# Patient Record
Sex: FEMALE | Race: WHITE | NOT HISPANIC OR LATINO | Employment: UNEMPLOYED | URBAN - NONMETROPOLITAN AREA
[De-identification: names, ages, dates, MRNs, and addresses within clinical notes are randomized per-mention and may not be internally consistent; named-entity substitution may affect disease eponyms.]

---

## 2017-12-19 ENCOUNTER — OFFICE VISIT (OUTPATIENT)
Dept: RETAIL CLINIC | Facility: CLINIC | Age: 37
End: 2017-12-19

## 2017-12-19 DIAGNOSIS — Z53.21 PATIENT LEFT WITHOUT BEING SEEN: Primary | ICD-10-CM

## 2017-12-19 PROCEDURE — 87081 CULTURE SCREEN ONLY: CPT | Performed by: NURSE PRACTITIONER

## 2017-12-19 NOTE — PROGRESS NOTES
Patient and son both presented to be seen.  A higher level of care was recommended for her son. She is taking him to urgent care and will be seen there too. Visit here cancelled.

## 2018-05-17 ENCOUNTER — OFFICE VISIT (OUTPATIENT)
Dept: RETAIL CLINIC | Facility: CLINIC | Age: 38
End: 2018-05-17

## 2018-05-17 VITALS
WEIGHT: 150 LBS | SYSTOLIC BLOOD PRESSURE: 140 MMHG | DIASTOLIC BLOOD PRESSURE: 80 MMHG | HEART RATE: 84 BPM | RESPIRATION RATE: 18 BRPM | OXYGEN SATURATION: 97 % | TEMPERATURE: 98.8 F

## 2018-05-17 DIAGNOSIS — L73.2 HIDRADENITIS: Primary | ICD-10-CM

## 2018-05-17 PROCEDURE — 99214 OFFICE O/P EST MOD 30 MIN: CPT | Performed by: NURSE PRACTITIONER

## 2018-05-17 RX ORDER — CEPHALEXIN 500 MG/1
500 CAPSULE ORAL 3 TIMES DAILY
Qty: 30 CAPSULE | Refills: 0 | Status: SHIPPED | OUTPATIENT
Start: 2018-05-17 | End: 2018-05-27

## 2018-05-17 NOTE — PATIENT INSTRUCTIONS
Hidradenitis Suppurativa  Hidradenitis suppurativa is a long-term (chronic) skin disease that starts with blocked sweat glands or hair follicles. Bacteria may grow in these blocked openings of your skin. Hidradenitis suppurativa is like a severe form of acne that develops in areas of your body where acne would be unusual. It is most likely to affect the areas of your body where skin rubs against skin and becomes moist. This includes your:  · Underarms.  · Groin.  · Genital areas.  · Buttocks.  · Upper thighs.  · Breasts.  Hidradenitis suppurativa may start out with small pimples. The pimples can develop into deep sores that break open (rupture) and drain pus. Over time your skin may thicken and become scarred. Hidradenitis suppurativa cannot be passed from person to person.  What are the causes?  The exact cause of hidradenitis suppurativa is not known. This condition may be due to:  · Male and female hormones. The condition is rare before and after puberty.  · An overactive body defense system (immune system). Your immune system may overreact to the blocked hair follicles or sweat glands and cause swelling and pus-filled sores.  What increases the risk?  You may have a higher risk of hidradenitis suppurativa if you:  · Are a woman.  · Are between ages 11 and 55.  · Have a family history of hidradenitis suppurativa.  · Have a personal history of acne.  · Are overweight.  · Smoke.  · Take the drug lithium.  What are the signs or symptoms?  The first signs of an outbreak are usually painful skin bumps that look like pimples. As the condition progresses:  · Skin bumps may get bigger and grow deeper into the skin.  · Bumps under the skin may rupture and drain smelly pus.  · Skin may become itchy and infected.  · Skin may thicken and scar.  · Drainage may continue through tunnels under the skin (fistulas).  · Walking and moving your arms can become painful.  How is this diagnosed?  Your health care provider may diagnose  hidradenitis suppurativa based on your medical history and your signs and symptoms. A physical exam will also be done. You may need to see a health care provider who specializes in skin diseases (dermatologist). You may also have tests done to confirm the diagnosis. These can include:  · Swabbing a sample of pus or drainage from your skin so it can be sent to the lab and tested for infection.  · Blood tests to check for infection.  How is this treated?  The same treatment will not work for everybody with hidradenitis suppurativa. Your treatment will depend on how severe your symptoms are. You may need to try several treatments to find what works best for you. Part of your treatment may include cleaning and bandaging (dressing) your wounds. You may also have to take medicines, such as the following:  · Antibiotics.  · Acne medicines.  · Medicines to block or suppress the immune system.  · A diabetes medicine (metformin) is sometimes used to treat this condition.  · For women, birth control pills can sometimes help relieve symptoms.  You may need surgery if you have a severe case of hidradenitis suppurativa that does not respond to medicine. Surgery may involve:  · Using a laser to clear the skin and remove hair follicles.  · Opening and draining deep sores.  · Removing the areas of skin that are diseased and scarred.  Follow these instructions at home:  · Learn as much as you can about your disease, and work closely with your health care providers.  · Take medicines only as directed by your health care provider.  · If you were prescribed an antibiotic medicine, finish it all even if you start to feel better.  · If you are overweight, losing weight may be very helpful. Try to reach and maintain a healthy weight.  · Do not use any tobacco products, including cigarettes, chewing tobacco, or electronic cigarettes. If you need help quitting, ask your health care provider.  · Do not shave the areas where you get  hidradenitis suppurativa.  · Do not wear deodorant.  · Wear loose-fitting clothes.  · Try not to overheat and get sweaty.  · Take a daily bleach bath as directed by your health care provider.  ¨ Fill your bathtub snf with water.  ¨ Pour in ½ cup of unscented household bleach.  ¨ Soak for 5-10 minutes.  · Cover sore areas with a warm, clean washcloth (compress) for 5-10 minutes.  Contact a health care provider if:  · You have a flare-up of hidradenitis suppurativa.  · You have chills or a fever.  · You are having trouble controlling your symptoms at home.  This information is not intended to replace advice given to you by your health care provider. Make sure you discuss any questions you have with your health care provider.  Document Released: 08/01/2005 Document Revised: 05/25/2017 Document Reviewed: 03/20/2015  [employer] Interactive Patient Education © 2017 [employer]

## 2018-05-17 NOTE — PROGRESS NOTES
Subjective   Rachana Smart is a 38 y.o. female.     Swollen glands under arms      Rash   The current episode started in the past 7 days. The problem has been gradually worsening since onset. The affected locations include the right axilla and left axilla. The rash is characterized by redness, swelling and pain. Associated symptoms include fatigue and a fever (last week; has resolved now). Pertinent negatives include no shortness of breath. Treatments tried: warm compresses. The treatment provided no relief.        The following portions of the patient's history were reviewed and updated as appropriate: allergies, current medications, past family history, past medical history, past social history, past surgical history and problem list.    Review of Systems   Constitutional: Positive for chills (last week), fatigue and fever (last week; has resolved now).   Respiratory: Negative for chest tightness and shortness of breath.    Cardiovascular: Negative for chest pain.   Gastrointestinal: Negative for nausea.   Musculoskeletal: Positive for myalgias. Negative for neck pain and neck stiffness.   Skin: Positive for rash.   Allergic/Immunologic: Positive for environmental allergies.   Hematological: Positive for adenopathy.       Objective      /80   Pulse 84   Temp 98.8 °F (37.1 °C) (Tympanic)   Resp 18   Wt 68 kg (150 lb)   LMP  (Within Weeks)   SpO2 97%   Breastfeeding? No     Physical Exam   Constitutional: She is oriented to person, place, and time. She appears well-developed and well-nourished. No distress.   HENT:   Head: Normocephalic and atraumatic.   Right Ear: External ear normal.   Left Ear: External ear normal.   Eyes: Conjunctivae and EOM are normal. Pupils are equal, round, and reactive to light.   Neck: Normal range of motion. Neck supple.   Cardiovascular: Normal rate and regular rhythm.    Pulmonary/Chest: Effort normal and breath sounds normal.   Musculoskeletal: Normal range of motion.    Neurological: She is alert and oriented to person, place, and time. No cranial nerve deficit.   Skin: Skin is warm and dry. Capillary refill takes less than 2 seconds. Rash noted.   pea- to marble-sized tender lumps under the skin of armpits bilateral; none are presently draining   Psychiatric: She has a normal mood and affect. Her behavior is normal. Judgment and thought content normal.   Nursing note and vitals reviewed.        Assessment/Plan   Rachana was seen today for rash.    Diagnoses and all orders for this visit:    Hidradenitis    Other orders  -     cephalexin (KEFLEX) 500 MG capsule; Take 1 capsule by mouth 3 (Three) Times a Day for 10 days.    Follow up with PCP if symptoms persist or worsen.    PRIYANK Merlos

## 2018-09-25 ENCOUNTER — OFFICE VISIT (OUTPATIENT)
Dept: RETAIL CLINIC | Facility: CLINIC | Age: 38
End: 2018-09-25

## 2018-09-25 VITALS
HEART RATE: 97 BPM | WEIGHT: 146 LBS | DIASTOLIC BLOOD PRESSURE: 84 MMHG | OXYGEN SATURATION: 98 % | TEMPERATURE: 98.4 F | RESPIRATION RATE: 18 BRPM | HEIGHT: 67 IN | BODY MASS INDEX: 22.91 KG/M2 | SYSTOLIC BLOOD PRESSURE: 118 MMHG

## 2018-09-25 DIAGNOSIS — L73.2 HIDRADENITIS: ICD-10-CM

## 2018-09-25 DIAGNOSIS — J02.9 ACUTE PHARYNGITIS, UNSPECIFIED ETIOLOGY: Primary | ICD-10-CM

## 2018-09-25 PROCEDURE — 99213 OFFICE O/P EST LOW 20 MIN: CPT | Performed by: NURSE PRACTITIONER

## 2018-09-25 RX ORDER — CEPHALEXIN 500 MG/1
500 CAPSULE ORAL 3 TIMES DAILY
Qty: 30 CAPSULE | Refills: 0 | Status: SHIPPED | OUTPATIENT
Start: 2018-09-25 | End: 2018-10-05

## 2018-09-25 NOTE — PROGRESS NOTES
Chief Complaint   Patient presents with   • Sore Throat   • Earache   • bumps under arms     Subjective   Rachana Smart is a 38 y.o. female who presents to the clinic today with complaints of sore throat, possible fever, mild ear pain. She also has tender bumps under her arms which she has had in the past. She reports they always seem to flare up when she feels bad.  Her son tested positive for strep today.   Sore Throat    This is a new problem. The current episode started today. The problem has been gradually worsening. Maximum temperature: not sure. Associated symptoms include ear pain. Pertinent negatives include no abdominal pain, congestion, coughing, diarrhea, headaches, shortness of breath, trouble swallowing or vomiting. She has had exposure to strep.   Earache    Associated symptoms include a rash (sore bumps under arms) and a sore throat. Pertinent negatives include no abdominal pain, coughing, diarrhea, headaches, rhinorrhea or vomiting.     Current Outpatient Prescriptions:   •  fluticasone (FLONASE) 50 MCG/ACT nasal spray, 2 sprays into each nostril Daily., Disp: 1 bottle, Rfl: 0  •  lisinopril (PRINIVIL,ZESTRIL) 10 MG tablet, Take 10 mg by mouth Daily., Disp: , Rfl:   •  LISINOPRIL PO, Take  by mouth., Disp: , Rfl:   •  loratadine (CLARITIN) 10 MG tablet, Take 1 tablet by mouth Daily., Disp: 30 tablet, Rfl: 0    Allergies:  Codeine    Past Medical History:   Diagnosis Date   • Allergic    • Anxiety    • Anxiety    • Asthma    • Gestational diabetes    • Heart murmur    • History of ear infections    • Hypertension    • Sinusitis      Past Surgical History:   Procedure Laterality Date   •  SECTION       Family History   Problem Relation Age of Onset   • Diabetes Mother    • Heart disease Father    • Diabetes Father    • Stroke Father      Social History   Substance Use Topics   • Smoking status: Current Every Day Smoker     Packs/day: 0.50     Years: 10.00   • Smokeless tobacco: Never Used   •  "Alcohol use Yes      Comment: 2/daily       Review of Systems  Review of Systems   Constitutional: Positive for chills and fever (possibly, not sure).   HENT: Positive for ear pain and sore throat. Negative for congestion, postnasal drip, rhinorrhea, sinus pain, sinus pressure and trouble swallowing.    Eyes: Negative.    Respiratory: Negative for cough and shortness of breath.    Gastrointestinal: Negative for abdominal pain, constipation, diarrhea, nausea and vomiting.   Musculoskeletal: Positive for arthralgias (generalized this morning).   Skin: Positive for rash (sore bumps under arms).   Neurological: Negative for headaches.       Objective   /84   Pulse 97   Temp 98.4 °F (36.9 °C) (Oral)   Resp 18   Ht 170.2 cm (67\")   Wt 66.2 kg (146 lb)   LMP 09/18/2018 (Exact Date)   SpO2 98%   BMI 22.87 kg/m²       Physical Exam   Constitutional: She is oriented to person, place, and time. She appears well-developed and well-nourished. She is cooperative. She appears ill (mildly). No distress.   HENT:   Head: Normocephalic and atraumatic.   Right Ear: Tympanic membrane, external ear and ear canal normal.   Left Ear: Tympanic membrane, external ear and ear canal normal.   Nose: Nose normal. No sinus tenderness.   Mouth/Throat: Uvula is midline and mucous membranes are normal. Posterior oropharyngeal erythema present. Tonsils are 2+ on the right. Tonsils are 2+ on the left. No tonsillar exudate.   Eyes: Pupils are equal, round, and reactive to light. Conjunctivae, EOM and lids are normal.   Neck: Trachea normal and normal range of motion. Neck supple.   Cardiovascular: Normal rate, regular rhythm, S1 normal, S2 normal and normal heart sounds.    Pulmonary/Chest: Effort normal and breath sounds normal. She has no wheezes. She has no rhonchi. She has no rales.   Lymphadenopathy:     She has no cervical adenopathy.   Neurological: She is alert and oriented to person, place, and time. Coordination and gait normal. "   Skin: Skin is warm, dry and intact.        Psychiatric: She has a normal mood and affect. Her speech is normal and behavior is normal.   Vitals reviewed.      Assessment/Plan     Rachana was seen today for sore throat, earache and bumps under arms.    Diagnoses and all orders for this visit:    Acute pharyngitis, unspecified etiology    Hidradenitis    Other orders  -     cephalexin (KEFLEX) 500 MG capsule; Take 1 capsule by mouth 3 (Three) Times a Day for 10 days.    Take Tylenol or Ibuprofen if needed for pain or fever.    An antibiotic has been prescribed for you that needs to be taken as directed for the full length of treatment. It is recommended that you take a probiotic when taking an antibiotic. Probiotics are found over the counter in pill form and in some brands of yogurt.    Change your razor blade.  Change your toothbrush after 48 hours.   Apply warm moist compresses under your arms for 20 minutes twice a day for the next few days.     If symptoms are not improving after 48 hours please see your primary care provider.

## 2018-09-25 NOTE — PATIENT INSTRUCTIONS
Pharyngitis  Pharyngitis is redness, pain, and swelling (inflammation) of the throat (pharynx). It is a very common cause of sore throat. Pharyngitis can be caused by a bacteria, but it is usually caused by a virus. Most cases of pharyngitis get better on their own without treatment.  What are the causes?  This condition may be caused by:  · Infection by viruses (viral). Viral pharyngitis spreads from person to person (is contagious) through coughing, sneezing, and sharing of personal items or utensils such as cups, forks, spoons, and toothbrushes.  · Infection by bacteria (bacterial). Bacterial pharyngitis may be spread by touching the nose or face after coming in contact with the bacteria, or through more intimate contact, such as kissing.  · Allergies. Allergies can cause buildup of mucus in the throat (post-nasal drip), leading to inflammation and irritation. Allergies can also cause blocked nasal passages, forcing breathing through the mouth, which dries and irritates the throat.    What increases the risk?  You are more likely to develop this condition if:  · You are 5-24 years old.  · You are exposed to crowded environments such as , school, or dormitory living.  · You live in a cold climate.  · You have a weakened disease-fighting (immune) system.    What are the signs or symptoms?  Symptoms of this condition vary by the cause (viral, bacterial, or allergies) and can include:  · Sore throat.  · Fatigue.  · Low-grade fever.  · Headache.  · Joint pain and muscle aches.  · Skin rashes.  · Swollen glands in the throat (lymph nodes).  · Plaque-like film on the throat or tonsils. This is often a symptom of bacterial pharyngitis.  · Vomiting.  · Stuffy nose (nasal congestion).  · Cough.  · Red, itchy eyes (conjunctivitis).  · Loss of appetite.    How is this diagnosed?  This condition is often diagnosed based on your medical history and a physical exam. Your health care provider will ask you questions about  your illness and your symptoms. A swab of your throat may be done to check for bacteria (rapid strep test). Other lab tests may also be done, depending on the suspected cause, but these are rare.  How is this treated?  This condition usually gets better in 3-4 days without medicine. Bacterial pharyngitis may be treated with antibiotic medicines.  Follow these instructions at home:  · Take over-the-counter and prescription medicines only as told by your health care provider.  ? If you were prescribed an antibiotic medicine, take it as told by your health care provider. Do not stop taking the antibiotic even if you start to feel better.  ? Do not give children aspirin because of the association with Reye syndrome.  · Drink enough water and fluids to keep your urine clear or pale yellow.  · Get a lot of rest.  · Gargle with a salt-water mixture 3-4 times a day or as needed. To make a salt-water mixture, completely dissolve ½-1 tsp of salt in 1 cup of warm water.  · If your health care provider approves, you may use throat lozenges or sprays to soothe your throat.  Contact a health care provider if:  · You have large, tender lumps in your neck.  · You have a rash.  · You cough up green, yellow-brown, or bloody spit.  Get help right away if:  · Your neck becomes stiff.  · You drool or are unable to swallow liquids.  · You cannot drink or take medicines without vomiting.  · You have severe pain that does not go away, even after you take medicine.  · You have trouble breathing, and it is not caused by a stuffy nose.  · You have new pain and swelling in your joints such as the knees, ankles, wrists, or elbows.  Summary  · Pharyngitis is redness, pain, and swelling (inflammation) of the throat (pharynx).  · While pharyngitis can be caused by a bacteria, the most common causes are viral.  · Most cases of pharyngitis get better on their own without treatment.  · Bacterial pharyngitis is treated with antibiotic medicines.  This  information is not intended to replace advice given to you by your health care provider. Make sure you discuss any questions you have with your health care provider.  Document Released: 12/18/2006 Document Revised: 01/23/2018 Document Reviewed: 01/23/2018  [employer] Interactive Patient Education © 2018 [employer]    Hidradenitis Suppurativa  Hidradenitis suppurativa is a long-term (chronic) skin disease that starts with blocked sweat glands or hair follicles. Bacteria may grow in these blocked openings of your skin. Hidradenitis suppurativa is like a severe form of acne that develops in areas of your body where acne would be unusual. It is most likely to affect the areas of your body where skin rubs against skin and becomes moist. This includes your:  · Underarms.  · Groin.  · Genital areas.  · Buttocks.  · Upper thighs.  · Breasts.    Hidradenitis suppurativa may start out with small pimples. The pimples can develop into deep sores that break open (rupture) and drain pus. Over time your skin may thicken and become scarred. Hidradenitis suppurativa cannot be passed from person to person.  What are the causes?  The exact cause of hidradenitis suppurativa is not known. This condition may be due to:  · Male and female hormones. The condition is rare before and after puberty.  · An overactive body defense system (immune system). Your immune system may overreact to the blocked hair follicles or sweat glands and cause swelling and pus-filled sores.    What increases the risk?  You may have a higher risk of hidradenitis suppurativa if you:  · Are a woman.  · Are between ages 11 and 55.  · Have a family history of hidradenitis suppurativa.  · Have a personal history of acne.  · Are overweight.  · Smoke.  · Take the drug lithium.    What are the signs or symptoms?  The first signs of an outbreak are usually painful skin bumps that look like pimples. As the condition progresses:  · Skin bumps may get bigger and grow deeper  into the skin.  · Bumps under the skin may rupture and drain smelly pus.  · Skin may become itchy and infected.  · Skin may thicken and scar.  · Drainage may continue through tunnels under the skin (fistulas).  · Walking and moving your arms can become painful.    How is this diagnosed?  Your health care provider may diagnose hidradenitis suppurativa based on your medical history and your signs and symptoms. A physical exam will also be done. You may need to see a health care provider who specializes in skin diseases (dermatologist). You may also have tests done to confirm the diagnosis. These can include:  · Swabbing a sample of pus or drainage from your skin so it can be sent to the lab and tested for infection.  · Blood tests to check for infection.    How is this treated?  The same treatment will not work for everybody with hidradenitis suppurativa. Your treatment will depend on how severe your symptoms are. You may need to try several treatments to find what works best for you. Part of your treatment may include cleaning and bandaging (dressing) your wounds. You may also have to take medicines, such as the following:  · Antibiotics.  · Acne medicines.  · Medicines to block or suppress the immune system.  · A diabetes medicine (metformin) is sometimes used to treat this condition.  · For women, birth control pills can sometimes help relieve symptoms.    You may need surgery if you have a severe case of hidradenitis suppurativa that does not respond to medicine. Surgery may involve:  · Using a laser to clear the skin and remove hair follicles.  · Opening and draining deep sores.  · Removing the areas of skin that are diseased and scarred.    Follow these instructions at home:  · Learn as much as you can about your disease, and work closely with your health care providers.  · Take medicines only as directed by your health care provider.  · If you were prescribed an antibiotic medicine, finish it all even if you  start to feel better.  · If you are overweight, losing weight may be very helpful. Try to reach and maintain a healthy weight.  · Do not use any tobacco products, including cigarettes, chewing tobacco, or electronic cigarettes. If you need help quitting, ask your health care provider.  · Do not shave the areas where you get hidradenitis suppurativa.  · Do not wear deodorant.  · Wear loose-fitting clothes.  · Try not to overheat and get sweaty.  · Take a daily bleach bath as directed by your health care provider.  ? Fill your bathtub skilled nursing with water.  ? Pour in ½ cup of unscented household bleach.  ? Soak for 5-10 minutes.  · Cover sore areas with a warm, clean washcloth (compress) for 5-10 minutes.  Contact a health care provider if:  · You have a flare-up of hidradenitis suppurativa.  · You have chills or a fever.  · You are having trouble controlling your symptoms at home.  This information is not intended to replace advice given to you by your health care provider. Make sure you discuss any questions you have with your health care provider.  Document Released: 08/01/2005 Document Revised: 05/25/2017 Document Reviewed: 03/20/2015  [employer] Patient Education © 2018 [employer]    Take Tylenol or Ibuprofen if needed for pain or fever.    An antibiotic has been prescribed for you that needs to be taken as directed for the full length of treatment. It is recommended that you take a probiotic when taking an antibiotic. Probiotics are found over the counter in pill form and in some brands of yogurt.      Change your razor blade.  Change your toothbrush after 48 hours.   Apply warm moist compresses under your arms for 20 minutes twice a day for the next few days.     If symptoms are not improving after 48 hours please see your primary care provider.

## 2018-12-18 ENCOUNTER — OFFICE VISIT (OUTPATIENT)
Dept: RETAIL CLINIC | Facility: CLINIC | Age: 38
End: 2018-12-18

## 2018-12-18 VITALS
BODY MASS INDEX: 23.18 KG/M2 | SYSTOLIC BLOOD PRESSURE: 140 MMHG | TEMPERATURE: 99.1 F | RESPIRATION RATE: 20 BRPM | DIASTOLIC BLOOD PRESSURE: 92 MMHG | HEART RATE: 80 BPM | OXYGEN SATURATION: 98 % | WEIGHT: 148 LBS

## 2018-12-18 DIAGNOSIS — J02.9 SORE THROAT: Primary | ICD-10-CM

## 2018-12-18 DIAGNOSIS — L73.2 HIDRADENITIS: ICD-10-CM

## 2018-12-18 DIAGNOSIS — J06.9 ACUTE URI: ICD-10-CM

## 2018-12-18 LAB
EXPIRATION DATE: NORMAL
EXPIRATION DATE: NORMAL
FLUAV AG NPH QL: NEGATIVE
FLUBV AG NPH QL: NEGATIVE
INTERNAL CONTROL: NORMAL
INTERNAL CONTROL: NORMAL
Lab: NORMAL
Lab: NORMAL
S PYO AG THROAT QL: NEGATIVE

## 2018-12-18 PROCEDURE — 87804 INFLUENZA ASSAY W/OPTIC: CPT | Performed by: NURSE PRACTITIONER

## 2018-12-18 PROCEDURE — 87880 STREP A ASSAY W/OPTIC: CPT | Performed by: NURSE PRACTITIONER

## 2018-12-18 PROCEDURE — 99213 OFFICE O/P EST LOW 20 MIN: CPT | Performed by: NURSE PRACTITIONER

## 2018-12-18 RX ORDER — CEPHALEXIN 500 MG/1
500 CAPSULE ORAL 3 TIMES DAILY
Qty: 30 CAPSULE | Refills: 0 | Status: SHIPPED | OUTPATIENT
Start: 2018-12-18 | End: 2018-12-28

## 2018-12-18 NOTE — PATIENT INSTRUCTIONS
"Upper Respiratory Infection, Adult  Most upper respiratory infections (URIs) are a viral infection of the air passages leading to the lungs. A URI affects the nose, throat, and upper air passages. The most common type of URI is nasopharyngitis and is typically referred to as \"the common cold.\"  URIs run their course and usually go away on their own. Most of the time, a URI does not require medical attention, but sometimes a bacterial infection in the upper airways can follow a viral infection. This is called a secondary infection. Sinus and middle ear infections are common types of secondary upper respiratory infections.  Bacterial pneumonia can also complicate a URI. A URI can worsen asthma and chronic obstructive pulmonary disease (COPD). Sometimes, these complications can require emergency medical care and may be life threatening.  What are the causes?  Almost all URIs are caused by viruses. A virus is a type of germ and can spread from one person to another.  What increases the risk?  You may be at risk for a URI if:  · You smoke.  · You have chronic heart or lung disease.  · You have a weakened defense (immune) system.  · You are very young or very old.  · You have nasal allergies or asthma.  · You work in crowded or poorly ventilated areas.  · You work in health care facilities or schools.    What are the signs or symptoms?  Symptoms typically develop 2-3 days after you come in contact with a cold virus. Most viral URIs last 7-10 days. However, viral URIs from the influenza virus (flu virus) can last 14-18 days and are typically more severe. Symptoms may include:  · Runny or stuffy (congested) nose.  · Sneezing.  · Cough.  · Sore throat.  · Headache.  · Fatigue.  · Fever.  · Loss of appetite.  · Pain in your forehead, behind your eyes, and over your cheekbones (sinus pain).  · Muscle aches.    How is this diagnosed?  Your health care provider may diagnose a URI by:  · Physical exam.  · Tests to check that your " symptoms are not due to another condition such as:  ? Strep throat.  ? Sinusitis.  ? Pneumonia.  ? Asthma.    How is this treated?  A URI goes away on its own with time. It cannot be cured with medicines, but medicines may be prescribed or recommended to relieve symptoms. Medicines may help:  · Reduce your fever.  · Reduce your cough.  · Relieve nasal congestion.    Follow these instructions at home:  · Take medicines only as directed by your health care provider.  · Gargle warm saltwater or take cough drops to comfort your throat as directed by your health care provider.  · Use a warm mist humidifier or inhale steam from a shower to increase air moisture. This may make it easier to breathe.  · Drink enough fluid to keep your urine clear or pale yellow.  · Eat soups and other clear broths and maintain good nutrition.  · Rest as needed.  · Return to work when your temperature has returned to normal or as your health care provider advises. You may need to stay home longer to avoid infecting others. You can also use a face mask and careful hand washing to prevent spread of the virus.  · Increase the usage of your inhaler if you have asthma.  · Do not use any tobacco products, including cigarettes, chewing tobacco, or electronic cigarettes. If you need help quitting, ask your health care provider.  How is this prevented?  The best way to protect yourself from getting a cold is to practice good hygiene.  · Avoid oral or hand contact with people with cold symptoms.  · Wash your hands often if contact occurs.    There is no clear evidence that vitamin C, vitamin E, echinacea, or exercise reduces the chance of developing a cold. However, it is always recommended to get plenty of rest, exercise, and practice good nutrition.  Contact a health care provider if:  · You are getting worse rather than better.  · Your symptoms are not controlled by medicine.  · You have chills.  · You have worsening shortness of breath.  · You have  brown or red mucus.  · You have yellow or brown nasal discharge.  · You have pain in your face, especially when you bend forward.  · You have a fever.  · You have swollen neck glands.  · You have pain while swallowing.  · You have white areas in the back of your throat.  Get help right away if:  · You have severe or persistent:  ? Headache.  ? Ear pain.  ? Sinus pain.  ? Chest pain.  · You have chronic lung disease and any of the following:  ? Wheezing.  ? Prolonged cough.  ? Coughing up blood.  ? A change in your usual mucus.  · You have a stiff neck.  · You have changes in your:  ? Vision.  ? Hearing.  ? Thinking.  ? Mood.  This information is not intended to replace advice given to you by your health care provider. Make sure you discuss any questions you have with your health care provider.  Document Released: 06/13/2002 Document Revised: 08/20/2017 Document Reviewed: 03/25/2015  [employer] Patient Education © 2018 [employer]    Continue Flonase.  Increase fluid intake.   Apply warm compress to right armpit for 20 minutes twice a day.   An antibiotic has been prescribed for you that needs to be taken as directed for the full length of treatment. It is recommended that you take a probiotic when taking an antibiotic. Probiotics are found over the counter in pill form and in some brands of yogurt.      If symptoms persist or worsen please see your primary care provider.

## 2018-12-19 NOTE — PROGRESS NOTES
Chief Complaint   Patient presents with   • Sore Throat   • Cough   • Fever     Subjective   Rachana Smart is a 38 y.o. female who presents to the clinic today with complaints of sore throat, fever, cough.  She also has some tender bumps right axilla.  She was treated for the same issue back in September. She states they completely went away after treatment.  She reports just about any time she is sick with strep they pop up.   Sore Throat    This is a new problem. Episode onset: 2-3 days ago. The problem has been waxing and waning. Maximum temperature: low grade. Associated symptoms include congestion and coughing. Pertinent negatives include no abdominal pain, diarrhea, ear pain, headaches, shortness of breath, trouble swallowing or vomiting. She has had exposure to strep (possibly). She has had no exposure to mono.   Cough   This is a new problem. Episode onset: 2-3 days ago. The cough is non-productive. Associated symptoms include a fever, postnasal drip, a rash (right axilla) and a sore throat. Pertinent negatives include no chills, ear pain, headaches, rhinorrhea, shortness of breath or wheezing.   Fever    Associated symptoms include congestion, coughing, a rash (right axilla) and a sore throat. Pertinent negatives include no abdominal pain, diarrhea, ear pain, headaches, nausea, vomiting or wheezing.       Current Outpatient Medications:   •  fluticasone (FLONASE) 50 MCG/ACT nasal spray, 2 sprays into each nostril Daily., Disp: 1 bottle, Rfl: 0    Allergies:  Codeine    Past Medical History:   Diagnosis Date   • Allergic    • Anxiety    • Anxiety    • Asthma    • Gestational diabetes    • Heart murmur    • History of ear infections    • Hypertension    • Sinusitis      Past Surgical History:   Procedure Laterality Date   •  SECTION       Family History   Problem Relation Age of Onset   • Diabetes Mother    • Heart disease Father    • Diabetes Father    • Stroke Father      Social History     Tobacco  Use   • Smoking status: Current Every Day Smoker     Packs/day: 0.50     Years: 10.00     Pack years: 5.00   • Smokeless tobacco: Never Used   Substance Use Topics   • Alcohol use: Yes     Comment: 2/daily   • Drug use: No       Review of Systems  Review of Systems   Constitutional: Positive for fever. Negative for chills.   HENT: Positive for congestion, postnasal drip and sore throat. Negative for ear pain, rhinorrhea, sinus pressure, sinus pain and trouble swallowing.    Respiratory: Positive for cough. Negative for shortness of breath and wheezing.    Gastrointestinal: Negative for abdominal pain, diarrhea, nausea and vomiting.   Skin: Positive for rash (right axilla).   Neurological: Negative for headaches.       Objective   /92   Pulse 80   Temp 99.1 °F (37.3 °C) (Oral)   Resp 20   Wt 67.1 kg (148 lb)   LMP 12/11/2018 (Within Days)   SpO2 98%   BMI 23.18 kg/m²       Physical Exam   Constitutional: She is oriented to person, place, and time. She appears well-developed and well-nourished. She is cooperative.   HENT:   Head: Normocephalic and atraumatic.   Right Ear: Tympanic membrane, external ear and ear canal normal.   Left Ear: Tympanic membrane, external ear and ear canal normal.   Nose: Nose normal. No sinus tenderness.   Mouth/Throat: Uvula is midline and mucous membranes are normal. Posterior oropharyngeal erythema present. Tonsils are 2+ on the right. Tonsils are 2+ on the left. No tonsillar exudate.   Eyes: Conjunctivae, EOM and lids are normal. Pupils are equal, round, and reactive to light.   Neck: Trachea normal and normal range of motion. Neck supple.   Cardiovascular: Normal rate, regular rhythm, S1 normal, S2 normal and normal heart sounds.   Pulmonary/Chest: Effort normal and breath sounds normal. She has no wheezes. She has no rhonchi. She has no rales.   Lymphadenopathy:     She has no cervical adenopathy.   Neurological: She is alert and oriented to person, place, and time.  Coordination and gait normal.   Skin: Skin is warm, dry and intact.        Psychiatric: She has a normal mood and affect. Her speech is normal and behavior is normal.   Vitals reviewed.      Assessment/Plan     Rachana was seen today for sore throat, cough and fever.    Diagnoses and all orders for this visit:    Sore throat  -     POC Rapid Strep A    Acute URI  -     POC Influenza A / B    Hidradenitis    Other orders  -     cephalexin (KEFLEX) 500 MG capsule; Take 1 capsule by mouth 3 (Three) Times a Day for 10 days.      Lab Results   Component Value Date    RAPFLUA negative 12/18/2018    RAPFLUB negative 12/18/2018     Lab Results   Component Value Date    RAPSCRN Negative 12/18/2018     Continue Flonase.  Increase fluid intake.   Apply warm compress to right armpit for 20 minutes twice a day.   An antibiotic has been prescribed for you that needs to be taken as directed for the full length of treatment. It is recommended that you take a probiotic when taking an antibiotic. Probiotics are found over the counter in pill form and in some brands of yogurt.      If symptoms persist or worsen please see your primary care provider.

## 2020-11-05 PROCEDURE — U0003 INFECTIOUS AGENT DETECTION BY NUCLEIC ACID (DNA OR RNA); SEVERE ACUTE RESPIRATORY SYNDROME CORONAVIRUS 2 (SARS-COV-2) (CORONAVIRUS DISEASE [COVID-19]), AMPLIFIED PROBE TECHNIQUE, MAKING USE OF HIGH THROUGHPUT TECHNOLOGIES AS DESCRIBED BY CMS-2020-01-R: HCPCS | Performed by: NURSE PRACTITIONER

## 2020-11-05 PROCEDURE — 87081 CULTURE SCREEN ONLY: CPT | Performed by: NURSE PRACTITIONER

## 2020-11-07 ENCOUNTER — TELEPHONE (OUTPATIENT)
Dept: URGENT CARE | Facility: CLINIC | Age: 40
End: 2020-11-07

## 2020-11-07 NOTE — TELEPHONE ENCOUNTER
Spoke to patient to discuss negative covid-19 test results. She agrees to follow-up with PCP if needed.

## 2021-01-20 PROCEDURE — U0004 COV-19 TEST NON-CDC HGH THRU: HCPCS | Performed by: NURSE PRACTITIONER

## 2021-02-08 ENCOUNTER — LAB (OUTPATIENT)
Dept: LAB | Facility: HOSPITAL | Age: 41
End: 2021-02-08

## 2021-02-08 ENCOUNTER — OFFICE VISIT (OUTPATIENT)
Dept: INTERNAL MEDICINE | Facility: CLINIC | Age: 41
End: 2021-02-08

## 2021-02-08 VITALS
WEIGHT: 139 LBS | TEMPERATURE: 98.7 F | DIASTOLIC BLOOD PRESSURE: 90 MMHG | HEART RATE: 73 BPM | RESPIRATION RATE: 15 BRPM | HEIGHT: 66 IN | OXYGEN SATURATION: 97 % | BODY MASS INDEX: 22.34 KG/M2 | SYSTOLIC BLOOD PRESSURE: 140 MMHG

## 2021-02-08 DIAGNOSIS — Z00.00 HEALTHCARE MAINTENANCE: ICD-10-CM

## 2021-02-08 DIAGNOSIS — Z11.59 ENCOUNTER FOR HEPATITIS C SCREENING TEST FOR LOW RISK PATIENT: ICD-10-CM

## 2021-02-08 DIAGNOSIS — Z11.59 NEED FOR HEPATITIS C SCREENING TEST: ICD-10-CM

## 2021-02-08 DIAGNOSIS — I10 ESSENTIAL HYPERTENSION: Primary | ICD-10-CM

## 2021-02-08 DIAGNOSIS — Z80.3 FAMILY HISTORY OF BREAST CANCER: ICD-10-CM

## 2021-02-08 DIAGNOSIS — I10 ESSENTIAL HYPERTENSION: ICD-10-CM

## 2021-02-08 DIAGNOSIS — Z12.31 ENCOUNTER FOR SCREENING MAMMOGRAM FOR MALIGNANT NEOPLASM OF BREAST: ICD-10-CM

## 2021-02-08 DIAGNOSIS — Z72.0 TOBACCO USE: ICD-10-CM

## 2021-02-08 DIAGNOSIS — F41.9 ANXIETY: ICD-10-CM

## 2021-02-08 DIAGNOSIS — D75.89 MACROCYTOSIS: ICD-10-CM

## 2021-02-08 LAB
ALBUMIN SERPL-MCNC: 4.2 G/DL (ref 3.5–5.2)
ALBUMIN/GLOB SERPL: 1.6 G/DL
ALP SERPL-CCNC: 72 U/L (ref 39–117)
ALT SERPL W P-5'-P-CCNC: 46 U/L (ref 1–33)
ANION GAP SERPL CALCULATED.3IONS-SCNC: 15.8 MMOL/L (ref 5–15)
AST SERPL-CCNC: 73 U/L (ref 1–32)
BASOPHILS # BLD AUTO: 0.07 10*3/MM3 (ref 0–0.2)
BASOPHILS NFR BLD AUTO: 1 % (ref 0–1.5)
BILIRUB SERPL-MCNC: 0.7 MG/DL (ref 0–1.2)
BUN SERPL-MCNC: 5 MG/DL (ref 6–20)
BUN/CREAT SERPL: 9.8 (ref 7–25)
CALCIUM SPEC-SCNC: 9.5 MG/DL (ref 8.6–10.5)
CHLORIDE SERPL-SCNC: 95 MMOL/L (ref 98–107)
CHOLEST SERPL-MCNC: 236 MG/DL (ref 0–200)
CO2 SERPL-SCNC: 25.2 MMOL/L (ref 22–29)
CREAT SERPL-MCNC: 0.51 MG/DL (ref 0.57–1)
DEPRECATED RDW RBC AUTO: 48 FL (ref 37–54)
EOSINOPHIL # BLD AUTO: 0.19 10*3/MM3 (ref 0–0.4)
EOSINOPHIL NFR BLD AUTO: 2.6 % (ref 0.3–6.2)
ERYTHROCYTE [DISTWIDTH] IN BLOOD BY AUTOMATED COUNT: 13.3 % (ref 12.3–15.4)
GFR SERPL CREATININE-BSD FRML MDRD: 133 ML/MIN/1.73
GLOBULIN UR ELPH-MCNC: 2.7 GM/DL
GLUCOSE SERPL-MCNC: 89 MG/DL (ref 65–99)
HBA1C MFR BLD: 5.06 % (ref 4.8–5.6)
HCT VFR BLD AUTO: 36 % (ref 34–46.6)
HCV AB SER DONR QL: NORMAL
HDLC SERPL-MCNC: 93 MG/DL (ref 40–60)
HGB BLD-MCNC: 12.6 G/DL (ref 12–15.9)
IMM GRANULOCYTES # BLD AUTO: 0.03 10*3/MM3 (ref 0–0.05)
IMM GRANULOCYTES NFR BLD AUTO: 0.4 % (ref 0–0.5)
LDLC SERPL CALC-MCNC: 123 MG/DL (ref 0–100)
LDLC/HDLC SERPL: 1.29 {RATIO}
LYMPHOCYTES # BLD AUTO: 1.86 10*3/MM3 (ref 0.7–3.1)
LYMPHOCYTES NFR BLD AUTO: 25.8 % (ref 19.6–45.3)
MCH RBC QN AUTO: 35.6 PG (ref 26.6–33)
MCHC RBC AUTO-ENTMCNC: 35 G/DL (ref 31.5–35.7)
MCV RBC AUTO: 101.7 FL (ref 79–97)
MONOCYTES # BLD AUTO: 0.54 10*3/MM3 (ref 0.1–0.9)
MONOCYTES NFR BLD AUTO: 7.5 % (ref 5–12)
NEUTROPHILS NFR BLD AUTO: 4.52 10*3/MM3 (ref 1.7–7)
NEUTROPHILS NFR BLD AUTO: 62.7 % (ref 42.7–76)
NRBC BLD AUTO-RTO: 0 /100 WBC (ref 0–0.2)
PLATELET # BLD AUTO: 267 10*3/MM3 (ref 140–450)
PMV BLD AUTO: 10.5 FL (ref 6–12)
POTASSIUM SERPL-SCNC: 3.5 MMOL/L (ref 3.5–5.2)
PROT SERPL-MCNC: 6.9 G/DL (ref 6–8.5)
RBC # BLD AUTO: 3.54 10*6/MM3 (ref 3.77–5.28)
SODIUM SERPL-SCNC: 136 MMOL/L (ref 136–145)
TRIGL SERPL-MCNC: 117 MG/DL (ref 0–150)
VLDLC SERPL-MCNC: 20 MG/DL (ref 5–40)
WBC # BLD AUTO: 7.21 10*3/MM3 (ref 3.4–10.8)

## 2021-02-08 PROCEDURE — 80061 LIPID PANEL: CPT

## 2021-02-08 PROCEDURE — 36415 COLL VENOUS BLD VENIPUNCTURE: CPT

## 2021-02-08 PROCEDURE — 85025 COMPLETE CBC W/AUTO DIFF WBC: CPT

## 2021-02-08 PROCEDURE — 86803 HEPATITIS C AB TEST: CPT

## 2021-02-08 PROCEDURE — 82607 VITAMIN B-12: CPT

## 2021-02-08 PROCEDURE — 83036 HEMOGLOBIN GLYCOSYLATED A1C: CPT

## 2021-02-08 PROCEDURE — 80053 COMPREHEN METABOLIC PANEL: CPT

## 2021-02-08 PROCEDURE — 99204 OFFICE O/P NEW MOD 45 MIN: CPT | Performed by: INTERNAL MEDICINE

## 2021-02-08 PROCEDURE — 82746 ASSAY OF FOLIC ACID SERUM: CPT

## 2021-02-08 NOTE — PATIENT INSTRUCTIONS
"Managing Your Hypertension  Hypertension is commonly called high blood pressure. This is when the force of your blood pressing against the walls of your arteries is too strong. Arteries are blood vessels that carry blood from your heart throughout your body. Hypertension forces the heart to work harder to pump blood, and may cause the arteries to become narrow or stiff. Having untreated or uncontrolled hypertension can cause heart attack, stroke, kidney disease, and other problems.  What are blood pressure readings?  A blood pressure reading consists of a higher number over a lower number. Ideally, your blood pressure should be below 120/80. The first (\"top\") number is called the systolic pressure. It is a measure of the pressure in your arteries as your heart beats. The second (\"bottom\") number is called the diastolic pressure. It is a measure of the pressure in your arteries as the heart relaxes.  What does my blood pressure reading mean?  Blood pressure is classified into four stages. Based on your blood pressure reading, your health care provider may use the following stages to determine what type of treatment you need, if any. Systolic pressure and diastolic pressure are measured in a unit called mm Hg.  Normal  · Systolic pressure: below 120.  · Diastolic pressure: below 80.  Elevated  · Systolic pressure: 120-129.  · Diastolic pressure: below 80.  Hypertension stage 1  · Systolic pressure: 130-139.  · Diastolic pressure: 80-89.  Hypertension stage 2  · Systolic pressure: 140 or above.  · Diastolic pressure: 90 or above.  What health risks are associated with hypertension?  Managing your hypertension is an important responsibility. Uncontrolled hypertension can lead to:  · A heart attack.  · A stroke.  · A weakened blood vessel (aneurysm).  · Heart failure.  · Kidney damage.  · Eye damage.  · Metabolic syndrome.  · Memory and concentration problems.  What changes can I make to manage my " hypertension?  Hypertension can be managed by making lifestyle changes and possibly by taking medicines. Your health care provider will help you make a plan to bring your blood pressure within a normal range.  Eating and drinking    · Eat a diet that is high in fiber and potassium, and low in salt (sodium), added sugar, and fat. An example eating plan is called the DASH (Dietary Approaches to Stop Hypertension) diet. To eat this way:  ? Eat plenty of fresh fruits and vegetables. Try to fill half of your plate at each meal with fruits and vegetables.  ? Eat whole grains, such as whole wheat pasta, brown rice, or whole grain bread. Fill about one quarter of your plate with whole grains.  ? Eat low-fat diary products.  ? Avoid fatty cuts of meat, processed or cured meats, and poultry with skin. Fill about one quarter of your plate with lean proteins such as fish, chicken without skin, beans, eggs, and tofu.  ? Avoid premade and processed foods. These tend to be higher in sodium, added sugar, and fat.  · Reduce your daily sodium intake. Most people with hypertension should eat less than 1,500 mg of sodium a day.  · Limit alcohol intake to no more than 1 drink a day for nonpregnant women and 2 drinks a day for men. One drink equals 12 oz of beer, 5 oz of wine, or 1½ oz of hard liquor.  Lifestyle  · Work with your health care provider to maintain a healthy body weight, or to lose weight. Ask what an ideal weight is for you.  · Get at least 30 minutes of exercise that causes your heart to beat faster (aerobic exercise) most days of the week. Activities may include walking, swimming, or biking.  · Include exercise to strengthen your muscles (resistance exercise), such as weight lifting, as part of your weekly exercise routine. Try to do these types of exercises for 30 minutes at least 3 days a week.  · Do not use any products that contain nicotine or tobacco, such as cigarettes and e-cigarettes. If you need help quitting,  ask your health care provider.  · Control any long-term (chronic) conditions you have, such as high cholesterol or diabetes.  Monitoring  · Monitor your blood pressure at home as told by your health care provider. Your personal target blood pressure may vary depending on your medical conditions, your age, and other factors.  · Have your blood pressure checked regularly, as often as told by your health care provider.  Working with your health care provider  · Review all the medicines you take with your health care provider because there may be side effects or interactions.  · Talk with your health care provider about your diet, exercise habits, and other lifestyle factors that may be contributing to hypertension.  · Visit your health care provider regularly. Your health care provider can help you create and adjust your plan for managing hypertension.  Will I need medicine to control my blood pressure?  Your health care provider may prescribe medicine if lifestyle changes are not enough to get your blood pressure under control, and if:  · Your systolic blood pressure is 130 or higher.  · Your diastolic blood pressure is 80 or higher.  Take medicines only as told by your health care provider. Follow the directions carefully. Blood pressure medicines must be taken as prescribed. The medicine does not work as well when you skip doses. Skipping doses also puts you at risk for problems.  Contact a health care provider if:  · You think you are having a reaction to medicines you have taken.  · You have repeated (recurrent) headaches.  · You feel dizzy.  · You have swelling in your ankles.  · You have trouble with your vision.  Get help right away if:  · You develop a severe headache or confusion.  · You have unusual weakness or numbness, or you feel faint.  · You have severe pain in your chest or abdomen.  · You vomit repeatedly.  · You have trouble breathing.  Summary  · Hypertension is when the force of blood pumping  "through your arteries is too strong. If this condition is not controlled, it may put you at risk for serious complications.  · Your personal target blood pressure may vary depending on your medical conditions, your age, and other factors. For most people, a normal blood pressure is less than 120/80.  · Hypertension is managed by lifestyle changes, medicines, or both. Lifestyle changes include weight loss, eating a healthy, low-sodium diet, exercising more, and limiting alcohol.  This information is not intended to replace advice given to you by your health care provider. Make sure you discuss any questions you have with your health care provider.  Document Revised: 04/10/2020 Document Reviewed: 11/15/2017  [employer] Patient Education © 2020 [employer]    Hypertension, Adult  High blood pressure (hypertension) is when the force of blood pumping through the arteries is too strong. The arteries are the blood vessels that carry blood from the heart throughout the body. Hypertension forces the heart to work harder to pump blood and may cause arteries to become narrow or stiff. Untreated or uncontrolled hypertension can cause a heart attack, heart failure, a stroke, kidney disease, and other problems.  A blood pressure reading consists of a higher number over a lower number. Ideally, your blood pressure should be below 120/80. The first (\"top\") number is called the systolic pressure. It is a measure of the pressure in your arteries as your heart beats. The second (\"bottom\") number is called the diastolic pressure. It is a measure of the pressure in your arteries as the heart relaxes.  What are the causes?  The exact cause of this condition is not known. There are some conditions that result in or are related to high blood pressure.  What increases the risk?  Some risk factors for high blood pressure are under your control. The following factors may make you more likely to develop this condition:  · Smoking.  · Having " type 2 diabetes mellitus, high cholesterol, or both.  · Not getting enough exercise or physical activity.  · Being overweight.  · Having too much fat, sugar, calories, or salt (sodium) in your diet.  · Drinking too much alcohol.  Some risk factors for high blood pressure may be difficult or impossible to change. Some of these factors include:  · Having chronic kidney disease.  · Having a family history of high blood pressure.  · Age. Risk increases with age.  · Race. You may be at higher risk if you are .  · Gender. Men are at higher risk than women before age 45. After age 65, women are at higher risk than men.  · Having obstructive sleep apnea.  · Stress.  What are the signs or symptoms?  High blood pressure may not cause symptoms. Very high blood pressure (hypertensive crisis) may cause:  · Headache.  · Anxiety.  · Shortness of breath.  · Nosebleed.  · Nausea and vomiting.  · Vision changes.  · Severe chest pain.  · Seizures.  How is this diagnosed?  This condition is diagnosed by measuring your blood pressure while you are seated, with your arm resting on a flat surface, your legs uncrossed, and your feet flat on the floor. The cuff of the blood pressure monitor will be placed directly against the skin of your upper arm at the level of your heart. It should be measured at least twice using the same arm. Certain conditions can cause a difference in blood pressure between your right and left arms.  Certain factors can cause blood pressure readings to be lower or higher than normal for a short period of time:  · When your blood pressure is higher when you are in a health care provider's office than when you are at home, this is called white coat hypertension. Most people with this condition do not need medicines.  · When your blood pressure is higher at home than when you are in a health care provider's office, this is called masked hypertension. Most people with this condition may need medicines to  control blood pressure.  If you have a high blood pressure reading during one visit or you have normal blood pressure with other risk factors, you may be asked to:  · Return on a different day to have your blood pressure checked again.  · Monitor your blood pressure at home for 1 week or longer.  If you are diagnosed with hypertension, you may have other blood or imaging tests to help your health care provider understand your overall risk for other conditions.  How is this treated?  This condition is treated by making healthy lifestyle changes, such as eating healthy foods, exercising more, and reducing your alcohol intake. Your health care provider may prescribe medicine if lifestyle changes are not enough to get your blood pressure under control, and if:  · Your systolic blood pressure is above 130.  · Your diastolic blood pressure is above 80.  Your personal target blood pressure may vary depending on your medical conditions, your age, and other factors.  Follow these instructions at home:  Eating and drinking    · Eat a diet that is high in fiber and potassium, and low in sodium, added sugar, and fat. An example eating plan is called the DASH (Dietary Approaches to Stop Hypertension) diet. To eat this way:  ? Eat plenty of fresh fruits and vegetables. Try to fill one half of your plate at each meal with fruits and vegetables.  ? Eat whole grains, such as whole-wheat pasta, brown rice, or whole-grain bread. Fill about one fourth of your plate with whole grains.  ? Eat or drink low-fat dairy products, such as skim milk or low-fat yogurt.  ? Avoid fatty cuts of meat, processed or cured meats, and poultry with skin. Fill about one fourth of your plate with lean proteins, such as fish, chicken without skin, beans, eggs, or tofu.  ? Avoid pre-made and processed foods. These tend to be higher in sodium, added sugar, and fat.  · Reduce your daily sodium intake. Most people with hypertension should eat less than 1,500 mg  of sodium a day.  · Do not drink alcohol if:  ? Your health care provider tells you not to drink.  ? You are pregnant, may be pregnant, or are planning to become pregnant.  · If you drink alcohol:  ? Limit how much you use to:  § 0-1 drink a day for women.  § 0-2 drinks a day for men.  ? Be aware of how much alcohol is in your drink. In the [place] one drink equals one 12 oz bottle of beer (355 mL), one 5 oz glass of wine (148 mL), or one 1½ oz glass of hard liquor (44 mL).  Lifestyle    · Work with your health care provider to maintain a healthy body weight or to lose weight. Ask what an ideal weight is for you.  · Get at least 30 minutes of exercise most days of the week. Activities may include walking, swimming, or biking.  · Include exercise to strengthen your muscles (resistance exercise), such as Pilates or lifting weights, as part of your weekly exercise routine. Try to do these types of exercises for 30 minutes at least 3 days a week.  · Do not use any products that contain nicotine or tobacco, such as cigarettes, e-cigarettes, and chewing tobacco. If you need help quitting, ask your health care provider.  · Monitor your blood pressure at home as told by your health care provider.  · Keep all follow-up visits as told by your health care provider. This is important.  Medicines  · Take over-the-counter and prescription medicines only as told by your health care provider. Follow directions carefully. Blood pressure medicines must be taken as prescribed.  · Do not skip doses of blood pressure medicine. Doing this puts you at risk for problems and can make the medicine less effective.  · Ask your health care provider about side effects or reactions to medicines that you should watch for.  Contact a health care provider if you:  · Think you are having a reaction to a medicine you are taking.  · Have headaches that keep coming back (recurring).  · Feel dizzy.  · Have swelling in your ankles.  · Have trouble with your  "vision.  Get help right away if you:  · Develop a severe headache or confusion.  · Have unusual weakness or numbness.  · Feel faint.  · Have severe pain in your chest or abdomen.  · Vomit repeatedly.  · Have trouble breathing.  Summary  · Hypertension is when the force of blood pumping through your arteries is too strong. If this condition is not controlled, it may put you at risk for serious complications.  · Your personal target blood pressure may vary depending on your medical conditions, your age, and other factors. For most people, a normal blood pressure is less than 120/80.  · Hypertension is treated with lifestyle changes, medicines, or a combination of both. Lifestyle changes include losing weight, eating a healthy, low-sodium diet, exercising more, and limiting alcohol.  This information is not intended to replace advice given to you by your health care provider. Make sure you discuss any questions you have with your health care provider.  Document Revised: 08/28/2019 Document Reviewed: 08/28/2019  [employer] Patient Education © 2020 [employer]    DASH Eating Plan  DASH stands for \"Dietary Approaches to Stop Hypertension.\" The DASH eating plan is a healthy eating plan that has been shown to reduce high blood pressure (hypertension). It may also reduce your risk for type 2 diabetes, heart disease, and stroke. The DASH eating plan may also help with weight loss.  What are tips for following this plan?    General guidelines  · Avoid eating more than 2,300 mg (milligrams) of salt (sodium) a day. If you have hypertension, you may need to reduce your sodium intake to 1,500 mg a day.  · Limit alcohol intake to no more than 1 drink a day for nonpregnant women and 2 drinks a day for men. One drink equals 12 oz of beer, 5 oz of wine, or 1½ oz of hard liquor.  · Work with your health care provider to maintain a healthy body weight or to lose weight. Ask what an ideal weight is for you.  · Get at least 30 minutes of " "exercise that causes your heart to beat faster (aerobic exercise) most days of the week. Activities may include walking, swimming, or biking.  · Work with your health care provider or diet and nutrition specialist (dietitian) to adjust your eating plan to your individual calorie needs.  Reading food labels    · Check food labels for the amount of sodium per serving. Choose foods with less than 5 percent of the Daily Value of sodium. Generally, foods with less than 300 mg of sodium per serving fit into this eating plan.  · To find whole grains, look for the word \"whole\" as the first word in the ingredient list.  Shopping  · Buy products labeled as \"low-sodium\" or \"no salt added.\"  · Buy fresh foods. Avoid canned foods and premade or frozen meals.  Cooking  · Avoid adding salt when cooking. Use salt-free seasonings or herbs instead of table salt or sea salt. Check with your health care provider or pharmacist before using salt substitutes.  · Do not gonzalez foods. Cook foods using healthy methods such as baking, boiling, grilling, and broiling instead.  · Cook with heart-healthy oils, such as olive, canola, soybean, or sunflower oil.  Meal planning  · Eat a balanced diet that includes:  ? 5 or more servings of fruits and vegetables each day. At each meal, try to fill half of your plate with fruits and vegetables.  ? Up to 6-8 servings of whole grains each day.  ? Less than 6 oz of lean meat, poultry, or fish each day. A 3-oz serving of meat is about the same size as a deck of cards. One egg equals 1 oz.  ? 2 servings of low-fat dairy each day.  ? A serving of nuts, seeds, or beans 5 times each week.  ? Heart-healthy fats. Healthy fats called Omega-3 fatty acids are found in foods such as flaxseeds and coldwater fish, like sardines, salmon, and mackerel.  · Limit how much you eat of the following:  ? Canned or prepackaged foods.  ? Food that is high in trans fat, such as fried foods.  ? Food that is high in saturated fat, " such as fatty meat.  ? Sweets, desserts, sugary drinks, and other foods with added sugar.  ? Full-fat dairy products.  · Do not salt foods before eating.  · Try to eat at least 2 vegetarian meals each week.  · Eat more home-cooked food and less restaurant, buffet, and fast food.  · When eating at a restaurant, ask that your food be prepared with less salt or no salt, if possible.  What foods are recommended?  The items listed may not be a complete list. Talk with your dietitian about what dietary choices are best for you.  Grains  Whole-grain or whole-wheat bread. Whole-grain or whole-wheat pasta. Brown rice. Oatmeal. Quinoa. Bulgur. Whole-grain and low-sodium cereals. Denisa bread. Low-fat, low-sodium crackers. Whole-wheat flour tortillas.  Vegetables  Fresh or frozen vegetables (raw, steamed, roasted, or grilled). Low-sodium or reduced-sodium tomato and vegetable juice. Low-sodium or reduced-sodium tomato sauce and tomato paste. Low-sodium or reduced-sodium canned vegetables.  Fruits  All fresh, dried, or frozen fruit. Canned fruit in natural juice (without added sugar).  Meat and other protein foods  Skinless chicken or turkey. Ground chicken or turkey. Pork with fat trimmed off. Fish and seafood. Egg whites. Dried beans, peas, or lentils. Unsalted nuts, nut butters, and seeds. Unsalted canned beans. Lean cuts of beef with fat trimmed off. Low-sodium, lean deli meat.  Dairy  Low-fat (1%) or fat-free (skim) milk. Fat-free, low-fat, or reduced-fat cheeses. Nonfat, low-sodium ricotta or cottage cheese. Low-fat or nonfat yogurt. Low-fat, low-sodium cheese.  Fats and oils  Soft margarine without trans fats. Vegetable oil. Low-fat, reduced-fat, or light mayonnaise and salad dressings (reduced-sodium). Canola, safflower, olive, soybean, and sunflower oils. Avocado.  Seasoning and other foods  Herbs. Spices. Seasoning mixes without salt. Unsalted popcorn and pretzels. Fat-free sweets.  What foods are not recommended?  The  items listed may not be a complete list. Talk with your dietitian about what dietary choices are best for you.  Grains  Baked goods made with fat, such as croissants, muffins, or some breads. Dry pasta or rice meal packs.  Vegetables  Creamed or fried vegetables. Vegetables in a cheese sauce. Regular canned vegetables (not low-sodium or reduced-sodium). Regular canned tomato sauce and paste (not low-sodium or reduced-sodium). Regular tomato and vegetable juice (not low-sodium or reduced-sodium). Pickles. Olives.  Fruits  Canned fruit in a light or heavy syrup. Fried fruit. Fruit in cream or butter sauce.  Meat and other protein foods  Fatty cuts of meat. Ribs. Fried meat. Moore. Sausage. Bologna and other processed lunch meats. Salami. Fatback. Hotdogs. Bratwurst. Salted nuts and seeds. Canned beans with added salt. Canned or smoked fish. Whole eggs or egg yolks. Chicken or turkey with skin.  Dairy  Whole or 2% milk, cream, and half-and-half. Whole or full-fat cream cheese. Whole-fat or sweetened yogurt. Full-fat cheese. Nondairy creamers. Whipped toppings. Processed cheese and cheese spreads.  Fats and oils  Butter. Stick margarine. Lard. Shortening. Ghee. Moore fat. Tropical oils, such as coconut, palm kernel, or palm oil.  Seasoning and other foods  Salted popcorn and pretzels. Onion salt, garlic salt, seasoned salt, table salt, and sea salt. [facility] [place] [facility] [place] sauce. Tartar sauce. Barbecue sauce. Teriyaki sauce. Soy sauce, including reduced-sodium. Steak sauce. Canned and packaged gravies. Fish sauce. Oyster sauce. Cocktail sauce. Horseradish that you find on the shelf. Ketchup. Mustard. Meat flavorings and tenderizers. Bouillon cubes. Hot sauce and Tabasco sauce. Premade or packaged marinades. Premade or packaged taco seasonings. Relishes. Regular salad dressings.  Where to find more information:  · National Heart, Lung, and Blood [place]: www.nhlbi.nih.gov  · American Heart Association:  www.heart.org  Summary  · The DASH eating plan is a healthy eating plan that has been shown to reduce high blood pressure (hypertension). It may also reduce your risk for type 2 diabetes, heart disease, and stroke.  · With the DASH eating plan, you should limit salt (sodium) intake to 2,300 mg a day. If you have hypertension, you may need to reduce your sodium intake to 1,500 mg a day.  · When on the DASH eating plan, aim to eat more fresh fruits and vegetables, whole grains, lean proteins, low-fat dairy, and heart-healthy fats.  · Work with your health care provider or diet and nutrition specialist (dietitian) to adjust your eating plan to your individual calorie needs.  This information is not intended to replace advice given to you by your health care provider. Make sure you discuss any questions you have with your health care provider.  Document Revised: 11/30/2018 Document Reviewed: 12/11/2017  [employer] Patient Education © 2020 [employer]    Managing Anxiety, Adult  After being diagnosed with an anxiety disorder, you may be relieved to know why you have felt or behaved a certain way. You may also feel overwhelmed about the treatment ahead and what it will mean for your life. With care and support, you can manage this condition and recover from it.  How to manage lifestyle changes  Managing stress and anxiety    Stress is your body's reaction to life changes and events, both good and bad. Most stress will last just a few hours, but stress can be ongoing and can lead to more than just stress. Although stress can play a major role in anxiety, it is not the same as anxiety. Stress is usually caused by something external, such as a deadline, test, or competition. Stress normally passes after the triggering event has ended.   Anxiety is caused by something internal, such as imagining a terrible outcome or worrying that something will go wrong that will devastate you. Anxiety often does not go away even after the  triggering event is over, and it can become long-term (chronic) worry. It is important to understand the differences between stress and anxiety and to manage your stress effectively so that it does not lead to an anxious response.  Talk with your health care provider or a counselor to learn more about reducing anxiety and stress. He or she may suggest tension reduction techniques, such as:  · Music therapy. This can include creating or listening to music that you enjoy and that inspires you.  · Mindfulness-based meditation. This involves being aware of your normal breaths while not trying to control your breathing. It can be done while sitting or walking.  · Centering prayer. This involves focusing on a word, phrase, or sacred image that means something to you and brings you peace.  · Deep breathing. To do this, expand your stomach and inhale slowly through your nose. Hold your breath for 3-5 seconds. Then exhale slowly, letting your stomach muscles relax.  · Self-talk. This involves identifying thought patterns that lead to anxiety reactions and changing those patterns.  · Muscle relaxation. This involves tensing muscles and then relaxing them.  Choose a tension reduction technique that suits your lifestyle and personality. These techniques take time and practice. Set aside 5-15 minutes a day to do them. Therapists can offer counseling and training in these techniques. The training to help with anxiety may be covered by some insurance plans. Other things you can do to manage stress and anxiety include:  · Keeping a stress/anxiety diary. This can help you learn what triggers your reaction and then learn ways to manage your response.  · Thinking about how you react to certain situations. You may not be able to control everything, but you can control your response.  · Making time for activities that help you relax and not feeling guilty about spending your time in this way.  · Visual imagery and yoga can help you stay  calm and relax.    Medicines  Medicines can help ease symptoms. Medicines for anxiety include:  · Anti-anxiety drugs.  · Antidepressants.  Medicines are often used as a primary treatment for anxiety disorder. Medicines will be prescribed by a health care provider. When used together, medicines, psychotherapy, and tension reduction techniques may be the most effective treatment.  Relationships  Relationships can play a big part in helping you recover. Try to spend more time connecting with trusted friends and family members. Consider going to couples counseling, taking family education classes, or going to family therapy. Therapy can help you and others better understand your condition.  How to recognize changes in your anxiety  Everyone responds differently to treatment for anxiety. Recovery from anxiety happens when symptoms decrease and stop interfering with your daily activities at home or work. This may mean that you will start to:  · Have better concentration and focus. Worry will interfere less in your daily thinking.  · Sleep better.  · Be less irritable.  · Have more energy.  · Have improved memory.  It is important to recognize when your condition is getting worse. Contact your health care provider if your symptoms interfere with home or work and you feel like your condition is not improving.  Follow these instructions at home:  Activity  · Exercise. Most adults should do the following:  ? Exercise for at least 150 minutes each week. The exercise should increase your heart rate and make you sweat (moderate-intensity exercise).  ? Strengthening exercises at least twice a week.  · Get the right amount and quality of sleep. Most adults need 7-9 hours of sleep each night.  Lifestyle    · Eat a healthy diet that includes plenty of vegetables, fruits, whole grains, low-fat dairy products, and lean protein. Do not eat a lot of foods that are high in solid fats, added sugars, or salt.  · Make choices that simplify  your life.  · Do not use any products that contain nicotine or tobacco, such as cigarettes, e-cigarettes, and chewing tobacco. If you need help quitting, ask your health care provider.  · Avoid caffeine, alcohol, and certain over-the-counter cold medicines. These may make you feel worse. Ask your pharmacist which medicines to avoid.  General instructions  · Take over-the-counter and prescription medicines only as told by your health care provider.  · Keep all follow-up visits as told by your health care provider. This is important.  Where to find support  You can get help and support from these sources:  · Self-help groups.  · Online and community organizations.  · A trusted spiritual leader.  · Couples counseling.  · Family education classes.  · Family therapy.  Where to find more information  You may find that joining a support group helps you deal with your anxiety. The following sources can help you locate counselors or support groups near you:  · Mental Health [place]: www.mentalhealth[place].net  · Anxiety and Depression Association of [place] (ADAA): www.adaa.org  · National [place] [employer] (AMRITA): www.amrita.org  Contact a health care provider if you:  · Have a hard time staying focused or finishing daily tasks.  · Spend many hours a day feeling worried about everyday life.  · Become exhausted by worry.  · Start to have headaches, feel tense, or have nausea.  · Urinate more than normal.  · Have diarrhea.  Get help right away if you have:  · A racing heart and shortness of breath.  · Thoughts of hurting yourself or others.  If you ever feel like you may hurt yourself or others, or have thoughts about taking your own life, get help right away. You can go to your nearest emergency department or call:  · Your local emergency services (911 in the [place]  · A suicide crisis helpline, such as the National Suicide Prevention Lifeline at 1-600.407.4408. This is open 24 hours a day.  Summary  · Taking steps  to learn and use tension reduction techniques can help calm you and help prevent triggering an anxiety reaction.  · When used together, medicines, psychotherapy, and tension reduction techniques may be the most effective treatment.  · Family, friends, and partners can play a big part in helping you recover from an anxiety disorder.  This information is not intended to replace advice given to you by your health care provider. Make sure you discuss any questions you have with your health care provider.  Document Revised: 05/19/2020 Document Reviewed: 05/19/2020  Elsevier Patient Education © 2020 [employer]

## 2021-02-08 NOTE — PROGRESS NOTES
Chief Complaint   Patient presents with   • Establish Care   • Anxiety   • Hypertension         History:  Rachana Smart is a 40 y.o. female who presents today for evaluation of the above problems.      She presents today to establish care.  Is been 4 to 5 years since she was last seen the primary care doctor.    She recently had influenza type illness with tested negative for COVID-19 and while she was being evaluated her blood pressure was high.  She states normally it will increase when she is not feeling good or when she is in pain.  However, she has been on lisinopril previously and blood pressure today is 140/90.  Currently, she has midthoracic back pain and feels this is the reason for her elevated blood pressure.  She did have tachycardia preeclampsia with pregnancy as well as gestational diabetes years ago.    Her anxiety is worse as well with COVID-19    Blood pressure reported to be 125/80 at home    In regards to her anxiety she has a prescription of Xanax from 5 years ago which she rarely needs.  Usually she will go to her back porch and she has anxiety to help relax her.    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  Thoracic back pain is not bad enough for medications.  Work-up.    She saw her cardiologist years ago for 2 leaky heart valves.    She had an ultrasound 5 years ago on her breasts due to nodules.  Has not had mammogram since then.  Her calf sister has breast cancer at the age of 47.        HPI    Social History     Socioeconomic History   • Marital status:      Spouse name: Not on file   • Number of children: Not on file   • Years of education: Not on file   • Highest education level: Not on file   Tobacco Use   • Smoking status: Current Every Day Smoker     Packs/day: 0.50     Years: 10.00     Pack years: 5.00   • Smokeless tobacco: Never Used   Substance and Sexual Activity   • Alcohol use: Yes     Comment: 2/daily   • Drug use:  "No   • Sexual activity: Defer       ROS:  Review of Systems   Constitutional: Negative for fatigue and fever.   Respiratory: Negative for cough and shortness of breath.    Cardiovascular: Positive for palpitations. Negative for chest pain and leg swelling.   Gastrointestinal: Negative.    Musculoskeletal: Positive for arthralgias and back pain.   Psychiatric/Behavioral: The patient is nervous/anxious.        No current outpatient medications on file.    No results found for: GLUCOSE, BUN, CREATININE, EGFRIFNONA, EGFRIFAFRI, BCR, K, CO2, CALCIUM, PROTENTOTREF, ALBUMIN, LABIL2, BILIRUBIN, AST, ALT    No results found for: WBC, RBC, HGB, HCT, MCV, MCH, MCHC, RDW, RDWSD, MPV, PLT, NEUTRORELPCT, LYMPHORELPCT, MONORELPCT, EOSRELPCT, BASORELPCT, AUTOIGPER, NEUTROABS, LYMPHSABS, MONOSABS, EOSABS, BASOSABS, AUTOIGNUM, NRBC      OBJECTIVE:  Visit Vitals  /90 (BP Location: Left arm, Patient Position: Sitting, Cuff Size: Adult)   Pulse 73   Temp 98.7 °F (37.1 °C) (Oral)   Resp 15   Ht 167.6 cm (65.98\")   Wt 63 kg (139 lb)   SpO2 97%   BMI 22.45 kg/m²      Physical Exam  Constitutional:       Appearance: Normal appearance.   HENT:      Head: Normocephalic and atraumatic.   Cardiovascular:      Rate and Rhythm: Normal rate and regular rhythm.      Heart sounds: No murmur.   Pulmonary:      Effort: Pulmonary effort is normal. No respiratory distress.      Breath sounds: Normal breath sounds. No stridor. No wheezing or rhonchi.   Musculoskeletal:      Thoracic back: She exhibits tenderness (Minimal).      Right lower leg: No edema.      Left lower leg: No edema.   Skin:     General: Skin is warm and dry.      Coloration: Skin is not jaundiced or pale.      Findings: No bruising.   Neurological:      Mental Status: She is alert.   Psychiatric:         Mood and Affect: Mood normal.         Behavior: Behavior normal.         Thought Content: Thought content normal.         Judgment: Judgment normal. "         Assessment/Plan    Diagnoses and all orders for this visit:    1. Essential hypertension (Primary)  -     Comprehensive Metabolic Panel; Future    2. Anxiety    3. Tobacco use    4. Need for hepatitis C screening test  -     Hepatitis C Antibody; Future    5. Healthcare maintenance  -     Hemoglobin A1c; Future  -     Comprehensive Metabolic Panel; Future  -     CBC & Differential; Future  -     Lipid Panel; Future    6. Encounter for hepatitis C screening test for low risk patient  -     Hepatitis C Antibody; Future    7. Encounter for screening mammogram for malignant neoplasm of breast  -     Mammo Screening Digital Tomosynthesis Bilateral With CAD; Future    8. Family history of breast cancer  -     Mammo Screening Digital Tomosynthesis Bilateral With CAD; Future      Sounds like she has just intermittent hypertension mostly when she is in pain, anxious or sick.  She will keep an eye on her blood pressure over the next couple weeks.  If it is high at home we may need to initiate antihypertensive medication but I do not think it is necessary at this time.  She states it is usually pretty normal at home.    I like to get general labs including a CBC, CMP, lipid panel, A1c and a hepatitis C antibody.  Further work-up or management based on these results.    She reports having 2 leaky heart valves in the past.  I do not hear a murmur on my exam but we may need to obtain echocardiogram in the future for follow-up especially if murmur is heard at the next visit.    Minimal thoracic back pain.  She does not want further work-up with plain film or medication.  She states her  will try to correct her back    She is in need of left hip replacement due to a congenital issue.  We do not have any work-up at this time and the pain is not too bothersome for her.    Continue her current plan for anxiety.  Does not want any medication at this time    I have also ordered mammogram given the family history of breast  cancer in her sister at the age of 47.    Rachana Smart  reports that she has been smoking. She has a 5.00 pack-year smoking history. She has never used smokeless tobacco.. I have educated her on the risk of diseases from using tobacco products such as cancer, COPD and heart disease.     I advised her to quit and she is not willing to quit.    I spent 4 minutes counseling the patient.    Follow-up in 6 months for recheck or sooner if needed.    Return in about 6 months (around 8/8/2021) for Recheck.    Patient was given instructions and counseling regarding his/her condition or for health maintenance advice. Please see specific information pulled into the AVS if appropriate.     ROSALINDA Holloway MD   10:52 CST  2/8/2021

## 2021-02-09 DIAGNOSIS — D75.89 MACROCYTOSIS: ICD-10-CM

## 2021-02-09 DIAGNOSIS — R74.01 TRANSAMINITIS: Primary | ICD-10-CM

## 2021-02-09 LAB
FOLATE SERPL-MCNC: 5.01 NG/ML (ref 4.78–24.2)
VIT B12 BLD-MCNC: 433 PG/ML (ref 211–946)

## 2021-02-22 ENCOUNTER — APPOINTMENT (OUTPATIENT)
Dept: MAMMOGRAPHY | Facility: HOSPITAL | Age: 41
End: 2021-02-22

## 2022-08-06 ENCOUNTER — HOSPITAL ENCOUNTER (OUTPATIENT)
Facility: HOSPITAL | Age: 42
Setting detail: OBSERVATION
Discharge: HOME | End: 2022-08-07
Attending: FAMILY MEDICINE | Admitting: INTERNAL MEDICINE

## 2022-08-06 ENCOUNTER — APPOINTMENT (OUTPATIENT)
Dept: CT IMAGING | Facility: HOSPITAL | Age: 42
End: 2022-08-06

## 2022-08-06 ENCOUNTER — APPOINTMENT (OUTPATIENT)
Dept: GENERAL RADIOLOGY | Facility: HOSPITAL | Age: 42
End: 2022-08-06

## 2022-08-06 DIAGNOSIS — E87.6 HYPOKALEMIA: Primary | ICD-10-CM

## 2022-08-06 DIAGNOSIS — F10.10 ALCOHOL ABUSE: ICD-10-CM

## 2022-08-06 PROBLEM — K70.10 ALCOHOLIC HEPATITIS: Status: ACTIVE | Noted: 2022-08-06

## 2022-08-06 PROBLEM — F10.20 ALCOHOLISM (HCC): Status: ACTIVE | Noted: 2022-08-06

## 2022-08-06 PROBLEM — R11.2 NAUSEA & VOMITING: Status: ACTIVE | Noted: 2022-08-06

## 2022-08-06 PROBLEM — E83.42 HYPOMAGNESEMIA: Status: ACTIVE | Noted: 2022-08-06

## 2022-08-06 PROBLEM — D75.89 MACROCYTOSIS: Status: ACTIVE | Noted: 2022-08-06

## 2022-08-06 LAB
ALBUMIN SERPL-MCNC: 4.1 G/DL (ref 3.5–5.2)
ALBUMIN/GLOB SERPL: 1.1 G/DL
ALP SERPL-CCNC: 159 U/L (ref 39–117)
ALT SERPL W P-5'-P-CCNC: 105 U/L (ref 1–33)
AMPHET+METHAMPHET UR QL: NEGATIVE
AMPHETAMINES UR QL: NEGATIVE
ANION GAP SERPL CALCULATED.3IONS-SCNC: 17 MMOL/L (ref 5–15)
APAP SERPL-MCNC: <5 MCG/ML (ref 0–30)
APTT PPP: 25.7 SECONDS (ref 24.1–35)
AST SERPL-CCNC: 414 U/L (ref 1–32)
BACTERIA UR QL AUTO: ABNORMAL /HPF
BARBITURATES UR QL SCN: NEGATIVE
BASOPHILS # BLD AUTO: 0.11 10*3/MM3 (ref 0–0.2)
BASOPHILS NFR BLD AUTO: 1.1 % (ref 0–1.5)
BENZODIAZ UR QL SCN: NEGATIVE
BILIRUB SERPL-MCNC: 1.2 MG/DL (ref 0–1.2)
BILIRUB UR QL STRIP: ABNORMAL
BUN SERPL-MCNC: 4 MG/DL (ref 6–20)
BUN/CREAT SERPL: 6.3 (ref 7–25)
BUPRENORPHINE SERPL-MCNC: NEGATIVE NG/ML
CALCIUM SPEC-SCNC: 9 MG/DL (ref 8.6–10.5)
CANNABINOIDS SERPL QL: NEGATIVE
CHLORIDE SERPL-SCNC: 93 MMOL/L (ref 98–107)
CLARITY UR: ABNORMAL
CO2 SERPL-SCNC: 30 MMOL/L (ref 22–29)
COCAINE UR QL: NEGATIVE
COLOR UR: ABNORMAL
CREAT SERPL-MCNC: 0.64 MG/DL (ref 0.57–1)
D DIMER PPP FEU-MCNC: 0.35 MCGFEU/ML (ref 0–0.5)
DEPRECATED RDW RBC AUTO: 53.4 FL (ref 37–54)
EGFRCR SERPLBLD CKD-EPI 2021: 112.6 ML/MIN/1.73
EOSINOPHIL # BLD AUTO: 0.12 10*3/MM3 (ref 0–0.4)
EOSINOPHIL NFR BLD AUTO: 1.3 % (ref 0.3–6.2)
ERYTHROCYTE [DISTWIDTH] IN BLOOD BY AUTOMATED COUNT: 13.8 % (ref 12.3–15.4)
ETHANOL UR QL: 0.36 %
GLOBULIN UR ELPH-MCNC: 3.8 GM/DL
GLUCOSE SERPL-MCNC: 124 MG/DL (ref 65–99)
GLUCOSE UR STRIP-MCNC: NEGATIVE MG/DL
HCT VFR BLD AUTO: 42 % (ref 34–46.6)
HGB BLD-MCNC: 14.7 G/DL (ref 12–15.9)
HGB UR QL STRIP.AUTO: NEGATIVE
HOLD SPECIMEN: NORMAL
HYALINE CASTS UR QL AUTO: ABNORMAL /LPF
IMM GRANULOCYTES # BLD AUTO: 0.03 10*3/MM3 (ref 0–0.05)
IMM GRANULOCYTES NFR BLD AUTO: 0.3 % (ref 0–0.5)
INR PPP: 1.05 (ref 0.91–1.09)
KETONES UR QL STRIP: ABNORMAL
LEUKOCYTE ESTERASE UR QL STRIP.AUTO: ABNORMAL
LYMPHOCYTES # BLD AUTO: 2.92 10*3/MM3 (ref 0.7–3.1)
LYMPHOCYTES NFR BLD AUTO: 30.5 % (ref 19.6–45.3)
MAGNESIUM SERPL-MCNC: 1.3 MG/DL (ref 1.6–2.6)
MAGNESIUM SERPL-MCNC: 2.1 MG/DL (ref 1.6–2.6)
MCH RBC QN AUTO: 36.7 PG (ref 26.6–33)
MCHC RBC AUTO-ENTMCNC: 35 G/DL (ref 31.5–35.7)
MCV RBC AUTO: 104.7 FL (ref 79–97)
METHADONE UR QL SCN: NEGATIVE
MONOCYTES # BLD AUTO: 1.03 10*3/MM3 (ref 0.1–0.9)
MONOCYTES NFR BLD AUTO: 10.8 % (ref 5–12)
NEUTROPHILS NFR BLD AUTO: 5.36 10*3/MM3 (ref 1.7–7)
NEUTROPHILS NFR BLD AUTO: 56 % (ref 42.7–76)
NITRITE UR QL STRIP: POSITIVE
NRBC BLD AUTO-RTO: 0 /100 WBC (ref 0–0.2)
OPIATES UR QL: NEGATIVE
OXYCODONE UR QL SCN: NEGATIVE
PCP UR QL SCN: NEGATIVE
PH UR STRIP.AUTO: 5.5 [PH] (ref 5–8)
PLATELET # BLD AUTO: 214 10*3/MM3 (ref 140–450)
PMV BLD AUTO: 10.3 FL (ref 6–12)
POTASSIUM SERPL-SCNC: 2.7 MMOL/L (ref 3.5–5.2)
POTASSIUM SERPL-SCNC: 2.8 MMOL/L (ref 3.5–5.2)
POTASSIUM SERPL-SCNC: 2.9 MMOL/L (ref 3.5–5.2)
PROPOXYPH UR QL: NEGATIVE
PROT SERPL-MCNC: 7.9 G/DL (ref 6–8.5)
PROT UR QL STRIP: ABNORMAL
PROTHROMBIN TIME: 13.3 SECONDS (ref 11.9–14.6)
RBC # BLD AUTO: 4.01 10*6/MM3 (ref 3.77–5.28)
RBC # UR STRIP: ABNORMAL /HPF
REF LAB TEST METHOD: ABNORMAL
SALICYLATES SERPL-MCNC: <0.3 MG/DL
SARS-COV-2 RNA PNL SPEC NAA+PROBE: NOT DETECTED
SODIUM SERPL-SCNC: 140 MMOL/L (ref 136–145)
SP GR UR STRIP: 1.01 (ref 1–1.03)
SQUAMOUS #/AREA URNS HPF: ABNORMAL /HPF
TRICYCLICS UR QL SCN: NEGATIVE
TROPONIN T SERPL-MCNC: <0.01 NG/ML (ref 0–0.03)
TROPONIN T SERPL-MCNC: <0.01 NG/ML (ref 0–0.03)
UROBILINOGEN UR QL STRIP: ABNORMAL
WBC # UR STRIP: ABNORMAL /HPF
WBC NRBC COR # BLD: 9.57 10*3/MM3 (ref 3.4–10.8)
WHOLE BLOOD HOLD COAG: NORMAL
WHOLE BLOOD HOLD SPECIMEN: NORMAL

## 2022-08-06 PROCEDURE — 80306 DRUG TEST PRSMV INSTRMNT: CPT | Performed by: FAMILY MEDICINE

## 2022-08-06 PROCEDURE — 25010000002 MAGNESIUM SULFATE 2 GM/50ML SOLUTION: Performed by: FAMILY MEDICINE

## 2022-08-06 PROCEDURE — 93010 ELECTROCARDIOGRAM REPORT: CPT | Performed by: EMERGENCY MEDICINE

## 2022-08-06 PROCEDURE — 93005 ELECTROCARDIOGRAM TRACING: CPT | Performed by: FAMILY MEDICINE

## 2022-08-06 PROCEDURE — 82077 ASSAY SPEC XCP UR&BREATH IA: CPT | Performed by: FAMILY MEDICINE

## 2022-08-06 PROCEDURE — 85730 THROMBOPLASTIN TIME PARTIAL: CPT | Performed by: FAMILY MEDICINE

## 2022-08-06 PROCEDURE — 87635 SARS-COV-2 COVID-19 AMP PRB: CPT | Performed by: FAMILY MEDICINE

## 2022-08-06 PROCEDURE — 85610 PROTHROMBIN TIME: CPT | Performed by: FAMILY MEDICINE

## 2022-08-06 PROCEDURE — 81001 URINALYSIS AUTO W/SCOPE: CPT | Performed by: FAMILY MEDICINE

## 2022-08-06 PROCEDURE — 70450 CT HEAD/BRAIN W/O DYE: CPT

## 2022-08-06 PROCEDURE — 85025 COMPLETE CBC W/AUTO DIFF WBC: CPT | Performed by: FAMILY MEDICINE

## 2022-08-06 PROCEDURE — 96367 TX/PROPH/DG ADDL SEQ IV INF: CPT

## 2022-08-06 PROCEDURE — G0378 HOSPITAL OBSERVATION PER HR: HCPCS

## 2022-08-06 PROCEDURE — 84132 ASSAY OF SERUM POTASSIUM: CPT | Performed by: FAMILY MEDICINE

## 2022-08-06 PROCEDURE — 96365 THER/PROPH/DIAG IV INF INIT: CPT

## 2022-08-06 PROCEDURE — 87186 SC STD MICRODIL/AGAR DIL: CPT | Performed by: FAMILY MEDICINE

## 2022-08-06 PROCEDURE — 25010000002 THIAMINE PER 100 MG: Performed by: FAMILY MEDICINE

## 2022-08-06 PROCEDURE — 83735 ASSAY OF MAGNESIUM: CPT | Performed by: FAMILY MEDICINE

## 2022-08-06 PROCEDURE — 87088 URINE BACTERIA CULTURE: CPT | Performed by: FAMILY MEDICINE

## 2022-08-06 PROCEDURE — 80179 DRUG ASSAY SALICYLATE: CPT | Performed by: FAMILY MEDICINE

## 2022-08-06 PROCEDURE — 99284 EMERGENCY DEPT VISIT MOD MDM: CPT

## 2022-08-06 PROCEDURE — 25010000002 CEFTRIAXONE PER 250 MG: Performed by: FAMILY MEDICINE

## 2022-08-06 PROCEDURE — 85379 FIBRIN DEGRADATION QUANT: CPT | Performed by: FAMILY MEDICINE

## 2022-08-06 PROCEDURE — 96366 THER/PROPH/DIAG IV INF ADDON: CPT

## 2022-08-06 PROCEDURE — 80053 COMPREHEN METABOLIC PANEL: CPT | Performed by: FAMILY MEDICINE

## 2022-08-06 PROCEDURE — 25010000002 THIAMINE PER 100 MG: Performed by: INTERNAL MEDICINE

## 2022-08-06 PROCEDURE — 84484 ASSAY OF TROPONIN QUANT: CPT | Performed by: FAMILY MEDICINE

## 2022-08-06 PROCEDURE — 71045 X-RAY EXAM CHEST 1 VIEW: CPT

## 2022-08-06 PROCEDURE — 25010000002 POTASSIUM CHLORIDE PER 2 MEQ: Performed by: INTERNAL MEDICINE

## 2022-08-06 PROCEDURE — 96375 TX/PRO/DX INJ NEW DRUG ADDON: CPT

## 2022-08-06 PROCEDURE — 25010000002 POTASSIUM CHLORIDE PER 2 MEQ: Performed by: FAMILY MEDICINE

## 2022-08-06 PROCEDURE — 80143 DRUG ASSAY ACETAMINOPHEN: CPT | Performed by: FAMILY MEDICINE

## 2022-08-06 PROCEDURE — C9803 HOPD COVID-19 SPEC COLLECT: HCPCS

## 2022-08-06 PROCEDURE — 87086 URINE CULTURE/COLONY COUNT: CPT | Performed by: FAMILY MEDICINE

## 2022-08-06 RX ORDER — MAGNESIUM SULFATE HEPTAHYDRATE 40 MG/ML
2 INJECTION, SOLUTION INTRAVENOUS ONCE
Status: COMPLETED | OUTPATIENT
Start: 2022-08-06 | End: 2022-08-06

## 2022-08-06 RX ORDER — ACETAMINOPHEN 325 MG/1
650 TABLET ORAL EVERY 4 HOURS PRN
Status: DISCONTINUED | OUTPATIENT
Start: 2022-08-06 | End: 2022-08-07 | Stop reason: HOSPADM

## 2022-08-06 RX ORDER — NICOTINE 21 MG/24HR
1 PATCH, TRANSDERMAL 24 HOURS TRANSDERMAL EVERY 24 HOURS
Status: DISCONTINUED | OUTPATIENT
Start: 2022-08-06 | End: 2022-08-07 | Stop reason: HOSPADM

## 2022-08-06 RX ORDER — POTASSIUM CHLORIDE 750 MG/1
40 CAPSULE, EXTENDED RELEASE ORAL ONCE
Status: COMPLETED | OUTPATIENT
Start: 2022-08-06 | End: 2022-08-06

## 2022-08-06 RX ORDER — ACETAMINOPHEN 160 MG/5ML
650 SOLUTION ORAL EVERY 4 HOURS PRN
Status: DISCONTINUED | OUTPATIENT
Start: 2022-08-06 | End: 2022-08-07 | Stop reason: HOSPADM

## 2022-08-06 RX ORDER — DIAZEPAM 5 MG/ML
2.5 INJECTION, SOLUTION INTRAMUSCULAR; INTRAVENOUS ONCE
Status: DISCONTINUED | OUTPATIENT
Start: 2022-08-06 | End: 2022-08-06

## 2022-08-06 RX ORDER — LORAZEPAM 1 MG/1
2 TABLET ORAL
Status: DISCONTINUED | OUTPATIENT
Start: 2022-08-06 | End: 2022-08-07 | Stop reason: HOSPADM

## 2022-08-06 RX ORDER — ACETAMINOPHEN 650 MG/1
650 SUPPOSITORY RECTAL EVERY 4 HOURS PRN
Status: DISCONTINUED | OUTPATIENT
Start: 2022-08-06 | End: 2022-08-07 | Stop reason: HOSPADM

## 2022-08-06 RX ORDER — LORAZEPAM 1 MG/1
1 TABLET ORAL
Status: DISCONTINUED | OUTPATIENT
Start: 2022-08-06 | End: 2022-08-07 | Stop reason: HOSPADM

## 2022-08-06 RX ORDER — FAMOTIDINE 10 MG/ML
20 INJECTION, SOLUTION INTRAVENOUS EVERY 12 HOURS SCHEDULED
Status: DISCONTINUED | OUTPATIENT
Start: 2022-08-06 | End: 2022-08-07 | Stop reason: HOSPADM

## 2022-08-06 RX ORDER — DEXTROSE AND SODIUM CHLORIDE 5; .45 G/100ML; G/100ML
100 INJECTION, SOLUTION INTRAVENOUS CONTINUOUS
Status: DISCONTINUED | OUTPATIENT
Start: 2022-08-06 | End: 2022-08-07 | Stop reason: HOSPADM

## 2022-08-06 RX ORDER — POTASSIUM CHLORIDE 14.9 MG/ML
20 INJECTION INTRAVENOUS ONCE
Status: COMPLETED | OUTPATIENT
Start: 2022-08-06 | End: 2022-08-06

## 2022-08-06 RX ORDER — POTASSIUM CHLORIDE 750 MG/1
40 CAPSULE, EXTENDED RELEASE ORAL 2 TIMES DAILY WITH MEALS
Status: DISCONTINUED | OUTPATIENT
Start: 2022-08-06 | End: 2022-08-07

## 2022-08-06 RX ORDER — LORAZEPAM 1 MG/1
1 TABLET ORAL EVERY 6 HOURS
Status: DISCONTINUED | OUTPATIENT
Start: 2022-08-06 | End: 2022-08-07 | Stop reason: HOSPADM

## 2022-08-06 RX ORDER — POTASSIUM CHLORIDE 14.9 MG/ML
20 INJECTION INTRAVENOUS
Status: COMPLETED | OUTPATIENT
Start: 2022-08-06 | End: 2022-08-07

## 2022-08-06 RX ORDER — SODIUM CHLORIDE 0.9 % (FLUSH) 0.9 %
10 SYRINGE (ML) INJECTION EVERY 12 HOURS SCHEDULED
Status: DISCONTINUED | OUTPATIENT
Start: 2022-08-06 | End: 2022-08-07 | Stop reason: HOSPADM

## 2022-08-06 RX ORDER — LORAZEPAM 1 MG/1
1 TABLET ORAL EVERY 8 HOURS
Status: DISCONTINUED | OUTPATIENT
Start: 2022-08-07 | End: 2022-08-07 | Stop reason: HOSPADM

## 2022-08-06 RX ORDER — ONDANSETRON 2 MG/ML
4 INJECTION INTRAMUSCULAR; INTRAVENOUS EVERY 6 HOURS PRN
Status: DISCONTINUED | OUTPATIENT
Start: 2022-08-06 | End: 2022-08-07 | Stop reason: HOSPADM

## 2022-08-06 RX ORDER — POTASSIUM CHLORIDE 14.9 MG/ML
20 INJECTION INTRAVENOUS ONCE
Status: DISCONTINUED | OUTPATIENT
Start: 2022-08-06 | End: 2022-08-06 | Stop reason: SDUPTHER

## 2022-08-06 RX ORDER — SODIUM CHLORIDE 0.9 % (FLUSH) 0.9 %
10 SYRINGE (ML) INJECTION AS NEEDED
Status: DISCONTINUED | OUTPATIENT
Start: 2022-08-06 | End: 2022-08-07 | Stop reason: HOSPADM

## 2022-08-06 RX ORDER — THIAMINE HYDROCHLORIDE 100 MG/ML
100 INJECTION, SOLUTION INTRAMUSCULAR; INTRAVENOUS ONCE
Status: COMPLETED | OUTPATIENT
Start: 2022-08-06 | End: 2022-08-06

## 2022-08-06 RX ORDER — GAUZE BANDAGE 2" X 2"
100 BANDAGE TOPICAL ONCE
Status: COMPLETED | OUTPATIENT
Start: 2022-08-06 | End: 2022-08-06

## 2022-08-06 RX ADMIN — SODIUM CHLORIDE 1 G: 9 INJECTION, SOLUTION INTRAVENOUS at 14:35

## 2022-08-06 RX ADMIN — POTASSIUM CHLORIDE 40 MEQ: 10 CAPSULE, COATED, EXTENDED RELEASE ORAL at 15:34

## 2022-08-06 RX ADMIN — POTASSIUM CHLORIDE 20 MEQ: 14.9 INJECTION, SOLUTION INTRAVENOUS at 22:55

## 2022-08-06 RX ADMIN — THIAMINE HYDROCHLORIDE 1000 ML/HR: 100 INJECTION, SOLUTION INTRAMUSCULAR; INTRAVENOUS at 18:42

## 2022-08-06 RX ADMIN — POTASSIUM CHLORIDE 20 MEQ: 14.9 INJECTION, SOLUTION INTRAVENOUS at 21:00

## 2022-08-06 RX ADMIN — DEXTROSE AND SODIUM CHLORIDE 100 ML/HR: 5; 450 INJECTION, SOLUTION INTRAVENOUS at 21:57

## 2022-08-06 RX ADMIN — POTASSIUM CHLORIDE 20 MEQ: 14.9 INJECTION, SOLUTION INTRAVENOUS at 13:34

## 2022-08-06 RX ADMIN — MAGNESIUM SULFATE HEPTAHYDRATE 2 G: 2 INJECTION, SOLUTION INTRAVENOUS at 15:34

## 2022-08-06 RX ADMIN — THIAMINE HYDROCHLORIDE 100 MG: 100 INJECTION, SOLUTION INTRAMUSCULAR; INTRAVENOUS at 22:55

## 2022-08-06 RX ADMIN — FAMOTIDINE 20 MG: 10 INJECTION, SOLUTION INTRAVENOUS at 21:57

## 2022-08-06 RX ADMIN — LORAZEPAM 1 MG: 1 TABLET ORAL at 21:10

## 2022-08-06 NOTE — H&P
[facility]  HISTORY AND PHYSICAL    Date of Admission: 8/6/2022  Primary Care Physician: INA Holloway MD    Subjective     Chief Complaint: Passed out from alcohol intoxication.    History of Present Illness  This is a 42-year-old  female patient who resides in [place], [place].  She sees Dr. Holloway for primary care.  She does not have much in the way of past medical history other than some problems with hypertension and gestational diabetes during her pregnancies, but her children are now 10 and 15 years old respectively.  She does not take any regular medications.  However, she does abuse alcohol on a daily basis.  She drinks vodka daily.  She has been drinking alcohol for nearly 20 years, but has become a much more heavy drinker over the course of the last 3 years because her father passed away causing her to self medicate with alcohol and then she also has felt isolated and depressed during the COVID-19 pandemic staying at home and caring for her children.  The main reason her  brought her to the emergency department today was to seek help with alcohol detoxification.  We do not do that on an inpatient basis here at [facility].  Case management had given them information about a program at [facility] and he had plan to take her there, but she was found to have hypomagnesemia and hypokalemia.  Dr. Contreras was able to replace her magnesium, but not her potassium.  Her potassium actually went down from 2.9 to 2.7 after IV and oral replacement.  She has been having problems with nausea and vomiting recently.  She has not had much in the way of intake with food.  She was found passed out on the floor this morning by her  and he felt like enough was enough.  The patient admits that when she woke up enough after being aroused by him that she took a few more drinks of vodka before presenting to the ER.  Her  blood alcohol level when checked earlier today was 0.360.    At this time, she is coherent.  She is willing to be admitted to the hospital [facility] for electrolyte replacement and CIWA protocol.  If she is medically stable for discharge tomorrow morning, her  plans to take her to the detoxification unit at [facility].  The patient seems accepting of this at this point in time.  He seems extremely supportive and wants her to get help.  This has been going on for 4 to long.  Their kids start school next week.  He wants her to get healthy and be there for them.    She does not give any history of distinct problems with alcohol withdrawal or seizure.,  But she states that she does shake sometimes when she has prolonged periods of abstaining from alcohol.  Her anxiety and shakiness improve with further vodka intake.    Review of Systems   Otherwise complete ROS reviewed and negative except as mentioned in the HPI.    Past Medical History:   Past Medical History:   Diagnosis Date   • Allergic    • Anxiety    • Anxiety    • Asthma    • Gestational diabetes    • Heart murmur    • History of ear infections    • Hypertension    • Sinusitis      Past Surgical History:  Past Surgical History:   Procedure Laterality Date   •  SECTION     • MOUTH SURGERY       Social History:  reports that she has been smoking. She has a 5.00 pack-year smoking history. She has never used smokeless tobacco. She reports current alcohol use. She reports that she does not use drugs.    Family History: family history includes Diabetes in her father and mother; Heart disease in her father; Stroke in her father.       Allergies:  Allergies   Allergen Reactions   • Codeine Rash and GI Intolerance       Medications:  Prior to Admission medications    Not on File     I have utilized all available immediate resources to obtain, update, and review the patient's current medications.    Objective     Vital Signs: /74    "Pulse 82   Temp 98.3 °F (36.8 °C) (Oral)   Resp 16   Ht 167.6 cm (66\")   Wt 52.2 kg (115 lb)   LMP 07/01/2022   SpO2 97%   BMI 18.56 kg/m²   Physical Exam  Constitutional:       Comments: Up in bed.  No distress.  Does not appear to be under the influence of alcohol at present.  Seen and discussed with her .   HENT:      Head: Normocephalic and atraumatic.   Eyes:      Conjunctiva/sclera: Conjunctivae normal.      Pupils: Pupils are equal, round, and reactive to light.   Neck:      Vascular: No JVD.   Cardiovascular:      Rate and Rhythm: Normal rate and regular rhythm.      Heart sounds: Normal heart sounds.   Pulmonary:      Effort: Pulmonary effort is normal. No respiratory distress.      Breath sounds: Normal breath sounds. No wheezing or rales.   Abdominal:      General: Bowel sounds are normal. There is no distension.      Palpations: Abdomen is soft.      Tenderness: There is no abdominal tenderness.   Musculoskeletal:         General: No tenderness or deformity. Normal range of motion.      Cervical back: Neck supple.   Skin:     General: Skin is warm and dry.      Findings: No rash.   Neurological:      Mental Status: She is alert and oriented to person, place, and time.      Cranial Nerves: No cranial nerve deficit.      Motor: No abnormal muscle tone.      Deep Tendon Reflexes: Reflexes normal.   Psychiatric:         Mood and Affect: Mood normal.         Behavior: Behavior normal.         Thought Content: Thought content normal.         Judgment: Judgment normal.      Comments: Coherent and decisional.        Results Reviewed:  Lab Results (last 24 hours)     Procedure Component Value Units Date/Time    Magnesium [[id number]]  (Normal) Collected: 08/06/22 1723    Specimen: Blood Updated: 08/06/22 1748     Magnesium 2.1 mg/dL     Potassium [[id number]]  (Abnormal) Collected: 08/06/22 1723    Specimen: Blood Updated: 08/06/22 1743     Potassium 2.7 mmol/L     [place] Draw [[id number]] Collected: " 08/06/22 1209    Specimen: Blood from Arm, Left Updated: 08/06/22 1617    Narrative:      The following orders were created for panel order [place] Draw.  Procedure                               Abnormality         Status                     ---------                               -----------         ------                     Green Top (Gel)[[id number]]                                  Final result               Lavender Top[[id number]]                                     Final result               Red Top[[id number]]                                          Final result               [place] Blood Culture Jorge...[[id number]]                      Final result               Mitchell Top[[id number]]                                         Final result               Light Blue Top[[id number]]                                   Final result                 Please view results for these tests on the individual orders.    Mitchell Top [[id number]] Collected: 08/06/22 1209    Specimen: Blood from Arm, Left Updated: 08/06/22 1617     Extra Tube Hold for add-ons.     Comment: Auto resulted.       Troponin [[id number]]  (Normal) Collected: 08/06/22 1437    Specimen: Blood Updated: 08/06/22 1501     Troponin T <0.010 ng/mL     Narrative:      Troponin T Reference Range:  <= 0.03 ng/mL-   Negative for AMI  >0.03 ng/mL-     Abnormal for myocardial necrosis.  Clinicians would have to utilize clinical acumen, EKG, Troponin and serial changes to determine if it is an Acute Myocardial Infarction or myocardial injury due to an underlying chronic condition.       Results may be falsely decreased if patient taking Biotin.      Potassium [[id number]]  (Abnormal) Collected: 08/06/22 1437    Specimen: Blood Updated: 08/06/22 1456     Potassium 2.8 mmol/L     Urinalysis With Culture If Indicated - Urine, Clean Catch [[id number]]  (Abnormal) Collected: 08/06/22 1244    Specimen: Urine, Clean Catch Updated: 08/06/22 1341     Color, UA Dark Yellow      Appearance, UA Cloudy     pH, UA 5.5     Specific Gravity, UA 1.013     Glucose, UA Negative     Ketones, UA Trace     Bilirubin, UA Small (1+)     Blood, UA Negative     Protein, UA Trace     Leuk Esterase, UA Small (1+)     Nitrite, UA Positive     Urobilinogen, UA 2.0 E.U./dL    Narrative:      In absence of clinical symptoms, the presence of pyuria, bacteria, and/or nitrites on the urinalysis result does not correlate with infection.    Urine Culture - Urine, Urine, Clean Catch [[id number]] Collected: 08/06/22 1244    Specimen: Urine, Clean Catch Updated: 08/06/22 1340    [place] Blood Culture Bottle Set [[id number]] Collected: 08/06/22 1209    Specimen: Blood from Arm, Left Updated: 08/06/22 1318     Extra Tube Hold for add-ons.     Comment: Auto resulted.       Light Blue Top [[id number]] Collected: 08/06/22 1209    Specimen: Blood from Arm, Left Updated: 08/06/22 1318     Extra Tube Hold for add-ons.     Comment: Auto resulted       Green Top (Gel) [[id number]] Collected: 08/06/22 1209    Specimen: Blood from Arm, Left Updated: 08/06/22 1318     Extra Tube Hold for add-ons.     Comment: Auto resulted.       Lavender Top [[id number]] Collected: 08/06/22 1209    Specimen: Blood from Arm, Left Updated: 08/06/22 1318     Extra Tube hold for add-on     Comment: Auto resulted       Red Top [[id number]] Collected: 08/06/22 1209    Specimen: Blood from Arm, Left Updated: 08/06/22 1318     Extra Tube Hold for add-ons.     Comment: Auto resulted.       Urinalysis, Microscopic Only - Urine, Clean Catch [[id number]]  (Abnormal) Collected: 08/06/22 1244    Specimen: Urine, Clean Catch Updated: 08/06/22 1305     RBC, UA 0-2 /HPF      WBC, UA 3-5 /HPF      Comment: Urine culture not indicated.        Bacteria, UA 3+ /HPF      Squamous Epithelial Cells, UA 7-12 /HPF      Hyaline Casts, UA None Seen /LPF      Methodology Manual Light Microscopy    Urine Drug Screen - Urine, Clean Catch [[id number]]  (Normal) Collected: 08/06/22 1244     Specimen: Urine, Clean Catch Updated: 08/06/22 1300     THC, Screen, Urine Negative     Phencyclidine (PCP), Urine Negative     Cocaine Screen, Urine Negative     Methamphetamine, Ur Negative     Opiate Screen Negative     Amphetamine Screen, Urine Negative     Benzodiazepine Screen, Urine Negative     Tricyclic Antidepressants Screen Negative     Methadone Screen, Urine Negative     Barbiturates Screen, Urine Negative     Oxycodone Screen, Urine Negative     Propoxyphene Screen Negative     Buprenorphine, Screen, Urine Negative    Narrative:      Cutoff For Drugs Screened:    Amphetamines               500 ng/ml  Barbiturates               200 ng/ml  Benzodiazepines            150 ng/ml  Cocaine                    150 ng/ml  Methadone                  200 ng/ml  Opiates                    100 ng/ml  Phencyclidine               25 ng/ml  THC                            50 ng/ml  Methamphetamine            500 ng/ml  Tricyclic Antidepressants  300 ng/ml  Oxycodone                  100 ng/ml  Propoxyphene               300 ng/ml  Buprenorphine               10 ng/ml    The normal value for all drugs tested is negative. This report includes unconfirmed screening results, with the cutoff values listed, to be used for medical treatment purposes only.  Unconfirmed results must not be used for non-medical purposes such as employment or legal testing.  Clinical consideration should be applied to any drug of abuse test, particularly when unconfirmed results are used.      Troponin [[id number]]  (Normal) Collected: 08/06/22 1209    Specimen: Blood from Arm, Left Updated: 08/06/22 1259     Troponin T <0.010 ng/mL     Narrative:      Troponin T Reference Range:  <= 0.03 ng/mL-   Negative for AMI  >0.03 ng/mL-     Abnormal for myocardial necrosis.  Clinicians would have to utilize clinical acumen, EKG, Troponin and serial changes to determine if it is an Acute Myocardial Infarction or myocardial injury due to an underlying  chronic condition.       Results may be falsely decreased if patient taking Biotin.      Magnesium [[id number]]  (Abnormal) Collected: 08/06/22 1209    Specimen: Blood from Arm, Left Updated: 08/06/22 1254     Magnesium 1.3 mg/dL     D-dimer, Quantitative [[id number]]  (Normal) Collected: 08/06/22 1209    Specimen: Blood from Arm, Left Updated: 08/06/22 1252     D-Dimer, Quantitative 0.35 MCGFEU/mL     Narrative:      Reference Range is 0-0.50 MCGFEU/mL. However, results <0.50 MCGFEU/mL tends to rule out DVT or PE. Results >0.50 MCGFEU/mL are not useful in predicting absence or presence of DVT or PE.      Salicylate Level [[id number]]  (Normal) Collected: 08/06/22 1209    Specimen: Blood from Arm, Left Updated: 08/06/22 1239     Salicylate <0.3 mg/dL     Acetaminophen Level [[id number]]  (Normal) Collected: 08/06/22 1209    Specimen: Blood from Arm, Left Updated: 08/06/22 1239     Acetaminophen <5.0 mcg/mL     Comprehensive Metabolic Panel [[id number]]  (Abnormal) Collected: 08/06/22 1209    Specimen: Blood from Arm, Left Updated: 08/06/22 1237     Glucose 124 mg/dL      BUN 4 mg/dL      Creatinine 0.64 mg/dL      Sodium 140 mmol/L      Potassium 2.9 mmol/L      Chloride 93 mmol/L      CO2 30.0 mmol/L      Calcium 9.0 mg/dL      Total Protein 7.9 g/dL      Albumin 4.10 g/dL      ALT (SGPT) 105 U/L      AST (SGOT) 414 U/L      Alkaline Phosphatase 159 U/L      Total Bilirubin 1.2 mg/dL      Globulin 3.8 gm/dL      A/G Ratio 1.1 g/dL      BUN/Creatinine Ratio 6.3     Anion Gap 17.0 mmol/L      eGFR 112.6 mL/min/1.73      Comment: National Kidney Foundation and American Society of Nephrology (ASN) Task Force recommended calculation based on the Chronic Kidney Disease Epidemiology Collaboration (CKD-EPI) equation refit without adjustment for race.       Narrative:      GFR Normal >60  Chronic Kidney Disease <60  Kidney Failure <15      Ethanol [[id number]] Collected: 08/06/22 1209    Specimen: Blood from Arm, Left Updated:  08/06/22 1232     Ethanol % 0.360 %     Narrative:      Not for legal purposes. Chain of Custody not followed.     Protime-INR [[id number]]  (Normal) Collected: 08/06/22 1209    Specimen: Blood from Arm, Left Updated: 08/06/22 1226     Protime 13.3 Seconds      INR 1.05    aPTT [[id number]]  (Normal) Collected: 08/06/22 1209    Specimen: Blood from Arm, Left Updated: 08/06/22 1226     PTT 25.7 seconds     CBC & Differential [[id number]]  (Abnormal) Collected: 08/06/22 1209    Specimen: Blood from Arm, Left Updated: 08/06/22 1219    Narrative:      The following orders were created for panel order CBC & Differential.  Procedure                               Abnormality         Status                     ---------                               -----------         ------                     CBC Auto Differential[[id number]]        Abnormal            Final result                 Please view results for these tests on the individual orders.    CBC Auto Differential [[id number]]  (Abnormal) Collected: 08/06/22 1209    Specimen: Blood from Arm, Left Updated: 08/06/22 1219     WBC 9.57 10*3/mm3      RBC 4.01 10*6/mm3      Hemoglobin 14.7 g/dL      Hematocrit 42.0 %      .7 fL      MCH 36.7 pg      MCHC 35.0 g/dL      RDW 13.8 %      RDW-SD 53.4 fl      MPV 10.3 fL      Platelets 214 10*3/mm3      Neutrophil % 56.0 %      Lymphocyte % 30.5 %      Monocyte % 10.8 %      Eosinophil % 1.3 %      Basophil % 1.1 %      Immature Grans % 0.3 %      Neutrophils, Absolute 5.36 10*3/mm3      Lymphocytes, Absolute 2.92 10*3/mm3      Monocytes, Absolute 1.03 10*3/mm3      Eosinophils, Absolute 0.12 10*3/mm3      Basophils, Absolute 0.11 10*3/mm3      Immature Grans, Absolute 0.03 10*3/mm3      nRBC 0.0 /100 WBC         Imaging Results (Last 24 Hours)     Procedure Component Value Units Date/Time    XR Chest 1 View [[id number]] Collected: 08/06/22 1321     Updated: 08/06/22 1325    Narrative:      EXAMINATION: XR CHEST 1 VW- 8/6/2022  1:21 PM CDT     HISTORY: Syncope.     REPORT: A frontal view the chest was obtained.     COMPARISON: There are no correlative imaging studies for comparison.        The lungs are mildly hypoaerated, no focal infiltrate or pulmonary  consolidation is identified. No pneumothorax or effusion is identified.  Heart size is normal. The osseous structures show no acute findings.       Impression:      No acute cardiopulmonary abnormality.     This report was finalized on 08/06/2022 13:21 by Dr. Miles Castaneda MD.    CT Head Without Contrast [[id number]] Collected: 08/06/22 1306     Updated: 08/06/22 1311    Narrative:      EXAMINATION: CT HEAD WO CONTRAST- 8/6/2022 1:06 PM CDT     HISTORY: Left-sided numbness, stroke symptoms.     DOSE: 600 mGycm (Automatic exposure control technique was implemented in  an effort to keep the radiation dose as low as possible without  compromising image quality)     REPORT:            Spiral CT of the head was performed without intravenous contrast.  Reconstructed coronal and sagittal images are also reviewed.     Comparison: There are no correlative imaging studies for comparison.     No evidence of intracranial hemorrhage, mass or mass-effect is  identified. The ventricles and basal cisterns appear within normal  limits. There is mild diffuse cerebral atrophy, greater in the frontal  lobes. Bone windows show no skull fracture. The visualized paranasal  sinuses and mastoid air cells are normally aerated.       Impression:      Impression: No acute intracranial abnormality.         This report was finalized on 08/06/2022 13:08 by Dr. Miles Castaneda MD.        I have personally reviewed and interpreted the radiology studies and ECG obtained at time of admission.     Assessment / Plan     Assessment:   Active Hospital Problems    Diagnosis    • **Hypokalemia    • Hypomagnesemia    • Nausea & vomiting    • Alcoholic hepatitis    • Alcoholism (HCC)    • Macrocytosis    • Tobacco use      Plan:    The patient will be admitted to my service here at [facility].  She presents to the emergency department after being found passed out on the floor this morning by her .  She has longstanding history of alcohol abuse.  He found her difficult to arouse.  She drinks vodka in excess on a daily basis.  Her blood alcohol level was 0.360 at 1209.  The patient states that she had a few drinks of vodka after her  woke her up prior to coming into the emergency department.  Her  primarily wanted to see about inpatient detoxification from alcohol.  On work-up, she is found to have an alcoholic hepatitis and hypokalemia/hypomagnesemia.  Dr. Contreras was able to replace her magnesium, but unable to replace her potassium.  Her potassium continues to be 2.7 despite IV and oral replacement.  He requested that we admit her to the hospital for electrolyte replacement.  The patient's  plans to take her to [facility] after discharge from here for inpatient detoxification.    Continue aggressive potassium replacement.  Give additional IV runs and additional oral replacement.  Her magnesium was replaced to 2.1 after IV magnesium in the emergency department.    IV Pepcid.  Antiemetics.    Regular diet if she can tolerate.    CIWA protocol.  Scheduled and as needed Ativan.  Prophylactic vitamin replacement.    Monitor transaminases again in the morning.  Her bilirubin is normal and she does not have a coagulopathy.  She is macrocytic with no thrombocytopenia.  Her discriminant function is only 1.2.    On no regular medications.    Offered a nicotine patch and counseled for tobacco cessation.    SCDs for DVT prophylaxis.    Code Status/Advanced Care Plan: Full with full interventions.      The patient's surrogate decision maker is her , Kurt Smart.     I discussed my findings and recommendations with the patient and her .    Estimated length of stay is at least  overnight.     The patient was seen and examined by me on 08/06/22 at 1815.    Electronically signed by Indio Gonzalez DO, 08/06/22, 18:53 CDT.

## 2022-08-06 NOTE — ED PROVIDER NOTES
"Subjective   This patient is a 42-year-old female who was found syncopized on her floor this morning by her .  She comes in complaining of left-sided numbness.  I was asked to assess her in terms of code stroke.  The patient seemed to be having spasms and her upper extremities bilaterally.  It was immediately apparent that she was intoxicated.  Code stroke was not called.  She denies chest pain, shortness of breath, fever or other systemic symptoms.    The  did have a private discussion with me where he states that his wife has decompensated since she lost her father 2 years ago.  I discussed this with the patient and she admits that she has been depressed and trying to compensate by \"self-medicating\" with alcohol.  She denies suicidality.              Review of Systems   Neurological: Positive for dizziness, syncope, weakness and numbness.   All other systems reviewed and are negative.      Past Medical History:   Diagnosis Date   • Allergic    • Anxiety    • Anxiety    • Asthma    • Gestational diabetes    • Heart murmur    • History of ear infections    • Hypertension    • Sinusitis        Allergies   Allergen Reactions   • Codeine Rash and GI Intolerance       Past Surgical History:   Procedure Laterality Date   •  SECTION     • MOUTH SURGERY         Family History   Problem Relation Age of Onset   • Diabetes Mother    • Heart disease Father    • Diabetes Father    • Stroke Father        Social History     Socioeconomic History   • Marital status:    Tobacco Use   • Smoking status: Current Every Day Smoker     Packs/day: 0.50     Years: 10.00     Pack years: 5.00   • Smokeless tobacco: Never Used   Substance and Sexual Activity   • Alcohol use: Yes     Comment: 2/daily   • Drug use: No   • Sexual activity: Defer           Objective   Physical Exam  Vitals and nursing note reviewed.   Constitutional:       Appearance: Normal appearance. She is well-developed.   HENT:      Head: " Normocephalic and atraumatic.      Right Ear: External ear normal.      Left Ear: External ear normal.      Nose: Nose normal.      Mouth/Throat:      Mouth: Mucous membranes are moist.      Pharynx: Oropharynx is clear.   Eyes:      Extraocular Movements: Extraocular movements intact.      Conjunctiva/sclera: Conjunctivae normal.   Cardiovascular:      Rate and Rhythm: Normal rate and regular rhythm.      Pulses: Normal pulses.      Heart sounds: Normal heart sounds.   Pulmonary:      Effort: Pulmonary effort is normal.      Breath sounds: Normal breath sounds.   Abdominal:      General: Bowel sounds are normal.      Palpations: Abdomen is soft.   Musculoskeletal:         General: Normal range of motion.      Cervical back: Normal range of motion and neck supple.   Skin:     General: Skin is warm and dry.      Capillary Refill: Capillary refill takes less than 2 seconds.   Neurological:      General: No focal deficit present.      Mental Status: She is alert and oriented to person, place, and time.   Psychiatric:         Mood and Affect: Affect is tearful.         Speech: Speech is slurred.         Procedures           ED Course                                           MDM  Number of Diagnoses or Management Options     Amount and/or Complexity of Data Reviewed  Clinical lab tests: ordered and reviewed  Tests in the radiology section of CPT®: reviewed and ordered  Decide to obtain previous medical records or to obtain history from someone other than the patient: yes    Patient Progress  Patient progress: stable      Final diagnoses:   Hypokalemia   Alcohol abuse       ED Disposition  ED Disposition     ED Disposition   Decision to Admit    Condition   --    Comment   Level of Care: Telemetry [5]   Diagnosis: Hypokalemia [044339]   Admitting Physician: ADILENE UGALDE [[id number]]   Attending Physician: ADILENE UGALDE [[id number]]               No follow-up provider specified.       Medication List      No changes were made to  your prescriptions during this visit.       The patient's work-up was significant for hypokalemia of 2.9.  I gave her IV potassium and rechecked it and unfortunately it did drop to 2.8.  I gave her p.o. potassium and recheck that and it dropped to 2.7.  Her magnesium was low and that was fixed with IV magnesium in the ED.  But I believe she should be admitted because of the hypokalemia that is going the wrong way.  I discussed this with Dr. Gonzalez and he is agreeable to this plan.     Nj Contreras MD  08/06/22 9450

## 2022-08-06 NOTE — CASE MANAGEMENT/SOCIAL WORK
Was asked to speak with patient and her spouse about facilities that offer detox from alcohol. Patient reports that she needs to go home before going anywhere so she can see her kids and pack a bag. I spoke with the house supervisor at [facility] and she advised since patient was not going straight there from here to tell her just to present to their ER and tell them she is there for detox. I relayed this information to patient and her spouse and they voiced understanding.

## 2022-08-07 ENCOUNTER — READMISSION MANAGEMENT (OUTPATIENT)
Dept: CALL CENTER | Facility: HOSPITAL | Age: 42
End: 2022-08-07

## 2022-08-07 VITALS
HEIGHT: 66 IN | TEMPERATURE: 98.6 F | HEART RATE: 80 BPM | RESPIRATION RATE: 16 BRPM | OXYGEN SATURATION: 98 % | BODY MASS INDEX: 18.48 KG/M2 | DIASTOLIC BLOOD PRESSURE: 70 MMHG | WEIGHT: 115 LBS | SYSTOLIC BLOOD PRESSURE: 100 MMHG

## 2022-08-07 LAB
ALBUMIN SERPL-MCNC: 3.4 G/DL (ref 3.5–5.2)
ALBUMIN/GLOB SERPL: 1.1 G/DL
ALP SERPL-CCNC: 131 U/L (ref 39–117)
ALT SERPL W P-5'-P-CCNC: 60 U/L (ref 1–33)
ANION GAP SERPL CALCULATED.3IONS-SCNC: 8 MMOL/L (ref 5–15)
AST SERPL-CCNC: 150 U/L (ref 1–32)
BILIRUB SERPL-MCNC: 1.7 MG/DL (ref 0–1.2)
BUN SERPL-MCNC: 2 MG/DL (ref 6–20)
BUN/CREAT SERPL: 3.6 (ref 7–25)
CALCIUM SPEC-SCNC: 7.8 MG/DL (ref 8.6–10.5)
CHLORIDE SERPL-SCNC: 98 MMOL/L (ref 98–107)
CO2 SERPL-SCNC: 29 MMOL/L (ref 22–29)
CREAT SERPL-MCNC: 0.56 MG/DL (ref 0.57–1)
DEPRECATED RDW RBC AUTO: 54.5 FL (ref 37–54)
EGFRCR SERPLBLD CKD-EPI 2021: 116.3 ML/MIN/1.73
ERYTHROCYTE [DISTWIDTH] IN BLOOD BY AUTOMATED COUNT: 13.9 % (ref 12.3–15.4)
GLOBULIN UR ELPH-MCNC: 3.1 GM/DL
GLUCOSE SERPL-MCNC: 100 MG/DL (ref 65–99)
HCT VFR BLD AUTO: 33 % (ref 34–46.6)
HGB BLD-MCNC: 11.3 G/DL (ref 12–15.9)
MAGNESIUM SERPL-MCNC: 1.6 MG/DL (ref 1.6–2.6)
MCH RBC QN AUTO: 36.8 PG (ref 26.6–33)
MCHC RBC AUTO-ENTMCNC: 34.2 G/DL (ref 31.5–35.7)
MCV RBC AUTO: 107.5 FL (ref 79–97)
PLATELET # BLD AUTO: 128 10*3/MM3 (ref 140–450)
PMV BLD AUTO: 10.4 FL (ref 6–12)
POTASSIUM SERPL-SCNC: 3.3 MMOL/L (ref 3.5–5.2)
PROT SERPL-MCNC: 6.5 G/DL (ref 6–8.5)
QT INTERVAL: 410 MS
QTC INTERVAL: 484 MS
RBC # BLD AUTO: 3.07 10*6/MM3 (ref 3.77–5.28)
SODIUM SERPL-SCNC: 135 MMOL/L (ref 136–145)
WBC NRBC COR # BLD: 3.88 10*3/MM3 (ref 3.4–10.8)

## 2022-08-07 PROCEDURE — 36415 COLL VENOUS BLD VENIPUNCTURE: CPT | Performed by: INTERNAL MEDICINE

## 2022-08-07 PROCEDURE — 83735 ASSAY OF MAGNESIUM: CPT | Performed by: INTERNAL MEDICINE

## 2022-08-07 PROCEDURE — 96375 TX/PRO/DX INJ NEW DRUG ADDON: CPT

## 2022-08-07 PROCEDURE — 80053 COMPREHEN METABOLIC PANEL: CPT | Performed by: INTERNAL MEDICINE

## 2022-08-07 PROCEDURE — G0378 HOSPITAL OBSERVATION PER HR: HCPCS

## 2022-08-07 PROCEDURE — 85027 COMPLETE CBC AUTOMATED: CPT | Performed by: INTERNAL MEDICINE

## 2022-08-07 PROCEDURE — 96366 THER/PROPH/DIAG IV INF ADDON: CPT

## 2022-08-07 PROCEDURE — 25010000002 MAGNESIUM SULFATE 2 GM/50ML SOLUTION: Performed by: INTERNAL MEDICINE

## 2022-08-07 RX ORDER — POTASSIUM CHLORIDE 750 MG/1
40 CAPSULE, EXTENDED RELEASE ORAL ONCE
Status: COMPLETED | OUTPATIENT
Start: 2022-08-07 | End: 2022-08-07

## 2022-08-07 RX ORDER — ONDANSETRON 4 MG/1
4 TABLET, ORALLY DISINTEGRATING ORAL EVERY 8 HOURS PRN
Qty: 12 TABLET | Refills: 1 | OUTPATIENT
Start: 2022-08-07 | End: 2023-02-20

## 2022-08-07 RX ORDER — CEFDINIR 300 MG/1
300 CAPSULE ORAL EVERY 12 HOURS SCHEDULED
Qty: 4 CAPSULE | Refills: 0 | Status: SHIPPED | OUTPATIENT
Start: 2022-08-07 | End: 2022-08-09

## 2022-08-07 RX ORDER — MAGNESIUM SULFATE HEPTAHYDRATE 40 MG/ML
2 INJECTION, SOLUTION INTRAVENOUS ONCE
Status: COMPLETED | OUTPATIENT
Start: 2022-08-07 | End: 2022-08-07

## 2022-08-07 RX ORDER — CEFDINIR 300 MG/1
300 CAPSULE ORAL EVERY 12 HOURS SCHEDULED
Status: DISCONTINUED | OUTPATIENT
Start: 2022-08-07 | End: 2022-08-07 | Stop reason: HOSPADM

## 2022-08-07 RX ADMIN — FAMOTIDINE 20 MG: 10 INJECTION, SOLUTION INTRAVENOUS at 09:24

## 2022-08-07 RX ADMIN — POTASSIUM CHLORIDE 40 MEQ: 10 CAPSULE, COATED, EXTENDED RELEASE ORAL at 09:24

## 2022-08-07 RX ADMIN — CEFDINIR 300 MG: 300 CAPSULE ORAL at 09:24

## 2022-08-07 RX ADMIN — MAGNESIUM SULFATE HEPTAHYDRATE 2 G: 2 INJECTION, SOLUTION INTRAVENOUS at 09:25

## 2022-08-07 RX ADMIN — LORAZEPAM 1 MG: 1 TABLET ORAL at 06:23

## 2022-08-07 RX ADMIN — LORAZEPAM 1 MG: 1 TABLET ORAL at 00:42

## 2022-08-07 RX ADMIN — POTASSIUM CHLORIDE 40 MEQ: 10 CAPSULE, COATED, EXTENDED RELEASE ORAL at 11:25

## 2022-08-07 RX ADMIN — Medication 10 ML: at 09:25

## 2022-08-07 NOTE — OUTREACH NOTE
Prep Survey    Flowsheet Row Responses   [facility] facility patient discharged from? [place]   Is LACE score < 7 ? Yes   Emergency Room discharge w/ pulse ox? No   Eligibility [facility]   Date of Admission 08/06/22   Date of Discharge 08/07/22   Discharge Disposition Home or Self Care   Discharge diagnosis alcoholic hepatitis, hypokalemia, hypomagnesemia   Does the patient have one of the following disease processes/diagnoses(primary or secondary)? Other   Does the patient have Home health ordered? No   Is there a DME ordered? No   Prep survey completed? Yes          WENDY JOHNSON - Registered Nurse

## 2022-08-07 NOTE — DISCHARGE SUMMARY
[facility] Medicine Services  DISCHARGE SUMMARY       Date of Admission: 8/6/2022  Date of Discharge:  8/7/2022  Primary Care Physician: INA Holloway MD    Presenting Problem/History of Present Illness:  Passed out from alcohol intoxication.    Final Discharge Diagnoses:  Active Hospital Problems    Diagnosis    • **Hypokalemia    • Hypomagnesemia    • Nausea & vomiting    • Alcoholic hepatitis    • Alcoholism (HCC)    • Macrocytosis    • Tobacco use      Consults: None.     Procedures Performed: None.     Pertinent Test Results:   Imaging Results (All)     Procedure Component Value Units Date/Time    XR Chest 1 View [[id number]] Collected: 08/06/22 1321     Updated: 08/06/22 1325    Narrative:      EXAMINATION: XR CHEST 1 VW- 8/6/2022 1:21 PM CDT     HISTORY: Syncope.     REPORT: A frontal view the chest was obtained.     COMPARISON: There are no correlative imaging studies for comparison.        The lungs are mildly hypoaerated, no focal infiltrate or pulmonary  consolidation is identified. No pneumothorax or effusion is identified.  Heart size is normal. The osseous structures show no acute findings.       Impression:      No acute cardiopulmonary abnormality.     This report was finalized on 08/06/2022 13:21 by Dr. Miles Castaneda MD.    CT Head Without Contrast [[id number]] Collected: 08/06/22 1306     Updated: 08/06/22 1311    Narrative:      EXAMINATION: CT HEAD WO CONTRAST- 8/6/2022 1:06 PM CDT     HISTORY: Left-sided numbness, stroke symptoms.     DOSE: 600 mGycm (Automatic exposure control technique was implemented in  an effort to keep the radiation dose as low as possible without  compromising image quality)     REPORT:            Spiral CT of the head was performed without intravenous contrast.  Reconstructed coronal and sagittal images are also reviewed.     Comparison: There are no correlative imaging studies for comparison.     No evidence of intracranial  hemorrhage, mass or mass-effect is  identified. The ventricles and basal cisterns appear within normal  limits. There is mild diffuse cerebral atrophy, greater in the frontal  lobes. Bone windows show no skull fracture. The visualized paranasal  sinuses and mastoid air cells are normally aerated.       Impression:      Impression: No acute intracranial abnormality.         This report was finalized on 08/06/2022 13:08 by Dr. Miles Castaneda MD.        LAB RESULTS:      Lab 08/07/22  0747 08/06/22  1209   WBC 3.88 9.57   HEMOGLOBIN 11.3* 14.7   HEMATOCRIT 33.0* 42.0   PLATELETS 128* 214   NEUTROS ABS  --  5.36   IMMATURE GRANS (ABS)  --  0.03   LYMPHS ABS  --  2.92   MONOS ABS  --  1.03*   EOS ABS  --  0.12   .5* 104.7*   PROTIME  --  13.3   APTT  --  25.7   D DIMER QUANT  --  0.35         Lab 08/07/22  0836 08/07/22  0516 08/06/22  1723 08/06/22  1437 08/06/22  1209   SODIUM 135*  --   --   --  140   POTASSIUM 3.3*  --  2.7* 2.8* 2.9*   CHLORIDE 98  --   --   --  93*   CO2 29.0  --   --   --  30.0*   ANION GAP 8.0  --   --   --  17.0*   BUN 2*  --   --   --  4*   CREATININE 0.56*  --   --   --  0.64   EGFR 116.3  --   --   --  112.6   GLUCOSE 100*  --   --   --  124*   CALCIUM 7.8*  --   --   --  9.0   MAGNESIUM  --  1.6 2.1  --  1.3*         Lab 08/07/22  0836 08/06/22  1209   TOTAL PROTEIN 6.5 7.9   ALBUMIN 3.40* 4.10   GLOBULIN 3.1 3.8   ALT (SGPT) 60* 105*   AST (SGOT) 150* 414*   BILIRUBIN 1.7* 1.2   ALK PHOS 131* 159*         Lab 08/06/22  1437 08/06/22  1209   TROPONIN T <0.010 <0.010   PROTIME  --  13.3   INR  --  1.05                 Brief Urine Lab Results  (Last result in the past 365 days)      Color   Clarity   Blood   Leuk Est   Nitrite   Protein   CREAT   Urine HCG        08/06/22 1244 Dark Yellow   Cloudy   Negative   Small (1+)   Positive   Trace               Microbiology Results (last 10 days)     Procedure Component Value - Date/Time    COVID-19,[facility] IN-HOUSE,Nasal Swab No Transport  Media 3-4 HR TAT - Swab, Nasal Cavity [[id number]]  (Normal) Collected: 08/06/22 1841    Lab Status: Final result Specimen: Swab from Nasal Cavity Updated: 08/06/22 1929     COVID19 Not Detected    Narrative:      Fact sheet for providers: https://www.fda.gov/media/317496/download     Fact sheet for patients: https://www.fda.gov/media/324926/download    Test performed by PCR.    Consider negative results in combination with clinical observations, patient history, and epidemiological information.        Hospital Course:   Initial Visit:   This is a 42-year-old  female patient who resides in [place], [place].  She sees Dr. Holloway for primary care.  She does not have much in the way of past medical history other than some problems with hypertension and gestational diabetes during her pregnancies, but her children are now 10 and 15 years old respectively.  She does not take any regular medications.  However, she does abuse alcohol on a daily basis.  She drinks vodka daily.  She has been drinking alcohol for nearly 20 years, but has become a much more heavy drinker over the course of the last 3 years because her father passed away causing her to self medicate with alcohol and then she also has felt isolated and depressed during the COVID-19 pandemic staying at home and caring for her children.  The main reason her  brought her to the emergency department today was to seek help with alcohol detoxification.  We do not do that on an inpatient basis here at [facility].  Case management had given them information about a program at [facility] and he had plan to take her there, but she was found to have hypomagnesemia and hypokalemia.  Dr. Contreras was able to replace her magnesium, but not her potassium.  Her potassium actually went down from 2.9 to 2.7 after IV and oral replacement.  She has been having problems with nausea and vomiting recently.  She has not had much in the way  of intake with food.  She was found passed out on the floor this morning by her  and he felt like enough was enough.  The patient admits that when she woke up enough after being aroused by him that she took a few more drinks of vodka before presenting to the ER.  Her blood alcohol level when checked earlier today was 0.360.     At this time, she is coherent.  She is willing to be admitted to the hospital [facility] for electrolyte replacement and CIWA protocol.  If she is medically stable for discharge tomorrow morning, her  plans to take her to the detoxification unit at [facility].  The patient seems accepting of this at this point in time.  He seems extremely supportive and wants her to get help.  This has been going on for 4 to long.  Their kids start school next week.  He wants her to get healthy and be there for them.     She does not give any history of distinct problems with alcohol withdrawal or seizure.,  But she states that she does shake sometimes when she has prolonged periods of abstaining from alcohol.  Her anxiety and shakiness improve with further vodka intake.    Subsequently:   The patient was admitted to my service here at  on 8/6.  She presented to the emergency department after being found passed out on the floor yesterday morning by her .  She has a longstanding history of alcohol abuse.  He found her difficult to arouse.  She drinks vodka in excess on a daily basis.  Her blood alcohol level was 0.360 at 1209.  The patient states that she had a few drinks of vodka after her  woke her up prior to coming into the emergency department.  Her  primarily wanted to see about inpatient detoxification from alcohol.  On work-up, she is found to have an alcoholic hepatitis and hypokalemia/hypomagnesemia.  Dr. Contreras was able to replace her magnesium, but unable to replace her potassium.  Her potassium continues to be 2.7 despite IV and  "oral replacement.  He requested that we admit her to the hospital for electrolyte replacement.  The patient's  plans to take her to [facility] after discharge from here for inpatient detoxification.     Continued aggressive potassium replacement.  Gave additional IV runs and additional oral replacement. Potassium now 3.3 and will receive additional oral replacement this morning, Her magnesium was replaced to 2.1 after IV magnesium in the emergency department. Down to 1.6 this morning so an additional 2 grams IV are given.      IV Pepcid.  Antiemetics.     Regular diet tolerated.     CIWA protocol.  Scheduled and as needed Ativan.  Prophylactic vitamin replacement.     Monitored transaminases; improved this morning.  Her bilirubin was 1.2 yesterday and now 1.7. No coagulopathy.  She is macrocytic with mild thrombocytopenia.  Her discriminant function was only 1.2.     On no regular medications.     Offered a nicotine patch and counseled for tobacco cessation.     SCDs were used for DVT prophylaxis.    Physical Exam on Discharge:  /70 (BP Location: Left arm, Patient Position: Lying)   Pulse 80   Temp 98.6 °F (37 °C) (Oral)   Resp 16   Ht 167.6 cm (65.98\")   Wt 52.2 kg (115 lb)   LMP 07/01/2022   SpO2 98%   BMI 18.57 kg/m²   Physical Exam  Constitutional:       Comments: Up in bed.  No distress. No family currently present. Discussed with her nurse, Martha.   HENT:      Head: Normocephalic and atraumatic.   Eyes:      Conjunctiva/sclera: Conjunctivae normal.      Pupils: Pupils are equal, round, and reactive to light.   Neck:      Vascular: No JVD.   Cardiovascular:      Rate and Rhythm: Normal rate and regular rhythm.   Pulmonary:      Effort: Pulmonary effort is normal. No respiratory distress.   Musculoskeletal:         General: No tenderness or deformity. Normal range of motion.      Cervical back: Neck supple.   Skin:     General: Skin is warm and dry.      Findings: No rash. "   Neurological: No signs of withdrawal at present.      Mental Status: She is alert and oriented to person, place, and time.      Cranial Nerves: No cranial nerve deficit.      Motor: No abnormal muscle tone.      Deep Tendon Reflexes: Reflexes normal.   Psychiatric:         Mood and Affect: Mood normal.         Behavior: Behavior normal.         Thought Content: Thought content normal.         Judgment: Judgment normal.      Comments: Remains coherent and decisional.     Condition on Discharge: Stable.     Discharge Disposition:  Home or Self Care    Discharge Medications:     Discharge Medications      New Medications      Instructions Start Date   cefdinir 300 MG capsule  Commonly known as: OMNICEF   300 mg, Oral, Every 12 Hours Scheduled      ondansetron ODT 4 MG disintegrating tablet  Commonly known as: Zofran ODT   4 mg, Translingual, Every 8 Hours PRN           Discharge Diet:   Diet Instructions     Diet: Regular; Thin      Discharge Diet: Regular    Fluid Consistency: Thin        Activity at Discharge:   Activity Instructions     Activity as Tolerated          Follow-up Appointments:   1. INA Holloway MD in 1 week.   2. She and her  plan for her to go to alcohol rehab after discharge from here.     Test Results Pending at Discharge: None.     Electronically signed by Indio Gonzalez DO, 08/07/22, 09:29 CDT.    Time: 25 minutes.

## 2022-08-07 NOTE — PLAN OF CARE
Goal Outcome Evaluation:  Plan of Care Reviewed With: patient        Progress: improving  Outcome Evaluation: VSS/RA.  HR S71-98 per tele.  Pt c/o pain in hip and ankle but did not require med coverage.  CIWA protocol.  Ativan PRN given x1 this shift.  Potassium protocol completed.  Pt appeared to sleep most of the night.  Will continue to monitor and notify MD of any changes.

## 2022-08-08 ENCOUNTER — TRANSITIONAL CARE MANAGEMENT TELEPHONE ENCOUNTER (OUTPATIENT)
Dept: CALL CENTER | Facility: HOSPITAL | Age: 42
End: 2022-08-08

## 2022-08-08 LAB — BACTERIA SPEC AEROBE CULT: ABNORMAL

## 2022-08-08 NOTE — OUTREACH NOTE
Call Center TCM Note    Flowsheet Row Responses   [facility] patient discharged from? [place]   Does the patient have one of the following disease processes/diagnoses(primary or secondary)? Other   TCM attempt successful? No   Unsuccessful attempts Attempt 1          Mandy Hartman LPN    8/8/2022, 15:20 CDT

## 2022-08-08 NOTE — OUTREACH NOTE
Call Center TCM Note    Flowsheet Row Responses   [facility] patient discharged from? [place]   Does the patient have one of the following disease processes/diagnoses(primary or secondary)? Other   TCM attempt successful? No   Unsuccessful attempts Attempt 2          Mandy Hartman LPN    8/8/2022, 16:04 CDT

## 2022-08-09 ENCOUNTER — TRANSITIONAL CARE MANAGEMENT TELEPHONE ENCOUNTER (OUTPATIENT)
Dept: CALL CENTER | Facility: HOSPITAL | Age: 42
End: 2022-08-09

## 2022-08-09 NOTE — OUTREACH NOTE
Call Center TCM Note    Flowsheet Row Responses   [facility] patient discharged from? [place]   Does the patient have one of the following disease processes/diagnoses(primary or secondary)? Other   TCM attempt successful? Yes   Call start time 1438   Call end time 1439   Discharge diagnosis alcoholic hepatitis, hypokalemia, hypomagnesemia   Does the patient have all medications ordered at discharge? Yes   Is the patient taking all medications as directed (includes completed medication regime)? Yes   Does the patient have a primary care provider?  Yes   Does the patient have an appointment with their PCP within 7 days of discharge? No   Comments regarding PCP says she will call to make her own f/u appt with PCP   Nursing Interventions Advised patient to make appointment   Has the patient kept scheduled appointments due by today? N/A   Has home health visited the patient within 72 hours of discharge? N/A   Psychosocial issues? No   Did the patient receive a copy of their discharge instructions? Yes   What is the patient's perception of their health status since discharge? Improving   Is the patient/caregiver able to teach back the hierarchy of who to call/visit for symptoms/problems? PCP, Specialist, Home health nurse, Urgent Care, ED, 911 Yes   TCM call completed? Yes   Wrap up additional comments Doing well, no questions, says she will call to make her own f/u appts.          Santa Nelson RN    8/9/2022, 14:39 CDT

## 2023-02-20 ENCOUNTER — HOSPITAL ENCOUNTER (EMERGENCY)
Facility: HOSPITAL | Age: 43
Discharge: HOME | End: 2023-02-20
Attending: EMERGENCY MEDICINE | Admitting: EMERGENCY MEDICINE
Payer: COMMERCIAL

## 2023-02-20 ENCOUNTER — APPOINTMENT (OUTPATIENT)
Dept: CT IMAGING | Facility: HOSPITAL | Age: 43
End: 2023-02-20
Payer: COMMERCIAL

## 2023-02-20 ENCOUNTER — APPOINTMENT (OUTPATIENT)
Dept: GENERAL RADIOLOGY | Facility: HOSPITAL | Age: 43
End: 2023-02-20
Payer: COMMERCIAL

## 2023-02-20 VITALS
HEART RATE: 116 BPM | RESPIRATION RATE: 15 BRPM | OXYGEN SATURATION: 98 % | BODY MASS INDEX: 19.15 KG/M2 | DIASTOLIC BLOOD PRESSURE: 79 MMHG | HEIGHT: 67 IN | TEMPERATURE: 97.8 F | WEIGHT: 122 LBS | SYSTOLIC BLOOD PRESSURE: 111 MMHG

## 2023-02-20 DIAGNOSIS — S92.515A CLOSED NONDISPLACED FRACTURE OF PROXIMAL PHALANX OF LESSER TOE OF LEFT FOOT, INITIAL ENCOUNTER: ICD-10-CM

## 2023-02-20 DIAGNOSIS — K70.11 ASCITES DUE TO ALCOHOLIC HEPATITIS: Primary | ICD-10-CM

## 2023-02-20 DIAGNOSIS — S20.212A CONTUSION OF LEFT CHEST WALL, INITIAL ENCOUNTER: ICD-10-CM

## 2023-02-20 LAB
ALBUMIN SERPL-MCNC: 3.2 G/DL (ref 3.5–5.2)
ALBUMIN/GLOB SERPL: 0.7 G/DL
ALP SERPL-CCNC: 164 U/L (ref 39–117)
ALT SERPL W P-5'-P-CCNC: 32 U/L (ref 1–33)
AMMONIA BLD-SCNC: 69 UMOL/L (ref 11–51)
ANION GAP SERPL CALCULATED.3IONS-SCNC: 18 MMOL/L (ref 5–15)
APTT PPP: 38.6 SECONDS (ref 24.1–35)
AST SERPL-CCNC: 218 U/L (ref 1–32)
B-HCG UR QL: NEGATIVE
BACTERIA UR QL AUTO: ABNORMAL /HPF
BASOPHILS # BLD AUTO: 0.02 10*3/MM3 (ref 0–0.2)
BASOPHILS NFR BLD AUTO: 0.2 % (ref 0–1.5)
BILIRUB SERPL-MCNC: 5.5 MG/DL (ref 0–1.2)
BILIRUB UR QL STRIP: ABNORMAL
BUN SERPL-MCNC: 7 MG/DL (ref 6–20)
BUN/CREAT SERPL: 10.9 (ref 7–25)
CALCIUM SPEC-SCNC: 8.4 MG/DL (ref 8.6–10.5)
CHLORIDE SERPL-SCNC: 82 MMOL/L (ref 98–107)
CLARITY UR: ABNORMAL
CO2 SERPL-SCNC: 28 MMOL/L (ref 22–29)
COLOR UR: ABNORMAL
CREAT SERPL-MCNC: 0.64 MG/DL (ref 0.57–1)
DEPRECATED RDW RBC AUTO: 61.8 FL (ref 37–54)
EGFRCR SERPLBLD CKD-EPI 2021: 111.9 ML/MIN/1.73
EOSINOPHIL # BLD AUTO: 0.26 10*3/MM3 (ref 0–0.4)
EOSINOPHIL NFR BLD AUTO: 2.4 % (ref 0.3–6.2)
ERYTHROCYTE [DISTWIDTH] IN BLOOD BY AUTOMATED COUNT: 15.6 % (ref 12.3–15.4)
ETHANOL UR QL: <0.01 %
GLOBULIN UR ELPH-MCNC: 4.7 GM/DL
GLUCOSE SERPL-MCNC: 105 MG/DL (ref 65–99)
GLUCOSE UR STRIP-MCNC: NEGATIVE MG/DL
GRAN CASTS URNS QL MICRO: ABNORMAL /LPF
HCT VFR BLD AUTO: 33 % (ref 34–46.6)
HGB BLD-MCNC: 10.9 G/DL (ref 12–15.9)
HGB UR QL STRIP.AUTO: NEGATIVE
HYALINE CASTS UR QL AUTO: ABNORMAL /LPF
IMM GRANULOCYTES # BLD AUTO: 0.08 10*3/MM3 (ref 0–0.05)
IMM GRANULOCYTES NFR BLD AUTO: 0.7 % (ref 0–0.5)
INR PPP: 1.22 (ref 0.91–1.09)
KETONES UR QL STRIP: ABNORMAL
LEUKOCYTE ESTERASE UR QL STRIP.AUTO: ABNORMAL
LIPASE SERPL-CCNC: 14 U/L (ref 13–60)
LYMPHOCYTES # BLD AUTO: 1.05 10*3/MM3 (ref 0.7–3.1)
LYMPHOCYTES NFR BLD AUTO: 9.8 % (ref 19.6–45.3)
MCH RBC QN AUTO: 35.7 PG (ref 26.6–33)
MCHC RBC AUTO-ENTMCNC: 33 G/DL (ref 31.5–35.7)
MCV RBC AUTO: 108.2 FL (ref 79–97)
MONOCYTES # BLD AUTO: 0.72 10*3/MM3 (ref 0.1–0.9)
MONOCYTES NFR BLD AUTO: 6.7 % (ref 5–12)
NEUTROPHILS NFR BLD AUTO: 8.55 10*3/MM3 (ref 1.7–7)
NEUTROPHILS NFR BLD AUTO: 80.2 % (ref 42.7–76)
NITRITE UR QL STRIP: POSITIVE
NRBC BLD AUTO-RTO: 0 /100 WBC (ref 0–0.2)
PH UR STRIP.AUTO: 6 [PH] (ref 5–8)
PLATELET # BLD AUTO: 102 10*3/MM3 (ref 140–450)
PMV BLD AUTO: 10.7 FL (ref 6–12)
POTASSIUM SERPL-SCNC: 3.6 MMOL/L (ref 3.5–5.2)
PROT SERPL-MCNC: 7.9 G/DL (ref 6–8.5)
PROT UR QL STRIP: ABNORMAL
PROTHROMBIN TIME: 15.6 SECONDS (ref 11.8–14.8)
RBC # BLD AUTO: 3.05 10*6/MM3 (ref 3.77–5.28)
RBC # UR STRIP: ABNORMAL /HPF
REF LAB TEST METHOD: ABNORMAL
SODIUM SERPL-SCNC: 128 MMOL/L (ref 136–145)
SP GR UR STRIP: 1.02 (ref 1–1.03)
SQUAMOUS #/AREA URNS HPF: ABNORMAL /HPF
UROBILINOGEN UR QL STRIP: ABNORMAL
WBC # UR STRIP: ABNORMAL /HPF
WBC NRBC COR # BLD: 10.68 10*3/MM3 (ref 3.4–10.8)

## 2023-02-20 PROCEDURE — 85730 THROMBOPLASTIN TIME PARTIAL: CPT | Performed by: EMERGENCY MEDICINE

## 2023-02-20 PROCEDURE — 73590 X-RAY EXAM OF LOWER LEG: CPT

## 2023-02-20 PROCEDURE — 81025 URINE PREGNANCY TEST: CPT | Performed by: EMERGENCY MEDICINE

## 2023-02-20 PROCEDURE — 99283 EMERGENCY DEPT VISIT LOW MDM: CPT

## 2023-02-20 PROCEDURE — 82140 ASSAY OF AMMONIA: CPT | Performed by: EMERGENCY MEDICINE

## 2023-02-20 PROCEDURE — 82077 ASSAY SPEC XCP UR&BREATH IA: CPT | Performed by: EMERGENCY MEDICINE

## 2023-02-20 PROCEDURE — 71250 CT THORAX DX C-: CPT

## 2023-02-20 PROCEDURE — 85610 PROTHROMBIN TIME: CPT | Performed by: EMERGENCY MEDICINE

## 2023-02-20 PROCEDURE — 85025 COMPLETE CBC W/AUTO DIFF WBC: CPT | Performed by: EMERGENCY MEDICINE

## 2023-02-20 PROCEDURE — 80053 COMPREHEN METABOLIC PANEL: CPT | Performed by: EMERGENCY MEDICINE

## 2023-02-20 PROCEDURE — 73630 X-RAY EXAM OF FOOT: CPT

## 2023-02-20 PROCEDURE — 83690 ASSAY OF LIPASE: CPT | Performed by: EMERGENCY MEDICINE

## 2023-02-20 PROCEDURE — 74177 CT ABD & PELVIS W/CONTRAST: CPT

## 2023-02-20 PROCEDURE — 81001 URINALYSIS AUTO W/SCOPE: CPT | Performed by: EMERGENCY MEDICINE

## 2023-02-20 PROCEDURE — 25010000002 IOPAMIDOL 61 % SOLUTION: Performed by: EMERGENCY MEDICINE

## 2023-02-20 RX ORDER — SODIUM CHLORIDE 0.9 % (FLUSH) 0.9 %
10 SYRINGE (ML) INJECTION AS NEEDED
Status: DISCONTINUED | OUTPATIENT
Start: 2023-02-20 | End: 2023-02-20 | Stop reason: HOSPADM

## 2023-02-20 RX ORDER — FUROSEMIDE 40 MG/1
20 TABLET ORAL DAILY
Qty: 30 TABLET | Refills: 0 | Status: SHIPPED | OUTPATIENT
Start: 2023-02-20

## 2023-02-20 RX ADMIN — IOPAMIDOL 100 ML: 612 INJECTION, SOLUTION INTRAVENOUS at 14:41

## 2023-02-20 RX ADMIN — SODIUM CHLORIDE 500 ML: 9 INJECTION, SOLUTION INTRAVENOUS at 12:59
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
## 2023-02-24 ENCOUNTER — HOSPITAL ENCOUNTER (EMERGENCY)
Facility: HOSPITAL | Age: 43
Discharge: ANOTHER HOSPITAL | End: 2023-02-25
Attending: STUDENT IN AN ORGANIZED HEALTH CARE EDUCATION/TRAINING PROGRAM | Admitting: STUDENT IN AN ORGANIZED HEALTH CARE EDUCATION/TRAINING PROGRAM
Payer: COMMERCIAL

## 2023-02-24 ENCOUNTER — OFFICE VISIT (OUTPATIENT)
Dept: INTERNAL MEDICINE | Facility: CLINIC | Age: 43
End: 2023-02-24
Payer: COMMERCIAL

## 2023-02-24 VITALS
HEART RATE: 93 BPM | WEIGHT: 122 LBS | HEIGHT: 67 IN | OXYGEN SATURATION: 96 % | DIASTOLIC BLOOD PRESSURE: 80 MMHG | TEMPERATURE: 97.6 F | BODY MASS INDEX: 19.15 KG/M2 | SYSTOLIC BLOOD PRESSURE: 112 MMHG

## 2023-02-24 DIAGNOSIS — E87.1 HYPONATREMIA: ICD-10-CM

## 2023-02-24 DIAGNOSIS — K74.60 DECOMPENSATED HEPATIC CIRRHOSIS: Primary | ICD-10-CM

## 2023-02-24 DIAGNOSIS — F10.988 ALCOHOL-INDUCED THROMBOCYTOPENIA: ICD-10-CM

## 2023-02-24 DIAGNOSIS — R79.89 ELEVATED LFTS: ICD-10-CM

## 2023-02-24 DIAGNOSIS — D69.6 THROMBOCYTOPENIA: ICD-10-CM

## 2023-02-24 DIAGNOSIS — D69.59 ALCOHOL-INDUCED THROMBOCYTOPENIA: ICD-10-CM

## 2023-02-24 DIAGNOSIS — K70.11 ALCOHOLIC HEPATITIS WITH ASCITES: Primary | ICD-10-CM

## 2023-02-24 DIAGNOSIS — K72.90 DECOMPENSATED HEPATIC CIRRHOSIS: Primary | ICD-10-CM

## 2023-02-24 DIAGNOSIS — E87.6 HYPOKALEMIA: ICD-10-CM

## 2023-02-24 LAB
ALBUMIN SERPL-MCNC: 2.8 G/DL (ref 3.5–5.2)
ALBUMIN/GLOB SERPL: 0.7 G/DL
ALP SERPL-CCNC: 166 U/L (ref 39–117)
ALT SERPL W P-5'-P-CCNC: 39 U/L (ref 1–33)
AMMONIA BLD-SCNC: 75 UMOL/L (ref 11–51)
AMPHET+METHAMPHET UR QL: NEGATIVE
AMPHETAMINES UR QL: NEGATIVE
ANION GAP SERPL CALCULATED.3IONS-SCNC: 14 MMOL/L (ref 5–15)
APAP SERPL-MCNC: <5 MCG/ML (ref 0–30)
AST SERPL-CCNC: 191 U/L (ref 1–32)
BACTERIA UR QL AUTO: ABNORMAL /HPF
BARBITURATES UR QL SCN: NEGATIVE
BASOPHILS # BLD AUTO: 0.05 10*3/MM3 (ref 0–0.2)
BASOPHILS NFR BLD AUTO: 0.5 % (ref 0–1.5)
BENZODIAZ UR QL SCN: NEGATIVE
BILIRUB SERPL-MCNC: 10.8 MG/DL (ref 0–1.2)
BILIRUB UR QL STRIP: ABNORMAL
BUN SERPL-MCNC: 4 MG/DL (ref 6–20)
BUN/CREAT SERPL: 15.4 (ref 7–25)
BUPRENORPHINE SERPL-MCNC: NEGATIVE NG/ML
CALCIUM SPEC-SCNC: 8.3 MG/DL (ref 8.6–10.5)
CANNABINOIDS SERPL QL: NEGATIVE
CHLORIDE SERPL-SCNC: 82 MMOL/L (ref 98–107)
CLARITY UR: ABNORMAL
CO2 SERPL-SCNC: 34 MMOL/L (ref 22–29)
COCAINE UR QL: NEGATIVE
COLOR UR: ABNORMAL
CREAT SERPL-MCNC: 0.26 MG/DL (ref 0.57–1)
D-LACTATE SERPL-SCNC: 4 MMOL/L (ref 0.5–2)
DEPRECATED RDW RBC AUTO: 63.6 FL (ref 37–54)
EGFRCR SERPLBLD CKD-EPI 2021: 139.1 ML/MIN/1.73
EOSINOPHIL # BLD AUTO: 0.03 10*3/MM3 (ref 0–0.4)
EOSINOPHIL NFR BLD AUTO: 0.3 % (ref 0.3–6.2)
ERYTHROCYTE [DISTWIDTH] IN BLOOD BY AUTOMATED COUNT: 16.4 % (ref 12.3–15.4)
ETHANOL UR QL: <0.01 %
FLUAV RNA RESP QL NAA+PROBE: NOT DETECTED
FLUBV RNA RESP QL NAA+PROBE: NOT DETECTED
GLOBULIN UR ELPH-MCNC: 4.3 GM/DL
GLUCOSE SERPL-MCNC: 124 MG/DL (ref 65–99)
GLUCOSE UR STRIP-MCNC: NEGATIVE MG/DL
HAV IGM SERPL QL IA: NORMAL
HBV CORE IGM SERPL QL IA: NORMAL
HBV SURFACE AG SERPL QL IA: NORMAL
HCG SERPL QL: NEGATIVE
HCT VFR BLD AUTO: 34.2 % (ref 34–46.6)
HCV AB SER DONR QL: NORMAL
HGB BLD-MCNC: 11.2 G/DL (ref 12–15.9)
HGB UR QL STRIP.AUTO: NEGATIVE
HYALINE CASTS UR QL AUTO: ABNORMAL /LPF
INR PPP: 1.45 (ref 0.91–1.09)
KETONES UR QL STRIP: NEGATIVE
LEUKOCYTE ESTERASE UR QL STRIP.AUTO: ABNORMAL
LYMPHOCYTES # BLD AUTO: 1.7 10*3/MM3 (ref 0.7–3.1)
LYMPHOCYTES NFR BLD AUTO: 17.8 % (ref 19.6–45.3)
MAGNESIUM SERPL-MCNC: 1.8 MG/DL (ref 1.6–2.6)
MCH RBC QN AUTO: 35.8 PG (ref 26.6–33)
MCHC RBC AUTO-ENTMCNC: 32.7 G/DL (ref 31.5–35.7)
MCV RBC AUTO: 109.3 FL (ref 79–97)
METHADONE UR QL SCN: NEGATIVE
MONOCYTES # BLD AUTO: 1.1 10*3/MM3 (ref 0.1–0.9)
MONOCYTES NFR BLD AUTO: 11.5 % (ref 5–12)
MUCOUS THREADS URNS QL MICRO: ABNORMAL /HPF
NEUTROPHILS NFR BLD AUTO: 6.58 10*3/MM3 (ref 1.7–7)
NEUTROPHILS NFR BLD AUTO: 69.1 % (ref 42.7–76)
NITRITE UR QL STRIP: POSITIVE
NT-PROBNP SERPL-MCNC: 314.5 PG/ML (ref 0–450)
OPIATES UR QL: NEGATIVE
OXYCODONE UR QL SCN: NEGATIVE
PCP UR QL SCN: NEGATIVE
PH UR STRIP.AUTO: 5.5 [PH] (ref 5–8)
PLATELET # BLD AUTO: 110 10*3/MM3 (ref 140–450)
PMV BLD AUTO: 12.4 FL (ref 6–12)
POTASSIUM SERPL-SCNC: 2.4 MMOL/L (ref 3.5–5.2)
PROCALCITONIN SERPL-MCNC: 0.4 NG/ML (ref 0–0.25)
PROPOXYPH UR QL: NEGATIVE
PROT SERPL-MCNC: 7.1 G/DL (ref 6–8.5)
PROT UR QL STRIP: NEGATIVE
PROTHROMBIN TIME: 17.8 SECONDS (ref 11.8–14.8)
RBC # BLD AUTO: 3.13 10*6/MM3 (ref 3.77–5.28)
RBC # UR STRIP: ABNORMAL /HPF
REF LAB TEST METHOD: ABNORMAL
RSV RNA NPH QL NAA+NON-PROBE: NOT DETECTED
SALICYLATES SERPL-MCNC: <0.3 MG/DL
SARS-COV-2 RNA RESP QL NAA+PROBE: NOT DETECTED
SODIUM SERPL-SCNC: 130 MMOL/L (ref 136–145)
SP GR UR STRIP: 1.01 (ref 1–1.03)
SQUAMOUS #/AREA URNS HPF: ABNORMAL /HPF
TRICYCLICS UR QL SCN: NEGATIVE
UROBILINOGEN UR QL STRIP: ABNORMAL
WBC # UR STRIP: ABNORMAL /HPF
WBC NRBC COR # BLD: 9.54 10*3/MM3 (ref 3.4–10.8)
YEAST URNS QL MICRO: ABNORMAL /HPF

## 2023-02-24 PROCEDURE — 85610 PROTHROMBIN TIME: CPT | Performed by: PHYSICIAN ASSISTANT

## 2023-02-24 PROCEDURE — 87040 BLOOD CULTURE FOR BACTERIA: CPT | Performed by: PHYSICIAN ASSISTANT

## 2023-02-24 PROCEDURE — 83880 ASSAY OF NATRIURETIC PEPTIDE: CPT | Performed by: PHYSICIAN ASSISTANT

## 2023-02-24 PROCEDURE — 80053 COMPREHEN METABOLIC PANEL: CPT | Performed by: PHYSICIAN ASSISTANT

## 2023-02-24 PROCEDURE — 81001 URINALYSIS AUTO W/SCOPE: CPT | Performed by: PHYSICIAN ASSISTANT

## 2023-02-24 PROCEDURE — 96365 THER/PROPH/DIAG IV INF INIT: CPT

## 2023-02-24 PROCEDURE — 83605 ASSAY OF LACTIC ACID: CPT | Performed by: PHYSICIAN ASSISTANT

## 2023-02-24 PROCEDURE — 25010000002 ONDANSETRON PER 1 MG: Performed by: PHYSICIAN ASSISTANT

## 2023-02-24 PROCEDURE — 99284 EMERGENCY DEPT VISIT MOD MDM: CPT

## 2023-02-24 PROCEDURE — 96375 TX/PRO/DX INJ NEW DRUG ADDON: CPT

## 2023-02-24 PROCEDURE — 87637 SARSCOV2&INF A&B&RSV AMP PRB: CPT | Performed by: PHYSICIAN ASSISTANT

## 2023-02-24 PROCEDURE — 36415 COLL VENOUS BLD VENIPUNCTURE: CPT | Performed by: PHYSICIAN ASSISTANT

## 2023-02-24 PROCEDURE — 80179 DRUG ASSAY SALICYLATE: CPT | Performed by: PHYSICIAN ASSISTANT

## 2023-02-24 PROCEDURE — 83735 ASSAY OF MAGNESIUM: CPT | Performed by: PHYSICIAN ASSISTANT

## 2023-02-24 PROCEDURE — 82140 ASSAY OF AMMONIA: CPT | Performed by: PHYSICIAN ASSISTANT

## 2023-02-24 PROCEDURE — 99215 OFFICE O/P EST HI 40 MIN: CPT | Performed by: INTERNAL MEDICINE

## 2023-02-24 PROCEDURE — 0 POTASSIUM CHLORIDE 10 MEQ/100ML SOLUTION: Performed by: PHYSICIAN ASSISTANT

## 2023-02-24 PROCEDURE — 82077 ASSAY SPEC XCP UR&BREATH IA: CPT | Performed by: PHYSICIAN ASSISTANT

## 2023-02-24 PROCEDURE — 80074 ACUTE HEPATITIS PANEL: CPT | Performed by: PHYSICIAN ASSISTANT

## 2023-02-24 PROCEDURE — 80306 DRUG TEST PRSMV INSTRMNT: CPT | Performed by: PHYSICIAN ASSISTANT

## 2023-02-24 PROCEDURE — 85025 COMPLETE CBC W/AUTO DIFF WBC: CPT | Performed by: PHYSICIAN ASSISTANT

## 2023-02-24 PROCEDURE — 84145 PROCALCITONIN (PCT): CPT | Performed by: PHYSICIAN ASSISTANT

## 2023-02-24 PROCEDURE — 80143 DRUG ASSAY ACETAMINOPHEN: CPT | Performed by: PHYSICIAN ASSISTANT

## 2023-02-24 PROCEDURE — 84703 CHORIONIC GONADOTROPIN ASSAY: CPT | Performed by: PHYSICIAN ASSISTANT

## 2023-02-24 RX ORDER — ONDANSETRON 2 MG/ML
4 INJECTION INTRAMUSCULAR; INTRAVENOUS ONCE
Status: COMPLETED | OUTPATIENT
Start: 2023-02-24 | End: 2023-02-24

## 2023-02-24 RX ORDER — SODIUM CHLORIDE 0.9 % (FLUSH) 0.9 %
10 SYRINGE (ML) INJECTION AS NEEDED
Status: DISCONTINUED | OUTPATIENT
Start: 2023-02-24 | End: 2023-02-25 | Stop reason: HOSPADM

## 2023-02-24 RX ORDER — POTASSIUM CHLORIDE 7.45 MG/ML
10 INJECTION INTRAVENOUS ONCE
Status: COMPLETED | OUTPATIENT
Start: 2023-02-24 | End: 2023-02-24

## 2023-02-24 RX ORDER — POTASSIUM CHLORIDE 7.45 MG/ML
10 INJECTION INTRAVENOUS ONCE
Status: DISCONTINUED | OUTPATIENT
Start: 2023-02-24 | End: 2023-02-24 | Stop reason: SDUPTHER

## 2023-02-24 RX ADMIN — ONDANSETRON 4 MG: 2 INJECTION INTRAMUSCULAR; INTRAVENOUS at 18:05

## 2023-02-24 RX ADMIN — SODIUM CHLORIDE 1000 ML: 9 INJECTION, SOLUTION INTRAVENOUS at 22:02

## 2023-02-24 RX ADMIN — POTASSIUM CHLORIDE 10 MEQ: 7.46 INJECTION, SOLUTION INTRAVENOUS at 19:48

## 2023-02-24 NOTE — PROGRESS NOTES
Chief Complaint   Patient presents with   • Follow-up     Seen in ED on 23   • Bloated     States bloating started with stomach on . Reports last bowel movement-regular was maybe 1 month ago.          History:  Rachana Smart is a 43 y.o. female who presents today for evaluation of the above problems.      Rachana presents today for ER follow-up.  She has been lost to follow-up and last saw me 2 years ago.    She went to the emergency room for foot pain, rib pain and swelling in her abdomen.  She was diagnosed with alcoholic hepatitis and had work-up with labs and CAT scans.    Her blood work revealed sodium of 128, ALT of 32, AST of 218 with total bilirubin of 5.5, pro time of 15.6 and INR of 1.22.  Her anion gap was 18.  Hemoglobin 10.9, and her platelets were 102.    CT of the chest showed trace layering pleural effusion.  CT of the abdomen showed moderate amount of abdominal and pelvic ascites, severe and extensive fatty infiltration of the liver with hepatomegaly, and significant thickening of the wall of the stomach and large bowel likely related to portal hypertensive gastropathy and colopathy.    She was given the option of admission to the hospital for evaluation with us.  She opted to be seen by us in the office instead of admission.    I calculated her Madrey discriminant function which was 23 indicating a good prognosis.    Her MELD score is 22    She states the symptoms started acutely on Monday when she noticed her abdomen was distended like she was 7 months pregnant.  She was not having a bowel movement so she thought she had a bowel obstruction since she has had this previously after a .  Her stomach is now bigger than when she was in the ER.  She has only eaten a banana and yogurt yesterday and has not eaten anything today.    She has not had anything alcoholic to drink since the emergency room visit.  She used to drink 4 times per week drinking 2 or 3 shots of vodka at a  time.  She would typically drink about half of a gallon of vodka per week      HPI      ROS:  Review of Systems  As above    Current Outpatient Medications:   •  furosemide (LASIX) 40 MG tablet, Take 0.5 tablets by mouth Daily., Disp: 30 tablet, Rfl: 0  No current facility-administered medications for this visit.    Facility-Administered Medications Ordered in Other Visits:   •  ondansetron (ZOFRAN) injection 4 mg, 4 mg, Intravenous, Once, Alen Allen PA-C  •  Insert Peripheral IV, , , Once **AND** sodium chloride 0.9 % flush 10 mL, 10 mL, Intravenous, PRN, Alen Allen PA-C    Lab Results   Component Value Date    GLUCOSE 105 (H) 02/20/2023    BUN 7 02/20/2023    CREATININE 0.64 02/20/2023    EGFR 111.9 02/20/2023    BCR 10.9 02/20/2023    K 3.6 02/20/2023    CO2 28.0 02/20/2023    CALCIUM 8.4 (L) 02/20/2023    ALBUMIN 3.2 (L) 02/20/2023     (H) 02/20/2023    ALT 32 02/20/2023       WBC   Date Value Ref Range Status   02/20/2023 10.68 3.40 - 10.80 10*3/mm3 Final     RBC   Date Value Ref Range Status   02/20/2023 3.05 (L) 3.77 - 5.28 10*6/mm3 Final     Hemoglobin   Date Value Ref Range Status   02/20/2023 10.9 (L) 12.0 - 15.9 g/dL Final     Hematocrit   Date Value Ref Range Status   02/20/2023 33.0 (L) 34.0 - 46.6 % Final     MCV   Date Value Ref Range Status   02/20/2023 108.2 (H) 79.0 - 97.0 fL Final     MCH   Date Value Ref Range Status   02/20/2023 35.7 (H) 26.6 - 33.0 pg Final     MCHC   Date Value Ref Range Status   02/20/2023 33.0 31.5 - 35.7 g/dL Final     RDW   Date Value Ref Range Status   02/20/2023 15.6 (H) 12.3 - 15.4 % Final     RDW-SD   Date Value Ref Range Status   02/20/2023 61.8 (H) 37.0 - 54.0 fl Final     MPV   Date Value Ref Range Status   02/20/2023 10.7 6.0 - 12.0 fL Final     Platelets   Date Value Ref Range Status   02/20/2023 102 (L) 140 - 450 10*3/mm3 Final     Neutrophil %   Date Value Ref Range Status   02/20/2023 80.2 (H) 42.7 - 76.0 % Final     Lymphocyte %   Date Value  "Ref Range Status   02/20/2023 9.8 (L) 19.6 - 45.3 % Final     Monocyte %   Date Value Ref Range Status   02/20/2023 6.7 5.0 - 12.0 % Final     Eosinophil %   Date Value Ref Range Status   02/20/2023 2.4 0.3 - 6.2 % Final     Basophil %   Date Value Ref Range Status   02/20/2023 0.2 0.0 - 1.5 % Final     Immature Grans %   Date Value Ref Range Status   02/20/2023 0.7 (H) 0.0 - 0.5 % Final     Neutrophils, Absolute   Date Value Ref Range Status   02/20/2023 8.55 (H) 1.70 - 7.00 10*3/mm3 Final     Lymphocytes, Absolute   Date Value Ref Range Status   02/20/2023 1.05 0.70 - 3.10 10*3/mm3 Final     Monocytes, Absolute   Date Value Ref Range Status   02/20/2023 0.72 0.10 - 0.90 10*3/mm3 Final     Eosinophils, Absolute   Date Value Ref Range Status   02/20/2023 0.26 0.00 - 0.40 10*3/mm3 Final     Basophils, Absolute   Date Value Ref Range Status   02/20/2023 0.02 0.00 - 0.20 10*3/mm3 Final     Immature Grans, Absolute   Date Value Ref Range Status   02/20/2023 0.08 (H) 0.00 - 0.05 10*3/mm3 Final     nRBC   Date Value Ref Range Status   02/20/2023 0.0 0.0 - 0.2 /100 WBC Final         OBJECTIVE:  Visit Vitals  /80 (BP Location: Left arm, Patient Position: Sitting, Cuff Size: Adult)   Pulse 93   Temp 97.6 °F (36.4 °C) (Temporal)   Ht 170.2 cm (67\")   Wt 55.3 kg (122 lb)   SpO2 96%   BMI 19.11 kg/m²      Physical Exam  Vitals and nursing note reviewed.   Constitutional:       General: She is not in acute distress.     Appearance: She is well-developed. She is ill-appearing. She is not diaphoretic.      Comments: Accompanied by her    HENT:      Head: Normocephalic and atraumatic.   Eyes:      General: Scleral icterus present.      Pupils: Pupils are equal, round, and reactive to light.   Neck:      Thyroid: No thyromegaly.      Trachea: Phonation normal.   Cardiovascular:      Rate and Rhythm: Normal rate and regular rhythm.      Heart sounds: No murmur heard.  Pulmonary:      Effort: Pulmonary effort is normal. " No respiratory distress.      Breath sounds: Normal breath sounds. No wheezing or rales.   Abdominal:      General: Abdomen is protuberant. There is distension.      Palpations: Abdomen is soft. There is hepatomegaly.      Tenderness: There is no abdominal tenderness. There is no guarding.   Skin:     Coloration: Skin is jaundiced. Skin is not pale.      Findings: No erythema.      Comments: Spider hemangiomas on her upper chest   Neurological:      Mental Status: She is alert.   Psychiatric:         Behavior: Behavior normal.         Thought Content: Thought content normal.         Judgment: Judgment normal.         Assessment/Plan    Diagnoses and all orders for this visit:    1. Alcoholic hepatitis with ascites (Primary)    2. Hyponatremia    3. Alcohol-induced thrombocytopenia (HCC)      Rachana has a new onset of ascites which needs further evaluation.  She is going to need a paracentesis to help with diagnosis etiology of her ascites.  This is most likely related to alcoholic hepatitis and/or cirrhosis.  She needs to be ruled out for SBP, and portal vein thrombosis.      Fortunately, her Madrey discriminant function is 23 and her MELD is 22.  She does not need any steroids at this time but I do believe she needs admission to the hospital.  I am concerned that she is going to need diuresis but at the same time she is unable to take any oral intake and is not having any bowel movements.  As such she would be higher risk for development of hepatic encephalopathy especially in the setting of already elevated ammonia levels.     I discussed the seriousness of her condition including the possibility of dying with her and her  at the bedside.  Highly recommended complete cessation of alcohol and she notes understanding.     I have sent her to the emergency room and talked with the provider in the ER.    Going to have her follow-up for a hospital follow-up after her hospitalization.    Return for Recheck after  hospitalization..      ROSALINDA Holloway MD  14:42 CST  2/24/2023

## 2023-02-25 VITALS
WEIGHT: 122 LBS | HEART RATE: 102 BPM | OXYGEN SATURATION: 95 % | DIASTOLIC BLOOD PRESSURE: 69 MMHG | SYSTOLIC BLOOD PRESSURE: 98 MMHG | TEMPERATURE: 99.1 F | HEIGHT: 67 IN | BODY MASS INDEX: 19.15 KG/M2 | RESPIRATION RATE: 23 BRPM

## 2023-02-25 LAB
ALBUMIN FLD-MCNC: 0.5 G/DL
APPEARANCE FLD: ABNORMAL
COLOR FLD: YELLOW
D-LACTATE SERPL-SCNC: 1.9 MMOL/L (ref 0.5–2)
LYMPHOCYTES NFR FLD MANUAL: 33 %
MONOCYTES NFR FLD: 7 %
NEUTROPHILS NFR FLD MANUAL: 11 %
PROT FLD-MCNC: <1 G/DL
RBC # FLD AUTO: 1000 /MM3
UNCLASSIFIED CELLS, FLUID: 49 %
WBC # FLD AUTO: 31 /MM3

## 2023-02-25 PROCEDURE — 96366 THER/PROPH/DIAG IV INF ADDON: CPT

## 2023-02-25 PROCEDURE — 87015 SPECIMEN INFECT AGNT CONCNTJ: CPT | Performed by: STUDENT IN AN ORGANIZED HEALTH CARE EDUCATION/TRAINING PROGRAM

## 2023-02-25 PROCEDURE — 82247 BILIRUBIN TOTAL: CPT | Performed by: STUDENT IN AN ORGANIZED HEALTH CARE EDUCATION/TRAINING PROGRAM

## 2023-02-25 PROCEDURE — 96375 TX/PRO/DX INJ NEW DRUG ADDON: CPT

## 2023-02-25 PROCEDURE — 87205 SMEAR GRAM STAIN: CPT | Performed by: STUDENT IN AN ORGANIZED HEALTH CARE EDUCATION/TRAINING PROGRAM

## 2023-02-25 PROCEDURE — 25010000002 ONDANSETRON PER 1 MG: Performed by: EMERGENCY MEDICINE

## 2023-02-25 PROCEDURE — 96376 TX/PRO/DX INJ SAME DRUG ADON: CPT

## 2023-02-25 PROCEDURE — 84157 ASSAY OF PROTEIN OTHER: CPT | Performed by: STUDENT IN AN ORGANIZED HEALTH CARE EDUCATION/TRAINING PROGRAM

## 2023-02-25 PROCEDURE — 25010000002 CEFTRIAXONE PER 250 MG: Performed by: PHYSICIAN ASSISTANT

## 2023-02-25 PROCEDURE — 82042 OTHER SOURCE ALBUMIN QUAN EA: CPT | Performed by: STUDENT IN AN ORGANIZED HEALTH CARE EDUCATION/TRAINING PROGRAM

## 2023-02-25 PROCEDURE — 89051 BODY FLUID CELL COUNT: CPT | Performed by: STUDENT IN AN ORGANIZED HEALTH CARE EDUCATION/TRAINING PROGRAM

## 2023-02-25 PROCEDURE — 25010000002 LORAZEPAM PER 2 MG: Performed by: EMERGENCY MEDICINE

## 2023-02-25 PROCEDURE — 87070 CULTURE OTHR SPECIMN AEROBIC: CPT | Performed by: STUDENT IN AN ORGANIZED HEALTH CARE EDUCATION/TRAINING PROGRAM

## 2023-02-25 PROCEDURE — 25010000002 POTASSIUM CHLORIDE PER 2 MEQ: Performed by: STUDENT IN AN ORGANIZED HEALTH CARE EDUCATION/TRAINING PROGRAM

## 2023-02-25 PROCEDURE — 96367 TX/PROPH/DG ADDL SEQ IV INF: CPT

## 2023-02-25 PROCEDURE — 83605 ASSAY OF LACTIC ACID: CPT | Performed by: PHYSICIAN ASSISTANT

## 2023-02-25 PROCEDURE — 88112 CYTOPATH CELL ENHANCE TECH: CPT | Performed by: STUDENT IN AN ORGANIZED HEALTH CARE EDUCATION/TRAINING PROGRAM

## 2023-02-25 RX ORDER — ONDANSETRON 2 MG/ML
4 INJECTION INTRAMUSCULAR; INTRAVENOUS ONCE
Status: COMPLETED | OUTPATIENT
Start: 2023-02-25 | End: 2023-02-25

## 2023-02-25 RX ORDER — POTASSIUM CHLORIDE 14.9 MG/ML
20 INJECTION INTRAVENOUS ONCE
Status: COMPLETED | OUTPATIENT
Start: 2023-02-25 | End: 2023-02-25

## 2023-02-25 RX ORDER — LORAZEPAM 2 MG/ML
1 INJECTION INTRAMUSCULAR ONCE
Status: COMPLETED | OUTPATIENT
Start: 2023-02-25 | End: 2023-02-25

## 2023-02-25 RX ADMIN — CEFTRIAXONE 1 G: 1 INJECTION, POWDER, FOR SOLUTION INTRAMUSCULAR; INTRAVENOUS at 03:34

## 2023-02-25 RX ADMIN — LORAZEPAM 1 MG: 2 INJECTION INTRAMUSCULAR; INTRAVENOUS at 10:21

## 2023-02-25 RX ADMIN — POTASSIUM CHLORIDE 20 MEQ: 14.9 INJECTION, SOLUTION INTRAVENOUS at 06:53

## 2023-02-25 RX ADMIN — ONDANSETRON 4 MG: 2 INJECTION INTRAMUSCULAR; INTRAVENOUS at 10:21

## 2023-02-28 LAB
CYTO UR: NORMAL
LAB AP CASE REPORT: NORMAL
Lab: NORMAL
PATH REPORT.FINAL DX SPEC: NORMAL
PATH REPORT.GROSS SPEC: NORMAL

## 2023-03-01 LAB
BACTERIA SPEC AEROBE CULT: NORMAL
BACTERIA SPEC AEROBE CULT: NORMAL

## 2023-03-02 LAB
BACTERIA FLD CULT: NORMAL
BILIRUB FLD-MCNC: 1.2 MG/DL
GRAM STN SPEC: NORMAL
GRAM STN SPEC: NORMAL

## 2024-01-17 ENCOUNTER — HOSPITAL ENCOUNTER (EMERGENCY)
Facility: HOSPITAL | Age: 44
Discharge: HOME | End: 2024-01-18
Admitting: FAMILY MEDICINE
Payer: COMMERCIAL

## 2024-01-17 VITALS
DIASTOLIC BLOOD PRESSURE: 87 MMHG | BODY MASS INDEX: 22.08 KG/M2 | HEIGHT: 65 IN | SYSTOLIC BLOOD PRESSURE: 146 MMHG | TEMPERATURE: 99.1 F | WEIGHT: 132.5 LBS | OXYGEN SATURATION: 98 % | RESPIRATION RATE: 18 BRPM | HEART RATE: 110 BPM

## 2024-01-17 DIAGNOSIS — T14.8XXA PUNCTURE WOUND: Primary | ICD-10-CM

## 2024-01-17 PROCEDURE — 90471 IMMUNIZATION ADMIN: CPT | Performed by: NURSE PRACTITIONER

## 2024-01-17 PROCEDURE — 25010000002 TETANUS-DIPHTH-ACELL PERTUSSIS 5-2.5-18.5 LF-MCG/0.5 SUSPENSION PREFILLED SYRINGE: Performed by: NURSE PRACTITIONER

## 2024-01-17 PROCEDURE — 90715 TDAP VACCINE 7 YRS/> IM: CPT | Performed by: NURSE PRACTITIONER

## 2024-01-17 PROCEDURE — 99283 EMERGENCY DEPT VISIT LOW MDM: CPT

## 2024-01-17 RX ADMIN — TETANUS TOXOID, REDUCED DIPHTHERIA TOXOID AND ACELLULAR PERTUSSIS VACCINE, ADSORBED 0.5 ML: 5; 2.5; 8; 8; 2.5 SUSPENSION INTRAMUSCULAR at 23:52

## 2024-01-18 NOTE — DISCHARGE INSTRUCTIONS
Return to ER if symptoms worsen   Leave dressing on until tomorrow   Follow up with hand surgeon if continues to have problems

## 2024-01-18 NOTE — ED PROVIDER NOTES
Subjective   History of Present Illness  Patient is a 43-year-old female presents emergency department with right middle digit injury.  She states about 2 months ago she reached out in her food disposal to pull out a piece of broken glass that went down the food disposal.  She states she cut her finger and had a piece of glass in her finger about 2 months ago.  She states she is been trying to get out a couple different times and tonight when they were trying to pull it out with some tweezers it started bleeding and would not stop.  She is right-hand dominant.  She is unsure of her last tetanus immunization.  It has stopped bleeding at this time.  Her family states that they do think that they pulled the glass out.    History provided by:  Patient   used: No        Review of Systems   Constitutional: Negative.    HENT: Negative.     Eyes: Negative.    Respiratory: Negative.     Cardiovascular: Negative.    Gastrointestinal: Negative.    Endocrine: Negative.    Genitourinary: Negative.    Musculoskeletal:         Patient is a 43-year-old female presents emergency department with right middle digit injury.  She states about 2 months ago she reached out in her food disposal to pull out a piece of broken glass that went down the food disposal.  She states she cut her finger and had a piece of glass in her finger about 2 months ago.  She states she is been trying to get out a couple different times and tonight when they were trying to pull it out with some tweezers it started bleeding and would not stop.  She is right-hand dominant.  She is unsure of her last tetanus immunization.  It has stopped bleeding at this time.  Her family states that they do think that they pulled the glass out.     Skin: Negative.    Allergic/Immunologic: Negative.    Neurological: Negative.    Hematological: Negative.    Psychiatric/Behavioral: Negative.     All other systems reviewed and are negative.      Past Medical  "History:   Diagnosis Date    Allergic     Anxiety     Anxiety     Asthma     Gestational diabetes     Heart murmur     History of ear infections     Hypertension     Sinusitis        Allergies   Allergen Reactions    Codeine Rash and GI Intolerance       Past Surgical History:   Procedure Laterality Date     SECTION      MOUTH SURGERY         Family History   Problem Relation Age of Onset    Diabetes Mother     Heart disease Father     Diabetes Father     Stroke Father        Social History     Socioeconomic History    Marital status:    Tobacco Use    Smoking status: Every Day     Packs/day: 0.50     Years: 10.00     Additional pack years: 0.00     Total pack years: 5.00     Types: Cigarettes    Smokeless tobacco: Never   Vaping Use    Vaping Use: Never used   Substance and Sexual Activity    Alcohol use: Not Currently     Alcohol/week: 10.0 standard drinks of alcohol     Types: 10 Shots of liquor per week     Comment: vodka a few times a week    Drug use: No    Sexual activity: Yes     Partners: Male     Birth control/protection: None       Prior to Admission medications    Not on File       /87 (BP Location: Left arm, Patient Position: Sitting)   Pulse 110   Temp 99.1 °F (37.3 °C) (Oral)   Resp 18   Ht 165.1 cm (65\")   Wt 60.1 kg (132 lb 8 oz)   SpO2 98%   BMI 22.05 kg/m²     Objective   Physical Exam  Vitals and nursing note reviewed.   Constitutional:       Appearance: She is well-developed.   HENT:      Head: Normocephalic and atraumatic.   Eyes:      Conjunctiva/sclera: Conjunctivae normal.      Pupils: Pupils are equal, round, and reactive to light.   Neck:      Thyroid: No thyromegaly.      Trachea: No tracheal deviation.   Cardiovascular:      Rate and Rhythm: Normal rate and regular rhythm.      Heart sounds: Normal heart sounds.   Pulmonary:      Effort: Pulmonary effort is normal. No respiratory distress.      Breath sounds: Normal breath sounds. No wheezing or rales. "   Chest:      Chest wall: No tenderness.   Musculoskeletal:      Cervical back: Normal range of motion and neck supple.      Comments: Right middle digit: palmar aspect of digit to mid digit - puncture wound noted. No foreign body noted. No bleeding at present. Flexion extension intact. Periph pulses palp    Skin:     General: Skin is warm and dry.   Neurological:      Mental Status: She is alert and oriented to person, place, and time.      Cranial Nerves: No cranial nerve deficit.      Deep Tendon Reflexes: Reflexes are normal and symmetric.   Psychiatric:         Behavior: Behavior normal.         Thought Content: Thought content normal.         Judgment: Judgment normal.         Procedures         Lab Results (last 24 hours)       ** No results found for the last 24 hours. **            No orders to display       ED Course  ED Course as of 01/17/24 2344   Wed Jan 17, 2024   2148 Advised the patient to not attempt to try to pull anything else at her finger.  Bleeding is controlled at present.  There does not appear to be a foreign body.  Family states they do think they remove the foreign body at home.  There is no bleeding at present.  Will place a dressing on the finger and updated on her tetanus.  Advised to follow-up with her primary care provider as she may need to see plastics if she continues to have problems appointment. [CW]      ED Course User Index  [CW] Thu Miranda APRN        Medical Decision Making  Patient is a 43-year-old female presents emergency department with right middle digit injury.  She states about 2 months ago she reached out in her food disposal to pull out a piece of broken glass that went down the food disposal.  She states she cut her finger and had a piece of glass in her finger about 2 months ago.  She states she is been trying to get out a couple different times and tonight when they were trying to pull it out with some tweezers it started bleeding and would not stop.   She is right-hand dominant.  She is unsure of her last tetanus immunization.  It has stopped bleeding at this time.  Her family states that they do think that they pulled the glass out.  Course of treatment in the er: Puncture wound noted to the palmar aspect of the right middle digit.  There is no bleeding at present.  There does not appear to be a foreign body.  Family states that they removed it at home.  And it would not stop bleeding.  Flexion extension is intact of the digit.  There is no signs of infection.  Cleanse the wound and applied a pressure dressing.  Updated the patient on her tetanus.  Advised if she continue to have problems she need to follow-up with hand surgery.  Advised to follow-up with her primary care doctor for referral.  She is in agreement with the care plan and verbalizes understanding of instructions.    Problems Addressed:  Puncture wound: complicated acute illness or injury    Risk  Prescription drug management.         Final diagnoses:   Puncture wound          Thu Miranda, APRN  01/17/24 3868

## 2024-01-31 ENCOUNTER — ANESTHESIA (OUTPATIENT)
Dept: GASTROENTEROLOGY | Facility: HOSPITAL | Age: 44
End: 2024-01-31
Payer: COMMERCIAL

## 2024-01-31 ENCOUNTER — HOSPITAL ENCOUNTER (INPATIENT)
Facility: HOSPITAL | Age: 44
LOS: 1 days | Discharge: HOME | DRG: 370 | End: 2024-02-01
Attending: EMERGENCY MEDICINE | Admitting: INTERNAL MEDICINE
Payer: COMMERCIAL

## 2024-01-31 ENCOUNTER — ANESTHESIA EVENT (OUTPATIENT)
Dept: GASTROENTEROLOGY | Facility: HOSPITAL | Age: 44
End: 2024-01-31
Payer: COMMERCIAL

## 2024-01-31 DIAGNOSIS — D64.9 ANEMIA, UNSPECIFIED TYPE: ICD-10-CM

## 2024-01-31 DIAGNOSIS — K92.2 UPPER GI BLEED: Primary | ICD-10-CM

## 2024-01-31 LAB
ABO GROUP BLD: NORMAL
ALBUMIN SERPL-MCNC: 3.3 G/DL (ref 3.5–5.2)
ALBUMIN/GLOB SERPL: 0.7 G/DL
ALP SERPL-CCNC: 83 U/L (ref 39–117)
ALT SERPL W P-5'-P-CCNC: 22 U/L (ref 1–33)
ANION GAP SERPL CALCULATED.3IONS-SCNC: 16 MMOL/L (ref 5–15)
APTT PPP: 39.7 SECONDS (ref 24.5–36)
AST SERPL-CCNC: 92 U/L (ref 1–32)
BASOPHILS # BLD AUTO: 0.07 10*3/MM3 (ref 0–0.2)
BASOPHILS NFR BLD AUTO: 0.8 % (ref 0–1.5)
BILIRUB SERPL-MCNC: 3.1 MG/DL (ref 0–1.2)
BLD GP AB SCN SERPL QL: NEGATIVE
BUN SERPL-MCNC: 21 MG/DL (ref 6–20)
BUN/CREAT SERPL: 40.4 (ref 7–25)
CALCIUM SPEC-SCNC: 8.2 MG/DL (ref 8.6–10.5)
CHLORIDE SERPL-SCNC: 97 MMOL/L (ref 98–107)
CO2 SERPL-SCNC: 22 MMOL/L (ref 22–29)
CREAT SERPL-MCNC: 0.52 MG/DL (ref 0.57–1)
DEPRECATED RDW RBC AUTO: 54.5 FL (ref 37–54)
DEVELOPER EXPIRATION DATE: 257
DEVELOPER LOT NUMBER: 257
EGFRCR SERPLBLD CKD-EPI 2021: 117.7 ML/MIN/1.73
EOSINOPHIL # BLD AUTO: 0.1 10*3/MM3 (ref 0–0.4)
EOSINOPHIL NFR BLD AUTO: 1.1 % (ref 0.3–6.2)
ERYTHROCYTE [DISTWIDTH] IN BLOOD BY AUTOMATED COUNT: 14.2 % (ref 12.3–15.4)
ETHANOL UR QL: <0.01 %
EXPIRATION DATE: ABNORMAL
FECAL OCCULT BLOOD SCREEN, POC: POSITIVE
FLUAV RNA RESP QL NAA+PROBE: NOT DETECTED
FLUBV RNA RESP QL NAA+PROBE: NOT DETECTED
GLOBULIN UR ELPH-MCNC: 4.7 GM/DL
GLUCOSE SERPL-MCNC: 137 MG/DL (ref 65–99)
HCG SERPL QL: NEGATIVE
HCT VFR BLD AUTO: 21.2 % (ref 34–46.6)
HCT VFR BLD AUTO: 22.7 % (ref 34–46.6)
HCT VFR BLD AUTO: 25.1 % (ref 34–46.6)
HGB BLD-MCNC: 6.8 G/DL (ref 12–15.9)
HGB BLD-MCNC: 7.3 G/DL (ref 12–15.9)
HGB BLD-MCNC: 8 G/DL (ref 12–15.9)
HOLD SPECIMEN: NORMAL
INR PPP: 1.5 (ref 0.91–1.09)
LYMPHOCYTES # BLD AUTO: 2.12 10*3/MM3 (ref 0.7–3.1)
LYMPHOCYTES NFR BLD AUTO: 24.3 % (ref 19.6–45.3)
Lab: 257
MAGNESIUM SERPL-MCNC: 1.4 MG/DL (ref 1.6–2.6)
MCH RBC QN AUTO: 33.8 PG (ref 26.6–33)
MCHC RBC AUTO-ENTMCNC: 31.9 G/DL (ref 31.5–35.7)
MCV RBC AUTO: 105.9 FL (ref 79–97)
MONOCYTES # BLD AUTO: 0.88 10*3/MM3 (ref 0.1–0.9)
MONOCYTES NFR BLD AUTO: 10.1 % (ref 5–12)
NEGATIVE CONTROL: NEGATIVE
NEUTROPHILS NFR BLD AUTO: 5.51 10*3/MM3 (ref 1.7–7)
NEUTROPHILS NFR BLD AUTO: 63.1 % (ref 42.7–76)
PLATELET # BLD AUTO: 100 10*3/MM3 (ref 140–450)
PMV BLD AUTO: 10.6 FL (ref 6–12)
POSITIVE CONTROL: POSITIVE
POTASSIUM SERPL-SCNC: 3.8 MMOL/L (ref 3.5–5.2)
PROT SERPL-MCNC: 8 G/DL (ref 6–8.5)
PROTHROMBIN TIME: 18.3 SECONDS (ref 11.8–14.8)
RBC # BLD AUTO: 2.37 10*6/MM3 (ref 3.77–5.28)
RETICS # AUTO: 0.07 10*6/MM3 (ref 0.02–0.13)
RETICS/RBC NFR AUTO: 2.83 % (ref 0.7–1.9)
RH BLD: POSITIVE
RSV RNA RESP QL NAA+PROBE: NOT DETECTED
SARS-COV-2 RNA RESP QL NAA+PROBE: NOT DETECTED
SODIUM SERPL-SCNC: 135 MMOL/L (ref 136–145)
T&S EXPIRATION DATE: NORMAL
WBC NRBC COR # BLD AUTO: 8.73 10*3/MM3 (ref 3.4–10.8)
WHOLE BLOOD HOLD COAG: NORMAL
WHOLE BLOOD HOLD SPECIMEN: NORMAL

## 2024-01-31 PROCEDURE — 85730 THROMBOPLASTIN TIME PARTIAL: CPT

## 2024-01-31 PROCEDURE — 96375 TX/PRO/DX INJ NEW DRUG ADDON: CPT

## 2024-01-31 PROCEDURE — 85045 AUTOMATED RETICULOCYTE COUNT: CPT | Performed by: INTERNAL MEDICINE

## 2024-01-31 PROCEDURE — 96365 THER/PROPH/DIAG IV INF INIT: CPT

## 2024-01-31 PROCEDURE — 86900 BLOOD TYPING SEROLOGIC ABO: CPT

## 2024-01-31 PROCEDURE — 25010000002 OCTREOTIDE PER 25 MCG: Performed by: EMERGENCY MEDICINE

## 2024-01-31 PROCEDURE — 86920 COMPATIBILITY TEST SPIN: CPT

## 2024-01-31 PROCEDURE — 96366 THER/PROPH/DIAG IV INF ADDON: CPT

## 2024-01-31 PROCEDURE — 85025 COMPLETE CBC W/AUTO DIFF WBC: CPT

## 2024-01-31 PROCEDURE — 93005 ELECTROCARDIOGRAM TRACING: CPT

## 2024-01-31 PROCEDURE — P9016 RBC LEUKOCYTES REDUCED: HCPCS

## 2024-01-31 PROCEDURE — 85018 HEMOGLOBIN: CPT | Performed by: INTERNAL MEDICINE

## 2024-01-31 PROCEDURE — 25010000002 THIAMINE PER 100 MG: Performed by: INTERNAL MEDICINE

## 2024-01-31 PROCEDURE — 84703 CHORIONIC GONADOTROPIN ASSAY: CPT

## 2024-01-31 PROCEDURE — 86900 BLOOD TYPING SEROLOGIC ABO: CPT | Performed by: EMERGENCY MEDICINE

## 2024-01-31 PROCEDURE — 86850 RBC ANTIBODY SCREEN: CPT | Performed by: EMERGENCY MEDICINE

## 2024-01-31 PROCEDURE — 0DJ08ZZ INSPECTION OF UPPER INTESTINAL TRACT, VIA NATURAL OR ARTIFICIAL OPENING ENDOSCOPIC: ICD-10-PCS | Performed by: INTERNAL MEDICINE

## 2024-01-31 PROCEDURE — 83735 ASSAY OF MAGNESIUM: CPT | Performed by: INTERNAL MEDICINE

## 2024-01-31 PROCEDURE — 82077 ASSAY SPEC XCP UR&BREATH IA: CPT | Performed by: INTERNAL MEDICINE

## 2024-01-31 PROCEDURE — 25810000003 SODIUM CHLORIDE 0.9 % SOLUTION: Performed by: EMERGENCY MEDICINE

## 2024-01-31 PROCEDURE — 93010 ELECTROCARDIOGRAM REPORT: CPT | Performed by: INTERNAL MEDICINE

## 2024-01-31 PROCEDURE — 82270 OCCULT BLOOD FECES: CPT | Performed by: EMERGENCY MEDICINE

## 2024-01-31 PROCEDURE — 87637 SARSCOV2&INF A&B&RSV AMP PRB: CPT | Performed by: INTERNAL MEDICINE

## 2024-01-31 PROCEDURE — 43235 EGD DIAGNOSTIC BRUSH WASH: CPT | Performed by: INTERNAL MEDICINE

## 2024-01-31 PROCEDURE — 99222 1ST HOSP IP/OBS MODERATE 55: CPT | Performed by: INTERNAL MEDICINE

## 2024-01-31 PROCEDURE — 80053 COMPREHEN METABOLIC PANEL: CPT

## 2024-01-31 PROCEDURE — 36430 TRANSFUSION BLD/BLD COMPNT: CPT

## 2024-01-31 PROCEDURE — 86901 BLOOD TYPING SEROLOGIC RH(D): CPT | Performed by: EMERGENCY MEDICINE

## 2024-01-31 PROCEDURE — 86901 BLOOD TYPING SEROLOGIC RH(D): CPT

## 2024-01-31 PROCEDURE — 25010000002 PROPOFOL 10 MG/ML EMULSION

## 2024-01-31 PROCEDURE — 99285 EMERGENCY DEPT VISIT HI MDM: CPT

## 2024-01-31 PROCEDURE — 25010000002 ONDANSETRON PER 1 MG: Performed by: EMERGENCY MEDICINE

## 2024-01-31 PROCEDURE — 85014 HEMATOCRIT: CPT | Performed by: INTERNAL MEDICINE

## 2024-01-31 PROCEDURE — 25810000003 SODIUM CHLORIDE 0.9 % SOLUTION

## 2024-01-31 PROCEDURE — 85610 PROTHROMBIN TIME: CPT

## 2024-01-31 RX ORDER — ONDANSETRON 2 MG/ML
4 INJECTION INTRAMUSCULAR; INTRAVENOUS EVERY 6 HOURS PRN
Status: DISCONTINUED | OUTPATIENT
Start: 2024-01-31 | End: 2024-02-01 | Stop reason: HOSPADM

## 2024-01-31 RX ORDER — SODIUM CHLORIDE 0.9 % (FLUSH) 0.9 %
10 SYRINGE (ML) INJECTION AS NEEDED
Status: DISCONTINUED | OUTPATIENT
Start: 2024-01-31 | End: 2024-02-01 | Stop reason: HOSPADM

## 2024-01-31 RX ORDER — SODIUM CHLORIDE 9 MG/ML
INJECTION, SOLUTION INTRAVENOUS CONTINUOUS PRN
Status: DISCONTINUED | OUTPATIENT
Start: 2024-01-31 | End: 2024-01-31 | Stop reason: SURG

## 2024-01-31 RX ORDER — LORAZEPAM 1 MG/1
2 TABLET ORAL EVERY 6 HOURS
Qty: 8 TABLET | Refills: 0 | Status: DISPENSED | OUTPATIENT
Start: 2024-01-31 | End: 2024-02-01

## 2024-01-31 RX ORDER — LORAZEPAM 1 MG/1
1 TABLET ORAL
Status: DISCONTINUED | OUTPATIENT
Start: 2024-01-31 | End: 2024-02-01 | Stop reason: HOSPADM

## 2024-01-31 RX ORDER — NAPROXEN SODIUM 220 MG
220 TABLET ORAL 2 TIMES DAILY PRN
COMMUNITY
End: 2024-02-01 | Stop reason: HOSPADM

## 2024-01-31 RX ORDER — SODIUM CHLORIDE, SODIUM LACTATE, POTASSIUM CHLORIDE, CALCIUM CHLORIDE 600; 310; 30; 20 MG/100ML; MG/100ML; MG/100ML; MG/100ML
75 INJECTION, SOLUTION INTRAVENOUS CONTINUOUS
Status: DISCONTINUED | OUTPATIENT
Start: 2024-01-31 | End: 2024-01-31

## 2024-01-31 RX ORDER — SODIUM CHLORIDE 0.9 % (FLUSH) 0.9 %
10 SYRINGE (ML) INJECTION EVERY 12 HOURS SCHEDULED
Status: DISCONTINUED | OUTPATIENT
Start: 2024-01-31 | End: 2024-02-01 | Stop reason: HOSPADM

## 2024-01-31 RX ORDER — LIDOCAINE HYDROCHLORIDE 20 MG/ML
INJECTION, SOLUTION EPIDURAL; INFILTRATION; INTRACAUDAL; PERINEURAL AS NEEDED
Status: DISCONTINUED | OUTPATIENT
Start: 2024-01-31 | End: 2024-01-31 | Stop reason: SURG

## 2024-01-31 RX ORDER — LORAZEPAM 2 MG/ML
1 INJECTION INTRAMUSCULAR
Status: DISCONTINUED | OUTPATIENT
Start: 2024-01-31 | End: 2024-02-01 | Stop reason: HOSPADM

## 2024-01-31 RX ORDER — PROPOFOL 10 MG/ML
VIAL (ML) INTRAVENOUS AS NEEDED
Status: DISCONTINUED | OUTPATIENT
Start: 2024-01-31 | End: 2024-01-31 | Stop reason: SURG

## 2024-01-31 RX ORDER — SODIUM CHLORIDE 9 MG/ML
40 INJECTION, SOLUTION INTRAVENOUS AS NEEDED
Status: DISCONTINUED | OUTPATIENT
Start: 2024-01-31 | End: 2024-02-01 | Stop reason: HOSPADM

## 2024-01-31 RX ORDER — LORAZEPAM 2 MG/ML
2 INJECTION INTRAMUSCULAR
Status: DISCONTINUED | OUTPATIENT
Start: 2024-01-31 | End: 2024-02-01 | Stop reason: HOSPADM

## 2024-01-31 RX ORDER — PANTOPRAZOLE SODIUM 40 MG/10ML
80 INJECTION, POWDER, LYOPHILIZED, FOR SOLUTION INTRAVENOUS ONCE
Status: COMPLETED | OUTPATIENT
Start: 2024-01-31 | End: 2024-01-31

## 2024-01-31 RX ORDER — THIAMINE HYDROCHLORIDE 100 MG/ML
200 INJECTION, SOLUTION INTRAMUSCULAR; INTRAVENOUS EVERY 8 HOURS SCHEDULED
Status: DISCONTINUED | OUTPATIENT
Start: 2024-01-31 | End: 2024-02-01 | Stop reason: HOSPADM

## 2024-01-31 RX ORDER — SODIUM CHLORIDE 9 MG/ML
25 INJECTION, SOLUTION INTRAVENOUS CONTINUOUS
Status: DISCONTINUED | OUTPATIENT
Start: 2024-01-31 | End: 2024-02-01 | Stop reason: HOSPADM

## 2024-01-31 RX ORDER — LORAZEPAM 1 MG/1
2 TABLET ORAL
Status: DISCONTINUED | OUTPATIENT
Start: 2024-01-31 | End: 2024-02-01 | Stop reason: HOSPADM

## 2024-01-31 RX ORDER — PANTOPRAZOLE SODIUM 40 MG/10ML
40 INJECTION, POWDER, LYOPHILIZED, FOR SOLUTION INTRAVENOUS EVERY 12 HOURS SCHEDULED
Status: DISCONTINUED | OUTPATIENT
Start: 2024-01-31 | End: 2024-01-31

## 2024-01-31 RX ORDER — PANTOPRAZOLE SODIUM 40 MG/10ML
40 INJECTION, POWDER, LYOPHILIZED, FOR SOLUTION INTRAVENOUS EVERY 12 HOURS SCHEDULED
Status: DISCONTINUED | OUTPATIENT
Start: 2024-01-31 | End: 2024-02-01 | Stop reason: HOSPADM

## 2024-01-31 RX ORDER — ONDANSETRON 2 MG/ML
4 INJECTION INTRAMUSCULAR; INTRAVENOUS ONCE
Status: COMPLETED | OUTPATIENT
Start: 2024-01-31 | End: 2024-01-31

## 2024-01-31 RX ORDER — LORAZEPAM 1 MG/1
1 TABLET ORAL EVERY 6 HOURS
Qty: 4 TABLET | Refills: 0 | Status: DISCONTINUED | OUTPATIENT
Start: 2024-02-01 | End: 2024-02-01 | Stop reason: HOSPADM

## 2024-01-31 RX ADMIN — THIAMINE HYDROCHLORIDE 200 MG: 100 INJECTION, SOLUTION INTRAMUSCULAR; INTRAVENOUS at 21:34

## 2024-01-31 RX ADMIN — Medication 10 ML: at 14:00

## 2024-01-31 RX ADMIN — LIDOCAINE HYDROCHLORIDE 80 MG: 20 INJECTION, SOLUTION EPIDURAL; INFILTRATION; INTRACAUDAL; PERINEURAL at 12:16

## 2024-01-31 RX ADMIN — SODIUM CHLORIDE 500 ML: 9 INJECTION, SOLUTION INTRAVENOUS at 07:05

## 2024-01-31 RX ADMIN — SODIUM CHLORIDE: 9 INJECTION, SOLUTION INTRAVENOUS at 11:55

## 2024-01-31 RX ADMIN — PROPOFOL INJECTABLE EMULSION 200 MG: 10 INJECTION, EMULSION INTRAVENOUS at 12:16

## 2024-01-31 RX ADMIN — Medication 10 ML: at 20:14

## 2024-01-31 RX ADMIN — PANTOPRAZOLE SODIUM 80 MG: 40 INJECTION, POWDER, FOR SOLUTION INTRAVENOUS at 07:01

## 2024-01-31 RX ADMIN — ONDANSETRON 4 MG: 2 INJECTION INTRAMUSCULAR; INTRAVENOUS at 07:00

## 2024-01-31 RX ADMIN — LORAZEPAM 2 MG: 1 TABLET ORAL at 20:13

## 2024-01-31 RX ADMIN — OCTREOTIDE ACETATE 25 MCG/HR: 500 INJECTION, SOLUTION INTRAVENOUS; SUBCUTANEOUS at 07:18

## 2024-01-31 RX ADMIN — PANTOPRAZOLE SODIUM 40 MG: 40 INJECTION, POWDER, FOR SOLUTION INTRAVENOUS at 18:30

## 2024-01-31 RX ADMIN — THIAMINE HYDROCHLORIDE 200 MG: 100 INJECTION, SOLUTION INTRAMUSCULAR; INTRAVENOUS at 13:48

## 2024-01-31 RX ADMIN — FOLIC ACID 1 MG: 5 INJECTION, SOLUTION INTRAMUSCULAR; INTRAVENOUS; SUBCUTANEOUS at 15:03

## 2024-01-31 RX ADMIN — LORAZEPAM 2 MG: 1 TABLET ORAL at 13:49

## 2024-01-31 RX ADMIN — GLYCOPYRROLATE 0.1 MG: 0.2 INJECTION INTRAMUSCULAR; INTRAVENOUS at 11:56

## 2024-01-31 NOTE — ED PROVIDER NOTES
Subjective   History of Present Illness  Patient is a 43-year-old female with a history of hypertension, hepatitis C and previous alcohol abuse who presents to the ER with dizziness.  Patient states she has felt dizzy for the last 2 weeks.  Patient states that yesterday she started vomiting blood.  Patient states it appears to be pure blood.  She did notice some dark stools today.  Patient states she does take Aleve almost daily.  She has had some chills.  She denies any fever, chest pain, shortness of air, abdominal pain, diarrhea, urinary changes, numbness or weakness.      Review of Systems   Constitutional:  Positive for chills.   HENT: Negative.     Eyes: Negative.    Respiratory: Negative.     Cardiovascular: Negative.    Gastrointestinal:  Positive for nausea and vomiting.   Endocrine: Negative.    Genitourinary: Negative.    Musculoskeletal: Negative.    Skin: Negative.    Allergic/Immunologic: Negative.    Neurological:  Positive for dizziness.   Hematological: Negative.    Psychiatric/Behavioral: Negative.     All other systems reviewed and are negative.      Past Medical History:   Diagnosis Date    Alcoholism in recovery     Allergic     Anxiety     Anxiety     Asthma     Gestational diabetes     Heart murmur     Hepatitis-C     History of ear infections     Hypertension     Sinusitis        Allergies   Allergen Reactions    Codeine Rash and GI Intolerance       Past Surgical History:   Procedure Laterality Date     SECTION      MOUTH SURGERY         Family History   Problem Relation Age of Onset    Diabetes Mother     Heart disease Father     Diabetes Father     Stroke Father        Social History     Socioeconomic History    Marital status:    Tobacco Use    Smoking status: Every Day     Packs/day: 0.50     Years: 10.00     Additional pack years: 0.00     Total pack years: 5.00     Types: Cigarettes    Smokeless tobacco: Never   Vaping Use    Vaping Use: Never used   Substance and Sexual  Activity    Alcohol use: Not Currently     Alcohol/week: 10.0 standard drinks of alcohol     Types: 10 Shots of liquor per week     Comment: vodka a few times a week    Drug use: No    Sexual activity: Yes     Partners: Male     Birth control/protection: None           Objective   Physical Exam  Vitals and nursing note reviewed.   Constitutional:       Appearance: She is well-developed.   HENT:      Head: Normocephalic and atraumatic.   Eyes:      Conjunctiva/sclera: Conjunctivae normal.      Pupils: Pupils are equal, round, and reactive to light.   Cardiovascular:      Rate and Rhythm: Regular rhythm. Tachycardia present.      Heart sounds: Normal heart sounds.   Pulmonary:      Effort: Pulmonary effort is normal.      Breath sounds: Normal breath sounds.   Abdominal:      Palpations: Abdomen is soft.      Tenderness: There is no abdominal tenderness. There is no right CVA tenderness, left CVA tenderness, guarding or rebound.   Genitourinary:     Rectum: Guaiac result positive.      Comments: Dark stool, no gross blood  Musculoskeletal:         General: No deformity. Normal range of motion.      Cervical back: Normal range of motion.   Skin:     General: Skin is warm.   Neurological:      Mental Status: She is alert and oriented to person, place, and time.   Psychiatric:         Behavior: Behavior normal.         Procedures           ED Course        Lab Results (last 24 hours)       Procedure Component Value Units Date/Time    CBC & Differential [[id number]]  (Abnormal) Collected: 01/31/24 0604    Specimen: Blood Updated: 01/31/24 0621    Narrative:      The following orders were created for panel order CBC & Differential.  Procedure                               Abnormality         Status                     ---------                               -----------         ------                     CBC Auto Differential[[id number]]        Abnormal            Final result                 Please view results for these  tests on the individual orders.    Comprehensive Metabolic Panel [[id number]]  (Abnormal) Collected: 01/31/24 0604    Specimen: Blood Updated: 01/31/24 0634     Glucose 137 mg/dL      BUN 21 mg/dL      Creatinine 0.52 mg/dL      Sodium 135 mmol/L      Potassium 3.8 mmol/L      Comment: Slight hemolysis detected by analyzer. Result may be falsely elevated.        Chloride 97 mmol/L      CO2 22.0 mmol/L      Calcium 8.2 mg/dL      Total Protein 8.0 g/dL      Albumin 3.3 g/dL      ALT (SGPT) 22 U/L      AST (SGOT) 92 U/L      Comment: Slight hemolysis detected by analyzer. Result may be falsely elevated.        Alkaline Phosphatase 83 U/L      Total Bilirubin 3.1 mg/dL      Globulin 4.7 gm/dL      A/G Ratio 0.7 g/dL      BUN/Creatinine Ratio 40.4     Anion Gap 16.0 mmol/L      eGFR 117.7 mL/min/1.73     Narrative:      GFR Normal >60  Chronic Kidney Disease <60  Kidney Failure <15      hCG, Serum, Qualitative [[id number]]  (Normal) Collected: 01/31/24 0604    Specimen: Blood Updated: 01/31/24 0627     HCG Qualitative Negative    Protime-INR [[id number]]  (Abnormal) Collected: 01/31/24 0604    Specimen: Blood Updated: 01/31/24 0625     Protime 18.3 Seconds      INR 1.50    aPTT [[id number]]  (Abnormal) Collected: 01/31/24 0604    Specimen: Blood Updated: 01/31/24 0625     PTT 39.7 seconds     CBC Auto Differential [[id number]]  (Abnormal) Collected: 01/31/24 0604    Specimen: Blood Updated: 01/31/24 0621     WBC 8.73 10*3/mm3      RBC 2.37 10*6/mm3      Hemoglobin 8.0 g/dL      Hematocrit 25.1 %      .9 fL      MCH 33.8 pg      MCHC 31.9 g/dL      RDW 14.2 %      RDW-SD 54.5 fl      MPV 10.6 fL      Platelets 100 10*3/mm3      Neutrophil % 63.1 %      Lymphocyte % 24.3 %      Monocyte % 10.1 %      Eosinophil % 1.1 %      Basophil % 0.8 %      Neutrophils, Absolute 5.51 10*3/mm3      Lymphocytes, Absolute 2.12 10*3/mm3      Monocytes, Absolute 0.88 10*3/mm3      Eosinophils, Absolute 0.10 10*3/mm3      Basophils,  Absolute 0.07 10*3/mm3     POCT Occult Blood, Stool [[id number]]  (Abnormal) Resulted: 01/31/24 0714    Specimen: Stool from Per Rectum Updated: 01/31/24 0714     Fecal Occult Blood Positive     Lot Number [id number]     Expiration Date 07/24/24     DEVELOPER LOT NUMBER 257     DEVELOPER EXPIRATION DATE 257     Positive Control Positive     Negative Control Negative           No orders to display                Medical Decision Making  Patient is a 43-year-old female with a history of hypertension, hepatitis C and previous alcohol abuse who presents to the ER with dizziness.  Patient states she has felt dizzy for the last 2 weeks.  Patient states that yesterday she started vomiting blood.  Patient states it appears to be pure blood.  She did notice some dark stools today.  Patient states she does take Aleve almost daily.  She has had some chills.  She denies any fever, chest pain, shortness of air, abdominal pain, diarrhea, urinary changes, numbness or weakness.    Differential diagnosis: Upper GI bleed, varices, gastritis, peptic ulcer disease, Evangelina-Lan tear    Patient was given IV fluids, Zofran, Protonix bolus and drip and octreotide drip.  Hemoccult was positive.  Labs showed anemia as well as an elevated BUN, bilirubin, AST and INR.  Patient also had thrombocytopenia and a decreased albumin.  Dr. Mauro with GI was consulted and will see the patient.  I then discussed the case with Dr. Upton with the hospitalist group and he has admitted the patient to his service for an upper GI bleed.      Problems Addressed:  Anemia, unspecified type: complicated acute illness or injury  Upper GI bleed: complicated acute illness or injury    Amount and/or Complexity of Data Reviewed  Labs: ordered. Decision-making details documented in ED Course.  Discussion of management or test interpretation with external provider(s): Dr Mauro with GI  Dr Upton with hospitalist group    Risk  Prescription drug management.  Decision  regarding hospitalization.        Final diagnoses:   Upper GI bleed   Anemia, unspecified type       ED Disposition  ED Disposition       ED Disposition   Decision to Admit    Condition   --    Comment   Level of Care: Critical Care [6]   Diagnosis: Upper GI bleed [126126]   Admitting Physician: ARY CASTANEDA [[id number]]   Attending Physician: ARY CASTANEDA [[id number]]   Certification: I Certify That Inpatient Hospital Services Are Medically Necessary For Greater Than 2 Midnights                 No follow-up provider specified.       Medication List      No changes were made to your prescriptions during this visit.            Amina Barron MD  01/31/24 0742

## 2024-01-31 NOTE — ANESTHESIA POSTPROCEDURE EVALUATION
"Patient: Rachana Smart    Procedure Summary       Date: 01/31/24 Room / Location: [facility] ENDOSCOPY 4 / [facility] END[facility]    Anesthesia Start: 1155 Anesthesia Stop: 1226    Procedure: ESOPHAGOGASTRODUODENOSCOPY WITH ANESTHESIA Diagnosis:     Surgeons: Abdifatah Mauro MD Provider: Jimena Rose CRNA    Anesthesia Type: MAC ASA Status: 4 - Emergent            Anesthesia Type: MAC    Vitals  No vitals data found for the desired time range.          Post Anesthesia Care and Evaluation    Patient location during evaluation: PACU  Patient participation: complete - patient participated  Level of consciousness: awake and alert  Pain management: adequate    Airway patency: patent  Anesthetic complications: No anesthetic complications    Cardiovascular status: acceptable  Respiratory status: acceptable  Hydration status: acceptable    Comments: Blood pressure 94/63, pulse 84, temperature 98.4 °F (36.9 °C), resp. rate 12, height 170.2 cm (67\"), weight 55.7 kg (122 lb 12.7 oz), SpO2 99%, not currently breastfeeding.    Pt discharged from PACU based on robbie score >8    "

## 2024-01-31 NOTE — H&P
[facility]  HISTORY AND PHYSICAL    Date of Admission: 1/31/2024  Primary Care Physician: INA Holloway MD    Subjective   Primary Historian: patient    Chief Complaint: UGI Bleed      History of Present Illness    I am asked admit this 43-year-old woman who is an alcoholic and has history of hepatitis C presenting in the emergency room with complaints of vomiting, dizziness.  Patient's blood pressure is anywhere from 115-133 systolic, she was tachycardic at 110  Her diagnostic studies showed as follows:  BUN 21/creatinine 0.32  Negative hCG  PT/INR of 18.3 and 1.5 respectively, PTT of 39.7  Hemoglobin of 8 and hematocrit of 25.1 while MCV is 106, platelet count is 100    She received Protonix 80 mg bolus and octreotide.  She was started on Protonix drip    Her EKG Showed sinus rhythm rate of 95.  No acute ST-T wave changes.    GI service called through the emergency room.  Hospitalist service requested to admit patient    Patient reports that she has been dizzy for about 1 to 2 weeks  She told me as well that she has been having headache, body aches fever and chills for the past month but has not sought any consultation.  She just shrugged her shoulder when I asked why.  She drinks alcohol namely vodka, fireball every 3 to 4 days.  Last drink was about 3 to 4 days ago  Her younger years she said that she had issue with drinking alcohol.  Never a DUI.    She said she got hepatitis C through drinking alcohol.  She clearly denies any IV drug use.     It is not clear to me whether he had treatment for it but she said she went out of town to receive some form of treatment.    Review of Systems   Otherwise complete ROS reviewed and negative except as mentioned in the HPI.    Past Medical History:   Past Medical History:   Diagnosis Date    Alcoholism in recovery     Allergic     Anxiety     Anxiety     Asthma     Gestational diabetes     Heart murmur      "Hepatitis-C     History of ear infections     Hypertension     Sinusitis      Past Surgical History:  Past Surgical History:   Procedure Laterality Date     SECTION      MOUTH SURGERY       Social History:  reports that she has been smoking cigarettes. She has a 5.00 pack-year smoking history. She has never used smokeless tobacco. She reports that she does not currently use alcohol after a past usage of about 10.0 standard drinks of alcohol per week. She reports that she does not use drugs.    Family History: family history includes Diabetes in her father and mother; Heart disease in her father; Stroke in her father.       Allergies:  Allergies   Allergen Reactions    Codeine Rash and GI Intolerance       Medications:  Prior to Admission medications    Medication Sig Start Date End Date Taking? Authorizing Provider   naproxen sodium (ALEVE) 220 MG tablet Take 1 tablet by mouth 2 (Two) Times a Day As Needed.    Provider, MD Maria Luisa     I have utilized all available immediate resources to obtain, update, or review the patient's current medications (including all prescriptions, over-the-counter products, herbals, cannabis/cannabidiol products, and vitamin/mineral/dietary (nutritional) supplements).    Objective     Vital Signs: /77   Pulse 98   Temp 98 °F (36.7 °C)   Resp 20   Ht 170.2 cm (67\")   Wt 54.4 kg (120 lb)   SpO2 100%   BMI 18.79 kg/m²   Physical Exam   She running as I interview her.  She has no reported or rhinorrhea.  She has no tremors  GEN: Awake, alert, interactive, in NAD  HEENT: Atraumatic, PERRLA, EOMI, Anicteric, Trachea midline  Lungs: CTAB, no wheezing/rales/rhonchi  Heart: RRR, +S1/s2, no rub  ABD: soft, nt/nd, +BS, no guarding/rebound; no organomegaly   Extremities: atraumatic, no cyanosis, no edema  Skin: no rashes or lesions  Neuro: AAOx3, no focal deficits  Psych: normal mood & affect        Results Reviewed:  Lab Results (last 24 hours)       Procedure Component " Value Units Date/Time    Ethanol [[id number]] Collected: 01/31/24 0604    Specimen: Blood Updated: 01/31/24 0755     Ethanol % <0.010 %     Narrative:      Not for legal purposes. Chain of Custody not followed.     Fossil Draw [[id number]] Collected: 01/31/24 0604    Specimen: Blood Updated: 01/31/24 0715    Narrative:      The following orders were created for panel order Fossil Draw.  Procedure                               Abnormality         Status                     ---------                               -----------         ------                     Green Top (Gel)[[id number]]                                  Final result               Lavender Top[[id number]]                                     Final result               Red Top[[id number]]                                          Final result               Mitchell Top[[id number]]                                         In process                 Light Blue Top[[id number]]                                   Final result                 Please view results for these tests on the individual orders.    Green Top (Gel) [[id number]] Collected: 01/31/24 0604    Specimen: Blood Updated: 01/31/24 0715     Extra Tube Hold for add-ons.     Comment: Auto resulted.       Lavender Top [[id number]] Collected: 01/31/24 0604    Specimen: Blood Updated: 01/31/24 0715     Extra Tube hold for add-on     Comment: Auto resulted       Red Top [[id number]] Collected: 01/31/24 0604    Specimen: Blood Updated: 01/31/24 0715     Extra Tube Hold for add-ons.     Comment: Auto resulted.       Light Blue Top [[id number]] Collected: 01/31/24 0604    Specimen: Blood Updated: 01/31/24 0715     Extra Tube Hold for add-ons.     Comment: Auto resulted       POCT Occult Blood, Stool [[id number]]  (Abnormal) Resulted: 01/31/24 0714    Specimen: Stool from Per Rectum Updated: 01/31/24 0714     Fecal Occult Blood Positive     Lot Number [id number]     Expiration Date 07/24/24     DEVELOPER LOT NUMBER 257      DEVELOPER EXPIRATION DATE 257     Positive Control Positive     Negative Control Negative    Comprehensive Metabolic Panel [[id number]]  (Abnormal) Collected: 01/31/24 0604    Specimen: Blood Updated: 01/31/24 0634     Glucose 137 mg/dL      BUN 21 mg/dL      Creatinine 0.52 mg/dL      Sodium 135 mmol/L      Potassium 3.8 mmol/L      Comment: Slight hemolysis detected by analyzer. Result may be falsely elevated.        Chloride 97 mmol/L      CO2 22.0 mmol/L      Calcium 8.2 mg/dL      Total Protein 8.0 g/dL      Albumin 3.3 g/dL      ALT (SGPT) 22 U/L      AST (SGOT) 92 U/L      Comment: Slight hemolysis detected by analyzer. Result may be falsely elevated.        Alkaline Phosphatase 83 U/L      Total Bilirubin 3.1 mg/dL      Globulin 4.7 gm/dL      A/G Ratio 0.7 g/dL      BUN/Creatinine Ratio 40.4     Anion Gap 16.0 mmol/L      eGFR 117.7 mL/min/1.73     Narrative:      GFR Normal >60  Chronic Kidney Disease <60  Kidney Failure <15      hCG, Serum, Qualitative [[id number]]  (Normal) Collected: 01/31/24 0604    Specimen: Blood Updated: 01/31/24 0627     HCG Qualitative Negative    Protime-INR [[id number]]  (Abnormal) Collected: 01/31/24 0604    Specimen: Blood Updated: 01/31/24 0625     Protime 18.3 Seconds      INR 1.50    aPTT [[id number]]  (Abnormal) Collected: 01/31/24 0604    Specimen: Blood Updated: 01/31/24 0625     PTT 39.7 seconds     CBC & Differential [[id number]]  (Abnormal) Collected: 01/31/24 0604    Specimen: Blood Updated: 01/31/24 0621    Narrative:      The following orders were created for panel order CBC & Differential.  Procedure                               Abnormality         Status                     ---------                               -----------         ------                     CBC Auto Differential[[id number]]        Abnormal            Final result                 Please view results for these tests on the individual orders.    CBC Auto Differential [[id number]]  (Abnormal) Collected:  01/31/24 0604    Specimen: Blood Updated: 01/31/24 0621     WBC 8.73 10*3/mm3      RBC 2.37 10*6/mm3      Hemoglobin 8.0 g/dL      Hematocrit 25.1 %      .9 fL      MCH 33.8 pg      MCHC 31.9 g/dL      RDW 14.2 %      RDW-SD 54.5 fl      MPV 10.6 fL      Platelets 100 10*3/mm3      Neutrophil % 63.1 %      Lymphocyte % 24.3 %      Monocyte % 10.1 %      Eosinophil % 1.1 %      Basophil % 0.8 %      Neutrophils, Absolute 5.51 10*3/mm3      Lymphocytes, Absolute 2.12 10*3/mm3      Monocytes, Absolute 0.88 10*3/mm3      Eosinophils, Absolute 0.10 10*3/mm3      Basophils, Absolute 0.07 10*3/mm3     Gray Top [[id number]] Collected: 01/31/24 0604    Specimen: Blood Updated: 01/31/24 0609          Imaging Results (Last 24 Hours)       ** No results found for the last 24 hours. **          I have personally reviewed and interpreted the radiology studies and ECG obtained at time of admission.     Assessment / Plan   Assessment:   Active Hospital Problems    Diagnosis     **Upper GI bleed            Medical Decision Making  Number and Complexity of problems:   Anemia secondary to GI blood loss  Hematemesis/melena-rule out possibility of varices in the setting of alcoholism and hepatitis C as well as NSAID induced gastritis/peptic ulcer disease  Intake of NSAID historically  Alcoholism-monitor for any alcohol withdrawal while  History of hepatitis C  Thrombocytopenia-etiology not clear but in the setting of history of alcoholism-possibility of hypersplenism considered.    Treatment Plan  The patient will be admitted to my service here at [facility].   Agree with unit admission for GI bleed  GI consultation  Continue PPI and octreotide  N.p.o.  Type and screen and hold blood products  Transfuse as needed for hemoglobin less than 7 or less than 8 with symptoms of anemia or active bleeding  SCD for DVT prophylaxis  Follow labs, transfuse if platelet count less than 20,000 with bleeding  WA protocol  Since  patient complained of myalgia, fever and chills, I requested the nurse to do RSV, influenza and COVID swab as we are still in the season that upper respiratory tract infection can be possibility  Will consider chest x-ray although currently has no signs and symptoms related to her lungs  Will consider head CT scan if any worsening condition or any focality to suggest a neurologic condition.  Conditions and Status    Fair    Patient has the potential to have continued bleeding that will require transfusion or even airway protection.  Patient can actually withdraw from alcohol were last intake was 3 to 4 days ago.  Agree with ICU/CCU admission  MDM Data  External documents reviewed: Reviewed epic information  Cardiac tracing (EKG, telemetry) interpretation: Sinus tachycardia on telemetry  Radiology interpretation: None  Labs reviewed: Yes  Any tests that were considered but not ordered: None     Decision rules/scores evaluated (example [id number], Wells, etc): None     Discussed with: Patient and  Gaurav.  Charge nurse in the ER  Dr. Barron  Care Planning  Shared decision making: Patient and , ER physician  Code status and discussions: Full code    Disposition  Social Determinants of Health that impact treatment or disposition: None identified at this time  Estimated length of stay is to be determined    I confirmed that the patient's advanced care plan is present, code status is documented, and a surrogate decision maker is listed in the patient's medical record.     The patient's surrogate decision maker is  Gaurav    The patient was seen and examined by me on  Electronically signed by Kwasi Upton MD, 01/31/24, 09:13 CST.

## 2024-01-31 NOTE — OP NOTE
ESOPHAGOGASTRODUODENOSCOPY    Date:  1/31/2024    Indications: Hematemesis.       Procedure: ESOPHAGOGASTRODUODENOSCOPY     Surgeon: Abdifatah Mauro MD    Anesthesia: Monitored Anesthesia Care     Procedure  Description: After informed consent was obtained, a timeout was called in the endoscopy suite to confirm the correct patient and appropriate procedure.  Thereafter with the patient in the left lateral position, esophagus was intubated under direct vision with adult Olympus gastroscope.  Thereafter scope was slowly advanced into the second portion of the duodenum under direct vision.  Careful examination of the duodenum, stomach and esophagus was performed while slowly withdrawing the scope.  Retroflex examination of the gastric cardia and fundus were also performed.    Findings: Hypopharynx and larynx appeared normal.  Proximal esophagus appeared normal.  Grade 1 and 2 distal esophageal varices without stigmata of recent bleeding.  Normal stomach.  No evidence of gastric varices.  Normal duodenal bulb, sweep and second portion of the duodenum.  No evidence of any fresh or old blood or clots in the examined upper GI tract.    Complications: No immediate complications.    Recommendations: Continue Protonix daily along with Zofran as needed and CIWA protocol.  Clear liquid diet.  Monitor H&H and transfuse with PRBCs as needed.    Procedure CPT code: 12449    Post-Op Diagnosis Codes: I85.00       Abdifatah Mauro MD

## 2024-01-31 NOTE — CONSULTS
[facility]  Inpatient Consult Note  Today's date:  01/31/24    Rachana Smart  1980       Referring Provider: No ref. provider found  Primary Physician: INA Holloway MD     Date of Admission: 1/31/2024  Date of Service:  01/31/24    Reason for Consultation/Chief Complaint: GI bleeding.  Hematemesis.  Anemia.    History of present illness: 43-year-old woman presented to the emergency room with complaints of hematemesis.  Patient was in her usual state of health up until yesterday morning, when after breakfast patient became nauseous and had an episode of vomiting with what she thinks was fresh blood.  Patient did not experience any abdominal pain.  She felt somewhat weak, feverish and dizzy.  Patient did have a bowel movement with a normal formed brown stool yesterday.  Patient was in bed for most of the day, but did not experience any more nausea, up until earlier this morning when patient woke up nauseous and had another 2 episodes of vomiting with what she thinks was clots.  She also had a bowel movement earlier this morning with form dark brown/black stool.  No episodes of vomiting since admission to the emergency room.  Patient has a long history of alcohol abuse, usually drinking few vodka drinks a day throughout her most adult life, with exception of parents when she was pregnant or nursing.  About a year ago patient developed significant ascites and jaundice and was diagnosed with what sounds like alcoholic hepatitis with ascites.  Patient was strongly recommended to quit drinking alcohol at once.  Patient states that she did not quit drinking alcohol completely but cut down on the amount of alcohol and has been drinking recently couple vodka drinks 2-3 times a week.  Patient denies any recent weight loss, dysphagia or dyne aphasia, change in bowel habits, abdominal pain.  Patient takes either 2 tablets of ibuprofen or 1 tablet of Aleve daily and sometimes takes  Tylenol as well.  No other medications.  Blood work in the emergency room reviewed normal white blood cell count, mild thrombocytopenia with a platelet count of 100,000, and quite significant macrocytic anemia with hemoglobin of 8.0, hematocrit of 25.1 and MCV of 105.9.  CMP revealed mildly low sodium and chloride, mildly elevated glucose at 137, mildly low albumin of 3.3 along with elevated AST of 92 and total bilirubin of 3.1, but normal alkaline phosphatase and ALT.  Low calcium of 8.2 is most likely secondary to low albumin.  Prothrombin time was mildly elevated at 18.3 with INR of 1.5.    Past Medical History:   Diagnosis Date    Alcoholism in recovery     Allergic     Anxiety     Anxiety     Asthma     Gestational diabetes     Heart murmur     Hepatitis-C     History of ear infections     Hypertension     Sinusitis        Past Surgical History:   Procedure Laterality Date     SECTION      MOUTH SURGERY          Allergies   Allergen Reactions    Codeine Rash and GI Intolerance       Medications Prior to Admission   Medication Sig Dispense Refill Last Dose    naproxen sodium (ALEVE) 220 MG tablet Take 1 tablet by mouth 2 (Two) Times a Day As Needed.   2024       Hospital Medications (active)         Dose Frequency Start End    folic acid 1 mg in sodium chloride 0.9 % 50 mL IVPB 1 mg Daily 2024 --    Admin Instructions: Protect from light.    Route: Intravenous    LORazepam (ATIVAN) injection 1 mg 1 mg Every 1 Hour PRN 2024    Admin Instructions: Reassess 1 Hour After Administration   Caution: Look alike/sound alike drug alert. Dilute 1:1 with normal saline.    Route: Intravenous    Linked Group 1: See Tami for full Linked Orders Report.        LORazepam (ATIVAN) injection 2 mg 2 mg Every 1 Hour PRN 2024    Admin Instructions: Reassess 1 Hour After Administration   Caution: Look alike/sound alike drug alert. Dilute 1:1 with normal saline.    Route: Intravenous     Linked Group 1: See Hyperspace for full Linked Orders Report.        LORazepam (ATIVAN) injection 2 mg 2 mg Every 15 Minutes PRN 1/31/2024 2/5/2024    Admin Instructions: Reassess 15 Minutes After Each Administration.  If CIWA-Ar Does Not Decrease Contact Provider To Discuss Transfer to Higher Level of Care.   Caution: Look alike/sound alike drug alert. Dilute 1:1 with normal saline.    Route: Intravenous    Linked Group 1: See Hyperspace for full Linked Orders Report.        LORazepam (ATIVAN) injection 2 mg 2 mg Every 15 Minutes PRN 1/31/2024 2/5/2024    Admin Instructions: Reassess 15 Minutes After Each Administration.  If CIWA-Ar Does Not Decrease Contact Provider To Discuss Transfer to Higher Level of Care.   Caution: Look alike/sound alike drug alert. Dilute 1:1 with normal saline.    Route: Intramuscular    Linked Group 1: See Hyperspace for full Linked Orders Report.        LORazepam (ATIVAN) tablet 1 mg 1 mg Every 6 Hours 2/1/2024 2/2/2024    Admin Instructions:  Caution: Look alike/sound alike drug alert    Route: Oral    Linked Group 2: See Hyperspace for full Linked Orders Report.        LORazepam (ATIVAN) tablet 1 mg 1 mg Every 1 Hour PRN 1/31/2024 2/5/2024    Admin Instructions: Reassess 1 Hour After Administration   Caution: Look alike/sound alike drug alert    Route: Oral    Linked Group 1: See Hyperspace for full Linked Orders Report.        LORazepam (ATIVAN) tablet 2 mg 2 mg Every 6 Hours 1/31/2024 2/1/2024    Admin Instructions:  Caution: Look alike/sound alike drug alert    Route: Oral    Linked Group 2: See Hyperspace for full Linked Orders Report.        LORazepam (ATIVAN) tablet 2 mg 2 mg Every 1 Hour PRN 1/31/2024 2/5/2024    Admin Instructions: Reassess 1 Hour After Administration.  Caution: Look alike/sound alike drug alert    Route: Oral    Linked Group 1: See Hyperspace for full Linked Orders Report.        ondansetron (ZOFRAN) injection 4 mg 4 mg Every 6 Hours PRN 1/31/2024 --     Admin Instructions: If BOTH ondansetron (ZOFRAN) and promethazine (PHENERGAN) are ordered use ondansetron first and THEN promethazine IF ondansetron is ineffective.    Route: Intravenous    pantoprazole (PROTONIX) injection 40 mg 40 mg Every 12 Hours Scheduled 1/31/2024 --    Admin Instructions: Dilute with 10 mL of 0.9% NaCl and give IV push over 2 minutes.    Route: Intravenous    sodium chloride 0.9 % flush 10 mL 10 mL As Needed 1/31/2024 --    Route: Intravenous    sodium chloride 0.9 % flush 10 mL 10 mL As Needed 1/31/2024 --    Route: Intravenous    Linked Group 3: See Hyperspace for full Linked Orders Report.        sodium chloride 0.9 % flush 10 mL 10 mL Every 12 Hours Scheduled 1/31/2024 --    Route: Intravenous    sodium chloride 0.9 % flush 10 mL 10 mL As Needed 1/31/2024 --    Route: Intravenous    sodium chloride 0.9 % flush 10 mL 10 mL Every 12 Hours Scheduled 1/31/2024 --    Route: Intravenous    sodium chloride 0.9 % flush 10 mL 10 mL As Needed 1/31/2024 --    Route: Intravenous    sodium chloride 0.9 % infusion 40 mL 40 mL As Needed 1/31/2024 --    Admin Instructions: Following administration of an IV intermittent medication, flush line with 40mL NS at 100mL/hr.    Route: Intravenous    sodium chloride 0.9 % infusion 40 mL 40 mL As Needed 1/31/2024 --    Admin Instructions: Following administration of an IV intermittent medication, flush line with 40mL NS at 100mL/hr.    Route: Intravenous    sodium chloride 0.9 % infusion 25 mL/hr Continuous 1/31/2024 --    Route: Intravenous    thiamine (B-1) injection 200 mg 200 mg Every 8 Hours Scheduled 1/31/2024 2/5/2024    Admin Instructions: Doses of up to 250mg, give over 1-2 minutes (IV push). Doses 250mg and above, give over 30 minutes.    Route: Intravenous    Linked Group 4: See Hyperspace for full Linked Orders Report.        thiamine (VITAMIN B-1) tablet 100 mg 100 mg Daily 2/5/2024 --    Route: Oral    Linked Group 4: See Hyperspace for full Linked  Orders Report.                Social History     Tobacco Use    Smoking status: Every Day     Packs/day: 0.50     Years: 10.00     Additional pack years: 0.00     Total pack years: 5.00     Types: Cigarettes    Smokeless tobacco: Never   Substance Use Topics    Alcohol use: Not Currently     Alcohol/week: 10.0 standard drinks of alcohol     Types: 10 Shots of liquor per week     Comment: vodka a few times a week        Past Family History:  Family History   Problem Relation Age of Onset    Diabetes Mother     Heart disease Father     Diabetes Father     Stroke Father        Review of Systems:  Constitutional: No unexpected weight change, + fatigue, no unexplained fever, no sweats or chills.   HEENT: No icteric sclera.  No hearing or visual deficits.  No sore throat.  No chronic nasal discharge.  Pulmonary: No chronic cough.  No hemoptysis.  No shortness of breath.  Cardiovascular: No chest pain.  No palpitations.  No shortness of breath.  Gastrointestinal: As above.  Musculoskeletal/extremities: No peripheral edema.  No cyanosis.  No claudications.  No back pain.  Genitourinary: No dysuria.  No blood in stool.  No urethral discharges.  Neurologic: No seizures.  +headaches.  No dizziness.  No gait problems.  Skin: No rash.  No icterus.  Mental: No psychosis.  No confusions.  No hallucinations.      Physical Exam:  Temp:  [98 °F (36.7 °C)-98.4 °F (36.9 °C)] 98.4 °F (36.9 °C)  Heart Rate:  [] 84  Resp:  [12-20] 12  BP: ()/(62-91) 94/63  Body mass index is 19.23 kg/m².    Intake/Output Summary (Last 24 hours) at 1/31/2024 1421  Last data filed at 1/31/2024 1222  Gross per 24 hour   Intake 700 ml   Output --   Net 700 ml     I/O this shift:  In: 700 [I.V.:200; IV Piggyback:500]  Out: -     General appearance: 43-year-old woman who appears to be no acute distress.  Awake, alert and oriented x 3.  HEENT: Nonicteric sclerae.  Moist oral mucosa.  PERRLA.  EOMI.  Clear pharynx.  Lungs: Clear to auscultation  bilaterally.  No wheezing, rales or rhonchi.  Heart: Regular rate and rhythm.  Normal S1 and S2, no S3, S4 or murmur.  Abdomen: Soft, nondistended, nontender to palpation, with normoactive bowel sounds, no hepatosplenomegaly, no palpable masses.  Extremities: No cyanosis, edema or pulse deficits.  Skin: No rash or jaundice.    Results Review:  Lab Results (last 24 hours)       Procedure Component Value Units Date/Time    Magnesium [[id number]]  (Abnormal) Collected: 01/31/24 0604    Specimen: Blood Updated: 01/31/24 1302     Magnesium 1.4 mg/dL     COVID PRE-OP / PRE-PROCEDURE SCREENING ORDER (NO ISOLATION) - Swab, Nasopharynx [[id number]]  (Normal) Collected: 01/31/24 0936    Specimen: Swab from Nasopharynx Updated: 01/31/24 1042    Narrative:      The following orders were created for panel order COVID PRE-OP / PRE-PROCEDURE SCREENING ORDER (NO ISOLATION) - Swab, Nasopharynx.  Procedure                               Abnormality         Status                     ---------                               -----------         ------                     COVID-19, FLU A/B, RSV P...[[id number]]  Normal              Final result                 Please view results for these tests on the individual orders.    COVID-19, FLU A/B, RSV PCR 1 HR TAT - Swab, Nasopharynx [[id number]]  (Normal) Collected: 01/31/24 0936    Specimen: Swab from Nasopharynx Updated: 01/31/24 1042     COVID19 Not Detected     Influenza A PCR Not Detected     Influenza B PCR Not Detected     RSV, PCR Not Detected    Narrative:      Fact sheet for providers: https://www.fda.gov/media/207933/download    Fact sheet for patients: https://www.fda.gov/media/438125/download    Test performed by PCR.    [place] Draw [[id number]] Collected: 01/31/24 0604    Specimen: Blood Updated: 01/31/24 1015    Narrative:      The following orders were created for panel order [place] Draw.  Procedure                               Abnormality         Status                      ---------                               -----------         ------                     Green Top (Gel)[[id number]]                                  Final result               Lavender Top[[id number]]                                     Final result               Red Top[[id number]]                                          Final result               Mitchell Top[[id number]]                                         Final result               Light Blue Top[[id number]]                                   Final result                 Please view results for these tests on the individual orders.    Gray Top [[id number]] Collected: 01/31/24 0604    Specimen: Blood Updated: 01/31/24 1015     Extra Tube Hold for add-ons.     Comment: Auto resulted.       Ethanol [[id number]] Collected: 01/31/24 0604    Specimen: Blood Updated: 01/31/24 0755     Ethanol % <0.010 %     Narrative:      Not for legal purposes. Chain of Custody not followed.     Green Top (Gel) [[id number]] Collected: 01/31/24 0604    Specimen: Blood Updated: 01/31/24 0715     Extra Tube Hold for add-ons.     Comment: Auto resulted.       Lavender Top [[id number]] Collected: 01/31/24 0604    Specimen: Blood Updated: 01/31/24 0715     Extra Tube hold for add-on     Comment: Auto resulted       Red Top [[id number]] Collected: 01/31/24 0604    Specimen: Blood Updated: 01/31/24 0715     Extra Tube Hold for add-ons.     Comment: Auto resulted.       Light Blue Top [[id number]] Collected: 01/31/24 0604    Specimen: Blood Updated: 01/31/24 0715     Extra Tube Hold for add-ons.     Comment: Auto resulted       POCT Occult Blood, Stool [[id number]]  (Abnormal) Resulted: 01/31/24 0714    Specimen: Stool from Per Rectum Updated: 01/31/24 0714     Fecal Occult Blood Positive     Lot Number [id number]     Expiration Date 07/24/24     DEVELOPER LOT NUMBER 257     DEVELOPER EXPIRATION DATE 257     Positive Control Positive     Negative Control Negative    Comprehensive Metabolic Panel [[id number]]   (Abnormal) Collected: 01/31/24 0604    Specimen: Blood Updated: 01/31/24 0634     Glucose 137 mg/dL      BUN 21 mg/dL      Creatinine 0.52 mg/dL      Sodium 135 mmol/L      Potassium 3.8 mmol/L      Comment: Slight hemolysis detected by analyzer. Result may be falsely elevated.        Chloride 97 mmol/L      CO2 22.0 mmol/L      Calcium 8.2 mg/dL      Total Protein 8.0 g/dL      Albumin 3.3 g/dL      ALT (SGPT) 22 U/L      AST (SGOT) 92 U/L      Comment: Slight hemolysis detected by analyzer. Result may be falsely elevated.        Alkaline Phosphatase 83 U/L      Total Bilirubin 3.1 mg/dL      Globulin 4.7 gm/dL      A/G Ratio 0.7 g/dL      BUN/Creatinine Ratio 40.4     Anion Gap 16.0 mmol/L      eGFR 117.7 mL/min/1.73     Narrative:      GFR Normal >60  Chronic Kidney Disease <60  Kidney Failure <15      hCG, Serum, Qualitative [[id number]]  (Normal) Collected: 01/31/24 0604    Specimen: Blood Updated: 01/31/24 0627     HCG Qualitative Negative    Protime-INR [[id number]]  (Abnormal) Collected: 01/31/24 0604    Specimen: Blood Updated: 01/31/24 0625     Protime 18.3 Seconds      INR 1.50    aPTT [[id number]]  (Abnormal) Collected: 01/31/24 0604    Specimen: Blood Updated: 01/31/24 0625     PTT 39.7 seconds     CBC & Differential [[id number]]  (Abnormal) Collected: 01/31/24 0604    Specimen: Blood Updated: 01/31/24 0621    Narrative:      The following orders were created for panel order CBC & Differential.  Procedure                               Abnormality         Status                     ---------                               -----------         ------                     CBC Auto Differential[[id number]]        Abnormal            Final result                 Please view results for these tests on the individual orders.    CBC Auto Differential [[id number]]  (Abnormal) Collected: 01/31/24 0604    Specimen: Blood Updated: 01/31/24 0621     WBC 8.73 10*3/mm3      RBC 2.37 10*6/mm3      Hemoglobin 8.0 g/dL       Hematocrit 25.1 %      .9 fL      MCH 33.8 pg      MCHC 31.9 g/dL      RDW 14.2 %      RDW-SD 54.5 fl      MPV 10.6 fL      Platelets 100 10*3/mm3      Neutrophil % 63.1 %      Lymphocyte % 24.3 %      Monocyte % 10.1 %      Eosinophil % 1.1 %      Basophil % 0.8 %      Neutrophils, Absolute 5.51 10*3/mm3      Lymphocytes, Absolute 2.12 10*3/mm3      Monocytes, Absolute 0.88 10*3/mm3      Eosinophils, Absolute 0.10 10*3/mm3      Basophils, Absolute 0.07 10*3/mm3             Radiology Review:  Imaging Results (Last 72 Hours)       ** No results found for the last 72 hours. **            Impression/Plan:    Upper GI bleed    43-year-old woman with a long history of alcohol abuse, which unfortunately remains an active problem, history of what sounds like alcoholic hepatitis with ascites 1 year ago, presents to the emergency room with complaints of fatigue, weakness, dizziness along with 3 episodes of vomiting with what looked like an blood and clots.  Patient takes either ibuprofen or Aleve daily.  Blood work on admission revealed normal white blood cell count, quite significant microcytic anemia with a hemoglobin of 8.0 and hematocrit of 25.1 along with mild thrombocytopenia with a platelet count of 100,000.  BUN was only borderline elevated at 21 with a normal creatinine of 0.52.  Albumin was elevated at 3.1 along with elevated AST of 92 and mildly low albumin of 3.3, while ALT and alkaline phosphatase were normal.  Obviously, patient's clinical presentation is very much suggestive of upper GI bleeding with differential diagnosis including peptic ulcer disease, portal gastropathy, variceal bleeding.  In addition, management should include CIWA protocol along with Protonix daily and antiemetics on as needed basis.  I discussed this with the patient risks associated with continuing alcohol abuse and strongly recommended cessation of any amount of alcohol intake.  I believe that patient should proceed with an  upper endoscopy for evaluation of possible upper GI bleeding and I discussed risk and benefits of this procedure with both the patient and her .  Consent was obtained and we will arrange EGD later today and further management will depend upon the findings of the EGD and patient's clinical progress.       Abdifatah Mauro MD  01/31/24   14:21 CST

## 2024-01-31 NOTE — ANESTHESIA PREPROCEDURE EVALUATION
Anesthesia Evaluation     Patient summary reviewed   no history of anesthetic complications:   NPO Solid Status: Waived due to emergency  NPO Liquid Status: Waived due to emergency           Airway   Mallampati: II  Dental - normal exam     Pulmonary    (+) a smoker Current, asthma,  Cardiovascular   Exercise tolerance: good (4-7 METS)    (+) hypertension, valvular problems/murmurs      Neuro/Psych  GI/Hepatic/Renal/Endo    (+) GI bleeding , hepatitis C, liver disease, diabetes mellitus    Musculoskeletal     Abdominal    Substance History      OB/GYN          Other                          Anesthesia Plan    ASA 4 - emergent     MAC     (Hb 8. Tachycardic  )  intravenous induction     Anesthetic plan, risks, benefits, and alternatives have been provided, discussed and informed consent has been obtained with: patient, spouse/significant other and child.        CODE STATUS:    Level Of Support Discussed With: Patient  Code Status (Patient has no pulse and is not breathing): CPR (Attempt to Resuscitate)  Medical Interventions (Patient has pulse or is breathing): Full Support

## 2024-02-01 ENCOUNTER — READMISSION MANAGEMENT (OUTPATIENT)
Dept: CALL CENTER | Facility: HOSPITAL | Age: 44
End: 2024-02-01
Payer: COMMERCIAL

## 2024-02-01 VITALS
DIASTOLIC BLOOD PRESSURE: 74 MMHG | TEMPERATURE: 98.5 F | HEART RATE: 88 BPM | BODY MASS INDEX: 19.27 KG/M2 | OXYGEN SATURATION: 98 % | WEIGHT: 122.8 LBS | SYSTOLIC BLOOD PRESSURE: 109 MMHG | RESPIRATION RATE: 15 BRPM | HEIGHT: 67 IN

## 2024-02-01 LAB
ALBUMIN SERPL-MCNC: 2.7 G/DL (ref 3.5–5.2)
ALBUMIN/GLOB SERPL: 0.8 G/DL
ALP SERPL-CCNC: 65 U/L (ref 39–117)
ALT SERPL W P-5'-P-CCNC: 15 U/L (ref 1–33)
ANION GAP SERPL CALCULATED.3IONS-SCNC: 8 MMOL/L (ref 5–15)
AST SERPL-CCNC: 54 U/L (ref 1–32)
BASOPHILS # BLD AUTO: 0.07 10*3/MM3 (ref 0–0.2)
BASOPHILS NFR BLD AUTO: 1.3 % (ref 0–1.5)
BILIRUB SERPL-MCNC: 2.8 MG/DL (ref 0–1.2)
BUN SERPL-MCNC: 20 MG/DL (ref 6–20)
BUN/CREAT SERPL: 28.2 (ref 7–25)
CALCIUM SPEC-SCNC: 7.9 MG/DL (ref 8.6–10.5)
CHLORIDE SERPL-SCNC: 102 MMOL/L (ref 98–107)
CO2 SERPL-SCNC: 26 MMOL/L (ref 22–29)
CREAT SERPL-MCNC: 0.71 MG/DL (ref 0.57–1)
DEPRECATED RDW RBC AUTO: 69.8 FL (ref 37–54)
EGFRCR SERPLBLD CKD-EPI 2021: 107.7 ML/MIN/1.73
EOSINOPHIL # BLD AUTO: 0.26 10*3/MM3 (ref 0–0.4)
EOSINOPHIL NFR BLD AUTO: 4.8 % (ref 0.3–6.2)
ERYTHROCYTE [DISTWIDTH] IN BLOOD BY AUTOMATED COUNT: 19.5 % (ref 12.3–15.4)
GLOBULIN UR ELPH-MCNC: 3.6 GM/DL
GLUCOSE SERPL-MCNC: 86 MG/DL (ref 65–99)
HCT VFR BLD AUTO: 24 % (ref 34–46.6)
HGB BLD-MCNC: 7.8 G/DL (ref 12–15.9)
IMM GRANULOCYTES # BLD AUTO: 0.01 10*3/MM3 (ref 0–0.05)
IMM GRANULOCYTES NFR BLD AUTO: 0.2 % (ref 0–0.5)
LYMPHOCYTES # BLD AUTO: 1.89 10*3/MM3 (ref 0.7–3.1)
LYMPHOCYTES NFR BLD AUTO: 34.9 % (ref 19.6–45.3)
MCH RBC QN AUTO: 31.7 PG (ref 26.6–33)
MCHC RBC AUTO-ENTMCNC: 32.5 G/DL (ref 31.5–35.7)
MCV RBC AUTO: 97.6 FL (ref 79–97)
MONOCYTES # BLD AUTO: 0.47 10*3/MM3 (ref 0.1–0.9)
MONOCYTES NFR BLD AUTO: 8.7 % (ref 5–12)
NEUTROPHILS NFR BLD AUTO: 2.71 10*3/MM3 (ref 1.7–7)
NEUTROPHILS NFR BLD AUTO: 50.1 % (ref 42.7–76)
NRBC BLD AUTO-RTO: 0 /100 WBC (ref 0–0.2)
PLATELET # BLD AUTO: 71 10*3/MM3 (ref 140–450)
PMV BLD AUTO: 10.9 FL (ref 6–12)
POTASSIUM SERPL-SCNC: 3.8 MMOL/L (ref 3.5–5.2)
PROT SERPL-MCNC: 6.3 G/DL (ref 6–8.5)
QT INTERVAL: 390 MS
QTC INTERVAL: 490 MS
RBC # BLD AUTO: 2.46 10*6/MM3 (ref 3.77–5.28)
SODIUM SERPL-SCNC: 136 MMOL/L (ref 136–145)
WBC NRBC COR # BLD AUTO: 5.41 10*3/MM3 (ref 3.4–10.8)

## 2024-02-01 PROCEDURE — 85025 COMPLETE CBC W/AUTO DIFF WBC: CPT | Performed by: INTERNAL MEDICINE

## 2024-02-01 PROCEDURE — 25010000002 THIAMINE PER 100 MG: Performed by: INTERNAL MEDICINE

## 2024-02-01 PROCEDURE — 80053 COMPREHEN METABOLIC PANEL: CPT | Performed by: INTERNAL MEDICINE

## 2024-02-01 PROCEDURE — 99232 SBSQ HOSP IP/OBS MODERATE 35: CPT | Performed by: INTERNAL MEDICINE

## 2024-02-01 RX ORDER — PANTOPRAZOLE SODIUM 40 MG/1
40 TABLET, DELAYED RELEASE ORAL DAILY
Qty: 30 TABLET | Refills: 0 | Status: SHIPPED | OUTPATIENT
Start: 2024-02-01 | End: 2024-04-29

## 2024-02-01 RX ORDER — MULTIVIT-MIN/IRON FUM/FOLIC AC 7.5 MG-4
1 TABLET ORAL DAILY
Qty: 30 TABLET | Refills: 0 | Status: SHIPPED | OUTPATIENT
Start: 2024-02-01

## 2024-02-01 RX ORDER — LANOLIN ALCOHOL/MO/W.PET/CERES
100 CREAM (GRAM) TOPICAL DAILY
Qty: 30 TABLET | Refills: 0 | Status: SHIPPED | OUTPATIENT
Start: 2024-02-05

## 2024-02-01 RX ADMIN — Medication 10 ML: at 08:20

## 2024-02-01 RX ADMIN — THIAMINE HYDROCHLORIDE 200 MG: 100 INJECTION, SOLUTION INTRAMUSCULAR; INTRAVENOUS at 06:41

## 2024-02-01 RX ADMIN — PANTOPRAZOLE SODIUM 40 MG: 40 INJECTION, POWDER, FOR SOLUTION INTRAVENOUS at 06:41

## 2024-02-01 RX ADMIN — FOLIC ACID 1 MG: 5 INJECTION, SOLUTION INTRAMUSCULAR; INTRAVENOUS; SUBCUTANEOUS at 08:19

## 2024-02-01 NOTE — DISCHARGE SUMMARY
[facility] Medicine Services  DISCHARGE SUMMARY       Date of Admission: 1/31/2024  Date of Discharge:  2/1/2024  Primary Care Physician: INA Holloway MD    Presenting Problem/History of Present Illness:  UGI Bleed   Final Discharge Diagnoses:  Anemia secondary to GI blood loss  Hematemesis/melena per narrative -Grade 1 and 2 distal esophageal varices in the setting of alcoholism and hepatitis C; NSAID induced gastritis/peptic ulcer disease - ruled out  Intake of NSAID historically  Alcoholism-monitor for any alcohol withdrawal while  History of hepatitis C  Thrombocytopenia-etiology not clear but in the setting of history of alcoholism-possibility of hypersplenism considered.  Hypoalbuminemia    Consults:   GI service    Procedures Performed:     Pertinent Test Results:       Imaging Results (All)       None          LAB RESULTS:      Lab 02/01/24  0439 01/31/24  2317 01/31/24  1521 01/31/24  0604   WBC 5.41  --   --  8.73   HEMOGLOBIN 7.8* 7.3* 6.8* 8.0*   HEMATOCRIT 24.0* 22.7* 21.2* 25.1*   PLATELETS 71*  --   --  100*   NEUTROS ABS 2.71  --   --  5.51   IMMATURE GRANS (ABS) 0.01  --   --   --    LYMPHS ABS 1.89  --   --  2.12   MONOS ABS 0.47  --   --  0.88   EOS ABS 0.26  --   --  0.10   MCV 97.6*  --   --  105.9*   PROTIME  --   --   --  18.3*   APTT  --   --   --  39.7*         Lab 02/01/24  0328 01/31/24  0604   SODIUM 136 135*   POTASSIUM 3.8 3.8   CHLORIDE 102 97*   CO2 26.0 22.0   ANION GAP 8.0 16.0*   BUN 20 21*   CREATININE 0.71 0.52*   EGFR 107.7 117.7   GLUCOSE 86 137*   CALCIUM 7.9* 8.2*   MAGNESIUM  --  1.4*         Lab 02/01/24  0328 01/31/24  0604   TOTAL PROTEIN 6.3 8.0   ALBUMIN 2.7* 3.3*   GLOBULIN 3.6 4.7   ALT (SGPT) 15 22   AST (SGOT) 54* 92*   BILIRUBIN 2.8* 3.1*   ALK PHOS 65 83         Lab 01/31/24  0604   PROTIME 18.3*   INR 1.50*             Lab 01/31/24  0708   ABO TYPING A   RH TYPING Positive   ANTIBODY SCREEN Negative         Brief Urine  Lab Results  (Last result in the past 365 days)        Color   Clarity   Blood   Leuk Est   Nitrite   Protein   CREAT   Urine HCG        02/24/23 1800 [place]   Cloudy   Negative   Trace   Positive   Negative                 Microbiology Results (last 10 days)       Procedure Component Value - Date/Time    COVID PRE-OP / PRE-PROCEDURE SCREENING ORDER (NO ISOLATION) - Swab, Nasopharynx [[id number]]  (Normal) Collected: 01/31/24 0936    Lab Status: Final result Specimen: Swab from Nasopharynx Updated: 01/31/24 1042    Narrative:      The following orders were created for panel order COVID PRE-OP / PRE-PROCEDURE SCREENING ORDER (NO ISOLATION) - Swab, Nasopharynx.  Procedure                               Abnormality         Status                     ---------                               -----------         ------                     COVID-19, FLU A/B, RSV P...[[id number]]  Normal              Final result                 Please view results for these tests on the individual orders.    COVID-19, FLU A/B, RSV PCR 1 HR TAT - Swab, Nasopharynx [[id number]]  (Normal) Collected: 01/31/24 0936    Lab Status: Final result Specimen: Swab from Nasopharynx Updated: 01/31/24 1042     COVID19 Not Detected     Influenza A PCR Not Detected     Influenza B PCR Not Detected     RSV, PCR Not Detected    Narrative:      Fact sheet for providers: https://www.fda.gov/media/158295/download    Fact sheet for patients: https://www.fda.gov/media/775038/download    Test performed by PCR.            Hospital Course:     Patient express she is ready to go home. Patient tolerated diet. No recurrence of bleeding. Stool is formed and brown.   She had EGD with findings as follows:   grade 1 and 2 distal esophageal varices without stigmata of recent bleeding along with a normal stomach and normal duodenum.  There was no evidence of any fresh or old blood or clots in the examined upper GI tract     She is cleared by GI service for d/c .   I reiterated  "instructions/recommendation of GI service.   I spoke directly to Dr. Mauro. He recommends continuing patient on PPI  and f/u in GI clinic in 1 month or sooner as needed.  Note she received 1 unit of PRBC during hospitalization for hgb of < 7. Post transfusion hgb is 7.8.      CIWA protocol started. She did not have gross signs of alcohol withdrawal. She received prophylactic vitamins.       The story I got on admission:   Patient reports that she has been dizzy for about 1 to 2 weeks  She told me as well that she has been having headache, body aches fever and chills for the past month but has not sought any consultation.  She just shrugged her shoulder when I asked why.  She drinks alcohol namely vodka, fireball every 3 to 4 days.  Last drink was about 3 to 4 days ago  Her younger years she said that she had issue with drinking alcohol.  Never had DUI.     She said she got hepatitis C through drinking alcohol.  She clearly denies any IV drug use.        Physical Exam on Discharge:  /74   Pulse 94   Temp 98.5 °F (36.9 °C) (Oral)   Resp 15   Ht 170.2 cm (67\")   Wt 55.7 kg (122 lb 12.7 oz)   SpO2 97%   BMI 19.23 kg/m²   Physical Exam  GEN: Awake, alert, interactive, in NAD  HEENT: Atraumatic, PERRLA, EOMI, Anicteric, Trachea midline  Lungs: CTAB, no wheezing/rales/rhonchi  Heart: RRR, +S1/s2, no rub  ABD: soft, nt/nd, +BS, no guarding/rebound  Extremities: atraumatic, no cyanosis, no edema  Skin: no rashes or lesions  Neuro: AAOx3, no focal deficits  Psych: normal mood & affect      Condition on Discharge: stable    Discharge Disposition:  Home or Self Care    Discharge Medications:     Discharge Medications        New Medications        Instructions Start Date   multivitamin with minerals tablet  Generic drug: multivitamin with minerals   1 tablet, Oral, Daily      pantoprazole 40 MG EC tablet  Commonly known as: Protonix   40 mg, Oral, Daily      thiamine 100 MG tablet  Commonly known as: VITAMIN B1   100 " mg, Oral, Daily   Start Date: February 5, 2024            Stop These Medications      naproxen sodium 220 MG tablet  Commonly known as: ALEVE              This patient has current or prior documentation of an left ventricular ejection fraction (LVEF) of less than or equal to 40%. - none availablefor review. Not applicable        Discharge Diet:   Diet Instructions       Diet: Regular/House Diet; Thin (IDDSI 0)      Discharge Diet: Regular/House Diet    Fluid Consistency: Thin (IDDSI 0)            Activity at Discharge:   Activity Instructions       Discharge Activity      Gradually resume activities            Follow-up Appointments:   Pcp within 1 wk   GI 1 month or sooner if needed    Test Results Pending at Discharge: none    Electronically signed by Kwasi Upton MD, 02/01/24, 15:03 CST.    Time: > 30  minutes.

## 2024-02-01 NOTE — PAYOR COMM NOTE
"REF:   ???????    [facility]  [place],  [phone]  OR  FAX   [phone]    Steve Rachana Carter (43 y.o. Female)       Date of Birth   1980    Social Security Number       Address   [facility] [address]    Home Phone   [phone]    MRN   [id number]       Roman Catholic   Mandaeism    Marital Status                               Admission Date   1/31/24    Admission Type   Emergency    Admitting Provider   Kwasi Upton MD    Attending Provider   Kwasi Upton MD    Department, Room/Bed   [facility] CARDIAC CARE, C002/1       Discharge Date       Discharge Disposition       Discharge Destination                                 Attending Provider: Kwasi Upton MD    Allergies: Codeine    Isolation: None   Infection: None   Code Status: CPR    Ht: 170.2 cm (67\")   Wt: 55.7 kg (122 lb 12.7 oz)    Admission Cmt: None   Principal Problem: Upper GI bleed [K92.2]                   Active Insurance as of 1/31/2024       Primary Coverage       Payor Plan Insurance Group Employer/Plan Group    [employer]HE[employer] BLUE CROSS ANTHEM BLUE CROSS BLUE SHIELD PPO [id number]       Payor Plan Address Payor Plan Phone Number Payor Plan Fax Number Effective Dates    [address] [phone]  1/1/2019 - None Entered    [place] [address]         Subscriber Name Subscriber Birth Date Member ID       STEVEJAY 7/2/1985 [id number]                     Emergency Contacts        (Rel.) Home Phone Work Phone Mobile Phone    Jay Smart (Spouse) [phone] -- [phone]          Patient Care Timeline (1/31/2024 05:55 to 1/31/2024 11:59)    1/31/2024 1/31/2024 Event Details User   05:55 In Facility Status: Arrived --   05:55:13 Patient arrival  Keeley Felix RN   05:55:15 Patient roomed in ED To room 10 Keeley Felix RN   05:55:16 Assign Nurse Keeley Felix RN assigned as Registered Nurse Keeley Felix RN   05:57 " "HPI HPI (Adult)  Stated Reason for Visit: dizziness x 1 wk and vomting blood started yesterday. \"i have been under a lot of stress. i take alleve daily.\" Keeley Felix RN   05:57:30 Trigger for Triage Start  Keeley Felix RN   05:57:30 Triage Started  Keeley Felix RN   05:57:30 Chief Complaints Updated Dizziness    Vomiting Blood Keeley Felix RN   05:58 Vitals Assessment  Schwetman, Keeley, RN   05:58 Vital Signs  Vital Signs  Temp: 98 °F (36.7 °C)  Heart Rate: 110  Resp: 20  BP: 133/91  BMI (Calculated): 18.8  Oxygen Therapy  SpO2: 100 %  Vitals Timer  Restart Vitals Timer: Yes  Height and Weight  Height: 170.2 cm (67\")  Weight: 54.4 kg (120 lb)  Other flowsheet entries  Ideal Body Weight k.6 Keeley Felix RN     05:59 Pain Pain (Adult)  (0-10) Pain Rating: Rest: 2 Keeley Felix RN     07:00 Medication Given ondansetron (ZOFRAN) injection 4 mg - Dose: 4 mg ; Route: Intravenous ; Line: Peripheral IV 24 Left Antecubital ; Scheduled Time: 709 Keeley Felix RN   07:01 Vitals Assessment  Keeley Felix RN   07:01 Medication Given pantoprazole (PROTONIX) injection 80 mg - Dose: 80 mg ; Route: Intravenous ; Line: Peripheral IV 24 Left Antecubital ; Scheduled Time: 709 Keeley Felix RN     07:05 Medication New Bag sodium chloride 0.9 % bolus 500 mL - Dose: 500 mL ; Rate: 1,000 mL/hr ; Route: Intravenous ; Line: Peripheral IV 24 Left Antecubital ; Scheduled Time: 709 Keeley Felix RN     07:18 Medication New Bag octreotide (sandoSTATIN) 500 mcg in sodium chloride 0.9 % 100 mL (5 mcg/mL) infusion - Dose: 25 mcg/hr ; Rate: 5 mL/hr ; Route: Intravenous ; Line: Peripheral IV 24 Right Antecubital ; Scheduled Time: 709 Yoel Taylor, Joanna Rosen, RN   Registered Nurse     Plan of Care     Signed     Date of Service: 24  Creation Time: 24     Signed         Goal Outcome Evaluation:   Pt A&O X4. No N/V or " bloody stools. Hgb improved after unit of PRBCs. BP stable. Up to toilet. CIWA scores have been zero for most of shift.                         History & Physical        Kwasi Upton MD at 01/31/24 0738              [facility]  HISTORY AND PHYSICAL    Date of Admission: 1/31/2024  Primary Care Physician: INA Holloway MD    Subjective   Primary Historian: patient    Chief Complaint: UGI Bleed      History of Present Illness    I am asked admit this 43-year-old woman who is an alcoholic and has history of hepatitis C presenting in the emergency room with complaints of vomiting, dizziness.  Patient's blood pressure is anywhere from 115-133 systolic, she was tachycardic at 110  Her diagnostic studies showed as follows:  BUN 21/creatinine 0.32  Negative hCG  PT/INR of 18.3 and 1.5 respectively, PTT of 39.7  Hemoglobin of 8 and hematocrit of 25.1 while MCV is 106, platelet count is 100    She received Protonix 80 mg bolus and octreotide.  She was started on Protonix drip    Her EKG Showed sinus rhythm rate of 95.  No acute ST-T wave changes.    GI service called through the emergency room.  Hospitalist service requested to admit patient    Patient reports that she has been dizzy for about 1 to 2 weeks  She told me as well that she has been having headache, body aches fever and chills for the past month but has not sought any consultation.  She just shrugged her shoulder when I asked why.  She drinks alcohol namely vodka, fireball every 3 to 4 days.  Last drink was about 3 to 4 days ago  Her younger years she said that she had issue with drinking alcohol.  Never a DUI.    She said she got hepatitis C through drinking alcohol.  She clearly denies any IV drug use.     It is not clear to me whether he had treatment for it but she said she went out of town to receive some form of treatment.    Review of Systems   Otherwise complete ROS reviewed and negative  "except as mentioned in the HPI.    Past Medical History:   Past Medical History:   Diagnosis Date    Alcoholism in recovery     Allergic     Anxiety     Anxiety     Asthma     Gestational diabetes     Heart murmur     Hepatitis-C     History of ear infections     Hypertension     Sinusitis      Past Surgical History:  Past Surgical History:   Procedure Laterality Date     SECTION      MOUTH SURGERY       Social History:  reports that she has been smoking cigarettes. She has a 5.00 pack-year smoking history. She has never used smokeless tobacco. She reports that she does not currently use alcohol after a past usage of about 10.0 standard drinks of alcohol per week. She reports that she does not use drugs.    Family History: family history includes Diabetes in her father and mother; Heart disease in her father; Stroke in her father.       Allergies:  Allergies   Allergen Reactions    Codeine Rash and GI Intolerance       Medications:  Prior to Admission medications    Medication Sig Start Date End Date Taking? Authorizing Provider   naproxen sodium (ALEVE) 220 MG tablet Take 1 tablet by mouth 2 (Two) Times a Day As Needed.    Provider, MD Maria Luisa     I have utilized all available immediate resources to obtain, update, or review the patient's current medications (including all prescriptions, over-the-counter products, herbals, cannabis/cannabidiol products, and vitamin/mineral/dietary (nutritional) supplements).    Objective     Vital Signs: /77   Pulse 98   Temp 98 °F (36.7 °C)   Resp 20   Ht 170.2 cm (67\")   Wt 54.4 kg (120 lb)   SpO2 100%   BMI 18.79 kg/m²   Physical Exam   She running as I interview her.  She has no reported or rhinorrhea.  She has no tremors  GEN: Awake, alert, interactive, in NAD  HEENT: Atraumatic, PERRLA, EOMI, Anicteric, Trachea midline  Lungs: CTAB, no wheezing/rales/rhonchi  Heart: RRR, +S1/s2, no rub  ABD: soft, nt/nd, +BS, no guarding/rebound; no organomegaly "   Extremities: atraumatic, no cyanosis, no edema  Skin: no rashes or lesions  Neuro: AAOx3, no focal deficits  Psych: normal mood & affect        Results Reviewed:  Lab Results (last 24 hours)       Procedure Component Value Units Date/Time    Ethanol [[id number]] Collected: 01/31/24 0604    Specimen: Blood Updated: 01/31/24 0755     Ethanol % <0.010 %     Narrative:      Not for legal purposes. Chain of Custody not followed.     Ione Draw [[id number]] Collected: 01/31/24 0604    Specimen: Blood Updated: 01/31/24 0715    Narrative:      The following orders were created for panel order Ione Draw.  Procedure                               Abnormality         Status                     ---------                               -----------         ------                     Green Top (Gel)[[id number]]                                  Final result               Lavender Top[[id number]]                                     Final result               Red Top[[id number]]                                          Final result               Mitchell Top[[id number]]                                         In process                 Light Blue Top[[id number]]                                   Final result                 Please view results for these tests on the individual orders.    Green Top (Gel) [[id number]] Collected: 01/31/24 0604    Specimen: Blood Updated: 01/31/24 0715     Extra Tube Hold for add-ons.     Comment: Auto resulted.       Lavender Top [[id number]] Collected: 01/31/24 0604    Specimen: Blood Updated: 01/31/24 0715     Extra Tube hold for add-on     Comment: Auto resulted       Red Top [[id number]] Collected: 01/31/24 0604    Specimen: Blood Updated: 01/31/24 0715     Extra Tube Hold for add-ons.     Comment: Auto resulted.       Light Blue Top [[id number]] Collected: 01/31/24 0604    Specimen: Blood Updated: 01/31/24 0715     Extra Tube Hold for add-ons.     Comment: Auto resulted       POCT Occult Blood, Stool  [[id number]]  (Abnormal) Resulted: 01/31/24 0714    Specimen: Stool from Per Rectum Updated: 01/31/24 0714     Fecal Occult Blood Positive     Lot Number 257     Expiration Date 07/24/24     DEVELOPER LOT NUMBER 257     DEVELOPER EXPIRATION DATE 257     Positive Control Positive     Negative Control Negative    Comprehensive Metabolic Panel [[id number]]  (Abnormal) Collected: 01/31/24 0604    Specimen: Blood Updated: 01/31/24 0634     Glucose 137 mg/dL      BUN 21 mg/dL      Creatinine 0.52 mg/dL      Sodium 135 mmol/L      Potassium 3.8 mmol/L      Comment: Slight hemolysis detected by analyzer. Result may be falsely elevated.        Chloride 97 mmol/L      CO2 22.0 mmol/L      Calcium 8.2 mg/dL      Total Protein 8.0 g/dL      Albumin 3.3 g/dL      ALT (SGPT) 22 U/L      AST (SGOT) 92 U/L      Comment: Slight hemolysis detected by analyzer. Result may be falsely elevated.        Alkaline Phosphatase 83 U/L      Total Bilirubin 3.1 mg/dL      Globulin 4.7 gm/dL      A/G Ratio 0.7 g/dL      BUN/Creatinine Ratio 40.4     Anion Gap 16.0 mmol/L      eGFR 117.7 mL/min/1.73     Narrative:      GFR Normal >60  Chronic Kidney Disease <60  Kidney Failure <15      hCG, Serum, Qualitative [[id number]]  (Normal) Collected: 01/31/24 0604    Specimen: Blood Updated: 01/31/24 0627     HCG Qualitative Negative    Protime-INR [[id number]]  (Abnormal) Collected: 01/31/24 0604    Specimen: Blood Updated: 01/31/24 0625     Protime 18.3 Seconds      INR 1.50    aPTT [[id number]]  (Abnormal) Collected: 01/31/24 0604    Specimen: Blood Updated: 01/31/24 0625     PTT 39.7 seconds     CBC & Differential [[id number]]  (Abnormal) Collected: 01/31/24 0604    Specimen: Blood Updated: 01/31/24 0621    Narrative:      The following orders were created for panel order CBC & Differential.  Procedure                               Abnormality         Status                     ---------                               -----------         ------                      CBC Auto Differential[[id number]]        Abnormal            Final result                 Please view results for these tests on the individual orders.    CBC Auto Differential [[id number]]  (Abnormal) Collected: 01/31/24 0604    Specimen: Blood Updated: 01/31/24 0621     WBC 8.73 10*3/mm3      RBC 2.37 10*6/mm3      Hemoglobin 8.0 g/dL      Hematocrit 25.1 %      .9 fL      MCH 33.8 pg      MCHC 31.9 g/dL      RDW 14.2 %      RDW-SD 54.5 fl      MPV 10.6 fL      Platelets 100 10*3/mm3      Neutrophil % 63.1 %      Lymphocyte % 24.3 %      Monocyte % 10.1 %      Eosinophil % 1.1 %      Basophil % 0.8 %      Neutrophils, Absolute 5.51 10*3/mm3      Lymphocytes, Absolute 2.12 10*3/mm3      Monocytes, Absolute 0.88 10*3/mm3      Eosinophils, Absolute 0.10 10*3/mm3      Basophils, Absolute 0.07 10*3/mm3     Gray Top [[id number]] Collected: 01/31/24 0604    Specimen: Blood Updated: 01/31/24 0609          Imaging Results (Last 24 Hours)       ** No results found for the last 24 hours. **          I have personally reviewed and interpreted the radiology studies and ECG obtained at time of admission.     Assessment / Plan   Assessment:   Active Hospital Problems    Diagnosis     **Upper GI bleed            Medical Decision Making  Number and Complexity of problems:   Anemia secondary to GI blood loss  Hematemesis/melena-rule out possibility of varices in the setting of alcoholism and hepatitis C as well as NSAID induced gastritis/peptic ulcer disease  Intake of NSAID historically  Alcoholism-monitor for any alcohol withdrawal while  History of hepatitis C  Thrombocytopenia-etiology not clear but in the setting of history of alcoholism-possibility of hypersplenism considered.    Treatment Plan  The patient will be admitted to my service here at [facility].   Agree with unit admission for GI bleed  GI consultation  Continue PPI and octreotide  N.p.o.  Type and screen and hold blood products  Transfuse  as needed for hemoglobin less than 7 or less than 8 with symptoms of anemia or active bleeding  SCD for DVT prophylaxis  Follow labs, transfuse if platelet count less than 20,000 with bleeding  CIWA protocol  Since patient complained of myalgia, fever and chills, I requested the nurse to do RSV, influenza and COVID swab as we are still in the season that upper respiratory tract infection can be possibility  Will consider chest x-ray although currently has no signs and symptoms related to her lungs  Will consider head CT scan if any worsening condition or any focality to suggest a neurologic condition.  Conditions and Status    Fair    Patient has the potential to have continued bleeding that will require transfusion or even airway protection.  Patient can actually withdraw from alcohol were last intake was 3 to 4 days ago.  Agree with ICU/CCU admission  MDM Data  External documents reviewed: Reviewed epic information  Cardiac tracing (EKG, telemetry) interpretation: Sinus tachycardia on telemetry  Radiology interpretation: None  Labs reviewed: Yes  Any tests that were considered but not ordered: None     Decision rules/scores evaluated (example [id number], Wells, etc): None     Discussed with: Patient and  Gaurav.  Charge nurse in the ER  Dr. Barron  Care Planning  Shared decision making: Patient and , ER physician  Code status and discussions: Full code    Disposition  Social Determinants of Health that impact treatment or disposition: None identified at this time  Estimated length of stay is to be determined    I confirmed that the patient's advanced care plan is present, code status is documented, and a surrogate decision maker is listed in the patient's medical record.     The patient's surrogate decision maker is brennan Nolasco    The patient was seen and examined by me on  Electronically signed by Kwasi Upton MD, 01/31/24, 09:13 CST.                Electronically signed by  Kwasi Upton MD at 24 0913          Emergency Department Notes        Keeley Felix RN at 24 0708          TYPE SCREEN [id number] DRAWN    Electronically signed by Keeley Felix RN at 24 0712       Amina Barron MD at 24 0607          Subjective   History of Present Illness  Patient is a 43-year-old female with a history of hypertension, hepatitis C and previous alcohol abuse who presents to the ER with dizziness.  Patient states she has felt dizzy for the last 2 weeks.  Patient states that yesterday she started vomiting blood.  Patient states it appears to be pure blood.  She did notice some dark stools today.  Patient states she does take Aleve almost daily.  She has had some chills.  She denies any fever, chest pain, shortness of air, abdominal pain, diarrhea, urinary changes, numbness or weakness.      Review of Systems   Constitutional:  Positive for chills.   HENT: Negative.     Eyes: Negative.    Respiratory: Negative.     Cardiovascular: Negative.    Gastrointestinal:  Positive for nausea and vomiting.   Endocrine: Negative.    Genitourinary: Negative.    Musculoskeletal: Negative.    Skin: Negative.    Allergic/Immunologic: Negative.    Neurological:  Positive for dizziness.   Hematological: Negative.    Psychiatric/Behavioral: Negative.     All other systems reviewed and are negative.      Past Medical History:   Diagnosis Date    Alcoholism in recovery     Allergic     Anxiety     Anxiety     Asthma     Gestational diabetes     Heart murmur     Hepatitis-C     History of ear infections     Hypertension     Sinusitis        Allergies   Allergen Reactions    Codeine Rash and GI Intolerance       Past Surgical History:   Procedure Laterality Date     SECTION      MOUTH SURGERY         Family History   Problem Relation Age of Onset    Diabetes Mother     Heart disease Father     Diabetes Father     Stroke Father        Social History     Socioeconomic  History    Marital status:    Tobacco Use    Smoking status: Every Day     Packs/day: 0.50     Years: 10.00     Additional pack years: 0.00     Total pack years: 5.00     Types: Cigarettes    Smokeless tobacco: Never   Vaping Use    Vaping Use: Never used   Substance and Sexual Activity    Alcohol use: Not Currently     Alcohol/week: 10.0 standard drinks of alcohol     Types: 10 Shots of liquor per week     Comment: vodka a few times a week    Drug use: No    Sexual activity: Yes     Partners: Male     Birth control/protection: None           Objective   Physical Exam  Vitals and nursing note reviewed.   Constitutional:       Appearance: She is well-developed.   HENT:      Head: Normocephalic and atraumatic.   Eyes:      Conjunctiva/sclera: Conjunctivae normal.      Pupils: Pupils are equal, round, and reactive to light.   Cardiovascular:      Rate and Rhythm: Regular rhythm. Tachycardia present.      Heart sounds: Normal heart sounds.   Pulmonary:      Effort: Pulmonary effort is normal.      Breath sounds: Normal breath sounds.   Abdominal:      Palpations: Abdomen is soft.      Tenderness: There is no abdominal tenderness. There is no right CVA tenderness, left CVA tenderness, guarding or rebound.   Genitourinary:     Rectum: Guaiac result positive.      Comments: Dark stool, no gross blood  Musculoskeletal:         General: No deformity. Normal range of motion.      Cervical back: Normal range of motion.   Skin:     General: Skin is warm.   Neurological:      Mental Status: She is alert and oriented to person, place, and time.   Psychiatric:         Behavior: Behavior normal.         Procedures          ED Course        Lab Results (last 24 hours)       Procedure Component Value Units Date/Time    CBC & Differential [[id number]]  (Abnormal) Collected: 01/31/24 0604    Specimen: Blood Updated: 01/31/24 0621    Narrative:      The following orders were created for panel order CBC & Differential.  Procedure                                Abnormality         Status                     ---------                               -----------         ------                     CBC Auto Differential[[id number]]        Abnormal            Final result                 Please view results for these tests on the individual orders.    Comprehensive Metabolic Panel [[id number]]  (Abnormal) Collected: 01/31/24 0604    Specimen: Blood Updated: 01/31/24 0634     Glucose 137 mg/dL      BUN 21 mg/dL      Creatinine 0.52 mg/dL      Sodium 135 mmol/L      Potassium 3.8 mmol/L      Comment: Slight hemolysis detected by analyzer. Result may be falsely elevated.        Chloride 97 mmol/L      CO2 22.0 mmol/L      Calcium 8.2 mg/dL      Total Protein 8.0 g/dL      Albumin 3.3 g/dL      ALT (SGPT) 22 U/L      AST (SGOT) 92 U/L      Comment: Slight hemolysis detected by analyzer. Result may be falsely elevated.        Alkaline Phosphatase 83 U/L      Total Bilirubin 3.1 mg/dL      Globulin 4.7 gm/dL      A/G Ratio 0.7 g/dL      BUN/Creatinine Ratio 40.4     Anion Gap 16.0 mmol/L      eGFR 117.7 mL/min/1.73     Narrative:      GFR Normal >60  Chronic Kidney Disease <60  Kidney Failure <15      hCG, Serum, Qualitative [[id number]]  (Normal) Collected: 01/31/24 0604    Specimen: Blood Updated: 01/31/24 0627     HCG Qualitative Negative    Protime-INR [[id number]]  (Abnormal) Collected: 01/31/24 0604    Specimen: Blood Updated: 01/31/24 0625     Protime 18.3 Seconds      INR 1.50    aPTT [[id number]]  (Abnormal) Collected: 01/31/24 0604    Specimen: Blood Updated: 01/31/24 0625     PTT 39.7 seconds     CBC Auto Differential [[id number]]  (Abnormal) Collected: 01/31/24 0604    Specimen: Blood Updated: 01/31/24 0621     WBC 8.73 10*3/mm3      RBC 2.37 10*6/mm3      Hemoglobin 8.0 g/dL      Hematocrit 25.1 %      .9 fL      MCH 33.8 pg      MCHC 31.9 g/dL      RDW 14.2 %      RDW-SD 54.5 fl      MPV 10.6 fL      Platelets 100 10*3/mm3      Neutrophil %  63.1 %      Lymphocyte % 24.3 %      Monocyte % 10.1 %      Eosinophil % 1.1 %      Basophil % 0.8 %      Neutrophils, Absolute 5.51 10*3/mm3      Lymphocytes, Absolute 2.12 10*3/mm3      Monocytes, Absolute 0.88 10*3/mm3      Eosinophils, Absolute 0.10 10*3/mm3      Basophils, Absolute 0.07 10*3/mm3     POCT Occult Blood, Stool [[id number]]  (Abnormal) Resulted: 01/31/24 0714    Specimen: Stool from Per Rectum Updated: 01/31/24 0714     Fecal Occult Blood Positive     Lot Number 257     Expiration Date 07/24/24     DEVELOPER LOT NUMBER 257     DEVELOPER EXPIRATION DATE 257     Positive Control Positive     Negative Control Negative           No orders to display                Medical Decision Making  Patient is a 43-year-old female with a history of hypertension, hepatitis C and previous alcohol abuse who presents to the ER with dizziness.  Patient states she has felt dizzy for the last 2 weeks.  Patient states that yesterday she started vomiting blood.  Patient states it appears to be pure blood.  She did notice some dark stools today.  Patient states she does take Aleve almost daily.  She has had some chills.  She denies any fever, chest pain, shortness of air, abdominal pain, diarrhea, urinary changes, numbness or weakness.    Differential diagnosis: Upper GI bleed, varices, gastritis, peptic ulcer disease, Evangelina-Lan tear    Patient was given IV fluids, Zofran, Protonix bolus and drip and octreotide drip.  Hemoccult was positive.  Labs showed anemia as well as an elevated BUN, bilirubin, AST and INR.  Patient also had thrombocytopenia and a decreased albumin.  Dr. Mauro with GI was consulted and will see the patient.  I then discussed the case with Dr. Upton with the hospitalist group and he has admitted the patient to his service for an upper GI bleed.      Problems Addressed:  Anemia, unspecified type: complicated acute illness or injury  Upper GI bleed: complicated acute illness or injury    Amount  and/or Complexity of Data Reviewed  Labs: ordered. Decision-making details documented in ED Course.  Discussion of management or test interpretation with external provider(s): Dr Mauro with GI  Dr Castaneda with hospitalist group    Risk  Prescription drug management.  Decision regarding hospitalization.        Final diagnoses:   Upper GI bleed   Anemia, unspecified type       ED Disposition  ED Disposition       ED Disposition   Decision to Admit    Condition   --    Comment   Level of Care: Critical Care [6]   Diagnosis: Upper GI bleed [094441]   Admitting Physician: ARY CASTANEDA [[id number]]   Attending Physician: ARY CASTANEDA [[id number]]   Certification: I Certify That Inpatient Hospital Services Are Medically Necessary For Greater Than 2 Midnights                 No follow-up provider specified.       Medication List      No changes were made to your prescriptions during this visit.            Amina Barron MD  01/31/24 0742      Electronically signed by Amina Barron MD at 01/31/24 0742       Keeley Felix, RN at 01/31/24 0605          Warm blanket given   Call light in reach.      Electronically signed by Keeley Felix, RN at 01/31/24 0613       Vital Signs (last 2 days)       Date/Time Temp Temp src Pulse Resp BP SpO2    02/01/24 1200 -- -- 90 -- 100/57 98    02/01/24 1100 -- -- 90 -- 102/69 96    02/01/24 1000 -- -- 80 -- 97/61 98    02/01/24 0900 -- -- 85 -- 90/57 99    02/01/24 0800 98.5 (36.9) Oral 74 15 94/63 98    02/01/24 0700 -- -- 72 -- 99/65 99    02/01/24 0645 -- -- 86 -- 102/75 98    02/01/24 0630 -- -- 77 -- 98/61 97    02/01/24 0615 -- -- 71 -- 95/68 98    02/01/24 0600 -- -- 80 -- 101/71 97    02/01/24 0545 -- -- 70 -- 95/61 98    02/01/24 0530 -- -- 77 -- 92/63 98    02/01/24 0515 -- -- 75 -- 98/60 99    02/01/24 0500 -- -- 72 -- 99/61 98    02/01/24 0445 -- -- 70 -- 97/61 97    02/01/24 0430 -- -- 76 -- 98/74 98    02/01/24 0415 -- -- 75 -- 93/59 99    02/01/24  0400 98.7 (37.1) Oral 78 -- 90/58 99    02/01/24 0315 -- -- 76 -- 91/62 97    02/01/24 0300 -- -- 80 -- 101/65 97    02/01/24 0200 -- -- 90 -- 99/71 99    02/01/24 0145 -- -- 88 -- 98/72 99    02/01/24 0130 -- -- 85 -- 93/67 98    02/01/24 0115 -- -- 82 -- 99/64 98    02/01/24 0100 -- -- 80 -- 99/72 98    02/01/24 0045 -- -- 81 -- 96/75 97    02/01/24 0030 -- -- 84 -- 91/63 97    02/01/24 0000 98.9 (37.2) Oral 80 -- 91/54 97    01/31/24 2345 -- -- 73 -- 92/63 98    01/31/24 2330 -- -- 84 -- 94/76 98    01/31/24 2230 -- -- 83 -- 95/66 97    01/31/24 2215 99.2 (37.3) Oral 85 16 97/68 98    01/31/24 2200 -- -- 88 -- 95/68 97    01/31/24 2130 -- -- 81 -- 97/70 97    01/31/24 2115 -- -- 91 -- 92/71 98    01/31/24 2100 -- -- 85 -- 95/65 97    01/31/24 2045 -- -- 81 -- 94/73 93    01/31/24 2015 -- -- 86 -- 91/61 98    01/31/24 2000 99.2 (37.3) Oral 90 -- 93/63 96    01/31/24 1945 -- -- 87 -- 89/61 98    01/31/24 1930 -- -- 89 -- 96/57 97    01/31/24 1928 -- -- 94 17 -- 96    01/31/24 1845 99 (37.2) Oral 92 -- 94/60 97    01/31/24 1830 -- -- -- 16 93/61 --    01/31/24 1800 99 (37.2) Oral 98 16 91/77 97    01/31/24 1700 -- -- 96 15 95/56 97    01/31/24 1600 98.5 (36.9) Axillary 114 15 91/67 99    01/31/24 1500 -- -- 81 16 96/68 99    01/31/24 1430 -- -- 101 -- 106/68 99    01/31/24 1400 -- -- 84 12 94/63 99    01/31/24 1345 -- -- 88 -- 91/63 100    01/31/24 1330 -- -- 81 -- 98/62 100    01/31/24 1315 -- -- 88 16 90/71 100    01/31/24 1300 98.4 (36.9) Oral 97 18 98/71 98    01/31/24 1016 -- -- 105 -- 113/74 95    01/31/24 1001 -- -- 101 -- 118/74 96    01/31/24 0946 -- -- 113 -- 106/66 97    01/31/24 0701 -- -- 98 20 115/77 100    01/31/24 0646 -- -- 119 20 115/79 99    01/31/24 0558 98 (36.7) Axillary 110 20 133/91 100          Oxygen Therapy (last 2 days)       Date/Time SpO2 Device (Oxygen Therapy) Flow (L/min) Oxygen Concentration (%) ETCO2 (mmHg)    02/01/24 1200 98 room air -- -- --    02/01/24 1100 96 -- -- -- --     02/01/24 1000 98 -- -- -- --    02/01/24 0900 99 -- -- -- --    02/01/24 0800 98 room air -- -- --    02/01/24 0700 99 -- -- -- --    02/01/24 0645 98 -- -- -- --    02/01/24 0630 97 -- -- -- --    02/01/24 0615 98 -- -- -- --    02/01/24 0600 97 -- -- -- --    02/01/24 0545 98 -- -- -- --    02/01/24 0530 98 -- -- -- --    02/01/24 0515 99 -- -- -- --    02/01/24 0500 98 -- -- -- --    02/01/24 0445 97 -- -- -- --    02/01/24 0430 98 -- -- -- --    02/01/24 0415 99 -- -- -- --    02/01/24 0400 99 room air -- -- --    02/01/24 0315 97 -- -- -- --    02/01/24 0300 97 -- -- -- --    02/01/24 0200 99 -- -- -- --    02/01/24 0145 99 -- -- -- --    02/01/24 0130 98 -- -- -- --    02/01/24 0115 98 -- -- -- --    02/01/24 0100 98 -- -- -- --    02/01/24 0045 97 -- -- -- --    02/01/24 0030 97 -- -- -- --    02/01/24 0000 97 room air -- -- --    01/31/24 2345 98 -- -- -- --    01/31/24 2330 98 -- -- -- --    01/31/24 2230 97 -- -- -- --    01/31/24 2215 98 -- -- -- --    01/31/24 2200 97 -- -- -- --    01/31/24 2130 97 -- -- -- --    01/31/24 2115 98 -- -- -- --    01/31/24 2100 97 -- -- -- --    01/31/24 2045 93 -- -- -- --    01/31/24 2015 98 -- -- -- --    01/31/24 2000 96 room air -- -- --    01/31/24 1945 98 -- -- -- --    01/31/24 1930 97 -- -- -- --    01/31/24 1928 96 room air -- -- --    01/31/24 1845 97 -- -- -- --    01/31/24 1800 97 -- -- -- --    01/31/24 1700 97 nasal cannula 2 -- --    01/31/24 1600 99 -- -- -- --    01/31/24 1500 99 -- -- -- --    01/31/24 1430 99 -- -- -- --    01/31/24 1400 99 -- -- -- --    01/31/24 1345 100 -- -- -- --    01/31/24 1330 100 -- -- -- --    01/31/24 1315 100 -- -- -- --    01/31/24 1300 98 nasal cannula 2 -- --    01/31/24 1016 95 -- -- -- --    01/31/24 1001 96 -- -- -- --    01/31/24 0946 97 -- -- -- --    01/31/24 0701 100 -- -- -- --    01/31/24 0646 99 -- -- -- --    01/31/24 0558 100 -- -- -- --          Intake & Output (last 2 days)         01/30 0701 01/31 0700  01/31 0701  02/01 0700 02/01 0701  02/02 0700    P.O.  450     I.V. (mL/kg)  834.4 (15)     Blood  300     IV Piggyback  550     Total Intake(mL/kg)  2134.4 (38.3)     Net  +2134.4            Urine Unmeasured Occurrence   2 x          Facility-Administered Medications as of 2/1/2024   Medication Dose Route Frequency Provider Last Rate Last Admin    folic acid 1 mg in sodium chloride 0.9 % 50 mL IVPB  1 mg Intravenous Daily Kwasi Upton  mL/hr at 02/01/24 0819 1 mg at 02/01/24 0819    LORazepam (ATIVAN) tablet 1 mg  1 mg Oral Q1H PRN Kwasi Upton MD        Or    LORazepam (ATIVAN) injection 1 mg  1 mg Intravenous Q1H PRN Kwasi Upton MD        Or    LORazepam (ATIVAN) tablet 2 mg  2 mg Oral Q1H PRN Kwasi Upton MD        Or    LORazepam (ATIVAN) injection 2 mg  2 mg Intravenous Q1H PRN Kwasi Upton MD        Or    LORazepam (ATIVAN) injection 2 mg  2 mg Intravenous Q15 Min PRN Kwasi Upton MD        Or    LORazepam (ATIVAN) injection 2 mg  2 mg Intramuscular Q15 Min PRKwasi Morales MD        LORazepam (ATIVAN) tablet 2 mg  2 mg Oral Q6H Kwasi Upton MD   2 mg at 01/31/24 2013    Followed by    LORazepam (ATIVAN) tablet 1 mg  1 mg Oral Q6H Kwasi Upton MD        [COMPLETED] ondansetron (ZOFRAN) injection 4 mg  4 mg Intravenous Once Amina Barron MD   4 mg at 01/31/24 0700    ondansetron (ZOFRAN) injection 4 mg  4 mg Intravenous Q6H PRN Kwasi Upton MD        pantoprazole (PROTONIX) injection 40 mg  40 mg Intravenous Q12H Kwasi Upton MD   40 mg at 02/01/24 0641    [COMPLETED] pantoprazole (PROTONIX) injection 80 mg  80 mg Intravenous Once Amina Barron MD   80 mg at 01/31/24 0701    [COMPLETED] sodium chloride 0.9 % bolus 500 mL  500 mL Intravenous Once Amina Barron MD   Stopped at 01/31/24 0745    sodium chloride 0.9 % flush 10 mL  10 mL Intravenous PRN  Amina Barron MD        sodium chloride 0.9 % flush 10 mL  10 mL Intravenous PRN Kwasi Upton MD        sodium chloride 0.9 % flush 10 mL  10 mL Intravenous Q12H Kwasi Upton MD   10 mL at 02/01/24 0820    sodium chloride 0.9 % flush 10 mL  10 mL Intravenous PRN Kwasi Upton MD        sodium chloride 0.9 % flush 10 mL  10 mL Intravenous Q12H Antonio Rivera MD   10 mL at 02/01/24 0820    sodium chloride 0.9 % flush 10 mL  10 mL Intravenous PRN Antonio Rivera MD        sodium chloride 0.9 % infusion 40 mL  40 mL Intravenous PRN Kwasi Upton MD        sodium chloride 0.9 % infusion 40 mL  40 mL Intravenous PRN Antonio Rivera MD        sodium chloride 0.9 % infusion  25 mL/hr Intravenous Continuous Abdifatah Mauro MD 25 mL/hr at 01/31/24 1345 25 mL/hr at 01/31/24 1345    thiamine (B-1) injection 200 mg  200 mg Intravenous Q8H Kwasi Upton MD   200 mg at 02/01/24 0641    Followed by    [START ON 2/5/2024] thiamine (VITAMIN B-1) tablet 100 mg  100 mg Oral Daily Kwasi Upton MD         Orders (last 48 hrs)        Start     Ordered    02/05/24 1400  thiamine (VITAMIN B-1) tablet 100 mg  Daily        See Hyperspace for full Linked Orders Report.    01/31/24 1250    02/01/24 1330  LORazepam (ATIVAN) tablet 1 mg  Every 6 Hours        See Hyperspace for full Linked Orders Report.    01/31/24 1250    02/01/24 1145  Diet: Regular/House Diet, Cardiac Diets; No Salt Packet; Texture: Regular Texture (IDDSI 7); Fluid Consistency: Thin (IDDSI 0)  Diet Effective Now         02/01/24 1145    02/01/24 0600  Comprehensive Metabolic Panel  Morning Draw         01/31/24 1250    02/01/24 0600  CBC & Differential  Morning Draw         01/31/24 1250    02/01/24 0600  CBC Auto Differential  PROCEDURE ONCE         01/31/24 2201    01/31/24 2100  pantoprazole (PROTONIX) injection 40 mg  Every 12 Hours Scheduled,   Status:  Discontinued         01/31/24 0635     01/31/24 1808  Verify Informed Consent for Blood Product Administration  Once         01/31/24 1808    01/31/24 1808  Prepare RBC, 1 Units  Blood - Once         01/31/24 1808    01/31/24 1807  Transfuse RBC Infuse Each Unit Over: 3.5H  Transfusion         01/31/24 1808    01/31/24 1800  pantoprazole (PROTONIX) injection 40 mg  Every 12 Hours Scheduled         01/31/24 0705    01/31/24 1800  Oral Care  2 Times Daily       01/31/24 1250    01/31/24 1600  Vital Signs  Every 4 Hours       01/31/24 1250    01/31/24 1600  Hemoglobin & Hematocrit, Blood  Every 8 Hours       01/31/24 1250    01/31/24 1504  Reticulocytes  Once         01/31/24 1503    01/31/24 1400  thiamine (B-1) injection 200 mg  Every 8 Hours Scheduled        See Hyperspace for full Linked Orders Report.    01/31/24 1250    01/31/24 1345  sodium chloride 0.9 % flush 10 mL  Every 12 Hours Scheduled         01/31/24 1250    01/31/24 1345  lactated ringers infusion  Continuous,   Status:  Discontinued         01/31/24 1250    01/31/24 1345  folic acid 1 mg in sodium chloride 0.9 % 50 mL IVPB  Daily         01/31/24 1250    01/31/24 1345  sodium chloride 0.9 % flush 10 mL  Every 12 Hours Scheduled         01/31/24 1253    01/31/24 1345  sodium chloride 0.9 % infusion  Continuous         01/31/24 1253    01/31/24 1330  LORazepam (ATIVAN) tablet 2 mg  Every 6 Hours        See Hyperspace for full Linked Orders Report.    01/31/24 1250    01/31/24 1321  Diet: Liquid Diets; Clear Liquid; Fluid Consistency: Thin (IDDSI 0)  Diet Effective Now,   Status:  Canceled         01/31/24 1320    01/31/24 1254  Oxygen Therapy- Nasal Cannula; Titrate 1-6 LPM Per SpO2; 90 - 95%  Continuous         01/31/24 1253    01/31/24 1254  Pulse Oximetry, Continuous  Continuous         01/31/24 1253    01/31/24 1254  Pregnancy, Urine - Urine, Clean Catch  STAT         01/31/24 1253    01/31/24 1254  Insert Peripheral IV  Once         01/31/24 1253    01/31/24 1254  Saline Lock & Maintain  IV Access  Continuous         01/31/24 1253    01/31/24 1253  sodium chloride 0.9 % flush 10 mL  As Needed         01/31/24 1253    01/31/24 1253  sodium chloride 0.9 % infusion 40 mL  As Needed         01/31/24 1253    01/31/24 1253  Oxygen Therapy- Blow by - Humidified  Continuous,   Status:  Canceled         01/31/24 1252    01/31/24 1253  Pulse Oximetry, Continuous  Continuous,   Status:  Canceled         01/31/24 1252    01/31/24 1253  Vital signs every 5 minutes for 15 minutes, every 15 minutes thereafter.  Once         01/31/24 1252    01/31/24 1253  Notify Anesthesia of Any Acute Changes in Patient Condition  Until Discontinued         01/31/24 1252    01/31/24 1253  Notify Anesthesia for Unrelieved Pain  Until Discontinued         01/31/24 1252    01/31/24 1253  Once DC criteria to floor met, follow surgeon's orders.  Until Discontinued         01/31/24 1252    01/31/24 1253  Discharge patient from PACU when discharge criteria is met.  Until Discontinued         01/31/24 1252    01/31/24 1250  Intake & Output  Every Shift       01/31/24 1250    01/31/24 1250  Weigh Patient  Once         01/31/24 1250    01/31/24 1250  Insert Peripheral IV  Once         01/31/24 1250    01/31/24 1250  Saline Lock & Maintain IV Access  Continuous,   Status:  Canceled         01/31/24 1250    01/31/24 1250  sodium chloride 0.9 % flush 10 mL  As Needed         01/31/24 1250    01/31/24 1250  sodium chloride 0.9 % infusion 40 mL  As Needed         01/31/24 1250    01/31/24 1250  Place Sequential Compression Device  Once         01/31/24 1250    01/31/24 1250  Maintain Sequential Compression Device  Continuous         01/31/24 1250    01/31/24 1250  Activity - Bed Rest With Exceptions (Specify)  Until Discontinued         01/31/24 1250    01/31/24 1250  NPO Diet NPO Type: Strict NPO  Diet Effective Now,   Status:  Canceled         01/31/24 1250    01/31/24 1250  Bowel Regimen Not Indicated  Once         01/31/24 1250    01/31/24  1250  ondansetron (ZOFRAN) injection 4 mg  Every 6 Hours PRN         01/31/24 1250    01/31/24 1250  Tobacco Cessation Education  Prior to Discharge         01/31/24 1250    01/31/24 1250  Verify Informed Consent for Blood Product Administration  Once         01/31/24 1250    01/31/24 1250  Prepare RBC, 1 Units  Blood - Once         01/31/24 1250    01/31/24 1250  Inpatient Gastroenterology Consult  Once        Specialty:  Gastroenterology  Provider:  Abdifatah Mauro MD    01/31/24 1250    01/31/24 1250  Magnesium  STAT         01/31/24 1250    01/31/24 1250  Initiate Alcohol Withdrawal Protocol  Once         01/31/24 1250    01/31/24 1250  Obtain Baseline Clinical Bell Withdrawal Assessment - Ar (CIWA-Ar), Sedation Scale & Vital Signs  Once        Comments: Follow CIWA Scoring Reference Guide    01/31/24 1250    01/31/24 1250  Clinical Bell Withdrawal Assessment (CIWA-Ar)  Per Hospital Policy         01/31/24 1250    01/31/24 1250  If CIWA-Ar Score Less Than 8 For 3 Consecutive Assessments, Monitor Every 4 Hours & Discontinue Assessment When CIWA-Ar Less Than 8 for 24 Hours  Per Hospital Policy        Comments: Contact Provider to Restart Protocol if Any Symptoms Reappear    01/31/24 1250    01/31/24 1250  Seizure Precautions  Continuous         01/31/24 1250    01/31/24 1250  Notify Provider - Withdrawal  Until Discontinued         01/31/24 1250    01/31/24 1250  Notify Provider of Abnormal Lab Results  Until Discontinued         01/31/24 1250    01/31/24 1250  Notify Provider - Vitals  Until Discontinued         01/31/24 1250    01/31/24 1250  Safety Precautions  Continuous         01/31/24 1250    01/31/24 1250  LORazepam (ATIVAN) tablet 1 mg  Every 1 Hour PRN        See Hyperspace for full Linked Orders Report.    01/31/24 1250    01/31/24 1250  LORazepam (ATIVAN) injection 1 mg  Every 1 Hour PRN        See Hyperspace for full Linked Orders Report.    01/31/24 1250    01/31/24 1250  LORazepam (ATIVAN)  tablet 2 mg  Every 1 Hour PRN        See Hyperspace for full Linked Orders Report.    01/31/24 1250    01/31/24 1250  LORazepam (ATIVAN) injection 2 mg  Every 1 Hour PRN        See Hyperspace for full Linked Orders Report.    01/31/24 1250    01/31/24 1250  LORazepam (ATIVAN) injection 2 mg  Every 15 Minutes PRN        See Hyperspace for full Linked Orders Report.    01/31/24 1250    01/31/24 1250  LORazepam (ATIVAN) injection 2 mg  Every 15 Minutes PRN        See Hyperspace for full Linked Orders Report.    01/31/24 1250    01/31/24 1234  Inpatient Gastroenterology Consult  Once,   Status:  Canceled        Specialty:  Gastroenterology  Provider:  Abdifatah Mauro MD    01/31/24 1233    01/31/24 0857  Code Status and Medical Interventions:  Continuous         01/31/24 0903    01/31/24 0848  COVID PRE-OP / PRE-PROCEDURE SCREENING ORDER (NO ISOLATION) - Swab, Nasopharynx  Once         01/31/24 0848    01/31/24 0848  COVID-19, FLU A/B, RSV PCR 1 HR TAT - Swab, Nasopharynx  PROCEDURE ONCE         01/31/24 0848    01/31/24 0744  Ethanol  Once         01/31/24 0743    01/31/24 0730  Inpatient Admission  Once         01/31/24 0729    01/31/24 0730  Cardiac Monitoring  Continuous        Comments: Follow Standing Orders As Outlined in Process Instructions (Open Order Report to View Full Instructions)    01/31/24 0729    01/31/24 0714  POCT Occult Blood, Stool  Once         01/31/24 0713    01/31/24 0709  sodium chloride 0.9 % bolus 500 mL  Once         01/31/24 0653    01/31/24 0709  pantoprazole (PROTONIX) injection 80 mg  Once        See Hyperspace for full Linked Orders Report.    01/31/24 0653    01/31/24 0709  pantoprazole (PROTONIX) 40 mg in 100mL NS IVPB  Continuous,   Status:  Discontinued        See Hyperspace for full Linked Orders Report.    01/31/24 0653    01/31/24 0709  ondansetron (ZOFRAN) injection 4 mg  Once         01/31/24 0653    01/31/24 0709  octreotide (sandoSTATIN) 500 mcg in sodium chloride 0.9 % 100 mL  (5 mcg/mL) infusion  Titrated,   Status:  Discontinued         01/31/24 0653    01/31/24 0657  Type & Screen  Once         01/31/24 0656    01/31/24 0653  Insert Peripheral IV  Once        See Hyperspace for full Linked Orders Report.    01/31/24 0653    01/31/24 0652  sodium chloride 0.9 % flush 10 mL  As Needed        See Hyperspace for full Linked Orders Report.    01/31/24 0653    01/31/24 0617  Manual Differential  Once,   Status:  Canceled         01/31/24 0616    01/31/24 0604  Insert peripheral IV  Once         01/31/24 0604    01/31/24 0604  [place] Draw  Once         01/31/24 0604    01/31/24 0604  CBC & Differential  Once         01/31/24 0604    01/31/24 0604  Comprehensive Metabolic Panel  Once         01/31/24 0604    01/31/24 0604  hCG, Serum, Qualitative  STAT         01/31/24 0604    01/31/24 0604  Protime-INR  Once         01/31/24 0604    01/31/24 0604  aPTT  Once         01/31/24 0604    01/31/24 0604  ECG 12 Lead Other; dizzy  Once         01/31/24 0604    01/31/24 0604  Green Top (Gel)  PROCEDURE ONCE         01/31/24 0604    01/31/24 0604  Lavender Top  PROCEDURE ONCE         01/31/24 0604    01/31/24 0604  Red Top  PROCEDURE ONCE         01/31/24 0604    01/31/24 0604  Mitchell Top  PROCEDURE ONCE         01/31/24 0604    01/31/24 0604  Light Blue Top  PROCEDURE ONCE         01/31/24 0604    01/31/24 0604  CBC Auto Differential  PROCEDURE ONCE         01/31/24 0604    01/31/24 0603  sodium chloride 0.9 % flush 10 mL  As Needed         01/31/24 0604    Unscheduled  Obtain Pre & Post Sedation Scores With Every Lorazepam Dose - Hold For POSS Greater Than 2 or RASS of -3 or Less  As Needed       01/31/24 1250    Unscheduled  Vital Signs - Per Anesthesia Protocol  As Needed       01/31/24 1253    Unscheduled  POC Glucose PRN  As Needed      Comments: Only on Diabetic patients      01/31/24 1253    --  naproxen sodium (ALEVE) 220 MG tablet  2 Times Daily PRN         01/31/24 0602    --  SCANNED EKG          01/31/24 0000                     Physician Progress Notes (last 48 hours)        Abdifatah Mauro MD at 02/01/24 1214          Patient remains in the ICU, but states that she is feeling back to normal.  No nausea or vomiting.  No abdominal pain.  Had a bowel movement with a semiformed brown stool.  Tolerating liquid diet without problems.  Remains afebrile.  Hemoglobin dropped yesterday to 6.8, but after transfusion of 1 unit of PRBCs it were up to 7.3 last night and this morning remained stable at 7.8.  White blood cell count remains normal at 5.4 thousand while platelet is down to 71,000.  Differential is unremarkable.  BUN and creatinine are normal at 20 and 0.71 respectively.  Albumin is down to 2.7.  AST down to 54 and total bilirubin is down to 2.8.    Physical exam: 73-year-old woman who appears to be no acute distress.  Awake, alert and oriented x 3. T-36.9 °C HR-80/min.BP-97/61 mmHg.RR-15/min.O2 sat-98% on room air.  Nonicteric sclera and moist oral mucosa.  Abdomen is soft, nondistended, nontender to palpation, with normoactive bowel sounds.    Labs:   Latest Reference Range & Units 01/31/24 06:04 02/01/24 03:28   Sodium 136 - 145 mmol/L 135 (L) 136   Potassium 3.5 - 5.2 mmol/L 3.8 3.8   Chloride 98 - 107 mmol/L 97 (L) 102   CO2 22.0 - 29.0 mmol/L 22.0 26.0   Anion Gap 5.0 - 15.0 mmol/L 16.0 (H) 8.0   BUN 6 - 20 mg/dL 21 (H) 20   Creatinine 0.57 - 1.00 mg/dL 0.52 (L) 0.71   BUN/Creatinine Ratio 7.0 - 25.0  40.4 (H) 28.2 (H)   eGFR >60.0 mL/min/1.73 117.7 107.7   Glucose 65 - 99 mg/dL 137 (H) 86   Calcium 8.6 - 10.5 mg/dL 8.2 (L) 7.9 (L)   Magnesium 1.6 - 2.6 mg/dL 1.4 (L)    Alkaline Phosphatase 39 - 117 U/L 83 65   Total Protein 6.0 - 8.5 g/dL 8.0 6.3   Albumin 3.5 - 5.2 g/dL 3.3 (L) 2.7 (L)   Globulin gm/dL 4.7 3.6   A/G Ratio g/dL 0.7 0.8   AST (SGOT) 1 - 32 U/L 92 (H) 54 (H)   ALT (SGPT) 1 - 33 U/L 22 15   Total Bilirubin 0.0 - 1.2 mg/dL 3.1 (H) 2.8 (H)      Latest Reference Range & Units 01/31/24  15:21 01/31/24 23:17 02/01/24 04:39   WBC 3.40 - 10.80 10*3/mm3   5.41   RBC 3.77 - 5.28 10*6/mm3   2.46 (L)   Hemoglobin 12.0 - 15.9 g/dL 6.8 (C) 7.3 (L) 7.8 (L)   Hematocrit 34.0 - 46.6 % 21.2 (L) 22.7 (L) 24.0 (L)   Platelets 140 - 450 10*3/mm3   71 (L)   RDW 12.3 - 15.4 %   19.5 (H)   MCV 79.0 - 97.0 fL   97.6 (H)   MCH 26.6 - 33.0 pg   31.7   MCHC 31.5 - 35.7 g/dL   32.5   MPV 6.0 - 12.0 fL   10.9   RDW-SD 37.0 - 54.0 fl   69.8 (H)     A/P: 43-year-old woman with a long history of alcohol abuse, which unfortunately remains an active problem, history of what sounds like alcoholic hepatitis with ascites 1 year ago, presents to the emergency room with complaints of fatigue, weakness, dizziness along with 3 reported by patient episodes of vomiting with what looked like an blood and clots.  Patient has been taking ibuprofen and Aleve for management of headaches on a daily basis.  Blood work revealed quite significant microcytic anemia with hemoglobin of 8.0 along with mild thrombocytopenia, but essentially normal BUN and creatinine.  Patient underwent emergency upper endoscopy, revealing grade 1 and 2 distal esophageal varices without stigmata of recent bleeding along with a normal stomach and normal duodenum.  There was no evidence of any fresh or old blood or clots in the examined upper GI tract.  Since admission patient did not have any signs of overt gastrointestinal bleeding except for one bowel movement was quite dark brown/black stool, but today patient already moved bowels within normal formed brown stool.  Hemoglobin dropped to 6.8 after receiving IV fluid, so it could be dilutional, but after transfusion of PRBCs hemoglobin improved to 7.3 and then overnight to 7.8.  As patient has low albumin, mildly elevated bilirubin, AST as well as low platelet count, patient obviously has some degree of liver cirrhosis and I reiterated the importance of complete abstinence from alcohol to the patient and her .   Liver cirrhosis with the progressing, finally leading to decompensation if patient continues to drink alcohol.  Indications inability to quit drinking completely by herself, patient should seek rehab program to accomplish it.  Patient also should follow-up with gastroenterologist closely for monitoring of ulcer alcoholic liver disease.      Electronically signed by Abdifatah Mauro MD at 02/01/24 1246     Abdifatah Mauro MD   Physician  Gastroenterology     Op Note     Signed     Date of Service: 01/31/24 1217  Creation Time: 01/31/24 1442  Case Time: Procedures: Surgeons:   01/31/24 1217 ESOPHAGOGASTRODUODENOSCOPY WITH ANESTHESIA    Abdifatah Mauro MD               Signed         ESOPHAGOGASTRODUODENOSCOPY     Date:  1/31/2024     Indications: Hematemesis.       Procedure: ESOPHAGOGASTRODUODENOSCOPY      Surgeon: Abdifatah Mauro MD     Anesthesia: Monitored Anesthesia Care     Procedure  Description: After informed consent was obtained, a timeout was called in the endoscopy suite to confirm the correct patient and appropriate procedure.  Thereafter with the patient in the left lateral position, esophagus was intubated under direct vision with adult Olympus gastroscope.  Thereafter scope was slowly advanced into the second portion of the duodenum under direct vision.  Careful examination of the duodenum, stomach and esophagus was performed while slowly withdrawing the scope.  Retroflex examination of the gastric cardia and fundus were also performed.     Findings: Hypopharynx and larynx appeared normal.  Proximal esophagus appeared normal.  Grade 1 and 2 distal esophageal varices without stigmata of recent bleeding.  Normal stomach.  No evidence of gastric varices.  Normal duodenal bulb, sweep and second portion of the duodenum.  No evidence of any fresh or old blood or clots in the examined upper GI tract.     Complications: No immediate complications.     Recommendations: Continue Protonix daily along with Zofran as needed  and CIWA protocol.  Clear liquid diet.  Monitor H&H and transfuse with PRBCs as needed.     Procedure CPT code: 49665     Post-Op Diagnosis Codes: I85.00        Abdifatah Mauro MD

## 2024-02-01 NOTE — PLAN OF CARE
Problem: Adult Inpatient Plan of Care  Goal: Absence of Hospital-Acquired Illness or Injury  Intervention: Identify and Manage Fall Risk  Recent Flowsheet Documentation  Taken 2/1/2024 1500 by Modesta Verduzco RN  Safety Promotion/Fall Prevention: safety round/check completed  Taken 2/1/2024 1400 by Modesta Verduzco RN  Safety Promotion/Fall Prevention: safety round/check completed  Taken 2/1/2024 1300 by Modesta Verduzco RN  Safety Promotion/Fall Prevention: safety round/check completed  Taken 2/1/2024 1200 by Modesta Verduzco RN  Safety Promotion/Fall Prevention: safety round/check completed  Taken 2/1/2024 1100 by Modesta Verduzco RN  Safety Promotion/Fall Prevention: safety round/check completed  Taken 2/1/2024 1000 by Modesta Verduzco RN  Safety Promotion/Fall Prevention: safety round/check completed  Taken 2/1/2024 0900 by Modesta Verduzco RN  Safety Promotion/Fall Prevention: safety round/check completed  Taken 2/1/2024 0800 by Modesta Verduzco RN  Safety Promotion/Fall Prevention: safety round/check completed  Taken 2/1/2024 0700 by Modesta Verduzco RN  Safety Promotion/Fall Prevention: safety round/check completed  Intervention: Prevent Skin Injury  Recent Flowsheet Documentation  Taken 2/1/2024 1400 by Modesta Verduzco RN  Body Position:   position changed independently   supine  Taken 2/1/2024 1200 by Modesta Verduzco RN  Body Position:   position changed independently   side-lying   left  Taken 2/1/2024 1000 by Modesta Verduzco RN  Body Position:   position changed independently   supine  Taken 2/1/2024 0800 by Modesta Verduzco RN  Body Position:   position changed independently   side-lying   right  Intervention: Prevent and Manage VTE (Venous Thromboembolism) Risk  Recent Flowsheet Documentation  Taken 2/1/2024 1200 by Modesta Verduzco RN  Activity Management: ambulated to bathroom  Taken 2/1/2024 0800 by Modesta Verduzco RN  Activity Management: bedrest  Goal: Optimal Comfort and  Wellbeing  Intervention: Provide Person-Centered Care  Recent Flowsheet Documentation  Taken 2/1/2024 0800 by Modesta Verduzco, RN  Trust Relationship/Rapport:   care explained   choices provided   emotional support provided  Goal: Readiness for Transition of Care  Intervention: Mutually Develop Transition Plan  Recent Flowsheet Documentation  Taken 2/1/2024 1319 by Modesta Verduzco, RN  Transportation Anticipated: family or friend will provide  Patient/Family Anticipated Services at Transition: none  Patient/Family Anticipates Transition to: home with family     Problem: Hypertension Comorbidity  Goal: Blood Pressure in Desired Range  Intervention: Maintain Blood Pressure Management  Recent Flowsheet Documentation  Taken 2/1/2024 1200 by Modesta Verduzco, RN  Medication Review/Management: medications reviewed  Taken 2/1/2024 0800 by Modesta Verduzco RN  Medication Review/Management: medications reviewed   Goal Outcome Evaluation:

## 2024-02-01 NOTE — PLAN OF CARE
Goal Outcome Evaluation:      Pt A&O X4. No N/V or bloody stools. Hgb improved after unit of PRBCs. BP stable. Up to toilet. CIWA scores have been zero for most of shift.

## 2024-02-01 NOTE — PROGRESS NOTES
Patient remains in the ICU, but states that she is feeling back to normal.  No nausea or vomiting.  No abdominal pain.  Had a bowel movement with a semiformed brown stool.  Tolerating liquid diet without problems.  Remains afebrile.  Hemoglobin dropped yesterday to 6.8, but after transfusion of 1 unit of PRBCs it were up to 7.3 last night and this morning remained stable at 7.8.  White blood cell count remains normal at 5.4 thousand while platelet is down to 71,000.  Differential is unremarkable.  BUN and creatinine are normal at 20 and 0.71 respectively.  Albumin is down to 2.7.  AST down to 54 and total bilirubin is down to 2.8.    Physical exam: 73-year-old woman who appears to be no acute distress.  Awake, alert and oriented x 3. T-36.9 °C HR-80/min.BP-97/61 mmHg.RR-15/min.O2 sat-98% on room air.  Nonicteric sclera and moist oral mucosa.  Abdomen is soft, nondistended, nontender to palpation, with normoactive bowel sounds.    Labs:   Latest Reference Range & Units 01/31/24 06:04 02/01/24 03:28   Sodium 136 - 145 mmol/L 135 (L) 136   Potassium 3.5 - 5.2 mmol/L 3.8 3.8   Chloride 98 - 107 mmol/L 97 (L) 102   CO2 22.0 - 29.0 mmol/L 22.0 26.0   Anion Gap 5.0 - 15.0 mmol/L 16.0 (H) 8.0   BUN 6 - 20 mg/dL 21 (H) 20   Creatinine 0.57 - 1.00 mg/dL 0.52 (L) 0.71   BUN/Creatinine Ratio 7.0 - 25.0  40.4 (H) 28.2 (H)   eGFR >60.0 mL/min/1.73 117.7 107.7   Glucose 65 - 99 mg/dL 137 (H) 86   Calcium 8.6 - 10.5 mg/dL 8.2 (L) 7.9 (L)   Magnesium 1.6 - 2.6 mg/dL 1.4 (L)    Alkaline Phosphatase 39 - 117 U/L 83 65   Total Protein 6.0 - 8.5 g/dL 8.0 6.3   Albumin 3.5 - 5.2 g/dL 3.3 (L) 2.7 (L)   Globulin gm/dL 4.7 3.6   A/G Ratio g/dL 0.7 0.8   AST (SGOT) 1 - 32 U/L 92 (H) 54 (H)   ALT (SGPT) 1 - 33 U/L 22 15   Total Bilirubin 0.0 - 1.2 mg/dL 3.1 (H) 2.8 (H)      Latest Reference Range & Units 01/31/24 15:21 01/31/24 23:17 02/01/24 04:39   WBC 3.40 - 10.80 10*3/mm3   5.41   RBC 3.77 - 5.28 10*6/mm3   2.46 (L)   Hemoglobin 12.0 -  15.9 g/dL 6.8 (C) 7.3 (L) 7.8 (L)   Hematocrit 34.0 - 46.6 % 21.2 (L) 22.7 (L) 24.0 (L)   Platelets 140 - 450 10*3/mm3   71 (L)   RDW 12.3 - 15.4 %   19.5 (H)   MCV 79.0 - 97.0 fL   97.6 (H)   MCH 26.6 - 33.0 pg   31.7   MCHC 31.5 - 35.7 g/dL   32.5   MPV 6.0 - 12.0 fL   10.9   RDW-SD 37.0 - 54.0 fl   69.8 (H)     A/P: 43-year-old woman with a long history of alcohol abuse, which unfortunately remains an active problem, history of what sounds like alcoholic hepatitis with ascites 1 year ago, presents to the emergency room with complaints of fatigue, weakness, dizziness along with 3 reported by patient episodes of vomiting with what looked like an blood and clots.  Patient has been taking ibuprofen and Aleve for management of headaches on a daily basis.  Blood work revealed quite significant microcytic anemia with hemoglobin of 8.0 along with mild thrombocytopenia, but essentially normal BUN and creatinine.  Patient underwent emergency upper endoscopy, revealing grade 1 and 2 distal esophageal varices without stigmata of recent bleeding along with a normal stomach and normal duodenum.  There was no evidence of any fresh or old blood or clots in the examined upper GI tract.  Since admission patient did not have any signs of overt gastrointestinal bleeding except for one bowel movement was quite dark brown/black stool, but today patient already moved bowels within normal formed brown stool.  Hemoglobin dropped to 6.8 after receiving IV fluid, so it could be dilutional, but after transfusion of PRBCs hemoglobin improved to 7.3 and then overnight to 7.8.  As patient has low albumin, mildly elevated bilirubin, AST as well as low platelet count, patient obviously has some degree of liver cirrhosis and I reiterated the importance of complete abstinence from alcohol to the patient and her .  Liver cirrhosis with the progressing, finally leading to decompensation if patient continues to drink alcohol.  Indications  inability to quit drinking completely by herself, patient should seek rehab program to accomplish it.  Patient also should follow-up with gastroenterologist closely for monitoring of ulcer alcoholic liver disease.

## 2024-02-01 NOTE — OUTREACH NOTE
Prep Survey      Flowsheet Row Responses   [facility] facility patient discharged from? [place]   Is LACE score < 7 ? No   Eligibility [facility]   Date of Admission 01/31/24   Date of Discharge 02/01/24   Discharge Disposition Home or Self Care   Discharge diagnosis Upper GI bleed   Does the patient have one of the following disease processes/diagnoses(primary or secondary)? Other   Does the patient have Home health ordered? No   Is there a DME ordered? No   Prep survey completed? Yes            JUANIS JOHNSON - Registered Nurse

## 2024-02-02 ENCOUNTER — TRANSITIONAL CARE MANAGEMENT TELEPHONE ENCOUNTER (OUTPATIENT)
Dept: CALL CENTER | Facility: HOSPITAL | Age: 44
End: 2024-02-02
Payer: COMMERCIAL

## 2024-02-02 LAB
BH BB BLOOD EXPIRATION DATE: NORMAL
BH BB BLOOD TYPE BARCODE: 6200
BH BB DISPENSE STATUS: NORMAL
BH BB PRODUCT CODE: NORMAL
BH BB UNIT NUMBER: NORMAL
CROSSMATCH INTERPRETATION: NORMAL
UNIT  ABO: NORMAL
UNIT  RH: NORMAL

## 2024-02-02 NOTE — OUTREACH NOTE
Call Center TCM Note      Flowsheet Row Responses   [facility] patient discharged from? [place]   Does the patient have one of the following disease processes/diagnoses(primary or secondary)? Other   TCM attempt successful? Yes   Call start time 1503   Call end time 1507   Discharge diagnosis Upper GI bleed   Person spoke with today (if not patient) and relationship pt   Meds reviewed with patient/caregiver? Yes   Is the patient having any side effects they believe may be caused by any medication additions or changes? No   Does the patient have all medications ordered at discharge? Yes   Is the patient taking all medications as directed (includes completed medication regime)? Yes   Does the patient have an appointment with their PCP within 7-14 days of discharge? Yes  [2/9/2024 at 7:45 AM]   Psychosocial issues? No   Did the patient receive a copy of their discharge instructions? Yes   Nursing interventions Reviewed instructions with patient   What is the patient's perception of their health status since discharge? Improving   Is the patient/caregiver able to teach back signs and symptoms related to disease process for when to call PCP? Yes   Is the patient/caregiver able to teach back signs and symptoms related to disease process for when to call 911? Yes   Is the patient/caregiver able to teach back the hierarchy of who to call/visit for symptoms/problems? PCP, Specialist, Home health nurse, Urgent Care, ED, 911 Yes   If the patient is a current smoker, are they able to teach back resources for cessation? Not a smoker   TCM call completed? Yes   Wrap up additional comments Spouse states pt is doing better, and denies pt has n/v. Reviewed AVS/meds with spouse. Spouse verified PCP [facility] fu appt on 2/9/24.   Call end time 1507   Would this patient benefit from a Referral to [facility] Social Work? No   Is the patient interested in additional calls from an ambulatory ? Rima Loomis  REY Lao    2/2/2024, 15:08 CST

## 2024-02-02 NOTE — PAYOR COMM NOTE
"Ref:    [id number]    [facility]  FAX  [phone]      Rachana Wilson (43 y.o. Female)       Date of Birth   1980    Social Security Number       Address   [address] [place] [address]    Home Phone   [phone]    MRN   [id number]       [employer]agogue   [employer]    Marital Status                               Admission Date   1/31/24    Admission Type   Emergency    Admitting Provider   Kwasi Upton MD    Attending Provider       Department, Room/Bed   [facility] CARDIAC CARE, C002/1       Discharge Date   2/1/2024    Discharge Disposition   Home or Self Care    Discharge Destination                                 Attending Provider: (none)   Allergies: Codeine    Isolation: None   Infection: None   Code Status: Prior    Ht: 170.2 cm (67\")   Wt: 55.7 kg (122 lb 12.7 oz)    Admission Cmt: None   Principal Problem: Upper GI bleed [K92.2]                   Active Insurance as of 1/31/2024       Primary Coverage       Payor Plan Insurance Group Employer/Plan Group    ANTHEM BLUE CROSS ANTHEM BLUE CROSS BLUE SHIELD PPO [id number]       Payor Plan Address Payor Plan Phone Number Payor Plan Fax Number Effective Dates    [address] [phone]  1/1/2019 - None Entered    [place] [address]         Subscriber Name Subscriber Birth Date Member ID       JAY WILSON 7/2/1985 [id number]                     Emergency Contacts        (Rel.) Home Phone Work Phone Mobile Phone    Jay Wilson (Spouse) [phone] -- [phone]                 Discharge Summary        Kwasi Upton MD at 02/01/24 [address] [place] [address] Medicine Services  DISCHARGE SUMMARY       Date of Admission: 1/31/2024  Date of Discharge:  2/1/2024  Primary Care Physician: INA Holloway MD    Presenting Problem/History of Present Illness:  UGI Bleed   Final Discharge Diagnoses:  Anemia secondary to GI blood " loss  Hematemesis/melena per narrative -Grade 1 and 2 distal esophageal varices in the setting of alcoholism and hepatitis C; NSAID induced gastritis/peptic ulcer disease - ruled out  Intake of NSAID historically  Alcoholism-monitor for any alcohol withdrawal while  History of hepatitis C  Thrombocytopenia-etiology not clear but in the setting of history of alcoholism-possibility of hypersplenism considered.  Hypoalbuminemia    Consults:   GI service    Procedures Performed:     Pertinent Test Results:       Imaging Results (All)       None          LAB RESULTS:      Lab 02/01/24  0439 01/31/24  2317 01/31/24  1521 01/31/24  0604   WBC 5.41  --   --  8.73   HEMOGLOBIN 7.8* 7.3* 6.8* 8.0*   HEMATOCRIT 24.0* 22.7* 21.2* 25.1*   PLATELETS 71*  --   --  100*   NEUTROS ABS 2.71  --   --  5.51   IMMATURE GRANS (ABS) 0.01  --   --   --    LYMPHS ABS 1.89  --   --  2.12   MONOS ABS 0.47  --   --  0.88   EOS ABS 0.26  --   --  0.10   MCV 97.6*  --   --  105.9*   PROTIME  --   --   --  18.3*   APTT  --   --   --  39.7*         Lab 02/01/24  0328 01/31/24  0604   SODIUM 136 135*   POTASSIUM 3.8 3.8   CHLORIDE 102 97*   CO2 26.0 22.0   ANION GAP 8.0 16.0*   BUN 20 21*   CREATININE 0.71 0.52*   EGFR 107.7 117.7   GLUCOSE 86 137*   CALCIUM 7.9* 8.2*   MAGNESIUM  --  1.4*         Lab 02/01/24  0328 01/31/24  0604   TOTAL PROTEIN 6.3 8.0   ALBUMIN 2.7* 3.3*   GLOBULIN 3.6 4.7   ALT (SGPT) 15 22   AST (SGOT) 54* 92*   BILIRUBIN 2.8* 3.1*   ALK PHOS 65 83         Lab 01/31/24  0604   PROTIME 18.3*   INR 1.50*             Lab 01/31/24  0708   ABO TYPING A   RH TYPING Positive   ANTIBODY SCREEN Negative         Brief Urine Lab Results  (Last result in the past 365 days)        Color   Clarity   Blood   Leuk Est   Nitrite   Protein   CREAT   Urine HCG        02/24/23 1800 [place]   Cloudy   Negative   Trace   Positive   Negative                 Microbiology Results (last 10 days)       Procedure Component Value - Date/Time    COVID  PRE-OP / PRE-PROCEDURE SCREENING ORDER (NO ISOLATION) - Swab, Nasopharynx [[id number]]  (Normal) Collected: 01/31/24 0936    Lab Status: Final result Specimen: Swab from Nasopharynx Updated: 01/31/24 1042    Narrative:      The following orders were created for panel order COVID PRE-OP / PRE-PROCEDURE SCREENING ORDER (NO ISOLATION) - Swab, Nasopharynx.  Procedure                               Abnormality         Status                     ---------                               -----------         ------                     COVID-19, FLU A/B, RSV P...[[id number]]  Normal              Final result                 Please view results for these tests on the individual orders.    COVID-19, FLU A/B, RSV PCR 1 HR TAT - Swab, Nasopharynx [[id number]]  (Normal) Collected: 01/31/24 0936    Lab Status: Final result Specimen: Swab from Nasopharynx Updated: 01/31/24 1042     COVID19 Not Detected     Influenza A PCR Not Detected     Influenza B PCR Not Detected     RSV, PCR Not Detected    Narrative:      Fact sheet for providers: https://www.fda.gov/media/254182/download    Fact sheet for patients: https://www.fda.gov/media/820380/download    Test performed by PCR.            Hospital Course:     Patient express she is ready to go home. Patient tolerated diet. No recurrence of bleeding. Stool is formed and brown.   She had EGD with findings as follows:   grade 1 and 2 distal esophageal varices without stigmata of recent bleeding along with a normal stomach and normal duodenum.  There was no evidence of any fresh or old blood or clots in the examined upper GI tract     She is cleared by GI service for d/c .   I reiterated instructions/recommendation of GI service.   I spoke directly to Dr. Mauro. He recommends continuing patient on PPI  and f/u in GI clinic in 1 month or sooner as needed.  Note she received 1 unit of PRBC during hospitalization for hgb of < 7. Post transfusion hgb is 7.8.      CIWA protocol started. She did not have  "gross signs of alcohol withdrawal. She received prophylactic vitamins.       The story I got on admission:   Patient reports that she has been dizzy for about 1 to 2 weeks  She told me as well that she has been having headache, body aches fever and chills for the past month but has not sought any consultation.  She just shrugged her shoulder when I asked why.  She drinks alcohol namely vodka, fireball every 3 to 4 days.  Last drink was about 3 to 4 days ago  Her younger years she said that she had issue with drinking alcohol.  Never had DUI.     She said she got hepatitis C through drinking alcohol.  She clearly denies any IV drug use.        Physical Exam on Discharge:  /74   Pulse 94   Temp 98.5 °F (36.9 °C) (Oral)   Resp 15   Ht 170.2 cm (67\")   Wt 55.7 kg (122 lb 12.7 oz)   SpO2 97%   BMI 19.23 kg/m²   Physical Exam  GEN: Awake, alert, interactive, in NAD  HEENT: Atraumatic, PERRLA, EOMI, Anicteric, Trachea midline  Lungs: CTAB, no wheezing/rales/rhonchi  Heart: RRR, +S1/s2, no rub  ABD: soft, nt/nd, +BS, no guarding/rebound  Extremities: atraumatic, no cyanosis, no edema  Skin: no rashes or lesions  Neuro: AAOx3, no focal deficits  Psych: normal mood & affect      Condition on Discharge: stable    Discharge Disposition:  Home or Self Care    Discharge Medications:     Discharge Medications        New Medications        Instructions Start Date   multivitamin with minerals tablet  Generic drug: multivitamin with minerals   1 tablet, Oral, Daily      pantoprazole 40 MG EC tablet  Commonly known as: Protonix   40 mg, Oral, Daily      thiamine 100 MG tablet  Commonly known as: VITAMIN B1   100 mg, Oral, Daily   Start Date: February 5, 2024            Stop These Medications      naproxen sodium 220 MG tablet  Commonly known as: ALEVE              This patient has current or prior documentation of an left ventricular ejection fraction (LVEF) of less than or equal to 40%. - none availablefor review. Not " applicable        Discharge Diet:   Diet Instructions       Diet: Regular/House Diet; Thin (IDDSI 0)      Discharge Diet: Regular/House Diet    Fluid Consistency: Thin (IDDSI 0)            Activity at Discharge:   Activity Instructions       Discharge Activity      Gradually resume activities            Follow-up Appointments:   Pcp within 1 wk   GI 1 month or sooner if needed    Test Results Pending at Discharge: none    Electronically signed by Kwasi Upton MD, 02/01/24, 15:03 CST.    Time: > 30  minutes.           Electronically signed by Kwasi Upton MD at 02/01/24 1504       Discharge Order (From admission, onward)       Start     Ordered    02/01/24 1449  Discharge patient  Once        Expected Discharge Date: 02/01/24   Discharge Disposition: Home or Self Care   Physician of Record for Attribution - Please select from Treatment Team: KWASI UPTON [[id number]]   Review needed by CMO to determine Physician of Record: No      Question Answer Comment   Physician of Record for Attribution - Please select from Treatment Team KWSAI UPTON    Review needed by CMO to determine Physician of Record No        02/01/24 0778

## 2024-03-15 ENCOUNTER — TELEPHONE (OUTPATIENT)
Dept: INTERNAL MEDICINE | Facility: CLINIC | Age: 44
End: 2024-03-15
Payer: COMMERCIAL

## 2024-03-15 NOTE — TELEPHONE ENCOUNTER
PATIENTS  HAS CALLED, HE STATED THAT PATIENT HAS SWELLING TO HER ABDOMEN AND DISCOMFORT.    HE STATED THAT SHE HAS HAD IT FOR AROUND 1 WEEK.   SHE HAS PASSED SOME GAS TODAY AND IT HAS RELIEVED SOME PRESSURE.    PATIENTS  DID WANT HER SCHEDULED TO BE SEEN BUT THERE ARE NO APPOINTMENTS AVAILABLE TODAY.  HE STATED THAT HE DIDN'T WANT TO TAKE HER TO THE ER.       [phone]

## 2024-03-15 NOTE — TELEPHONE ENCOUNTER
She has a history of alcoholic hepatitis with ascites.  Recently had upper GI bleed in January as well.  She needs to go to the emergency room as soon as possible for evaluation.

## 2024-03-16 ENCOUNTER — HOSPITAL ENCOUNTER (INPATIENT)
Facility: HOSPITAL | Age: 44
LOS: 1 days | Discharge: LEFT BEFORE DISCHARGE | DRG: 812 | End: 2024-03-16
Payer: COMMERCIAL

## 2024-03-16 ENCOUNTER — APPOINTMENT (OUTPATIENT)
Dept: CT IMAGING | Facility: HOSPITAL | Age: 44
DRG: 812 | End: 2024-03-16
Payer: COMMERCIAL

## 2024-03-16 VITALS
OXYGEN SATURATION: 99 % | HEIGHT: 66 IN | WEIGHT: 140 LBS | BODY MASS INDEX: 22.5 KG/M2 | SYSTOLIC BLOOD PRESSURE: 110 MMHG | RESPIRATION RATE: 16 BRPM | TEMPERATURE: 98.4 F | HEART RATE: 82 BPM | DIASTOLIC BLOOD PRESSURE: 79 MMHG

## 2024-03-16 DIAGNOSIS — R79.89 HIGH SERUM VITAMIN B12: ICD-10-CM

## 2024-03-16 DIAGNOSIS — E87.6 HYPOKALEMIA: ICD-10-CM

## 2024-03-16 DIAGNOSIS — E83.51 HYPOCALCEMIA: ICD-10-CM

## 2024-03-16 DIAGNOSIS — E61.1 IRON DEFICIENCY: ICD-10-CM

## 2024-03-16 DIAGNOSIS — R18.8 OTHER ASCITES: ICD-10-CM

## 2024-03-16 DIAGNOSIS — D64.9 ANEMIA, UNSPECIFIED TYPE: Primary | ICD-10-CM

## 2024-03-16 DIAGNOSIS — K76.6 PORTAL HYPERTENSION: ICD-10-CM

## 2024-03-16 DIAGNOSIS — I86.8 SPLENIC VARICES: ICD-10-CM

## 2024-03-16 LAB
ABO GROUP BLD: NORMAL
ALBUMIN SERPL-MCNC: 2.6 G/DL (ref 3.5–5.2)
ALBUMIN/GLOB SERPL: 0.6 G/DL
ALP SERPL-CCNC: 94 U/L (ref 39–117)
ALT SERPL W P-5'-P-CCNC: 14 U/L (ref 1–33)
AMMONIA BLD-SCNC: 62 UMOL/L (ref 11–51)
AMPHET+METHAMPHET UR QL: NEGATIVE
AMPHETAMINES UR QL: NEGATIVE
ANION GAP SERPL CALCULATED.3IONS-SCNC: 10 MMOL/L (ref 5–15)
APTT PPP: 39.1 SECONDS (ref 24.5–36)
AST SERPL-CCNC: 41 U/L (ref 1–32)
BACTERIA UR QL AUTO: NORMAL /HPF
BARBITURATES UR QL SCN: NEGATIVE
BASOPHILS # BLD AUTO: 0.1 10*3/MM3 (ref 0–0.2)
BASOPHILS NFR BLD AUTO: 1 % (ref 0–1.5)
BENZODIAZ UR QL SCN: NEGATIVE
BILIRUB SERPL-MCNC: 1.8 MG/DL (ref 0–1.2)
BILIRUB UR QL STRIP: ABNORMAL
BLD GP AB SCN SERPL QL: NEGATIVE
BUN SERPL-MCNC: 7 MG/DL (ref 6–20)
BUN/CREAT SERPL: 10.4 (ref 7–25)
BUPRENORPHINE SERPL-MCNC: NEGATIVE NG/ML
CALCIUM SPEC-SCNC: 8.2 MG/DL (ref 8.6–10.5)
CANNABINOIDS SERPL QL: NEGATIVE
CHLORIDE SERPL-SCNC: 99 MMOL/L (ref 98–107)
CLARITY UR: CLEAR
CO2 SERPL-SCNC: 29 MMOL/L (ref 22–29)
COCAINE UR QL: NEGATIVE
COLOR UR: ABNORMAL
CREAT SERPL-MCNC: 0.67 MG/DL (ref 0.57–1)
D-LACTATE SERPL-SCNC: 2.4 MMOL/L (ref 0.5–2)
D-LACTATE SERPL-SCNC: 4.2 MMOL/L (ref 0.5–2)
DEPRECATED RDW RBC AUTO: 76.4 FL (ref 37–54)
DEVELOPER EXPIRATION DATE: NORMAL
DEVELOPER LOT NUMBER: 0
EGFRCR SERPLBLD CKD-EPI 2021: 110 ML/MIN/1.73
EOSINOPHIL # BLD AUTO: 0.56 10*3/MM3 (ref 0–0.4)
EOSINOPHIL NFR BLD AUTO: 5.4 % (ref 0.3–6.2)
ERYTHROCYTE [DISTWIDTH] IN BLOOD BY AUTOMATED COUNT: 23.6 % (ref 12.3–15.4)
ETHANOL UR QL: <0.01 %
EXPIRATION DATE: NORMAL
FECAL OCCULT BLOOD SCREEN, POC: NEGATIVE
FENTANYL UR-MCNC: NEGATIVE NG/ML
FERRITIN SERPL-MCNC: 18.3 NG/ML (ref 13–150)
FLUAV RNA RESP QL NAA+PROBE: NOT DETECTED
FLUBV RNA RESP QL NAA+PROBE: NOT DETECTED
FOLATE SERPL-MCNC: 9.22 NG/ML (ref 4.78–24.2)
GLOBULIN UR ELPH-MCNC: 4.7 GM/DL
GLUCOSE SERPL-MCNC: 122 MG/DL (ref 65–99)
GLUCOSE UR STRIP-MCNC: NEGATIVE MG/DL
HAV IGM SERPL QL IA: NORMAL
HBV CORE IGM SERPL QL IA: NORMAL
HBV SURFACE AG SERPL QL IA: NORMAL
HCG SERPL QL: NEGATIVE
HCT VFR BLD AUTO: 18.6 % (ref 34–46.6)
HCV AB SER DONR QL: NORMAL
HGB BLD-MCNC: 5.2 G/DL (ref 12–15.9)
HGB UR QL STRIP.AUTO: NEGATIVE
HOLD SPECIMEN: NORMAL
HOLD SPECIMEN: NORMAL
HYALINE CASTS UR QL AUTO: NORMAL /LPF
IMM GRANULOCYTES # BLD AUTO: 0.05 10*3/MM3 (ref 0–0.05)
IMM GRANULOCYTES NFR BLD AUTO: 0.5 % (ref 0–0.5)
INR PPP: 1.71 (ref 0.91–1.09)
IRON 24H UR-MRATE: 11 MCG/DL (ref 37–145)
IRON SATN MFR SERPL: 5 % (ref 20–50)
KETONES UR QL STRIP: ABNORMAL
LEUKOCYTE ESTERASE UR QL STRIP.AUTO: ABNORMAL
LIPASE SERPL-CCNC: 54 U/L (ref 13–60)
LYMPHOCYTES # BLD AUTO: 2.32 10*3/MM3 (ref 0.7–3.1)
LYMPHOCYTES NFR BLD AUTO: 22.4 % (ref 19.6–45.3)
Lab: 257
MAGNESIUM SERPL-MCNC: 2.2 MG/DL (ref 1.6–2.6)
MCH RBC QN AUTO: 25 PG (ref 26.6–33)
MCHC RBC AUTO-ENTMCNC: 28 G/DL (ref 31.5–35.7)
MCV RBC AUTO: 89.4 FL (ref 79–97)
METHADONE UR QL SCN: NEGATIVE
MONOCYTES # BLD AUTO: 1.03 10*3/MM3 (ref 0.1–0.9)
MONOCYTES NFR BLD AUTO: 9.9 % (ref 5–12)
NEGATIVE CONTROL: NEGATIVE
NEUTROPHILS NFR BLD AUTO: 6.32 10*3/MM3 (ref 1.7–7)
NEUTROPHILS NFR BLD AUTO: 60.8 % (ref 42.7–76)
NITRITE UR QL STRIP: NEGATIVE
NRBC BLD AUTO-RTO: 0 /100 WBC (ref 0–0.2)
NT-PROBNP SERPL-MCNC: 150.1 PG/ML (ref 0–450)
OPIATES UR QL: NEGATIVE
OXYCODONE UR QL SCN: NEGATIVE
PCP UR QL SCN: NEGATIVE
PH UR STRIP.AUTO: 7 [PH] (ref 5–8)
PLATELET # BLD AUTO: 298 10*3/MM3 (ref 140–450)
PMV BLD AUTO: 9.5 FL (ref 6–12)
POSITIVE CONTROL: POSITIVE
POTASSIUM SERPL-SCNC: 3 MMOL/L (ref 3.5–5.2)
PROCALCITONIN SERPL-MCNC: 0.12 NG/ML (ref 0–0.25)
PROT SERPL-MCNC: 7.3 G/DL (ref 6–8.5)
PROT UR QL STRIP: ABNORMAL
PROTHROMBIN TIME: 20.7 SECONDS (ref 11.8–14.8)
RBC # BLD AUTO: 2.08 10*6/MM3 (ref 3.77–5.28)
RBC # UR STRIP: NORMAL /HPF
REF LAB TEST METHOD: NORMAL
RH BLD: POSITIVE
RSV RNA RESP QL NAA+PROBE: NOT DETECTED
SARS-COV-2 RNA RESP QL NAA+PROBE: NOT DETECTED
SODIUM SERPL-SCNC: 138 MMOL/L (ref 136–145)
SP GR UR STRIP: >1.03 (ref 1–1.03)
SQUAMOUS #/AREA URNS HPF: NORMAL /HPF
T&S EXPIRATION DATE: NORMAL
T4 FREE SERPL-MCNC: 1.2 NG/DL (ref 0.93–1.7)
TIBC SERPL-MCNC: 244 MCG/DL (ref 298–536)
TRANSFERRIN SERPL-MCNC: 164 MG/DL (ref 200–360)
TRICYCLICS UR QL SCN: NEGATIVE
TSH SERPL DL<=0.05 MIU/L-ACNC: 1.6 UIU/ML (ref 0.27–4.2)
UROBILINOGEN UR QL STRIP: ABNORMAL
VIT B12 BLD-MCNC: 1321 PG/ML (ref 211–946)
WBC # UR STRIP: NORMAL /HPF
WBC NRBC COR # BLD AUTO: 10.38 10*3/MM3 (ref 3.4–10.8)
WHOLE BLOOD HOLD COAG: NORMAL
WHOLE BLOOD HOLD SPECIMEN: NORMAL

## 2024-03-16 PROCEDURE — 25010000002 POTASSIUM CHLORIDE 10 MEQ/100ML SOLUTION: Performed by: PHYSICIAN ASSISTANT

## 2024-03-16 PROCEDURE — 25010000002 POTASSIUM CHLORIDE 10 MEQ/100ML SOLUTION: Performed by: INTERNAL MEDICINE

## 2024-03-16 PROCEDURE — 83605 ASSAY OF LACTIC ACID: CPT | Performed by: PHYSICIAN ASSISTANT

## 2024-03-16 PROCEDURE — P9016 RBC LEUKOCYTES REDUCED: HCPCS

## 2024-03-16 PROCEDURE — 80307 DRUG TEST PRSMV CHEM ANLYZR: CPT | Performed by: PHYSICIAN ASSISTANT

## 2024-03-16 PROCEDURE — 82728 ASSAY OF FERRITIN: CPT | Performed by: INTERNAL MEDICINE

## 2024-03-16 PROCEDURE — 96375 TX/PRO/DX INJ NEW DRUG ADDON: CPT

## 2024-03-16 PROCEDURE — 25010000002 OCTREOTIDE PER 25 MCG: Performed by: PHYSICIAN ASSISTANT

## 2024-03-16 PROCEDURE — 25510000001 IOPAMIDOL 61 % SOLUTION: Performed by: PHYSICIAN ASSISTANT

## 2024-03-16 PROCEDURE — 82077 ASSAY SPEC XCP UR&BREATH IA: CPT | Performed by: PHYSICIAN ASSISTANT

## 2024-03-16 PROCEDURE — 85610 PROTHROMBIN TIME: CPT | Performed by: PHYSICIAN ASSISTANT

## 2024-03-16 PROCEDURE — 96367 TX/PROPH/DG ADDL SEQ IV INF: CPT

## 2024-03-16 PROCEDURE — 81001 URINALYSIS AUTO W/SCOPE: CPT | Performed by: PHYSICIAN ASSISTANT

## 2024-03-16 PROCEDURE — 82607 VITAMIN B-12: CPT | Performed by: PHYSICIAN ASSISTANT

## 2024-03-16 PROCEDURE — 84439 ASSAY OF FREE THYROXINE: CPT | Performed by: PHYSICIAN ASSISTANT

## 2024-03-16 PROCEDURE — 86900 BLOOD TYPING SEROLOGIC ABO: CPT | Performed by: PHYSICIAN ASSISTANT

## 2024-03-16 PROCEDURE — 84703 CHORIONIC GONADOTROPIN ASSAY: CPT | Performed by: PHYSICIAN ASSISTANT

## 2024-03-16 PROCEDURE — 83690 ASSAY OF LIPASE: CPT

## 2024-03-16 PROCEDURE — 82140 ASSAY OF AMMONIA: CPT | Performed by: PHYSICIAN ASSISTANT

## 2024-03-16 PROCEDURE — 96366 THER/PROPH/DIAG IV INF ADDON: CPT

## 2024-03-16 PROCEDURE — 96368 THER/DIAG CONCURRENT INF: CPT

## 2024-03-16 PROCEDURE — 84466 ASSAY OF TRANSFERRIN: CPT | Performed by: PHYSICIAN ASSISTANT

## 2024-03-16 PROCEDURE — 74177 CT ABD & PELVIS W/CONTRAST: CPT

## 2024-03-16 PROCEDURE — 96365 THER/PROPH/DIAG IV INF INIT: CPT

## 2024-03-16 PROCEDURE — 87637 SARSCOV2&INF A&B&RSV AMP PRB: CPT | Performed by: PHYSICIAN ASSISTANT

## 2024-03-16 PROCEDURE — 86901 BLOOD TYPING SEROLOGIC RH(D): CPT | Performed by: PHYSICIAN ASSISTANT

## 2024-03-16 PROCEDURE — 80050 GENERAL HEALTH PANEL: CPT

## 2024-03-16 PROCEDURE — 84145 PROCALCITONIN (PCT): CPT | Performed by: PHYSICIAN ASSISTANT

## 2024-03-16 PROCEDURE — 80074 ACUTE HEPATITIS PANEL: CPT | Performed by: PHYSICIAN ASSISTANT

## 2024-03-16 PROCEDURE — 87040 BLOOD CULTURE FOR BACTERIA: CPT | Performed by: PHYSICIAN ASSISTANT

## 2024-03-16 PROCEDURE — 36415 COLL VENOUS BLD VENIPUNCTURE: CPT

## 2024-03-16 PROCEDURE — 86923 COMPATIBILITY TEST ELECTRIC: CPT

## 2024-03-16 PROCEDURE — 25010000002 CEFTRIAXONE PER 250 MG: Performed by: PHYSICIAN ASSISTANT

## 2024-03-16 PROCEDURE — 36430 TRANSFUSION BLD/BLD COMPNT: CPT

## 2024-03-16 PROCEDURE — 83880 ASSAY OF NATRIURETIC PEPTIDE: CPT | Performed by: PHYSICIAN ASSISTANT

## 2024-03-16 PROCEDURE — 86850 RBC ANTIBODY SCREEN: CPT | Performed by: PHYSICIAN ASSISTANT

## 2024-03-16 PROCEDURE — 83735 ASSAY OF MAGNESIUM: CPT | Performed by: PHYSICIAN ASSISTANT

## 2024-03-16 PROCEDURE — 82746 ASSAY OF FOLIC ACID SERUM: CPT | Performed by: PHYSICIAN ASSISTANT

## 2024-03-16 PROCEDURE — 83540 ASSAY OF IRON: CPT | Performed by: PHYSICIAN ASSISTANT

## 2024-03-16 PROCEDURE — 82270 OCCULT BLOOD FECES: CPT | Performed by: PHYSICIAN ASSISTANT

## 2024-03-16 PROCEDURE — 99285 EMERGENCY DEPT VISIT HI MDM: CPT

## 2024-03-16 PROCEDURE — 85730 THROMBOPLASTIN TIME PARTIAL: CPT | Performed by: PHYSICIAN ASSISTANT

## 2024-03-16 PROCEDURE — 86900 BLOOD TYPING SEROLOGIC ABO: CPT

## 2024-03-16 RX ORDER — SODIUM CHLORIDE 0.9 % (FLUSH) 0.9 %
10 SYRINGE (ML) INJECTION AS NEEDED
Status: DISCONTINUED | OUTPATIENT
Start: 2024-03-16 | End: 2024-03-17 | Stop reason: HOSPADM

## 2024-03-16 RX ORDER — SODIUM CHLORIDE 0.9 % (FLUSH) 0.9 %
10 SYRINGE (ML) INJECTION EVERY 12 HOURS SCHEDULED
Status: CANCELLED | OUTPATIENT
Start: 2024-03-16

## 2024-03-16 RX ORDER — SODIUM CHLORIDE 0.9 % (FLUSH) 0.9 %
10 SYRINGE (ML) INJECTION AS NEEDED
Status: CANCELLED | OUTPATIENT
Start: 2024-03-16

## 2024-03-16 RX ORDER — ONDANSETRON 2 MG/ML
4 INJECTION INTRAMUSCULAR; INTRAVENOUS ONCE
Status: DISCONTINUED | OUTPATIENT
Start: 2024-03-16 | End: 2024-03-17 | Stop reason: HOSPADM

## 2024-03-16 RX ORDER — POTASSIUM CHLORIDE 7.45 MG/ML
10 INJECTION INTRAVENOUS ONCE
Status: COMPLETED | OUTPATIENT
Start: 2024-03-16 | End: 2024-03-16

## 2024-03-16 RX ORDER — SPIRONOLACTONE 25 MG/1
25 TABLET ORAL DAILY
Status: CANCELLED | OUTPATIENT
Start: 2024-03-16

## 2024-03-16 RX ORDER — FUROSEMIDE 20 MG/1
10 TABLET ORAL DAILY
Status: CANCELLED | OUTPATIENT
Start: 2024-03-16

## 2024-03-16 RX ORDER — PANTOPRAZOLE SODIUM 40 MG/10ML
40 INJECTION, POWDER, LYOPHILIZED, FOR SOLUTION INTRAVENOUS ONCE
Status: COMPLETED | OUTPATIENT
Start: 2024-03-16 | End: 2024-03-16

## 2024-03-16 RX ORDER — ONDANSETRON 2 MG/ML
4 INJECTION INTRAMUSCULAR; INTRAVENOUS EVERY 6 HOURS PRN
Status: CANCELLED | OUTPATIENT
Start: 2024-03-16

## 2024-03-16 RX ORDER — SODIUM CHLORIDE 9 MG/ML
40 INJECTION, SOLUTION INTRAVENOUS AS NEEDED
Status: CANCELLED | OUTPATIENT
Start: 2024-03-16

## 2024-03-16 RX ADMIN — POTASSIUM CHLORIDE 10 MEQ: 10 INJECTION, SOLUTION INTRAVENOUS at 16:31

## 2024-03-16 RX ADMIN — SODIUM CHLORIDE 1000 MG: 900 INJECTION INTRAVENOUS at 17:59

## 2024-03-16 RX ADMIN — OCTREOTIDE ACETATE 25 MCG/HR: 500 INJECTION, SOLUTION INTRAVENOUS; SUBCUTANEOUS at 17:54

## 2024-03-16 RX ADMIN — IOPAMIDOL 100 ML: 612 INJECTION, SOLUTION INTRAVENOUS at 15:56

## 2024-03-16 RX ADMIN — PANTOPRAZOLE SODIUM 40 MG: 40 INJECTION, POWDER, FOR SOLUTION INTRAVENOUS at 17:55

## 2024-03-16 RX ADMIN — POTASSIUM CHLORIDE 10 MEQ: 10 INJECTION, SOLUTION INTRAVENOUS at 18:40

## 2024-03-16 NOTE — Clinical Note
Level of Care: Remote Telemetry [26]   Diagnosis: Anemia [276982]   Certification: I Certify That Inpatient Hospital Services Are Medically Necessary For Greater Than 2 Midnights
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
## 2024-03-16 NOTE — H&P
[facility] Medicine Services  HISTORY AND PHYSICAL    Date of Admission: 3/16/2024  Primary Care Physician: INA Holloway MD    Subjective   Primary Historian: patient     Chief Complaint: increasing abdominal girth  - felt like fluid or gas trapped    History of Present Illness  44-year-old woman who previously seen in our hospital for diagnosis as stated below.  Date of Admission: 1/31/2024  Date of Discharge:  2/1/2024  Primary Care Physician: INA Holloway MD     Presenting Problem/History of Present Illness:  UGI Bleed   Final Discharge Diagnoses:  Anemia secondary to GI blood loss  Hematemesis/melena per narrative -Grade 1 and 2 distal esophageal varices in the setting of alcoholism and hepatitis C; NSAID induced gastritis/peptic ulcer disease - ruled out  Intake of NSAID historically  Alcoholism-monitor for any alcohol withdrawal while  History of hepatitis C  Thrombocytopenia-etiology not clear but in the setting of history of alcoholism-possibility of hypersplenism considered.  Hypoalbuminemia     She presented today in emergency room with abdominal discomfort and reported bleeding has large volume ascites by MARIUM Lowry.  On top of this patient is profoundly anemic (hemoglobin 5.2 and hematocrit of 18.6) platelet count is normal at 298  Hypokalemic (3)  Hypoalbuminemic with albumin of 2.6  INR is 1.71  Total bilirubin 1.8      Failed to follow up with GI in Feb after hospitalization  Non compliant with diet - high salt and sugar  Not taking nay med --- may not have ascite back then     No bleeding (hematemesis, melena)  No epistaxis  Dog jumped on her -- now has infraorbital ecchymosis left  eye    Review of Systems   Otherwise complete ROS reviewed and negative except as mentioned in the HPI.    Past Medical History:   Past Medical History:   Diagnosis Date    Alcoholism in recovery     Allergic     Anxiety     Anxiety     Asthma     Gestational diabetes      "Heart murmur     Hepatitis-C     History of ear infections     Hypertension     Sinusitis      Past Surgical History:  Past Surgical History:   Procedure Laterality Date     SECTION      ENDOSCOPY N/A 2024    Procedure: ESOPHAGOGASTRODUODENOSCOPY WITH ANESTHESIA;  Surgeon: Abdifatah Mauro MD;  Location: [facility] ENDOSCOPY;  Service: Gastroenterology;  Laterality: N/A;  pre op: upper GI bleed  post op:varices,    MOUTH SURGERY       Social History:  reports that she has been smoking cigarettes. She has a 5 pack-year smoking history. She has never used smokeless tobacco. She reports that she does not currently use alcohol after a past usage of about 10.0 standard drinks of alcohol per week. She reports that she does not use drugs.    Family History: family history includes Diabetes in her father and mother; Heart disease in her father; Stroke in her father.       Allergies:  Allergies   Allergen Reactions    Codeine Rash and GI Intolerance       Medications:  Prior to Admission medications    Medication Sig Start Date End Date Taking? Authorizing Provider   multivitamin with minerals tablet tablet Take 1 tablet by mouth Daily. 24   Kwasi Upton MD   pantoprazole (Protonix) 40 MG EC tablet Take 1 tablet by mouth Daily. 24   Kwasi Upton MD   thiamine (VITAMIN B1) 100 MG tablet Take 1 tablet by mouth Daily. 24   Kwasi Upton MD     I have utilized all available immediate resources to obtain, update, or review the patient's current medications (including all prescriptions, over-the-counter products, herbals, cannabis/cannabidiol products, and vitamin/mineral/dietary (nutritional) supplements).    Objective     Vital Signs: /57   Pulse 92   Temp 97.9 °F (36.6 °C)   Resp 16   Ht 167.6 cm (66\")   Wt 63.5 kg (140 lb)   SpO2 99%   BMI 22.60 kg/m²   Physical Exam   Childlike as she conversed with me  Looks frequently to her  but  was quiet " and had nothing to add  Left infraorbital ecchymosis  GEN: Awake, alert, interactive, in NAD  HEENT: Atraumatic, PERRLA, EOMI, Anicteric, Trachea midline  Lungs: CTAB, no wheezing/rales/rhonchi  Heart: RRR, +S1/s2, no rub  ABD: soft,distended abdomen nt/nd, +BS, no guarding/rebound  Extremities: atraumatic, no cyanosis, no gross peripheral edema of extremities  Atrophic muscles  Skin: no rashes or lesions  Neuro: AAOx3, no focal deficits  Psych: normal mood & affect        Results Reviewed:  Lab Results (last 24 hours)       Procedure Component Value Units Date/Time    POC Occult Blood Stool [[id number]]  (Normal) Resulted: 03/16/24 1803    Specimen: Stool from Per Rectum Updated: 03/16/24 1804     Fecal Occult Blood Negative     Lot Number [id number]     Expiration Date 07/31/2024     DEVELOPER LOT NUMBER 0     DEVELOPER EXPIRATION DATE 07/31/2024     Positive Control Positive     Negative Control Negative    Urinalysis With Culture If Indicated - Urine, Clean Catch [[id number]]  (Abnormal) Collected: 03/16/24 1623    Specimen: Urine, Clean Catch Updated: 03/16/24 1708     Color, UA Dark Yellow     Appearance, UA Clear     pH, UA 7.0     Specific Gravity, UA >1.030     Glucose, UA Negative     Ketones, UA Trace     Bilirubin, UA Small (1+)     Blood, UA Negative     Protein, UA Trace     Leuk Esterase, UA Trace     Nitrite, UA Negative     Urobilinogen, UA 1.0 E.U./dL    Narrative:      In absence of clinical symptoms, the presence of pyuria, bacteria, and/or nitrites on the urinalysis result does not correlate with infection.    Urinalysis, Microscopic Only - Urine, Clean Catch [[id number]] Collected: 03/16/24 1623    Specimen: Urine, Clean Catch Updated: 03/16/24 1708     RBC, UA 0-2 /HPF      WBC, UA 0-2 /HPF      Comment: Urine culture not indicated.        Bacteria, UA None Seen /HPF      Squamous Epithelial Cells, UA 0-2 /HPF      Hyaline Casts, UA 0-2 /LPF      Methodology Manual Light Microscopy    Fentanyl, Urine -  Urine, Clean Catch [[id number]]  (Normal) Collected: 03/16/24 1623    Specimen: Urine, Clean Catch Updated: 03/16/24 1701     Fentanyl, Urine Negative    Narrative:      Negative Threshold:      Fentanyl 5 ng/mL     The normal value for the drug tested is negative. This report includes final unconfirmed screening results to be used for medical treatment purposes only. Unconfirmed results must not be used for non-medical purposes such as employment or legal testing. Clinical consideration should be applied to any drug of abuse test, particularly when unconfirmed results are used.           Urine Drug Screen - Urine, Clean Catch [[id number]]  (Normal) Collected: 03/16/24 1623    Specimen: Urine, Clean Catch Updated: 03/16/24 1659     THC, Screen, Urine Negative     Phencyclidine (PCP), Urine Negative     Cocaine Screen, Urine Negative     Methamphetamine, Ur Negative     Opiate Screen Negative     Amphetamine Screen, Urine Negative     Benzodiazepine Screen, Urine Negative     Tricyclic Antidepressants Screen Negative     Methadone Screen, Urine Negative     Barbiturates Screen, Urine Negative     Oxycodone Screen, Urine Negative     Buprenorphine, Screen, Urine Negative    Narrative:      Cutoff For Drugs Screened:    Amphetamines               500 ng/ml  Barbiturates               200 ng/ml  Benzodiazepines            150 ng/ml  Cocaine                    150 ng/ml  Methadone                  200 ng/ml  Opiates                    100 ng/ml  Phencyclidine               25 ng/ml  THC                         50 ng/ml  Methamphetamine            500 ng/ml  Tricyclic Antidepressants  300 ng/ml  Oxycodone                  100 ng/ml  Buprenorphine               10 ng/ml    The normal value for all drugs tested is negative. This report includes unconfirmed screening results, with the cutoff values listed, to be used for medical treatment purposes only.  Unconfirmed results must not be used for non-medical purposes such as  employment or legal testing.  Clinical consideration should be applied to any drug of abuse test, particularly when unconfirmed results are used.      Iron Profile [[id number]]  (Abnormal) Collected: 03/16/24 1429    Specimen: Blood Updated: 03/16/24 1604     Iron 11 mcg/dL      Iron Saturation (TSAT) 5 %      Transferrin 164 mg/dL      TIBC 244 mcg/dL     COVID-19, FLU A/B, RSV PCR 1 HR TAT - Swab, Nasopharynx [[id number]]  (Normal) Collected: 03/16/24 1456    Specimen: Swab from Nasopharynx Updated: 03/16/24 1559     COVID19 Not Detected     Influenza A PCR Not Detected     Influenza B PCR Not Detected     RSV, PCR Not Detected    Narrative:      Fact sheet for providers: https://www.fda.gov/media/926658/download    Fact sheet for patients: https://www.fda.gov/media/449999/download    Test performed by PCR.    Folate [[id number]] Collected: 03/16/24 1429    Specimen: Blood Updated: 03/16/24 1553    Vitamin B12 [[id number]] Collected: 03/16/24 1429    Specimen: Blood Updated: 03/16/24 1553    Hepatitis Panel, Acute [[id number]]  (Normal) Collected: 03/16/24 1455    Specimen: Blood Updated: 03/16/24 1548     Hepatitis B Surface Ag Non-Reactive     Hep A IgM Non-Reactive     Hep B C IgM Non-Reactive     Hepatitis C Ab Non-Reactive    Narrative:      Results may be falsely decreased if patient taking Biotin.     Lactic Acid, Plasma [[id number]]  (Abnormal) Collected: 03/16/24 1455    Specimen: Blood Updated: 03/16/24 1534     Lactate 4.2 mmol/L     [place] Draw [[id number]] Collected: 03/16/24 1429    Specimen: Blood Updated: 03/16/24 1531    Narrative:      The following orders were created for panel order [place] Draw.  Procedure                               Abnormality         Status                     ---------                               -----------         ------                     Green Top (Gel)[[id number]]                                  Final result               Lavender Top[[id number]]                                      Final result               Red Top[[id number]]                                          Final result               Light Blue Top[[id number]]                                   Final result                 Please view results for these tests on the individual orders.    Green Top (Gel) [[id number]] Collected: 03/16/24 1429    Specimen: Blood Updated: 03/16/24 1531     Extra Tube Hold for add-ons.     Comment: Auto resulted.       Lavender Top [[id number]] Collected: 03/16/24 1429    Specimen: Blood Updated: 03/16/24 1531     Extra Tube hold for add-on     Comment: Auto resulted       Red Top [[id number]] Collected: 03/16/24 1429    Specimen: Blood Updated: 03/16/24 1531     Extra Tube Hold for add-ons.     Comment: Auto resulted.       Light Blue Top [[id number]] Collected: 03/16/24 1429    Specimen: Blood Updated: 03/16/24 1531     Extra Tube Hold for add-ons.     Comment: Auto resulted       T4, Free [[id number]]  (Normal) Collected: 03/16/24 1429    Specimen: Blood Updated: 03/16/24 1518     Free T4 1.20 ng/dL     Narrative:      Results may be falsely increased if patient taking Biotin.      TSH [[id number]]  (Normal) Collected: 03/16/24 1429    Specimen: Blood Updated: 03/16/24 1517     TSH 1.600 uIU/mL     BNP [[id number]]  (Normal) Collected: 03/16/24 1429    Specimen: Blood Updated: 03/16/24 1511     proBNP 150.1 pg/mL     Narrative:      This assay is used as an aid in the diagnosis of individuals suspected of having heart failure. It can be used as an aid in the diagnosis of acute decompensated heart failure (ADHF) in patients presenting with signs and symptoms of ADHF to the emergency department (ED). In addition, NT-proBNP of <300 pg/mL indicates ADHF is not likely.    Age Range Result Interpretation  NT-proBNP Concentration (pg/mL:      <50             Positive            >450                   Gray                 300-450                    Negative             <300    50-75           Positive         "    >900                  Gray                300-900                  Negative            <300      >75             Positive            >1800                  Gray                300-1800                  Negative            <300    Ethanol [[id number]] Collected: 03/16/24 1429    Specimen: Blood Updated: 03/16/24 1507     Ethanol % <0.010 %     Narrative:      Not for legal purposes. Chain of Custody not followed.     Magnesium [[id number]]  (Normal) Collected: 03/16/24 1429    Specimen: Blood Updated: 03/16/24 1507     Magnesium 2.2 mg/dL     Procalcitonin [[id number]]  (Normal) Collected: 03/16/24 1429    Specimen: Blood Updated: 03/16/24 1503     Procalcitonin 0.12 ng/mL     Narrative:      As a Marker for Sepsis (Non-Neonates):    1. <0.5 ng/mL represents a low risk of severe sepsis and/or septic shock.  2. >2 ng/mL represents a high risk of severe sepsis and/or septic shock.    As a Marker for Lower Respiratory Tract Infections that require antibiotic therapy:    PCT on Admission    Antibiotic Therapy       6-12 Hrs later    >0.5                Strongly Recommended  >0.25 - <0.5        Recommended   0.1 - 0.25          Discouraged              Remeasure/reassess PCT  <0.1                Strongly Discouraged     Remeasure/reassess PCT    As 28 day mortality risk marker: \"Change in Procalcitonin Result\" (>80% or <=80%) if Day 0 (or Day 1) and Day 4 values are available. Refer to http://www.[employer]-pct-calculator.com    Change in PCT <=80%  A decrease of PCT levels below or equal to 80% defines a positive change in PCT test result representing a higher risk for 28-day all-cause mortality of patients diagnosed with severe sepsis for septic shock.    Change in PCT >80%  A decrease of PCT levels of more than 80% defines a negative change in PCT result representing a lower risk for 28-day all-cause mortality of patients diagnosed with severe sepsis or septic shock.       Protime-INR [[id number]]  (Abnormal) Collected: " 03/16/24 1429    Specimen: Blood Updated: 03/16/24 1502     Protime 20.7 Seconds      INR 1.71    aPTT [[id number]]  (Abnormal) Collected: 03/16/24 1429    Specimen: Blood Updated: 03/16/24 1502     PTT 39.1 seconds     Comprehensive Metabolic Panel [[id number]]  (Abnormal) Collected: 03/16/24 1429    Specimen: Blood Updated: 03/16/24 1459     Glucose 122 mg/dL      BUN 7 mg/dL      Creatinine 0.67 mg/dL      Sodium 138 mmol/L      Potassium 3.0 mmol/L      Chloride 99 mmol/L      CO2 29.0 mmol/L      Calcium 8.2 mg/dL      Total Protein 7.3 g/dL      Albumin 2.6 g/dL      ALT (SGPT) 14 U/L      AST (SGOT) 41 U/L      Alkaline Phosphatase 94 U/L      Total Bilirubin 1.8 mg/dL      Globulin 4.7 gm/dL      A/G Ratio 0.6 g/dL      BUN/Creatinine Ratio 10.4     Anion Gap 10.0 mmol/L      eGFR 110.0 mL/min/1.73     Narrative:      GFR Normal >60  Chronic Kidney Disease <60  Kidney Failure <15      Lipase [[id number]]  (Normal) Collected: 03/16/24 1429    Specimen: Blood Updated: 03/16/24 1454     Lipase 54 U/L     hCG, Serum, Qualitative [[id number]]  (Normal) Collected: 03/16/24 1429    Specimen: Blood Updated: 03/16/24 1451     HCG Qualitative Negative    CBC & Differential [[id number]]  (Abnormal) Collected: 03/16/24 1429    Specimen: Blood Updated: 03/16/24 1449    Narrative:      The following orders were created for panel order CBC & Differential.  Procedure                               Abnormality         Status                     ---------                               -----------         ------                     CBC Auto Differential[[id number]]        Abnormal            Final result                 Please view results for these tests on the individual orders.    CBC Auto Differential [[id number]]  (Abnormal) Collected: 03/16/24 1429    Specimen: Blood Updated: 03/16/24 1449     WBC 10.38 10*3/mm3      RBC 2.08 10*6/mm3      Hemoglobin 5.2 g/dL      Hematocrit 18.6 %      MCV 89.4 fL      MCH 25.0 pg      MCHC  28.0 g/dL      RDW 23.6 %      RDW-SD 76.4 fl      MPV 9.5 fL      Platelets 298 10*3/mm3      Neutrophil % 60.8 %      Lymphocyte % 22.4 %      Monocyte % 9.9 %      Eosinophil % 5.4 %      Basophil % 1.0 %      Immature Grans % 0.5 %      Neutrophils, Absolute 6.32 10*3/mm3      Lymphocytes, Absolute 2.32 10*3/mm3      Monocytes, Absolute 1.03 10*3/mm3      Eosinophils, Absolute 0.56 10*3/mm3      Basophils, Absolute 0.10 10*3/mm3      Immature Grans, Absolute 0.05 10*3/mm3      nRBC 0.0 /100 WBC           Imaging Results (Last 24 Hours)       Procedure Component Value Units Date/Time    CT Abdomen Pelvis With Contrast [[id number]] Collected: 03/16/24 1603     Updated: 03/16/24 1613    Narrative:      CT ABDOMEN PELVIS W CONTRAST- 3/16/2024 2:48 PM     HISTORY: nausea, abdo distentsion, hx hepatitis c; D64.9-Anemia,  unspecified; E87.6-Hypokalemia       COMPARISON: Abdominal CT dated 2/20/2023.     DOSE LENGTH PRODUCT: 264.66 mGy.cm. Automated exposure control was also  utilized to decrease patient radiation dose.     TECHNIQUE: Following the intravenous administration of contrast, helical  CT tomographic images of the abdomen and pelvis were acquired.  Multiplanar reformatted images were provided for review.     FINDINGS:  LOWER CHEST: The lung bases are clear. Included mediastinal structures  are unremarkable.     LIVER: Liver cirrhosis with recanalized paraumbilical vein and nodular  surface contour. No discrete liver mass identified on this phase of  contrast. Portal veins are patent. Hepatic veins are compressed.     BILIARY SYSTEM: The gallbladder is unremarkable. No intrahepatic or  extrahepatic ductal dilatation.     PANCREAS: No focal pancreatic lesion identified.      SPLEEN: Normal size.     KIDNEYS AND ADRENALS: Adrenal glands are unremarkable. Kidneys are  unremarkable. The ureters are decompressed and normal in appearance.     RETROPERITONEUM: No mass, lymphadenopathy or hemorrhage.     GI TRACT: The  stomach is nondistended. Small bowel loops are nondilated.   No inflammatory changes are seen along the colon. The appendix is  visualized and unremarkable.     OTHER: No mesenteric adenopathy. Large volume ascites. No  intraperitoneal free air or organized peritoneal collection. There are  multiple splenic varices as well as a splenorenal shunt. No acute bony  abnormality.     PELVIS: No mass lesion, fluid collection or significant lymphadenopathy  is seen in the pelvis. Urinary bladder is completely decompressed.       Impression:         1.  Liver cirrhosis with sequela of portal hypertension including large  volume ascites, recanalized periumbilical vein, splenic varices and  splenorenal shunt formation.  2.  No portal vein thrombus identified. Hepatic veins are compressed but  patent. No obvious liver mass on this phase of contrast.           This report was signed and finalized on 3/16/2024 4:10 PM by Dr Lucas Hogan.             I have personally reviewed and interpreted the radiology studies and ECG obtained at time of admission.     Assessment / Plan   Assessment:   Active Hospital Problems    Diagnosis     Anemia - iron deficiency and chronic diseased        Medical Decision Making  Number and Complexity of problems:   Profound anemia believed to be iron deficient and anemia of chronic disease like malnutrition   cirrhosis with known varices, portal hypertension large volume ascites  Large volume ascites  Probably dietary noncompliant  Hypokalemia        Treatment Plan  The patient will be admitted to my service here at [facility]. Agree with transfusion   Monitor for active bleeding   Paracentesis c/o ER - will send cell count and cx; discussed with MARIUM Lowry  Ppi given  Ceftriaxone given in er - no gross indication on continued use that I can readily see  Will determine in AM if needing continued use  Started on Lasix 10 mg and Spironolactone 20; low salt diet       Conditions and Status    fair     [facility] Data  External documents reviewed: reviewed epic notes  Cardiac tracing (EKG, telemetry) interpretation: -  Radiology interpretation: Reviewed as outlined above  Labs reviewed: y  Any tests that were considered but not ordered: None     Decision rules/scores evaluated (example [id number], Wells, etc):      Discussed with: Patient, , NP Al[facility] ER  Care Planning  Shared decision making: Patient and family with NP Alen  Code status and discussions: Full code    Disposition  Social Determinants of Health that impact treatment or disposition: None identified at this time  Estimated length of stay is to be determined    I confirmed that the patient's advanced care plan is present, code status is documented, and a surrogate decision maker is listed in the patient's medical record.     The patient's surrogate decision maker is     The patient was seen and examined by me on    Electronically signed by Kwasi Upton MD, 03/16/24, 18:05 CDT.

## 2024-03-17 NOTE — DISCHARGE SUMMARY
Patient reportedly left AMA from the ER after the second unit of blood transfusion.  This information obtained from ER nurse Krystal.  The event happened after hour of my shift

## 2024-03-17 NOTE — DISCHARGE INSTRUCTIONS
It is very importantly follow-up with your primary care provider on Monday as previously scheduled.  Please discuss scheduling your outpatient paracentesis, where they will drain the fluid off your abdomen.  It is also important that you have your blood work monitor to assess your blood counts for anemia, your potassium levels.  Please continue follow-up with your primary care provider as well as hepatologist for monitoring of your liver disease.  Should you develop any new or worsening symptoms please return to the ER for further evaluation.

## 2024-03-18 ENCOUNTER — OFFICE VISIT (OUTPATIENT)
Dept: INTERNAL MEDICINE | Facility: CLINIC | Age: 44
End: 2024-03-18
Payer: COMMERCIAL

## 2024-03-18 ENCOUNTER — APPOINTMENT (OUTPATIENT)
Dept: GENERAL RADIOLOGY | Facility: HOSPITAL | Age: 44
End: 2024-03-18
Payer: COMMERCIAL

## 2024-03-18 ENCOUNTER — HOSPITAL ENCOUNTER (EMERGENCY)
Facility: HOSPITAL | Age: 44
Discharge: HOME | End: 2024-03-18
Attending: EMERGENCY MEDICINE
Payer: COMMERCIAL

## 2024-03-18 VITALS
BODY MASS INDEX: 23.46 KG/M2 | HEIGHT: 66 IN | HEART RATE: 108 BPM | TEMPERATURE: 98.8 F | DIASTOLIC BLOOD PRESSURE: 64 MMHG | SYSTOLIC BLOOD PRESSURE: 108 MMHG | OXYGEN SATURATION: 97 % | WEIGHT: 146 LBS

## 2024-03-18 VITALS
WEIGHT: 144 LBS | BODY MASS INDEX: 22.6 KG/M2 | HEIGHT: 67 IN | TEMPERATURE: 98.2 F | OXYGEN SATURATION: 99 % | DIASTOLIC BLOOD PRESSURE: 84 MMHG | RESPIRATION RATE: 18 BRPM | SYSTOLIC BLOOD PRESSURE: 113 MMHG | HEART RATE: 84 BPM

## 2024-03-18 DIAGNOSIS — R18.8 ASCITES OF LIVER: Primary | ICD-10-CM

## 2024-03-18 DIAGNOSIS — Z98.890 S/P ABDOMINAL PARACENTESIS: ICD-10-CM

## 2024-03-18 DIAGNOSIS — D64.9 ANEMIA, UNSPECIFIED TYPE: ICD-10-CM

## 2024-03-18 DIAGNOSIS — D63.8 ANEMIA IN OTHER CHRONIC DISEASES CLASSIFIED ELSEWHERE: ICD-10-CM

## 2024-03-18 DIAGNOSIS — E87.6 HYPOKALEMIA: ICD-10-CM

## 2024-03-18 DIAGNOSIS — N30.90 CYSTITIS: Primary | ICD-10-CM

## 2024-03-18 LAB
ALBUMIN SERPL-MCNC: 2.7 G/DL (ref 3.5–5.2)
ALBUMIN/GLOB SERPL: 0.6 G/DL
ALP SERPL-CCNC: 84 U/L (ref 39–117)
ALT SERPL W P-5'-P-CCNC: 13 U/L (ref 1–33)
ANION GAP SERPL CALCULATED.3IONS-SCNC: 10 MMOL/L (ref 5–15)
APTT PPP: 40.6 SECONDS (ref 24.5–36)
AST SERPL-CCNC: 36 U/L (ref 1–32)
BACTERIA UR QL AUTO: ABNORMAL /HPF
BASOPHILS # BLD AUTO: 0.12 10*3/MM3 (ref 0–0.2)
BASOPHILS NFR BLD AUTO: 1.2 % (ref 0–1.5)
BH BB BLOOD EXPIRATION DATE: NORMAL
BH BB BLOOD EXPIRATION DATE: NORMAL
BH BB BLOOD TYPE BARCODE: 6200
BH BB BLOOD TYPE BARCODE: 6200
BH BB DISPENSE STATUS: NORMAL
BH BB DISPENSE STATUS: NORMAL
BH BB PRODUCT CODE: NORMAL
BH BB PRODUCT CODE: NORMAL
BH BB UNIT NUMBER: NORMAL
BH BB UNIT NUMBER: NORMAL
BILIRUB SERPL-MCNC: 2.5 MG/DL (ref 0–1.2)
BILIRUB UR QL STRIP: ABNORMAL
BUN SERPL-MCNC: 6 MG/DL (ref 6–20)
BUN/CREAT SERPL: 10.2 (ref 7–25)
CALCIUM SPEC-SCNC: 7.9 MG/DL (ref 8.6–10.5)
CHLORIDE SERPL-SCNC: 100 MMOL/L (ref 98–107)
CLARITY UR: CLEAR
CO2 SERPL-SCNC: 25 MMOL/L (ref 22–29)
COLOR UR: ABNORMAL
CREAT SERPL-MCNC: 0.59 MG/DL (ref 0.57–1)
CROSSMATCH INTERPRETATION: NORMAL
CROSSMATCH INTERPRETATION: NORMAL
D-LACTATE SERPL-SCNC: 2.4 MMOL/L (ref 0.5–2)
DEPRECATED RDW RBC AUTO: 68.1 FL (ref 37–54)
EGFRCR SERPLBLD CKD-EPI 2021: 113.4 ML/MIN/1.73
EOSINOPHIL # BLD AUTO: 0.49 10*3/MM3 (ref 0–0.4)
EOSINOPHIL NFR BLD AUTO: 5 % (ref 0.3–6.2)
ERYTHROCYTE [DISTWIDTH] IN BLOOD BY AUTOMATED COUNT: 22.1 % (ref 12.3–15.4)
ETHANOL UR QL: <0.01 %
GLOBULIN UR ELPH-MCNC: 4.6 GM/DL
GLUCOSE SERPL-MCNC: 104 MG/DL (ref 65–99)
GLUCOSE UR STRIP-MCNC: NEGATIVE MG/DL
HCG SERPL QL: NEGATIVE
HCT VFR BLD AUTO: 24 % (ref 34–46.6)
HGB BLD-MCNC: 7.2 G/DL (ref 12–15.9)
HGB UR QL STRIP.AUTO: NEGATIVE
HYALINE CASTS UR QL AUTO: ABNORMAL /LPF
IMM GRANULOCYTES # BLD AUTO: 0.07 10*3/MM3 (ref 0–0.05)
IMM GRANULOCYTES NFR BLD AUTO: 0.7 % (ref 0–0.5)
INR PPP: 1.6 (ref 0.91–1.09)
KETONES UR QL STRIP: ABNORMAL
LEUKOCYTE ESTERASE UR QL STRIP.AUTO: ABNORMAL
LIPASE SERPL-CCNC: 81 U/L (ref 13–60)
LYMPHOCYTES # BLD AUTO: 1.88 10*3/MM3 (ref 0.7–3.1)
LYMPHOCYTES NFR BLD AUTO: 19.1 % (ref 19.6–45.3)
MAGNESIUM SERPL-MCNC: 1.9 MG/DL (ref 1.6–2.6)
MCH RBC QN AUTO: 26.3 PG (ref 26.6–33)
MCHC RBC AUTO-ENTMCNC: 30 G/DL (ref 31.5–35.7)
MCV RBC AUTO: 87.6 FL (ref 79–97)
MONOCYTES # BLD AUTO: 0.97 10*3/MM3 (ref 0.1–0.9)
MONOCYTES NFR BLD AUTO: 9.9 % (ref 5–12)
MUCOUS THREADS URNS QL MICRO: ABNORMAL /HPF
NEUTROPHILS NFR BLD AUTO: 6.29 10*3/MM3 (ref 1.7–7)
NEUTROPHILS NFR BLD AUTO: 64.1 % (ref 42.7–76)
NITRITE UR QL STRIP: POSITIVE
NRBC BLD AUTO-RTO: 0 /100 WBC (ref 0–0.2)
NT-PROBNP SERPL-MCNC: 143 PG/ML (ref 0–450)
PH UR STRIP.AUTO: 5.5 [PH] (ref 5–8)
PLATELET # BLD AUTO: 266 10*3/MM3 (ref 140–450)
PMV BLD AUTO: 9.7 FL (ref 6–12)
POTASSIUM SERPL-SCNC: 3.1 MMOL/L (ref 3.5–5.2)
PROT SERPL-MCNC: 7.3 G/DL (ref 6–8.5)
PROT UR QL STRIP: ABNORMAL
PROTHROMBIN TIME: 19.6 SECONDS (ref 11.8–14.8)
RBC # BLD AUTO: 2.74 10*6/MM3 (ref 3.77–5.28)
RBC # UR STRIP: ABNORMAL /HPF
REF LAB TEST METHOD: ABNORMAL
SODIUM SERPL-SCNC: 135 MMOL/L (ref 136–145)
SP GR UR STRIP: >1.03 (ref 1–1.03)
SQUAMOUS #/AREA URNS HPF: ABNORMAL /HPF
T4 FREE SERPL-MCNC: 1.18 NG/DL (ref 0.93–1.7)
TSH SERPL DL<=0.05 MIU/L-ACNC: 1.35 UIU/ML (ref 0.27–4.2)
UNIT  ABO: NORMAL
UNIT  ABO: NORMAL
UNIT  RH: NORMAL
UNIT  RH: NORMAL
UROBILINOGEN UR QL STRIP: ABNORMAL
WBC # UR STRIP: ABNORMAL /HPF
WBC NRBC COR # BLD AUTO: 9.82 10*3/MM3 (ref 3.4–10.8)

## 2024-03-18 PROCEDURE — 83690 ASSAY OF LIPASE: CPT

## 2024-03-18 PROCEDURE — 80050 GENERAL HEALTH PANEL: CPT

## 2024-03-18 PROCEDURE — 36415 COLL VENOUS BLD VENIPUNCTURE: CPT

## 2024-03-18 PROCEDURE — 96365 THER/PROPH/DIAG IV INF INIT: CPT

## 2024-03-18 PROCEDURE — 71045 X-RAY EXAM CHEST 1 VIEW: CPT

## 2024-03-18 PROCEDURE — 84439 ASSAY OF FREE THYROXINE: CPT

## 2024-03-18 PROCEDURE — 85610 PROTHROMBIN TIME: CPT

## 2024-03-18 PROCEDURE — 99283 EMERGENCY DEPT VISIT LOW MDM: CPT

## 2024-03-18 PROCEDURE — 25010000002 CEFTRIAXONE PER 250 MG

## 2024-03-18 PROCEDURE — 81001 URINALYSIS AUTO W/SCOPE: CPT

## 2024-03-18 PROCEDURE — 83735 ASSAY OF MAGNESIUM: CPT

## 2024-03-18 PROCEDURE — 82077 ASSAY SPEC XCP UR&BREATH IA: CPT

## 2024-03-18 PROCEDURE — 85730 THROMBOPLASTIN TIME PARTIAL: CPT

## 2024-03-18 PROCEDURE — 83605 ASSAY OF LACTIC ACID: CPT

## 2024-03-18 PROCEDURE — 83880 ASSAY OF NATRIURETIC PEPTIDE: CPT

## 2024-03-18 PROCEDURE — 84703 CHORIONIC GONADOTROPIN ASSAY: CPT

## 2024-03-18 RX ORDER — POTASSIUM CHLORIDE 20 MEQ/1
40 TABLET, EXTENDED RELEASE ORAL ONCE
Status: COMPLETED | OUTPATIENT
Start: 2024-03-18 | End: 2024-03-18

## 2024-03-18 RX ORDER — ALBUMIN (HUMAN) 12.5 G/50ML
75 SOLUTION INTRAVENOUS ONCE
Status: DISCONTINUED | OUTPATIENT
Start: 2024-03-18 | End: 2024-03-18 | Stop reason: HOSPADM

## 2024-03-18 RX ORDER — ALBUMIN (HUMAN) 12.5 G/50ML
37.5 SOLUTION INTRAVENOUS ONCE
Status: DISCONTINUED | OUTPATIENT
Start: 2024-03-18 | End: 2024-03-18 | Stop reason: HOSPADM

## 2024-03-18 RX ORDER — ALBUMIN (HUMAN) 12.5 G/50ML
50 SOLUTION INTRAVENOUS ONCE
Status: DISCONTINUED | OUTPATIENT
Start: 2024-03-18 | End: 2024-03-18 | Stop reason: HOSPADM

## 2024-03-18 RX ORDER — CEFDINIR 300 MG/1
300 CAPSULE ORAL 2 TIMES DAILY
Qty: 14 CAPSULE | Refills: 0 | Status: SHIPPED | OUTPATIENT
Start: 2024-03-18 | End: 2024-03-25

## 2024-03-18 RX ORDER — ALBUMIN (HUMAN) 12.5 G/50ML
100 SOLUTION INTRAVENOUS ONCE
Status: DISCONTINUED | OUTPATIENT
Start: 2024-03-18 | End: 2024-03-18 | Stop reason: HOSPADM

## 2024-03-18 RX ORDER — ALBUMIN (HUMAN) 12.5 G/50ML
62.5 SOLUTION INTRAVENOUS ONCE
Status: DISCONTINUED | OUTPATIENT
Start: 2024-03-18 | End: 2024-03-18 | Stop reason: HOSPADM

## 2024-03-18 RX ORDER — ALBUMIN (HUMAN) 12.5 G/50ML
112.5 SOLUTION INTRAVENOUS ONCE
Status: DISCONTINUED | OUTPATIENT
Start: 2024-03-18 | End: 2024-03-18 | Stop reason: HOSPADM

## 2024-03-18 RX ORDER — SODIUM CHLORIDE 0.9 % (FLUSH) 0.9 %
10 SYRINGE (ML) INJECTION AS NEEDED
Status: DISCONTINUED | OUTPATIENT
Start: 2024-03-18 | End: 2024-03-18 | Stop reason: HOSPADM

## 2024-03-18 RX ORDER — ALBUMIN (HUMAN) 12.5 G/50ML
87.5 SOLUTION INTRAVENOUS ONCE
Status: DISCONTINUED | OUTPATIENT
Start: 2024-03-18 | End: 2024-03-18 | Stop reason: HOSPADM

## 2024-03-18 RX ADMIN — POTASSIUM CHLORIDE 40 MEQ: 1500 TABLET, EXTENDED RELEASE ORAL at 21:22

## 2024-03-18 RX ADMIN — SODIUM CHLORIDE 1000 MG: 900 INJECTION INTRAVENOUS at 18:20

## 2024-03-18 NOTE — PROGRESS NOTES
Chief Complaint   Patient presents with    Abdominal Pain     Pt was in ER on 3/16. She states she is needing fluid drained from her stomach         History:  Rachana Smart is a 44 y.o. female who presents today for evaluation of the above problems.      Abdominal Pain  This is a recurrent problem. The current episode started in the past 7 days. The onset quality is gradual. The problem occurs constantly. The problem has been worse. The pain is located in the generalized abdominal region. Associated symptoms include arthralgias and diarrhea. Pertinent negatives include no fever, nausea or vomiting.       Ms. Smart presents to the office today to follow up from the emergency department on 3/16/2024. She has a history of liver disease, last week she began having abdominal distension, this continued to worsen until she went to ED. In the last month she has had increased stress and has not been following low sugar, low salt diet, which she feels has contributed to abdominal distension. In ED labs indicated severe iron deficiency anemia, hypocalcemia, and hypokalemia. She was also diagnosed with ascites,  portal hypertension, and splenic varices. While in ER, hospital admission for paracentesis and to further investigate anemia was discussed with patient. Currently she is still having significant ascites and jaundice. Still denies blood in stools or black stools, nosebleeds, menorrhagia, hematemesis, or blood in urine. She did have a GI bleed in January, 2024. She does have some shortness of air related to pressure from abdominal distension. Ms. Smart has been seeing a gastroenterologist, Dr. Jason Ricardo, in [place] for this condition, her last appointment was a virtual visit in August of 2023.       ROS:  Review of Systems   Constitutional:  Positive for activity change, appetite change and fatigue. Negative for fever.   Respiratory:  Positive for shortness of breath. Negative for chest tightness.     Cardiovascular:  Negative for chest pain, palpitations and leg swelling.   Gastrointestinal:  Positive for abdominal pain and diarrhea. Negative for blood in stool, nausea and vomiting.   Genitourinary:  Negative for difficulty urinating, menstrual problem and vaginal bleeding.   Musculoskeletal:  Positive for arthralgias.   Neurological:  Negative for dizziness, syncope, weakness and light-headedness.   Hematological:  Bruises/bleeds easily.   Psychiatric/Behavioral:  Negative for self-injury and suicidal ideas.          Current Outpatient Medications:     multivitamin with minerals tablet tablet, Take 1 tablet by mouth Daily., Disp: 30 tablet, Rfl: 0    pantoprazole (Protonix) 40 MG EC tablet, Take 1 tablet by mouth Daily., Disp: 30 tablet, Rfl: 0    thiamine (VITAMIN B1) 100 MG tablet, Take 1 tablet by mouth Daily., Disp: 30 tablet, Rfl: 0    cefdinir (OMNICEF) 300 MG capsule, Take 1 capsule by mouth 2 (Two) Times a Day for 7 days., Disp: 14 capsule, Rfl: 0  No current facility-administered medications for this visit.    Lab Results   Component Value Date    GLUCOSE 104 (H) 03/18/2024    BUN 6 03/18/2024    CREATININE 0.59 03/18/2024    EGFR 113.4 03/18/2024    BCR 10.2 03/18/2024    K 3.1 (L) 03/18/2024    CO2 25.0 03/18/2024    CALCIUM 7.9 (L) 03/18/2024    ALBUMIN 2.7 (L) 03/18/2024    BILITOT 2.5 (H) 03/18/2024    AST 36 (H) 03/18/2024    ALT 13 03/18/2024       WBC   Date Value Ref Range Status   03/18/2024 9.82 3.40 - 10.80 10*3/mm3 Final     RBC   Date Value Ref Range Status   03/18/2024 2.74 (L) 3.77 - 5.28 10*6/mm3 Final     Hemoglobin   Date Value Ref Range Status   03/18/2024 7.2 (L) 12.0 - 15.9 g/dL Final     Hematocrit   Date Value Ref Range Status   03/18/2024 24.0 (L) 34.0 - 46.6 % Final     MCV   Date Value Ref Range Status   03/18/2024 87.6 79.0 - 97.0 fL Final     MCH   Date Value Ref Range Status   03/18/2024 26.3 (L) 26.6 - 33.0 pg Final     [facility]   Date Value Ref Range Status   03/18/2024  "30.0 (L) 31.5 - 35.7 g/dL Final     RDW   Date Value Ref Range Status   03/18/2024 22.1 (H) 12.3 - 15.4 % Final     RDW-SD   Date Value Ref Range Status   03/18/2024 68.1 (H) 37.0 - 54.0 fl Final     MPV   Date Value Ref Range Status   03/18/2024 9.7 6.0 - 12.0 fL Final     Platelets   Date Value Ref Range Status   03/18/2024 266 140 - 450 10*3/mm3 Final     Neutrophil %   Date Value Ref Range Status   03/18/2024 64.1 42.7 - 76.0 % Final     Lymphocyte %   Date Value Ref Range Status   03/18/2024 19.1 (L) 19.6 - 45.3 % Final     Monocyte %   Date Value Ref Range Status   03/18/2024 9.9 5.0 - 12.0 % Final     Eosinophil %   Date Value Ref Range Status   03/18/2024 5.0 0.3 - 6.2 % Final     Basophil %   Date Value Ref Range Status   03/18/2024 1.2 0.0 - 1.5 % Final     Immature Grans %   Date Value Ref Range Status   03/18/2024 0.7 (H) 0.0 - 0.5 % Final     Neutrophils, Absolute   Date Value Ref Range Status   03/18/2024 6.29 1.70 - 7.00 10*3/mm3 Final     Lymphocytes, Absolute   Date Value Ref Range Status   03/18/2024 1.88 0.70 - 3.10 10*3/mm3 Final     Monocytes, Absolute   Date Value Ref Range Status   03/18/2024 0.97 (H) 0.10 - 0.90 10*3/mm3 Final     Eosinophils, Absolute   Date Value Ref Range Status   03/18/2024 0.49 (H) 0.00 - 0.40 10*3/mm3 Final     Basophils, Absolute   Date Value Ref Range Status   03/18/2024 0.12 0.00 - 0.20 10*3/mm3 Final     Immature Grans, Absolute   Date Value Ref Range Status   03/18/2024 0.07 (H) 0.00 - 0.05 10*3/mm3 Final     nRBC   Date Value Ref Range Status   03/18/2024 0.0 0.0 - 0.2 /100 WBC Final         OBJECTIVE:  Visit Vitals  /64 (BP Location: Right arm, Patient Position: Sitting, Cuff Size: Adult)   Pulse 108   Temp 98.8 °F (37.1 °C) (Oral)   Ht 167.6 cm (66\")   Wt 66.2 kg (146 lb)   SpO2 97%   BMI 23.57 kg/m²      Physical Exam  Constitutional:       Appearance: Normal appearance. She is ill-appearing.   Eyes:      Pupils: Pupils are equal, round, and reactive to " light.   Cardiovascular:      Rate and Rhythm: Normal rate and regular rhythm.      Pulses: Normal pulses.      Heart sounds: Normal heart sounds.   Pulmonary:      Effort: Pulmonary effort is normal.      Breath sounds: Normal breath sounds.   Abdominal:      General: Bowel sounds are normal. There is distension.      Tenderness: There is abdominal tenderness.   Skin:     General: Skin is warm and dry.      Coloration: Skin is jaundiced.      Findings: Bruising (left eye) present.   Neurological:      Mental Status: She is alert and oriented to person, place, and time.   Psychiatric:         Mood and Affect: Mood normal.         Behavior: Behavior normal.         Thought Content: Thought content normal.         Judgment: Judgment normal.         Assessment/Plan    Diagnoses and all orders for this visit:    1. Ascites of liver (Primary)    2. Anemia in other chronic diseases classified elsewhere      Because of the severity of anemia and ascites, I recommend the patient return to the Emergency Department for Hospital admission.Spoke with ER to give report. Patient left the office to return to the Emergency department for continuation of care. I also recommend continuing low salt and low sugar diet and follow up with gastroenterologist, Dr. Ricardo.       Return Hospital follow up.      PRIYANK Turner  15:21 CDT  3/19/2024   Electronically signed  Answers submitted by the patient for this visit:  Other (Submitted on 3/18/2024)  Please describe your symptoms.: Follow up from ER visit.  Have you had these symptoms before?: Yes  How long have you been having these symptoms?: 1-2 weeks  Primary Reason for Visit (Submitted on 3/18/2024)  What is the primary reason for your visit?: Other

## 2024-03-18 NOTE — ED PROVIDER NOTES
Subjective   History of Present Illness  Patient is a 44-year-old female that presents to the emergency department sent by her primary care provider for further evaluation and treatment of abdominal distention that has been ongoing for the last 1.5 weeks.  Past medical history significant for hepatitis C, hypertension, alcoholism, asthma, anxiety, previous .  Patient is followed by provider and level for hepatitis C.  She does have a history of alcoholism and reports her last drink was greater than several years ago.  She denies any use of marijuana or recreational/illicit drug use.  She does intermittently smoke tobacco products.  Patient reports she was seen in this ED on 3/16/2024 for similar complaints resulted in admission to the hospital but ultimately ended up leaving AGAINST MEDICAL ADVICE on that date as she did not want to to this facility and did not want to receive a paracentesis.  Patient received 2 units of packed red blood cells while in the ED.  Patient states following ED encounter she followed up with her primary care provider today and was advised to return to the ED due to continued abdominal distention and the need for paracentesis.  Patient states she had a similar episode approximately a year ago when she had to receive a paracentesis at that time.  She states that she has had no issues with abdominal distention or fluid retention until approximately 1.5 weeks ago.  Patient denies any hematemesis, black, bloody, or tarry stools, hematuria, or vaginal bleeding.  She states that she does bruise easily and does have a black-eyed noted on the left side due to an injury from her dog recently.  Patient denies any fevers, body aches, chills, cough, or congestion.  She denies any new or worsening symptoms since previous ED encounter.      Review of Systems   Gastrointestinal:  Positive for abdominal distention, abdominal pain and nausea.   Skin:  Positive for wound.        Black eye noted to  the left eye   Hematological:  Bruises/bleeds easily.   All other systems reviewed and are negative.      Past Medical History:   Diagnosis Date    Alcoholism in recovery     Allergic     Anxiety     Anxiety     Asthma     Gestational diabetes     Heart murmur     Hepatitis-C     History of ear infections     Hypertension     Sinusitis        Allergies   Allergen Reactions    Codeine Rash and GI Intolerance       Past Surgical History:   Procedure Laterality Date     SECTION      ENDOSCOPY N/A 2024    Procedure: ESOPHAGOGASTRODUODENOSCOPY WITH ANESTHESIA;  Surgeon: Abdifatah Mauro MD;  Location: [facility] ENDOSCOPY;  Service: Gastroenterology;  Laterality: N/A;  pre op: upper GI bleed  post op:varices,    MOUTH SURGERY         Family History   Problem Relation Age of Onset    Diabetes Mother     Heart disease Father     Diabetes Father     Stroke Father        Social History     Socioeconomic History    Marital status:    Tobacco Use    Smoking status: Every Day     Current packs/day: 0.50     Average packs/day: 0.5 packs/day for 10.0 years (5.0 ttl pk-yrs)     Types: Cigarettes    Smokeless tobacco: Never   Vaping Use    Vaping status: Never Used   Substance and Sexual Activity    Alcohol use: Not Currently     Alcohol/week: 10.0 standard drinks of alcohol     Types: 10 Shots of liquor per week     Comment: vodka a few times a week    Drug use: Never    Sexual activity: Yes     Partners: Male     Birth control/protection: None           Objective   Physical Exam  Vitals and nursing note reviewed.   Constitutional:       Appearance: Normal appearance. She is ill-appearing.      Comments: Chronically ill-appearing.  In no acute distress.    HENT:      Head: Normocephalic and atraumatic.      Comments: Bruising noted to the left inferior periorbital region.  No evidence of acute injury.  No conjunctival injury noted or erythema noted.     Right Ear: External ear normal.      Left Ear: External ear  normal.      Nose: Nose normal.      Mouth/Throat:      Mouth: Mucous membranes are moist. No injury, lacerations, oral lesions or angioedema.      Tongue: No lesions. Tongue does not deviate from midline.      Palate: No lesions.      Pharynx: Oropharynx is clear. Uvula midline. No oropharyngeal exudate or posterior oropharyngeal erythema.   Eyes:      General: Scleral icterus present.      Extraocular Movements: Extraocular movements intact.      Conjunctiva/sclera: Conjunctivae normal.      Pupils: Pupils are equal, round, and reactive to light.   Cardiovascular:      Rate and Rhythm: Normal rate and regular rhythm.      Pulses: Normal pulses.      Heart sounds: Normal heart sounds.   Pulmonary:      Effort: Pulmonary effort is normal. No respiratory distress.      Breath sounds: Normal breath sounds. No wheezing.   Chest:      Chest wall: No tenderness.   Abdominal:      General: Abdomen is protuberant. Bowel sounds are normal. There is distension.      Tenderness: There is no abdominal tenderness. There is no right CVA tenderness, left CVA tenderness, guarding or rebound.   Musculoskeletal:         General: Normal range of motion.      Cervical back: Normal range of motion and neck supple.      Right lower le+ Pitting Edema present.      Left lower le+ Pitting Edema present.   Skin:     General: Skin is warm and dry.      Capillary Refill: Capillary refill takes less than 2 seconds.      Coloration: Skin is jaundiced.   Neurological:      General: No focal deficit present.      Mental Status: She is alert and oriented to person, place, and time. Mental status is at baseline.   Psychiatric:         Mood and Affect: Mood normal.         Behavior: Behavior normal.         Thought Content: Thought content normal.         Judgment: Judgment normal.       Labs Reviewed   COMPREHENSIVE METABOLIC PANEL - Abnormal; Notable for the following components:       Result Value    Glucose 104 (*)     Sodium 135 (*)      Potassium 3.1 (*)     Calcium 7.9 (*)     Albumin 2.7 (*)     AST (SGOT) 36 (*)     Total Bilirubin 2.5 (*)     All other components within normal limits    Narrative:     GFR Normal >60  Chronic Kidney Disease <60  Kidney Failure <15     PROTIME-INR - Abnormal; Notable for the following components:    Protime 19.6 (*)     INR 1.60 (*)     All other components within normal limits   APTT - Abnormal; Notable for the following components:    PTT 40.6 (*)     All other components within normal limits   LIPASE - Abnormal; Notable for the following components:    Lipase 81 (*)     All other components within normal limits   URINALYSIS W/ CULTURE IF INDICATED - Abnormal; Notable for the following components:    Color, UA Orange (*)     Specific Gravity, UA >1.030 (*)     Ketones, UA Trace (*)     Bilirubin, UA Small (1+) (*)     Protein, UA Trace (*)     Leuk Esterase, UA Small (1+) (*)     Nitrite, UA Positive (*)     All other components within normal limits    Narrative:     Dipstick results may be inaccurate due to color interference.   CBC WITH AUTO DIFFERENTIAL - Abnormal; Notable for the following components:    RBC 2.74 (*)     Hemoglobin 7.2 (*)     Hematocrit 24.0 (*)     MCH 26.3 (*)     MCHC 30.0 (*)     RDW 22.1 (*)     RDW-SD 68.1 (*)     Lymphocyte % 19.1 (*)     Immature Grans % 0.7 (*)     Monocytes, Absolute 0.97 (*)     Eosinophils, Absolute 0.49 (*)     Immature Grans, Absolute 0.07 (*)     All other components within normal limits   URINALYSIS, MICROSCOPIC ONLY - Abnormal; Notable for the following components:    WBC, UA 3-5 (*)     Bacteria, UA Trace (*)     Mucus, UA Moderate/2+ (*)     All other components within normal limits   LACTIC ACID, PLASMA - Abnormal; Notable for the following components:    Lactate 2.4 (*)     All other components within normal limits   MAGNESIUM - Normal   HCG, SERUM, QUALITATIVE - Normal   TSH - Normal   T4, FREE - Normal    Narrative:     Results may be falsely  increased if patient taking Biotin.     BNP (IN-HOUSE) - Normal    Narrative:     This assay is used as an aid in the diagnosis of individuals suspected of having heart failure. It can be used as an aid in the diagnosis of acute decompensated heart failure (ADHF) in patients presenting with signs and symptoms of ADHF to the emergency department (ED). In addition, NT-proBNP of <300 pg/mL indicates ADHF is not likely.    Age Range Result Interpretation  NT-proBNP Concentration (pg/mL:      <50             Positive            >450                   Gray                 300-450                    Negative             <300    50-75           Positive            >900                  Gray                300-900                  Negative            <300      >75             Positive            >1800                  Gray                300-1800                  Negative            <300   ETHANOL    Narrative:     Not for legal purposes. Chain of Custody not followed.    CBC AND DIFFERENTIAL    Narrative:     The following orders were created for panel order CBC & Differential.  Procedure                               Abnormality         Status                     ---------                               -----------         ------                     CBC Auto Differential[[id number]]        Abnormal            Final result                 Please view results for these tests on the individual orders.      XR Chest 1 View   Final Result   No acute findings.       This report was signed and finalized on 3/18/2024 6:50 PM by Dr. Jared Sumner MD.               Procedures           ED Course  ED Course as of 03/19/24 1903   Mon Mar 18, 2024   1904 Patient is in agreement with paracentesis. Dr Morrell will do procedure.  [KF]   2042 Calcium will correct because of hypoalbuminemia.  Potassium will be replaced [TS]   2052 2200ml output  [KF]      ED Course User Index  [KF] Naida Gaines, APRN  [TS] Ray Morrell MD                                              Medical Decision Making  Rachana Smart is a 44 y.o. female who presents to the ED sent by her primary care provider for further evaluation and treatment of abdominal distention that has been ongoing for the last 1.5 weeks.  Past medical history significant for hepatitis C, hypertension, alcoholism, asthma, anxiety, previous .  Patient is followed by provider and level for hepatitis C.  She does have a history of alcoholism and reports her last drink was greater than several years ago.  She denies any use of marijuana or recreational/illicit drug use.  She does intermittently smoke tobacco products.  Patient reports she was seen in this ED on 3/16/2024 for similar complaints resulted in admission to the hospital but ultimately ended up leaving AGAINST MEDICAL ADVICE on that date as she did not want to to this facility and did not want to receive a paracentesis.  Patient received 2 units of packed red blood cells while in the ED.  Patient states following ED encounter she followed up with her primary care provider today and was advised to return to the ED due to continued abdominal distention and the need for paracentesis.  Patient states she had a similar episode approximately a year ago when she had to receive a paracentesis at that time.  She states that she has had no issues with abdominal distention or fluid retention until approximately 1.5 weeks ago.  Patient denies any hematemesis, black, bloody, or tarry stools, hematuria, or vaginal bleeding.  She states that she does bruise easily and does have a black-eyed noted on the left side due to an injury from her dog recently.  Patient denies any fevers, body aches, chills, cough, or congestion.  She denies any new or worsening symptoms since previous ED encounter.    Patient was chronically ill-appearing on arrival. No acute distress was noted.  Vital signs stable.     Past medical history, surgical history, and  medication regimen reviewed.     Previous notes, labs, imaging, and more reviewed.    Patient's presentation raises suspicion for differentials including, but not limited to, cirrhosis, liver failure, anemia, CARLITOS, urinary tract infection.     Please refer to above section of note for lab and imaging results that were reviewed and interpreted by radiology as well as attending physician.     Medications administered,   cefTRIAXone (ROCEPHIN) 1,000 mg in sodium chloride 0.9 % 100 mL MBP (0 mg Intravenous Stopped 3/18/24 1925)  potassium chloride (KLOR-CON M20) CR tablet 40 mEq (40 mEq Oral Given 3/18/24 2122)     Attending physician, Dr. Morrell performed bedside paracentesis.  Patient tolerated procedure well with no immediate complications.  Patient had total output of 2200ml.     Given findings described above, patient's presentation is likely consistent with hypokalemia, urinary tract infection, anemia of chronic disease, and status post abdominal paracentesis.     I had an in-depth discussion with the patient as well as significant other present at bedside regarding all lab and imaging results completed during today's ED encounter.  Discussed that patient will be discharged with cefdinir that she will take twice daily for 7 days for urinary tract infection.  Also discussed that patient will need to establish care with a gastroenterologist. I answered all the questions regarding the emergency department evaluation, diagnosis, and treatment plan in plain and simple language that was understandable. We discussed that due to always having some diagnostic uncertainty while in the ER, there is always a chance that symptoms may change or new symptoms may reveal themselves after being discharged. Because of this, I stressed the importance of Rachana following up with their PCP and GI. Patient informed that appointment will need to be done by calling their office to set up an appointment within the next few days or as soon as  reasonably possible so that the symptoms can be re-evaluated for improvement or for any other questions. I also gave Rachana common sense return precautions and prompted patient to return to the emergency department within 24 - 48hrs if there are any new, worsening, or concerning symptoms. The patient verbalized understanding of the discharge instructions and agreed with them. Rachana was discharged in stable condition.     Dragon disclaimer:  Parts of this note may be an electronic transcription/translation of spoken language to printed text using the Dragon dictation system.       Problems Addressed:  Anemia, unspecified type: complicated acute illness or injury  Cystitis: complicated acute illness or injury  Hypokalemia: complicated acute illness or injury  S/P abdominal paracentesis: complicated acute illness or injury    Amount and/or Complexity of Data Reviewed  Labs: ordered.  Radiology: ordered.    Risk  Prescription drug management.        Final diagnoses:   Cystitis   Hypokalemia   S/P abdominal paracentesis   Anemia, unspecified type       ED Disposition  ED Disposition       ED Disposition   Discharge    Condition   Stable    Comment   --               INA Holloway MD  [address] [facility] [address]  [address]  [place] [id number] [phone]    Schedule an appointment as soon as possible for a visit       [facility] EMERGENCY DEPARTMENT  [address] [id number] [phone]    If symptoms worsen         Medication List        New Prescriptions      cefdinir 300 MG capsule  Commonly known as: OMNICEF  Take 1 capsule by mouth 2 (Two) Times a Day for 7 days.               Where to Get Your Medications        These medications were sent to [facility] [address] [address] [address] [facility] [phone] [facility] [phone]   [address] [place], [place] [id number]      Phone: [phone]   cefdinir 300 MG capsule            Naida Gaines, APRN  03/19/24  1903

## 2024-03-19 NOTE — DISCHARGE INSTRUCTIONS
It was very nice to meet you, Rachana. Thank you for allowing us to take care of you today at [facility].    Today you were seen in the emergency department for your symptoms. Please understand that an ER evaluation is just the start of your evaluation. We do the best we can, but we are often unable to fully find what is causing your symptoms from one evaluation.  Because of this, the goal is to determine whether you need to be evaluated in the hospital or if it is safe for you to go home and see other doctors provided such as primary care physicians or specialist on an outpatient basis.     Like we discussed, I strongly urge that you follow up with your primary care doctor and GI. Please call their office to set up an appointment as soon as possible so that you can be re-evaluated for improvement in your symptoms or for any other questions.  I have provided the information needed, including phone number, to call to set up an appointment below in these discharge papers.     Educational material has also been provided in the following pages regarding what we have discussed today.     MEDICATIONS PRESCRIBED: Cefdinir twice daily for the next 7 days for urinary tract infection    Please return to the emergency room within 12-48 hours if you experience symptoms such as the following:   Fever, chills, chest pain or shortness of breath, pain with inspiration/expiration, pain that travels to your arms, neck or back, nausea, vomiting, severe headache, tearing pain in your chest, dizziness, feel as though you are about to pass out, OR if you have any worsening symptoms, or any other concerns.

## 2024-03-19 NOTE — ED PROVIDER NOTES
Subjective   History of Present Illness    Review of Systems    Past Medical History:   Diagnosis Date    Alcoholism in recovery     Allergic     Anxiety     Anxiety     Asthma     Gestational diabetes     Heart murmur     Hepatitis-C     History of ear infections     Hypertension     Sinusitis        Allergies   Allergen Reactions    Codeine Rash and GI Intolerance       Past Surgical History:   Procedure Laterality Date     SECTION      ENDOSCOPY N/A 2024    Procedure: ESOPHAGOGASTRODUODENOSCOPY WITH ANESTHESIA;  Surgeon: Abdifatah Mauro MD;  Location: [facility] ENDOSCOPY;  Service: Gastroenterology;  Laterality: N/A;  pre op: upper GI bleed  post op:varices,    MOUTH SURGERY         Family History   Problem Relation Age of Onset    Diabetes Mother     Heart disease Father     Diabetes Father     Stroke Father        Social History     Socioeconomic History    Marital status:    Tobacco Use    Smoking status: Every Day     Current packs/day: 0.50     Average packs/day: 0.5 packs/day for 10.0 years (5.0 ttl pk-yrs)     Types: Cigarettes    Smokeless tobacco: Never   Vaping Use    Vaping status: Never Used   Substance and Sexual Activity    Alcohol use: Not Currently     Alcohol/week: 10.0 standard drinks of alcohol     Types: 10 Shots of liquor per week     Comment: vodka a few times a week    Drug use: Never    Sexual activity: Yes     Partners: Male     Birth control/protection: None           Objective   Physical Exam    Paracentesis Without Radiology Bedside    Date/Time: 3/18/2024 7:52 PM    Performed by: Ray Morrell MD  Authorized by: Ray Morrell MD    Consent:     Consent obtained:  Written    Consent given by:  Patient    Risks, benefits, and alternatives were discussed: yes      Risks discussed:  Bleeding, bowel perforation, infection and pain    Alternatives discussed:  Delayed treatment  Universal protocol:     Immediately prior to procedure, a time out was called: yes      Patient  identity confirmed:  Hospital-assigned identification number  Pre-procedure details:     Procedure purpose:  Therapeutic  Anesthesia:     Anesthesia method:  Local infiltration    Local anesthetic:  Lidocaine 1% w/o epi  Procedure details:     Needle gauge:  22    Ultrasound guidance: yes      Puncture site:  R lower quadrant    Fluid removed amount:  2200    Fluid appearance:  Shahla    Dressing:  4x4 sterile gauze  Post-procedure details:     Procedure completion:  Tolerated             ED Course  ED Course as of 03/19/24 2143   Mon Mar 18, 2024   1904 Patient is in agreement with paracentesis. Dr Morrell will do procedure.  [KF]   2042 Calcium will correct because of hypoalbuminemia.  Potassium will be replaced [TS]   2052 2200ml output  [KF]      ED Course User Index  [KF] Naida Gaines, APRN  [TS] Ray Morrell MD                                             Medical Decision Making  Problems Addressed:  Anemia, unspecified type: complicated acute illness or injury  Cystitis: complicated acute illness or injury  Hypokalemia: complicated acute illness or injury  S/P abdominal paracentesis: complicated acute illness or injury    Amount and/or Complexity of Data Reviewed  Labs: ordered.  Radiology: ordered.    Risk  Prescription drug management.        Final diagnoses:   Cystitis   Hypokalemia   S/P abdominal paracentesis   Anemia, unspecified type       ED Disposition  ED Disposition       ED Disposition   Discharge    Condition   Stable    Comment   --               INA Holloway MD  [address]  [address]  [facility] [place] [facility] [address] [phone]    Schedule an appointment as soon as possible for a visit       [facility] EMERGENCY DEPARTMENT  [address] [place] [id number] [phone]    If symptoms worsen         Medication List        New Prescriptions      cefdinir 300 MG capsule  Commonly known as: OMNICEF  Take 1 capsule by mouth 2 (Two) Times a Day for 7  days.               Where to Get Your Medications        These medications were sent to [facility] [address] [facility] - [phone]  - [phone] [address] [address]      Phone: [phone]   cefdinir 300 MG capsule            Ray Morrell MD  03/19/24 1723

## 2024-03-21 LAB
BACTERIA SPEC AEROBE CULT: NORMAL
BACTERIA SPEC AEROBE CULT: NORMAL

## 2024-04-19 DIAGNOSIS — R18.8 CIRRHOSIS OF LIVER WITH ASCITES, UNSPECIFIED HEPATIC CIRRHOSIS TYPE: Primary | ICD-10-CM

## 2024-04-19 DIAGNOSIS — K74.60 CIRRHOSIS OF LIVER WITH ASCITES, UNSPECIFIED HEPATIC CIRRHOSIS TYPE: Primary | ICD-10-CM

## 2024-04-19 RX ORDER — FUROSEMIDE 40 MG/1
40 TABLET ORAL 2 TIMES DAILY
Qty: 30 TABLET | Refills: 2 | Status: SHIPPED | OUTPATIENT
Start: 2024-04-19 | End: 2024-04-22

## 2024-04-19 RX ORDER — SPIRONOLACTONE 25 MG/1
25 TABLET ORAL DAILY
Qty: 30 TABLET | Refills: 5 | Status: SHIPPED | OUTPATIENT
Start: 2024-04-19

## 2024-04-22 ENCOUNTER — OFFICE VISIT (OUTPATIENT)
Dept: INTERNAL MEDICINE | Facility: CLINIC | Age: 44
End: 2024-04-22
Payer: COMMERCIAL

## 2024-04-22 ENCOUNTER — LAB (OUTPATIENT)
Dept: LAB | Facility: HOSPITAL | Age: 44
End: 2024-04-22
Payer: COMMERCIAL

## 2024-04-22 VITALS
TEMPERATURE: 97.3 F | WEIGHT: 140.3 LBS | HEART RATE: 105 BPM | DIASTOLIC BLOOD PRESSURE: 70 MMHG | OXYGEN SATURATION: 96 % | SYSTOLIC BLOOD PRESSURE: 110 MMHG | HEIGHT: 67 IN | BODY MASS INDEX: 22.02 KG/M2

## 2024-04-22 DIAGNOSIS — R18.8 CIRRHOSIS OF LIVER WITH ASCITES, UNSPECIFIED HEPATIC CIRRHOSIS TYPE: ICD-10-CM

## 2024-04-22 DIAGNOSIS — K74.60 CIRRHOSIS OF LIVER WITH ASCITES, UNSPECIFIED HEPATIC CIRRHOSIS TYPE: Primary | ICD-10-CM

## 2024-04-22 DIAGNOSIS — D63.8 ANEMIA OF CHRONIC DISEASE: ICD-10-CM

## 2024-04-22 DIAGNOSIS — K74.60 CIRRHOSIS OF LIVER WITH ASCITES, UNSPECIFIED HEPATIC CIRRHOSIS TYPE: ICD-10-CM

## 2024-04-22 DIAGNOSIS — R18.8 CIRRHOSIS OF LIVER WITH ASCITES, UNSPECIFIED HEPATIC CIRRHOSIS TYPE: Primary | ICD-10-CM

## 2024-04-22 DIAGNOSIS — E87.6 HYPOKALEMIA: ICD-10-CM

## 2024-04-22 LAB
ALBUMIN SERPL-MCNC: 3 G/DL (ref 3.5–5.2)
ALBUMIN/GLOB SERPL: 0.5 G/DL
ALP SERPL-CCNC: 106 U/L (ref 39–117)
ALT SERPL W P-5'-P-CCNC: 12 U/L (ref 1–33)
ANION GAP SERPL CALCULATED.3IONS-SCNC: 10 MMOL/L (ref 5–15)
AST SERPL-CCNC: 38 U/L (ref 1–32)
BILIRUB SERPL-MCNC: 2.5 MG/DL (ref 0–1.2)
BUN SERPL-MCNC: 6 MG/DL (ref 6–20)
BUN/CREAT SERPL: 8.7 (ref 7–25)
CALCIUM SPEC-SCNC: 8.7 MG/DL (ref 8.6–10.5)
CHLORIDE SERPL-SCNC: 98 MMOL/L (ref 98–107)
CO2 SERPL-SCNC: 28 MMOL/L (ref 22–29)
CREAT SERPL-MCNC: 0.69 MG/DL (ref 0.57–1)
DEPRECATED RDW RBC AUTO: 81.5 FL (ref 37–54)
EGFRCR SERPLBLD CKD-EPI 2021: 109.2 ML/MIN/1.73
ERYTHROCYTE [DISTWIDTH] IN BLOOD BY AUTOMATED COUNT: 24.5 % (ref 12.3–15.4)
GLOBULIN UR ELPH-MCNC: 5.7 GM/DL
GLUCOSE SERPL-MCNC: 107 MG/DL (ref 65–99)
HCT VFR BLD AUTO: 32.2 % (ref 34–46.6)
HGB BLD-MCNC: 9.9 G/DL (ref 12–15.9)
INR PPP: 1.53 (ref 0.91–1.09)
MCH RBC QN AUTO: 28.5 PG (ref 26.6–33)
MCHC RBC AUTO-ENTMCNC: 30.7 G/DL (ref 31.5–35.7)
MCV RBC AUTO: 92.8 FL (ref 79–97)
PLATELET # BLD AUTO: 238 10*3/MM3 (ref 140–450)
PMV BLD AUTO: 9.7 FL (ref 6–12)
POTASSIUM SERPL-SCNC: 3.2 MMOL/L (ref 3.5–5.2)
PROT SERPL-MCNC: 8.7 G/DL (ref 6–8.5)
PROTHROMBIN TIME: 19 SECONDS (ref 11.8–14.8)
RBC # BLD AUTO: 3.47 10*6/MM3 (ref 3.77–5.28)
SODIUM SERPL-SCNC: 136 MMOL/L (ref 136–145)
WBC NRBC COR # BLD AUTO: 8.85 10*3/MM3 (ref 3.4–10.8)

## 2024-04-22 PROCEDURE — 85027 COMPLETE CBC AUTOMATED: CPT

## 2024-04-22 PROCEDURE — 80053 COMPREHEN METABOLIC PANEL: CPT

## 2024-04-22 PROCEDURE — 99214 OFFICE O/P EST MOD 30 MIN: CPT | Performed by: INTERNAL MEDICINE

## 2024-04-22 PROCEDURE — 85610 PROTHROMBIN TIME: CPT

## 2024-04-22 PROCEDURE — 36415 COLL VENOUS BLD VENIPUNCTURE: CPT

## 2024-04-22 RX ORDER — FUROSEMIDE 40 MG/1
40 TABLET ORAL DAILY
Qty: 30 TABLET | Refills: 2 | Status: SHIPPED | OUTPATIENT
Start: 2024-04-22

## 2024-04-22 NOTE — PROGRESS NOTES
Chief Complaint   Patient presents with    Bloated     Answers submitted by the patient for this visit:  Other (Submitted on 4/20/2024)  Please describe your symptoms.: Stomach bloated, and pain  Have you had these symptoms before?: Yes  How long have you been having these symptoms?: Greater than 2 weeks  Primary Reason for Visit (Submitted on 4/20/2024)  What is the primary reason for your visit?: Other    History:  Rachana Smart is a 44 y.o. female who presents today for evaluation of the above problems.      Rachana presents today with her  for a follow-up on her alcoholic cirrhosis with ascites.  I last saw her in the office over 1 year ago.  Since then she has had admission to the hospital for decompensated hepatic cirrhosis, and upper GI bleed of unknown source.  During that hospitalization she was found to have esophageal varices without stigmata of bleeding, however.  She has had previous paracenteses but has not been on diuretics.  I saw her  on Friday for an office visit.  He indicated that she was experiencing worsening abdominal pain and swelling.  Her prescribed Lasix and Aldactone.  We then set up this visit for discussion and further treatment.    Since this past Friday she has noticed improvement in her stomach distention and there is much less pain.  She has been taking Aldactone 25 mg daily Lasix 40 mg twice a day.    Previous labs on March 18, 2024 showed a potassium of 3.1, a hemoglobin of 7.2.  She is taking over-the-counter potassium.    She had a video visit with Dr. Ricardo on Della 3, 2023 although I cannot see that office note.    It has been 2 months since her last alcoholic drink.    She denies any chest pains or palpitations.  She has had shortness of breath due to difficulty getting a deep breath due to the abdominal distention.  HPI      ROS:  Review of Systems    As above      Current Outpatient Medications:     furosemide (Lasix) 40 MG tablet, Take 1 tablet by mouth  Daily., Disp: 30 tablet, Rfl: 2    multivitamin with minerals tablet tablet, Take 1 tablet by mouth Daily., Disp: 30 tablet, Rfl: 0    pantoprazole (Protonix) 40 MG EC tablet, Take 1 tablet by mouth Daily., Disp: 30 tablet, Rfl: 0    spironolactone (Aldactone) 25 MG tablet, Take 1 tablet by mouth Daily., Disp: 30 tablet, Rfl: 5    thiamine (VITAMIN B1) 100 MG tablet, Take 1 tablet by mouth Daily., Disp: 30 tablet, Rfl: 0    Lab Results   Component Value Date    GLUCOSE 104 (H) 03/18/2024    BUN 6 03/18/2024    CREATININE 0.59 03/18/2024    EGFR 113.4 03/18/2024    BCR 10.2 03/18/2024    K 3.1 (L) 03/18/2024    CO2 25.0 03/18/2024    CALCIUM 7.9 (L) 03/18/2024    ALBUMIN 2.7 (L) 03/18/2024    BILITOT 2.5 (H) 03/18/2024    AST 36 (H) 03/18/2024    ALT 13 03/18/2024       WBC   Date Value Ref Range Status   03/18/2024 9.82 3.40 - 10.80 10*3/mm3 Final     RBC   Date Value Ref Range Status   03/18/2024 2.74 (L) 3.77 - 5.28 10*6/mm3 Final     Hemoglobin   Date Value Ref Range Status   03/18/2024 7.2 (L) 12.0 - 15.9 g/dL Final     Hematocrit   Date Value Ref Range Status   03/18/2024 24.0 (L) 34.0 - 46.6 % Final     MCV   Date Value Ref Range Status   03/18/2024 87.6 79.0 - 97.0 fL Final     MCH   Date Value Ref Range Status   03/18/2024 26.3 (L) 26.6 - 33.0 pg Final     MCHC   Date Value Ref Range Status   03/18/2024 30.0 (L) 31.5 - 35.7 g/dL Final     RDW   Date Value Ref Range Status   03/18/2024 22.1 (H) 12.3 - 15.4 % Final     RDW-SD   Date Value Ref Range Status   03/18/2024 68.1 (H) 37.0 - 54.0 fl Final     MPV   Date Value Ref Range Status   03/18/2024 9.7 6.0 - 12.0 fL Final     Platelets   Date Value Ref Range Status   03/18/2024 266 140 - 450 10*3/mm3 Final     Neutrophil %   Date Value Ref Range Status   03/18/2024 64.1 42.7 - 76.0 % Final     Lymphocyte %   Date Value Ref Range Status   03/18/2024 19.1 (L) 19.6 - 45.3 % Final     Monocyte %   Date Value Ref Range Status   03/18/2024 9.9 5.0 - 12.0 % Final  "    Eosinophil %   Date Value Ref Range Status   03/18/2024 5.0 0.3 - 6.2 % Final     Basophil %   Date Value Ref Range Status   03/18/2024 1.2 0.0 - 1.5 % Final     Immature Grans %   Date Value Ref Range Status   03/18/2024 0.7 (H) 0.0 - 0.5 % Final     Neutrophils, Absolute   Date Value Ref Range Status   03/18/2024 6.29 1.70 - 7.00 10*3/mm3 Final     Lymphocytes, Absolute   Date Value Ref Range Status   03/18/2024 1.88 0.70 - 3.10 10*3/mm3 Final     Monocytes, Absolute   Date Value Ref Range Status   03/18/2024 0.97 (H) 0.10 - 0.90 10*3/mm3 Final     Eosinophils, Absolute   Date Value Ref Range Status   03/18/2024 0.49 (H) 0.00 - 0.40 10*3/mm3 Final     Basophils, Absolute   Date Value Ref Range Status   03/18/2024 0.12 0.00 - 0.20 10*3/mm3 Final     Immature Grans, Absolute   Date Value Ref Range Status   03/18/2024 0.07 (H) 0.00 - 0.05 10*3/mm3 Final     nRBC   Date Value Ref Range Status   03/18/2024 0.0 0.0 - 0.2 /100 WBC Final         OBJECTIVE:  Visit Vitals  /70 (BP Location: Left arm, Patient Position: Sitting, Cuff Size: Adult)   Pulse 105   Temp 97.3 °F (36.3 °C) (Temporal)   Ht 170.2 cm (67\")   Wt 63.6 kg (140 lb 4.8 oz)   SpO2 96%   BMI 21.97 kg/m²      Physical Exam  Vitals reviewed.   Constitutional:       General: She is not in acute distress.     Appearance: She is well-developed. She is ill-appearing (Chronically ill-appearing.  Appears older than stated age.). She is not diaphoretic.   HENT:      Head: Normocephalic and atraumatic.   Eyes:      General: Scleral icterus present.      Pupils: Pupils are equal, round, and reactive to light.   Neck:      Thyroid: No thyromegaly.      Trachea: Phonation normal.   Cardiovascular:      Rate and Rhythm: Regular rhythm. Tachycardia present.      Heart sounds: No murmur heard.  Pulmonary:      Effort: Pulmonary effort is normal. No respiratory distress.      Breath sounds: No wheezing or rales.   Skin:     Coloration: Skin is jaundiced. Skin is not " pale.      Findings: No erythema.   Neurological:      Mental Status: She is alert.   Psychiatric:         Behavior: Behavior normal.         Thought Content: Thought content normal.         Judgment: Judgment normal.         Assessment/Plan    Diagnoses and all orders for this visit:    1. Cirrhosis of liver with ascites, unspecified hepatic cirrhosis type (Primary)  -     furosemide (Lasix) 40 MG tablet; Take 1 tablet by mouth Daily.  Dispense: 30 tablet; Refill: 2  -     Comprehensive metabolic panel; Future  -     CBC (No Diff); Future  -     Protime-INR; Future    2. Hypokalemia  -     Comprehensive metabolic panel; Future    3. Anemia of chronic disease  -     CBC (No Diff); Future      I would like to continue Lasix and spironolactone.  I changed her Lasix dose to once a day rather than twice a day.  We will check a CBC, CMP and INR to follow-up on liver cirrhosis, hypokalemia and chronic anemia.  Further workup or management depending on those results.    I recommend she eat multiple small meals that are high in protein.  Avoid acetaminophen, NSAIDs and alcohol.    I highly recommend she call Dr. Ricardo to set up a follow-up visit.  Keep appointment with PRIYANK Mc on April 29, 2024.    Follow-up in 4 weeks with me or sooner if clinically indicated.    Return in about 4 weeks (around 5/20/2024) for Recheck me.      ROSALINDA Holloway MD  10:51 CDT  4/22/2024   Electronically signed

## 2024-04-23 RX ORDER — POTASSIUM CHLORIDE 20 MEQ/1
20 TABLET, EXTENDED RELEASE ORAL DAILY
Qty: 14 TABLET | Refills: 0 | Status: SHIPPED | OUTPATIENT
Start: 2024-04-23

## 2024-04-29 ENCOUNTER — OFFICE VISIT (OUTPATIENT)
Dept: GASTROENTEROLOGY | Facility: CLINIC | Age: 44
End: 2024-04-29
Payer: COMMERCIAL

## 2024-04-29 ENCOUNTER — LAB (OUTPATIENT)
Dept: LAB | Facility: HOSPITAL | Age: 44
End: 2024-04-29
Payer: COMMERCIAL

## 2024-04-29 VITALS
TEMPERATURE: 97.5 F | SYSTOLIC BLOOD PRESSURE: 100 MMHG | HEART RATE: 100 BPM | HEIGHT: 67 IN | BODY MASS INDEX: 20 KG/M2 | WEIGHT: 127.4 LBS | DIASTOLIC BLOOD PRESSURE: 70 MMHG | OXYGEN SATURATION: 98 %

## 2024-04-29 DIAGNOSIS — K70.31 ALCOHOLIC CIRRHOSIS OF LIVER WITH ASCITES: Primary | ICD-10-CM

## 2024-04-29 DIAGNOSIS — Z78.9 NONSMOKER: ICD-10-CM

## 2024-04-29 DIAGNOSIS — R11.2 NAUSEA AND VOMITING, UNSPECIFIED VOMITING TYPE: ICD-10-CM

## 2024-04-29 DIAGNOSIS — K70.11 ALCOHOLIC HEPATITIS WITH ASCITES: ICD-10-CM

## 2024-04-29 PROCEDURE — 36415 COLL VENOUS BLD VENIPUNCTURE: CPT | Performed by: CLINICAL NURSE SPECIALIST

## 2024-04-29 PROCEDURE — 82105 ALPHA-FETOPROTEIN SERUM: CPT | Performed by: CLINICAL NURSE SPECIALIST

## 2024-04-29 NOTE — PROGRESS NOTES
Rachana Smart  1980 4/29/2024  Chief Complaint   Patient presents with    GI Problem     cirrhosis     Subjective   HPI  Rachana Smart is a 44 y.o. female who presents with a complaint of cirrhosis secondary to ETOH first diagnosed last year in 2023 when she presented here with abdominal distention/ascites. S/p paracentesis then and most recent one in March 2024 in the ER. Today she has not signs for ascites she is on her Lasix and Aldactone. No peripheral edema. No signs for bleeding. Less abdominal distention she verifies diuresis.   Most recent labs:   H/H 4/22/24 9.9/32.2 MCV 92.8.  on 3/16/24 5.2/18.6.   Vodka and fireball every other day for years. She has drank since her 20s. Occasional wine. In October of last year she began more heavily when her sister passed away. She drank again heavily for her birthday this year and that is when she had abdominal distention/ascites.   February 2023 she had ascites and was sent to [facility] at that time, Dr Ricardo.   Her current MELD score from March 2024 is 17. MELD using April labs is 16.     PCP Dr Holloway Note from 4/20/24  she has had admission to the hospital for decompensated hepatic cirrhosis, and upper GI bleed of unknown source.  During that hospitalization she was found to have esophageal varices without stigmata of bleeding, however.  She has had previous paracenteses but has not been on diuretics.  I saw her  on Friday for an office visit.  He indicated that she was experiencing worsening abdominal pain and swelling.  Her prescribed Lasix and Aldactone.  We then set up this visit for discussion and further treatment.     Since this past Friday she has noticed improvement in her stomach distention and there is much less pain.  She has been taking Aldactone 25 mg daily Lasix 40 mg twice a day.  CT abdomen pelvis   IMPRESSION:     1.  Liver cirrhosis with sequela of portal hypertension including large  volume ascites, recanalized  periumbilical vein, splenic varices and  splenorenal shunt formation.  2.  No portal vein thrombus identified. Hepatic veins are compressed but  patent. No obvious liver mass on this phase of contrast.           This report was signed and finalized on 3/16/2024 4:10 PM by Dr Lucas Hogan.  Dr [facility] 24  Findings: Hypopharynx and larynx appeared normal.  Proximal esophagus appeared normal.  Grade 1 and 2 distal esophageal varices without stigmata of recent bleeding.  Normal stomach.  No evidence of gastric varices.  Normal duodenal bulb, sweep and second portion of the duodenum.  No evidence of any fresh or old blood or clots in the examined upper GI tract.     Complications: No immediate complications.     Recommendations: Continue Protonix daily along with Zofran as needed and CIWA protocol.  Clear liquid diet.  Monitor H&H and transfuse with PRBCs as needed.  Past Medical History:   Diagnosis Date    Alcoholism in recovery     Allergic     Anxiety     Anxiety     Asthma     Gestational diabetes     Heart murmur     Hepatitis-C     History of ear infections     Hypertension     Sinusitis      Past Surgical History:   Procedure Laterality Date     SECTION      x2    ENDOSCOPY N/A 2024    no evidence of any fresh or old blood or clots,normal stomach    MOUTH SURGERY         Outpatient Medications Marked as Taking for the 24 encounter (Office Visit) with Glo Bailon APRN   Medication Sig Dispense Refill    furosemide (Lasix) 40 MG tablet Take 1 tablet by mouth Daily. 30 tablet 2    multivitamin with minerals tablet tablet Take 1 tablet by mouth Daily. 30 tablet 0    potassium chloride (KLOR-CON M20) 20 MEQ CR tablet Take 1 tablet by mouth Daily. 14 tablet 0    spironolactone (Aldactone) 25 MG tablet Take 1 tablet by mouth Daily. 30 tablet 5    thiamine (VITAMIN B1) 100 MG tablet Take 1 tablet by mouth Daily. 30 tablet 0     Allergies   Allergen Reactions    Codeine Rash and  GI Intolerance     Social History     Socioeconomic History    Marital status:    Tobacco Use    Smoking status: Every Day     Current packs/day: 0.50     Average packs/day: 0.5 packs/day for 10.0 years (5.0 ttl pk-yrs)     Types: Cigarettes    Smokeless tobacco: Never   Vaping Use    Vaping status: Never Used   Substance and Sexual Activity    Alcohol use: Not Currently     Alcohol/week: 10.0 standard drinks of alcohol     Types: 10 Shots of liquor per week     Comment: vodka a few times a week    Drug use: Never    Sexual activity: Yes     Partners: Male     Birth control/protection: None     Family History   Problem Relation Age of Onset    Diabetes Mother     Heart disease Father     Diabetes Father     Stroke Father     Colon cancer Neg Hx     Colon polyps Neg Hx      Health Maintenance   Topic Date Due    ANNUAL PHYSICAL  Never done    PAP SMEAR  Never done    Hepatitis B (2 of 3 - Hep B Twinrix 3-dose series) 03/29/2023    COVID-19 Vaccine (1 - 2023-24 season) Never done    INFLUENZA VACCINE  08/01/2024    COLORECTAL CANCER SCREENING  03/16/2025    TDAP/TD VACCINES (2 - Td or Tdap) 01/17/2034    HEPATITIS C SCREENING  Completed    Pneumococcal Vaccine 0-64  Completed     Review of Systems   Constitutional:  Negative for activity change, appetite change, chills, diaphoresis, fatigue, fever and unexpected weight change.   HENT:  Negative for ear pain, hearing loss, mouth sores, sore throat, trouble swallowing and voice change.    Eyes: Negative.    Respiratory:  Negative for cough, choking, shortness of breath and wheezing.    Cardiovascular:  Negative for chest pain and palpitations.   Gastrointestinal:  Negative for abdominal pain, blood in stool, constipation, diarrhea, nausea and vomiting.   Endocrine: Negative for cold intolerance and heat intolerance.   Genitourinary:  Negative for decreased urine volume, dysuria, frequency, hematuria and urgency.   Musculoskeletal:  Negative for back pain, gait  "problem and myalgias.   Skin:  Negative for color change, pallor and rash.   Allergic/Immunologic: Negative for food allergies and immunocompromised state.   Neurological:  Negative for dizziness, tremors, seizures, syncope, weakness, light-headedness, numbness and headaches.   Hematological:  Negative for adenopathy. Does not bruise/bleed easily.   Psychiatric/Behavioral:  Negative for agitation and confusion. The patient is not nervous/anxious.    All other systems reviewed and are negative.    Objective   Vitals:    04/29/24 1323   BP: 100/70   BP Location: Left arm   Pulse: 100   Temp: 97.5 °F (36.4 °C)   TempSrc: Temporal   SpO2: 98%   Weight: 57.8 kg (127 lb 6.4 oz)   Height: 170.2 cm (67.01\")     Body mass index is 19.95 kg/m².  Physical Exam  Constitutional:       Appearance: She is well-developed.   HENT:      Head: Normocephalic and atraumatic.   Eyes:      Pupils: Pupils are equal, round, and reactive to light.   Neck:      Trachea: No tracheal deviation.   Cardiovascular:      Rate and Rhythm: Normal rate and regular rhythm.      Heart sounds: Normal heart sounds. No murmur heard.     No friction rub. No gallop.   Pulmonary:      Effort: Pulmonary effort is normal. No respiratory distress.      Breath sounds: Normal breath sounds. No wheezing or rales.   Chest:      Chest wall: No tenderness.   Abdominal:      General: Bowel sounds are normal. There is no distension.      Palpations: Abdomen is soft. Abdomen is not rigid.      Tenderness: There is no abdominal tenderness. There is no guarding or rebound.   Musculoskeletal:         General: No tenderness or deformity. Normal range of motion.      Cervical back: Normal range of motion and neck supple.   Skin:     General: Skin is warm and dry.      Coloration: Skin is not pale.      Findings: No rash.   Neurological:      Mental Status: She is alert and oriented to person, place, and time.      Deep Tendon Reflexes: Reflexes are normal and symmetric. "   Psychiatric:         Behavior: Behavior normal.         Thought Content: Thought content normal.         Judgment: Judgment normal.       Assessment & Plan   Diagnoses and all orders for this visit:    1. Alcoholic cirrhosis of liver with ascites (Primary)  -     AFP Tumor Marker  -     Ambulatory Referral to Gastroenterology    2. Alcoholic hepatitis with ascites    3. Nausea and vomiting, unspecified vomiting type    4. Nonsmoker    Less than 2 grams per day sodium  Hepatocellular screening every 6 months  Continue follow up with Dr Davion Lew discussion today regarding cirrhosis of the liver and disease progression   MELD score most recent 16 using April labs.   Will need colonoscopy wants to discuss at follow up.       Part of this note may be an electronic transcription/translation of spoken language to printed text using the Dragon Dictation System.  Body mass index is 19.95 kg/m².  Return in about 5 weeks (around 6/3/2024).    BMI is within normal parameters. No other follow-up for BMI required.      All risks, benefits, alternatives, and indications of colonoscopy and/or Endoscopy procedure have been discussed with the patient. Risks to include perforation of the colon requiring possible surgery or colostomy, risk of bleeding from biopsies or removal of colon tissue, possibility of missing a colon polyp or cancer, or adverse drug reaction.  Benefits to include the diagnosis and management of disease of the colon and rectum. Alternatives to include barium enema, radiographic evaluation, lab testing or no intervention. Pt verbalizes understanding and agrees.     Glo Bailon, APRN  4/29/2024  14:27 CDT          If you smoke or use tobacco, 4 minutes reading provided  Steps to Quit Smoking  Smoking tobacco can be harmful to your health and can affect almost every organ in your body. Smoking puts you, and those around you, at risk for developing many serious chronic diseases. Quitting smoking is  difficult, but it is one of the best things that you can do for your health. It is never too late to quit.  What are the benefits of quitting smoking?  When you quit smoking, you lower your risk of developing serious diseases and conditions, such as:  Lung cancer or lung disease, such as COPD.  Heart disease.  Stroke.  Heart attack.  Infertility.  Osteoporosis and bone fractures.  Additionally, symptoms such as coughing, wheezing, and shortness of breath may get better when you quit. You may also find that you get sick less often because your body is stronger at fighting off colds and infections. If you are pregnant, quitting smoking can help to reduce your chances of having a baby of low birth weight.  How do I get ready to quit?  When you decide to quit smoking, create a plan to make sure that you are successful. Before you quit:  Pick a date to quit. Set a date within the next two weeks to give you time to prepare.  Write down the reasons why you are quitting. Keep this list in places where you will see it often, such as on your bathroom mirror or in your car or wallet.  Identify the people, places, things, and activities that make you want to smoke (triggers) and avoid them. Make sure to take these actions:  Throw away all cigarettes at home, at work, and in your car.  Throw away smoking accessories, such as ashtrays and lighters.  Clean your car and make sure to empty the ashtray.  Clean your home, including curtains and carpets.  Tell your family, friends, and coworkers that you are quitting. Support from your loved ones can make quitting easier.  Talk with your health care provider about your options for quitting smoking.  Find out what treatment options are covered by your health insurance.  What strategies can I use to quit smoking?  Talk with your healthcare provider about different strategies to quit smoking. Some strategies include:  Quitting smoking altogether instead of gradually lessening how much you  smoke over a period of time. Research shows that quitting “cold turkey” is more successful than gradually quitting.  Attending in-person counseling to help you build problem-solving skills. You are more likely to have success in quitting if you attend several counseling sessions. Even short sessions of 10 minutes can be effective.  Finding resources and support systems that can help you to quit smoking and remain smoke-free after you quit. These resources are most helpful when you use them often. They can include:  Online chats with a counselor.  Telephone quitlines.  Printed self-help materials.  Support groups or group counseling.  Text messaging programs.  Mobile phone applications.  Taking medicines to help you quit smoking. (If you are pregnant or breastfeeding, talk with your health care provider first.) Some medicines contain nicotine and some do not. Both types of medicines help with cravings, but the medicines that include nicotine help to relieve withdrawal symptoms. Your health care provider may recommend:  Nicotine patches, gum, or lozenges.  Nicotine inhalers or sprays.  Non-nicotine medicine that is taken by mouth.  Talk with your health care provider about combining strategies, such as taking medicines while you are also receiving in-person counseling. Using these two strategies together makes you more likely to succeed in quitting than if you used either strategy on its own.  If you are pregnant or breastfeeding, talk with your health care provider about finding counseling or other support strategies to quit smoking. Do not take medicine to help you quit smoking unless told to do so by your health care provider.  What things can I do to make it easier to quit?  Quitting smoking might feel overwhelming at first, but there is a lot that you can do to make it easier. Take these important actions:  Reach out to your family and friends and ask that they support and encourage you during this time. Call  telephone quitlines, reach out to support groups, or work with a counselor for support.  Ask people who smoke to avoid smoking around you.  Avoid places that trigger you to smoke, such as bars, parties, or smoke-break areas at work.  Spend time around people who do not smoke.  Lessen stress in your life, because stress can be a smoking trigger for some people. To lessen stress, try:  Exercising regularly.  Deep-breathing exercises.  Yoga.  Meditating.  Performing a body scan. This involves closing your eyes, scanning your body from head to toe, and noticing which parts of your body are particularly tense. Purposefully relax the muscles in those areas.  Download or purchase mobile phone or tablet apps (applications) that can help you stick to your quit plan by providing reminders, tips, and encouragement. There are many free apps, such as QuitGuide from the CDC (Centers for Disease Control and Prevention). You can find other support for quitting smoking (smoking cessation) through smokefree.gov and other websites.  How will I feel when I quit smoking?  Within the first 24 hours of quitting smoking, you may start to feel some withdrawal symptoms. These symptoms are usually most noticeable 2-3 days after quitting, but they usually do not last beyond 2-3 weeks. Changes or symptoms that you might experience include:  Mood swings.  Restlessness, anxiety, or irritation.  Difficulty concentrating.  Dizziness.  Strong cravings for sugary foods in addition to nicotine.  Mild weight gain.  Constipation.  Nausea.  Coughing or a sore throat.  Changes in how your medicines work in your body.  A depressed mood.  Difficulty sleeping (insomnia).  After the first 2-3 weeks of quitting, you may start to notice more positive results, such as:  Improved sense of smell and taste.  Decreased coughing and sore throat.  Slower heart rate.  Lower blood pressure.  Clearer skin.  The ability to breathe more easily.  Fewer sick days.  Quitting  smoking is very challenging for most people. Do not get discouraged if you are not successful the first time. Some people need to make many attempts to quit before they achieve long-term success. Do your best to stick to your quit plan, and talk with your health care provider if you have any questions or concerns.  This information is not intended to replace advice given to you by your health care provider. Make sure you discuss any questions you have with your health care provider.  Document Released: 12/12/2002 Document Revised: 08/15/2017 Document Reviewed: 05/03/2016  Elsevier Interactive Patient Education © 2017 [employer]

## 2024-04-30 LAB — ALPHA-FETOPROTEIN: 3.35 NG/ML (ref 0–8.3)

## 2024-05-24 ENCOUNTER — LAB (OUTPATIENT)
Dept: LAB | Facility: HOSPITAL | Age: 44
End: 2024-05-24
Payer: COMMERCIAL

## 2024-05-24 ENCOUNTER — OFFICE VISIT (OUTPATIENT)
Dept: INTERNAL MEDICINE | Facility: CLINIC | Age: 44
End: 2024-05-24
Payer: COMMERCIAL

## 2024-05-24 VITALS
HEIGHT: 67 IN | SYSTOLIC BLOOD PRESSURE: 128 MMHG | HEART RATE: 98 BPM | DIASTOLIC BLOOD PRESSURE: 86 MMHG | BODY MASS INDEX: 20.88 KG/M2 | TEMPERATURE: 98.2 F | OXYGEN SATURATION: 100 % | WEIGHT: 133 LBS

## 2024-05-24 DIAGNOSIS — K74.60 CIRRHOSIS OF LIVER WITH ASCITES, UNSPECIFIED HEPATIC CIRRHOSIS TYPE: Primary | ICD-10-CM

## 2024-05-24 DIAGNOSIS — E87.6 HYPOKALEMIA: ICD-10-CM

## 2024-05-24 DIAGNOSIS — R18.8 CIRRHOSIS OF LIVER WITH ASCITES, UNSPECIFIED HEPATIC CIRRHOSIS TYPE: ICD-10-CM

## 2024-05-24 DIAGNOSIS — R18.8 CIRRHOSIS OF LIVER WITH ASCITES, UNSPECIFIED HEPATIC CIRRHOSIS TYPE: Primary | ICD-10-CM

## 2024-05-24 DIAGNOSIS — K74.60 CIRRHOSIS OF LIVER WITH ASCITES, UNSPECIFIED HEPATIC CIRRHOSIS TYPE: ICD-10-CM

## 2024-05-24 LAB
ALBUMIN SERPL-MCNC: 3.4 G/DL (ref 3.5–5.2)
ALBUMIN/GLOB SERPL: 0.7 G/DL
ALP SERPL-CCNC: 88 U/L (ref 39–117)
ALT SERPL W P-5'-P-CCNC: 33 U/L (ref 1–33)
ANION GAP SERPL CALCULATED.3IONS-SCNC: 9 MMOL/L (ref 5–15)
AST SERPL-CCNC: 74 U/L (ref 1–32)
BILIRUB SERPL-MCNC: 3.2 MG/DL (ref 0–1.2)
BUN SERPL-MCNC: 6 MG/DL (ref 6–20)
BUN/CREAT SERPL: 8.5 (ref 7–25)
CALCIUM SPEC-SCNC: 9.7 MG/DL (ref 8.6–10.5)
CHLORIDE SERPL-SCNC: 98 MMOL/L (ref 98–107)
CO2 SERPL-SCNC: 28 MMOL/L (ref 22–29)
CREAT SERPL-MCNC: 0.71 MG/DL (ref 0.57–1)
DEPRECATED RDW RBC AUTO: 74.2 FL (ref 37–54)
EGFRCR SERPLBLD CKD-EPI 2021: 107 ML/MIN/1.73
ERYTHROCYTE [DISTWIDTH] IN BLOOD BY AUTOMATED COUNT: 20.8 % (ref 12.3–15.4)
GLOBULIN UR ELPH-MCNC: 4.8 GM/DL
GLUCOSE SERPL-MCNC: 132 MG/DL (ref 65–99)
HCT VFR BLD AUTO: 35.8 % (ref 34–46.6)
HGB BLD-MCNC: 11.4 G/DL (ref 12–15.9)
MCH RBC QN AUTO: 30.8 PG (ref 26.6–33)
MCHC RBC AUTO-ENTMCNC: 31.8 G/DL (ref 31.5–35.7)
MCV RBC AUTO: 96.8 FL (ref 79–97)
PLATELET # BLD AUTO: 155 10*3/MM3 (ref 140–450)
PMV BLD AUTO: 9.8 FL (ref 6–12)
POTASSIUM SERPL-SCNC: 3.5 MMOL/L (ref 3.5–5.2)
PROT SERPL-MCNC: 8.2 G/DL (ref 6–8.5)
RBC # BLD AUTO: 3.7 10*6/MM3 (ref 3.77–5.28)
SODIUM SERPL-SCNC: 135 MMOL/L (ref 136–145)
WBC NRBC COR # BLD AUTO: 6.38 10*3/MM3 (ref 3.4–10.8)

## 2024-05-24 PROCEDURE — 36415 COLL VENOUS BLD VENIPUNCTURE: CPT

## 2024-05-24 PROCEDURE — 80053 COMPREHEN METABOLIC PANEL: CPT

## 2024-05-24 PROCEDURE — 99213 OFFICE O/P EST LOW 20 MIN: CPT

## 2024-05-24 PROCEDURE — 85027 COMPLETE CBC AUTOMATED: CPT

## 2024-05-24 NOTE — PROGRESS NOTES
No chief complaint on file.      History:  Rachana Smart is a 44 y.o. female who presents today for evaluation of the above problems.      HPI    Ms. Smart presents today for 4-week follow-up on cirrhosis with ascites.  In March, she had a paracentesis due to severe ascites.  She had a follow-up on 4/29/2024, at that time her Lasix was changed to 40 mg once daily.  She is also taking Aldactone 25 mg daily.  She reports that since making these changes she has felt significantly better.  She feels that she is keeping fluid off much easier and had decreased abdominal distention.  At her previous appointment she also had lab work ordered, which showed that potassium was low at 3.2.  She was started on potassium chloride 20 mEq daily.    Since her last appointment Ms. Smart has established with PRIYANK Mc with gastroenterology.  She reports that she has another follow-up with her on 6/3/2024.    ROS:  Review of Systems   Constitutional:  Negative for fatigue, fever and unexpected weight change.   HENT:  Negative for sinus pain and sore throat.    Eyes:  Negative for pain and visual disturbance.   Respiratory:  Negative for chest tightness and shortness of breath.    Cardiovascular:  Negative for chest pain and palpitations.   Gastrointestinal:  Positive for abdominal distention. Negative for abdominal pain, constipation, diarrhea, nausea and vomiting.   Genitourinary:  Negative for difficulty urinating.   Neurological:  Negative for dizziness and headaches.   Psychiatric/Behavioral:  Negative for confusion and suicidal ideas.          Current Outpatient Medications:     furosemide (Lasix) 40 MG tablet, Take 1 tablet by mouth Daily., Disp: 30 tablet, Rfl: 2    multivitamin with minerals tablet tablet, Take 1 tablet by mouth Daily., Disp: 30 tablet, Rfl: 0    potassium chloride (KLOR-CON M20) 20 MEQ CR tablet, Take 1 tablet by mouth Daily., Disp: 14 tablet, Rfl: 0    spironolactone (Aldactone) 25 MG  tablet, Take 1 tablet by mouth Daily., Disp: 30 tablet, Rfl: 5    thiamine (VITAMIN B1) 100 MG tablet, Take 1 tablet by mouth Daily., Disp: 30 tablet, Rfl: 0    Lab Results   Component Value Date    GLUCOSE 107 (H) 04/22/2024    BUN 6 04/22/2024    CREATININE 0.69 04/22/2024    EGFR 109.2 04/22/2024    BCR 8.7 04/22/2024    K 3.2 (L) 04/22/2024    CO2 28.0 04/22/2024    CALCIUM 8.7 04/22/2024    ALBUMIN 3.0 (L) 04/22/2024    BILITOT 2.5 (H) 04/22/2024    AST 38 (H) 04/22/2024    ALT 12 04/22/2024       WBC   Date Value Ref Range Status   05/24/2024 6.38 3.40 - 10.80 10*3/mm3 Final     RBC   Date Value Ref Range Status   05/24/2024 3.70 (L) 3.77 - 5.28 10*6/mm3 Final     Hemoglobin   Date Value Ref Range Status   05/24/2024 11.4 (L) 12.0 - 15.9 g/dL Final     Hematocrit   Date Value Ref Range Status   05/24/2024 35.8 34.0 - 46.6 % Final     MCV   Date Value Ref Range Status   05/24/2024 96.8 79.0 - 97.0 fL Final     MCH   Date Value Ref Range Status   05/24/2024 30.8 26.6 - 33.0 pg Final     MCHC   Date Value Ref Range Status   05/24/2024 31.8 31.5 - 35.7 g/dL Final     RDW   Date Value Ref Range Status   05/24/2024 20.8 (H) 12.3 - 15.4 % Final     RDW-SD   Date Value Ref Range Status   05/24/2024 74.2 (H) 37.0 - 54.0 fl Final     MPV   Date Value Ref Range Status   05/24/2024 9.8 6.0 - 12.0 fL Final     Platelets   Date Value Ref Range Status   05/24/2024 155 140 - 450 10*3/mm3 Final     Neutrophil %   Date Value Ref Range Status   03/18/2024 64.1 42.7 - 76.0 % Final     Lymphocyte %   Date Value Ref Range Status   03/18/2024 19.1 (L) 19.6 - 45.3 % Final     Monocyte %   Date Value Ref Range Status   03/18/2024 9.9 5.0 - 12.0 % Final     Eosinophil %   Date Value Ref Range Status   03/18/2024 5.0 0.3 - 6.2 % Final     Basophil %   Date Value Ref Range Status   03/18/2024 1.2 0.0 - 1.5 % Final     Immature Grans %   Date Value Ref Range Status   03/18/2024 0.7 (H) 0.0 - 0.5 % Final     Neutrophils, Absolute   Date  "Value Ref Range Status   03/18/2024 6.29 1.70 - 7.00 10*3/mm3 Final     Lymphocytes, Absolute   Date Value Ref Range Status   03/18/2024 1.88 0.70 - 3.10 10*3/mm3 Final     Monocytes, Absolute   Date Value Ref Range Status   03/18/2024 0.97 (H) 0.10 - 0.90 10*3/mm3 Final     Eosinophils, Absolute   Date Value Ref Range Status   03/18/2024 0.49 (H) 0.00 - 0.40 10*3/mm3 Final     Basophils, Absolute   Date Value Ref Range Status   03/18/2024 0.12 0.00 - 0.20 10*3/mm3 Final     Immature Grans, Absolute   Date Value Ref Range Status   03/18/2024 0.07 (H) 0.00 - 0.05 10*3/mm3 Final     nRBC   Date Value Ref Range Status   03/18/2024 0.0 0.0 - 0.2 /100 WBC Final         OBJECTIVE:  Visit Vitals  /86 (BP Location: Right arm, Patient Position: Sitting, Cuff Size: Adult)   Pulse 98   Temp 98.2 °F (36.8 °C) (Oral)   Ht 170.2 cm (67\")   Wt 60.3 kg (133 lb)   SpO2 100%   BMI 20.83 kg/m²      Physical Exam  Constitutional:       Appearance: Normal appearance. She is not ill-appearing.   HENT:      Left Ear: Tympanic membrane normal.   Eyes:      Pupils: Pupils are equal, round, and reactive to light.   Cardiovascular:      Rate and Rhythm: Normal rate and regular rhythm.      Pulses: Normal pulses.      Heart sounds: Normal heart sounds.   Pulmonary:      Effort: Pulmonary effort is normal.      Breath sounds: Normal breath sounds.   Abdominal:      General: Bowel sounds are normal. There is distension.      Palpations: Abdomen is soft.   Musculoskeletal:         General: Normal range of motion.      Cervical back: Normal range of motion.   Skin:     General: Skin is warm and dry.      Capillary Refill: Capillary refill takes less than 2 seconds.      Coloration: Skin is jaundiced.   Neurological:      Mental Status: She is alert and oriented to person, place, and time.   Psychiatric:         Mood and Affect: Mood normal.         Behavior: Behavior normal.         Thought Content: Thought content normal.         Judgment: " Judgment normal.         Assessment/Plan    Diagnoses and all orders for this visit:    1. Cirrhosis of liver with ascites, unspecified hepatic cirrhosis type (Primary)  -     Comprehensive Metabolic Panel; Future  -     CBC (No Diff); Future    2. Hypokalemia  -     Comprehensive Metabolic Panel; Future      Overall, symptoms have improved significantly since her last appointment.  She feels that she is able to maintain ascites and fluid better since changing Lasix to 40 mg daily.  Recommend continuing  this regimen.  Repeat labs today to evaluate hypokalemia.    Recommend continue with follow-up with PRIYANK Mc with gastroenterology.  She also has a follow-up with Dr. Ricardo, gastroenterologist in [place] scheduled for October, 2024.    Recommend continuing diet including less than 2 g of sodium per day and avoiding all alcohol consumption.    Return in about 3 months (around 8/24/2024) for Recheck.      PRIYANK Turner  08:27 CDT  5/24/2024   Electronically signed

## 2024-06-03 ENCOUNTER — OFFICE VISIT (OUTPATIENT)
Dept: GASTROENTEROLOGY | Facility: CLINIC | Age: 44
End: 2024-06-03
Payer: COMMERCIAL

## 2024-06-03 VITALS
DIASTOLIC BLOOD PRESSURE: 72 MMHG | OXYGEN SATURATION: 100 % | WEIGHT: 135.2 LBS | HEART RATE: 117 BPM | SYSTOLIC BLOOD PRESSURE: 110 MMHG | TEMPERATURE: 97.8 F | HEIGHT: 67 IN | BODY MASS INDEX: 21.22 KG/M2

## 2024-06-03 DIAGNOSIS — Z12.11 ENCOUNTER FOR SCREENING FOR MALIGNANT NEOPLASM OF COLON: ICD-10-CM

## 2024-06-03 DIAGNOSIS — K70.31 ALCOHOLIC CIRRHOSIS OF LIVER WITH ASCITES: Primary | ICD-10-CM

## 2024-06-03 PROCEDURE — 99214 OFFICE O/P EST MOD 30 MIN: CPT | Performed by: CLINICAL NURSE SPECIALIST

## 2024-06-03 RX ORDER — CEFTRIAXONE 1 G/1
1 INJECTION, POWDER, FOR SOLUTION INTRAMUSCULAR; INTRAVENOUS EVERY 24 HOURS
OUTPATIENT
Start: 2024-06-03 | End: 2024-06-04

## 2024-06-03 RX ORDER — SODIUM, POTASSIUM,MAG SULFATES 17.5-3.13G
SOLUTION, RECONSTITUTED, ORAL ORAL
Qty: 177 ML | Refills: 0 | Status: SHIPPED | OUTPATIENT
Start: 2024-06-03

## 2024-06-03 NOTE — PROGRESS NOTES
Rachana Smart  1980    6/3/2024  Chief Complaint   Patient presents with    GI Problem     5 week fu     Subjective   HPI  Rachana Smart is a 44 y.o. female who presents with a complaint of cirrhosis secondary to ETOH.  She was first diagnosed in [place] and followed there. She was in a clinical trial there for cirrhosis in . She has appt for follow up with Dr Ricardo. No current signs or symptoms of ascites or fluid in her peripheral extremities.   No nausea or vomiting. No abdominal pain. No signs of bleeding. No fever chills or sweats.   She has grade 2 varices noted on January endoscopy. Suggested a repeat in a few months. No signs of bleeding. She denies any ETOH. Her bilirubin continues to be elevated at 3.2. I have explained cirrhosis in detail and the need for no ETOH.   No change in bowels, no family hx for colon cancer.   ENDOSCOPY, INT (2024)   Per her note from 3/3/23 [facility]  Full serologic work up: UDS neg, CHARLY, neg, Quant IgGs only with high IgA. Viral hep serologies NR.  -Immunizations: s/p HAV/HBV vaccine   Past Medical History:   Diagnosis Date    Alcoholism in recovery     Allergic     Anxiety     Anxiety     Asthma     Gestational diabetes     Heart murmur     Hepatitis-C     History of ear infections     Hypertension     Sinusitis      Past Surgical History:   Procedure Laterality Date     SECTION      x2    ENDOSCOPY N/A 2024    no evidence of any fresh or old blood or clots,normal stomach    MOUTH SURGERY      UPPER GASTROINTESTINAL ENDOSCOPY  3/24       Outpatient Medications Marked as Taking for the 6/3/24 encounter (Office Visit) with Glo Bailon APRN   Medication Sig Dispense Refill    furosemide (Lasix) 40 MG tablet Take 1 tablet by mouth Daily. 30 tablet 2    multivitamin with minerals tablet tablet Take 1 tablet by mouth Daily. 30 tablet 0    potassium chloride (KLOR-CON M20) 20 MEQ CR tablet Take 1 tablet by mouth Daily. 14  tablet 0    spironolactone (Aldactone) 25 MG tablet Take 1 tablet by mouth Daily. 30 tablet 5    thiamine (VITAMIN B1) 100 MG tablet Take 1 tablet by mouth Daily. 30 tablet 0     Allergies   Allergen Reactions    Codeine Rash and GI Intolerance     Social History     Socioeconomic History    Marital status:    Tobacco Use    Smoking status: Every Day     Current packs/day: 0.50     Average packs/day: 0.5 packs/day for 10.0 years (5.0 ttl pk-yrs)     Types: Cigarettes    Smokeless tobacco: Never   Vaping Use    Vaping status: Never Used   Substance and Sexual Activity    Alcohol use: Not Currently     Alcohol/week: 10.0 standard drinks of alcohol     Types: 10 Shots of liquor per week     Comment: vodka a few times a week    Drug use: Never    Sexual activity: Yes     Partners: Male     Birth control/protection: None     Family History   Problem Relation Age of Onset    Diabetes Mother     Heart disease Father     Diabetes Father     Stroke Father     Colon cancer Neg Hx     Colon polyps Neg Hx      Health Maintenance   Topic Date Due    MAMMOGRAM  Never done    ANNUAL PHYSICAL  Never done    PAP SMEAR  Never done    Hepatitis B (2 of 3 - Hep B Twinrix 3-dose series) 03/29/2023    COVID-19 Vaccine (1 - 2023-24 season) Never done    INFLUENZA VACCINE  08/01/2024    COLORECTAL CANCER SCREENING  03/16/2025    TDAP/TD VACCINES (2 - Td or Tdap) 01/17/2034    HEPATITIS C SCREENING  Completed    Pneumococcal Vaccine 0-64  Completed     Review of Systems   Constitutional:  Negative for activity change, appetite change, chills, diaphoresis, fatigue, fever and unexpected weight change.   HENT:  Negative for ear pain, hearing loss, mouth sores, sore throat, trouble swallowing and voice change.    Eyes: Negative.    Respiratory:  Negative for cough, choking, shortness of breath and wheezing.    Cardiovascular:  Negative for chest pain and palpitations.   Gastrointestinal:  Negative for abdominal pain, blood in stool,  "constipation, diarrhea, nausea and vomiting.   Endocrine: Negative for cold intolerance and heat intolerance.   Genitourinary:  Negative for decreased urine volume, dysuria, frequency, hematuria and urgency.   Musculoskeletal:  Negative for back pain, gait problem and myalgias.   Skin:  Negative for color change, pallor and rash.   Allergic/Immunologic: Negative for food allergies and immunocompromised state.   Neurological:  Negative for dizziness, tremors, seizures, syncope, weakness, light-headedness, numbness and headaches.   Hematological:  Negative for adenopathy. Does not bruise/bleed easily.   Psychiatric/Behavioral:  Negative for agitation and confusion. The patient is not nervous/anxious.    All other systems reviewed and are negative.    Objective   Vitals:    06/03/24 1346   BP: 110/72   BP Location: Left arm   Pulse: 117   Temp: 97.8 °F (36.6 °C)   TempSrc: Temporal   SpO2: 100%   Weight: 61.3 kg (135 lb 3.2 oz)   Height: 170.2 cm (67.01\")     Body mass index is 21.17 kg/m².  Physical Exam  Constitutional:       Appearance: She is well-developed.   HENT:      Head: Normocephalic and atraumatic.   Eyes:      Pupils: Pupils are equal, round, and reactive to light.   Neck:      Trachea: No tracheal deviation.   Cardiovascular:      Rate and Rhythm: Normal rate and regular rhythm.      Heart sounds: Normal heart sounds. No murmur heard.     No friction rub. No gallop.   Pulmonary:      Effort: Pulmonary effort is normal. No respiratory distress.      Breath sounds: Normal breath sounds. No wheezing or rales.   Chest:      Chest wall: No tenderness.   Abdominal:      General: Bowel sounds are normal. There is no distension.      Palpations: Abdomen is soft. Abdomen is not rigid.      Tenderness: There is no abdominal tenderness. There is no guarding or rebound.   Musculoskeletal:         General: No tenderness or deformity. Normal range of motion.      Cervical back: Normal range of motion and neck supple. "   Skin:     General: Skin is warm and dry.      Coloration: Skin is jaundiced. Skin is not pale.      Findings: No rash.   Neurological:      Mental Status: She is alert and oriented to person, place, and time.      Deep Tendon Reflexes: Reflexes are normal and symmetric.   Psychiatric:         Behavior: Behavior normal.         Thought Content: Thought content normal.         Judgment: Judgment normal.       Assessment & Plan   Diagnoses and all orders for this visit:    1. Alcoholic cirrhosis of liver with ascites (Primary)  -     Case Request; Standing  -     cefTRIAXone (ROCEPHIN) injection 1 g  -     Case Request  -     sodium-potassium-magnesium sulfates (Suprep Bowel Prep Kit) 17.5-3.13-1.6 GM/177ML solution oral solution; Take as directed by office instructions provided  Dispense: 177 mL; Refill: 0    2. Encounter for screening for malignant neoplasm of colon    Other orders  -     Implement Anesthesia Orders Day of Procedure; Standing  -     Follow Anesthesia Guidelines / Protocol; Future  -     Obtain Informed Consent; Future  -     Verify Bowel Prep Was Successful; Standing  -     Obtain Informed Consent; Standing    Continue hepatocellular screening every 6 months  Low sodium diet  Continue follow up with [facility] next appt October  Continue no ETOH  Will get endo/colon evaluations.   ESOPHAGOGASTRODUODENOSCOPY WITH ANESTHESIA (N/A), COLONOSCOPY WITH ANESTHESIA (N/A)  Part of this note may be an electronic transcription/translation of spoken language to printed text using the Dragon Dictation System.  Body mass index is 21.17 kg/m².  Return in about 6 months (around 12/3/2024).    BMI is within normal parameters. No other follow-up for BMI required.      All risks, benefits, alternatives, and indications of colonoscopy and/or Endoscopy procedure have been discussed with the patient. Risks to include perforation of the colon requiring possible surgery or colostomy, risk of bleeding from biopsies or removal of  colon tissue, possibility of missing a colon polyp or cancer, or adverse drug reaction.  Benefits to include the diagnosis and management of disease of the colon and rectum. Alternatives to include barium enema, radiographic evaluation, lab testing or no intervention. Pt verbalizes understanding and agrees.     Glo Joan Bailon, APRN  6/3/2024  14:20 CDT          If you smoke or use tobacco, 4 minutes reading provided  Steps to Quit Smoking  Smoking tobacco can be harmful to your health and can affect almost every organ in your body. Smoking puts you, and those around you, at risk for developing many serious chronic diseases. Quitting smoking is difficult, but it is one of the best things that you can do for your health. It is never too late to quit.  What are the benefits of quitting smoking?  When you quit smoking, you lower your risk of developing serious diseases and conditions, such as:  Lung cancer or lung disease, such as COPD.  Heart disease.  Stroke.  Heart attack.  Infertility.  Osteoporosis and bone fractures.  Additionally, symptoms such as coughing, wheezing, and shortness of breath may get better when you quit. You may also find that you get sick less often because your body is stronger at fighting off colds and infections. If you are pregnant, quitting smoking can help to reduce your chances of having a baby of low birth weight.  How do I get ready to quit?  When you decide to quit smoking, create a plan to make sure that you are successful. Before you quit:  Pick a date to quit. Set a date within the next two weeks to give you time to prepare.  Write down the reasons why you are quitting. Keep this list in places where you will see it often, such as on your bathroom mirror or in your car or wallet.  Identify the people, places, things, and activities that make you want to smoke (triggers) and avoid them. Make sure to take these actions:  Throw away all cigarettes at home, at work, and in your  car.  Throw away smoking accessories, such as ashtrays and lighters.  Clean your car and make sure to empty the ashtray.  Clean your home, including curtains and carpets.  Tell your family, friends, and coworkers that you are quitting. Support from your loved ones can make quitting easier.  Talk with your health care provider about your options for quitting smoking.  Find out what treatment options are covered by your health insurance.  What strategies can I use to quit smoking?  Talk with your healthcare provider about different strategies to quit smoking. Some strategies include:  Quitting smoking altogether instead of gradually lessening how much you smoke over a period of time. Research shows that quitting “cold turkey” is more successful than gradually quitting.  Attending in-person counseling to help you build problem-solving skills. You are more likely to have success in quitting if you attend several counseling sessions. Even short sessions of 10 minutes can be effective.  Finding resources and support systems that can help you to quit smoking and remain smoke-free after you quit. These resources are most helpful when you use them often. They can include:  Online chats with a counselor.  Telephone quitlines.  Printed self-help materials.  Support groups or group counseling.  Text messaging programs.  Mobile phone applications.  Taking medicines to help you quit smoking. (If you are pregnant or breastfeeding, talk with your health care provider first.) Some medicines contain nicotine and some do not. Both types of medicines help with cravings, but the medicines that include nicotine help to relieve withdrawal symptoms. Your health care provider may recommend:  Nicotine patches, gum, or lozenges.  Nicotine inhalers or sprays.  Non-nicotine medicine that is taken by mouth.  Talk with your health care provider about combining strategies, such as taking medicines while you are also receiving in-person counseling.  Using these two strategies together makes you more likely to succeed in quitting than if you used either strategy on its own.  If you are pregnant or breastfeeding, talk with your health care provider about finding counseling or other support strategies to quit smoking. Do not take medicine to help you quit smoking unless told to do so by your health care provider.  What things can I do to make it easier to quit?  Quitting smoking might feel overwhelming at first, but there is a lot that you can do to make it easier. Take these important actions:  Reach out to your family and friends and ask that they support and encourage you during this time. Call telephone quitlines, reach out to support groups, or work with a counselor for support.  Ask people who smoke to avoid smoking around you.  Avoid places that trigger you to smoke, such as bars, parties, or smoke-break areas at work.  Spend time around people who do not smoke.  Lessen stress in your life, because stress can be a smoking trigger for some people. To lessen stress, try:  Exercising regularly.  Deep-breathing exercises.  Yoga.  Meditating.  Performing a body scan. This involves closing your eyes, scanning your body from head to toe, and noticing which parts of your body are particularly tense. Purposefully relax the muscles in those areas.  Download or purchase mobile phone or tablet apps (applications) that can help you stick to your quit plan by providing reminders, tips, and encouragement. There are many free apps, such as QuitGuide from the CDC (Centers for Disease Control and Prevention). You can find other support for quitting smoking (smoking cessation) through smokefree.gov and other websites.  How will I feel when I quit smoking?  Within the first 24 hours of quitting smoking, you may start to feel some withdrawal symptoms. These symptoms are usually most noticeable 2-3 days after quitting, but they usually do not last beyond 2-3 weeks. Changes or  symptoms that you might experience include:  Mood swings.  Restlessness, anxiety, or irritation.  Difficulty concentrating.  Dizziness.  Strong cravings for sugary foods in addition to nicotine.  Mild weight gain.  Constipation.  Nausea.  Coughing or a sore throat.  Changes in how your medicines work in your body.  A depressed mood.  Difficulty sleeping (insomnia).  After the first 2-3 weeks of quitting, you may start to notice more positive results, such as:  Improved sense of smell and taste.  Decreased coughing and sore throat.  Slower heart rate.  Lower blood pressure.  Clearer skin.  The ability to breathe more easily.  Fewer sick days.  Quitting smoking is very challenging for most people. Do not get discouraged if you are not successful the first time. Some people need to make many attempts to quit before they achieve long-term success. Do your best to stick to your quit plan, and talk with your health care provider if you have any questions or concerns.  This information is not intended to replace advice given to you by your health care provider. Make sure you discuss any questions you have with your health care provider.  Document Released: 12/12/2002 Document Revised: 08/15/2017 Document Reviewed: 05/03/2016  [employer] Interactive Patient Education © 2017 [employer]

## 2024-07-28 DIAGNOSIS — K74.60 CIRRHOSIS OF LIVER WITH ASCITES, UNSPECIFIED HEPATIC CIRRHOSIS TYPE: ICD-10-CM

## 2024-07-28 DIAGNOSIS — R18.8 CIRRHOSIS OF LIVER WITH ASCITES, UNSPECIFIED HEPATIC CIRRHOSIS TYPE: ICD-10-CM

## 2024-07-29 RX ORDER — FUROSEMIDE 40 MG/1
40 TABLET ORAL DAILY
Qty: 90 TABLET | Refills: 1 | Status: SHIPPED | OUTPATIENT
Start: 2024-07-29

## 2024-07-29 NOTE — TELEPHONE ENCOUNTER
Rx Refill Note  Requested Prescriptions     Pending Prescriptions Disp Refills    furosemide (LASIX) 40 MG tablet [Pharmacy Med Name: Furosemide 40 MG Oral Tablet] 90 tablet 0     Sig: Take 1 tablet by mouth once daily      Last office visit with prescribing clinician: 4/22/2024   Last telemedicine visit with prescribing clinician: Visit date not found   Next office visit with prescribing clinician: 9/10/2024                         Would you like a call back once the refill request has been completed: [] Yes [] No    If the office needs to give you a call back, can they leave a voicemail: [] Yes [] No    JOVI Edwards  07/29/24, 09:53 CDT

## 2024-08-09 ENCOUNTER — ANESTHESIA (OUTPATIENT)
Dept: GASTROENTEROLOGY | Facility: HOSPITAL | Age: 44
End: 2024-08-09
Payer: COMMERCIAL

## 2024-08-09 ENCOUNTER — HOSPITAL ENCOUNTER (OUTPATIENT)
Facility: HOSPITAL | Age: 44
Setting detail: HOSPITAL OUTPATIENT SURGERY
Discharge: HOME | End: 2024-08-09
Attending: INTERNAL MEDICINE | Admitting: INTERNAL MEDICINE
Payer: COMMERCIAL

## 2024-08-09 ENCOUNTER — ANESTHESIA EVENT (OUTPATIENT)
Dept: GASTROENTEROLOGY | Facility: HOSPITAL | Age: 44
End: 2024-08-09
Payer: COMMERCIAL

## 2024-08-09 ENCOUNTER — TELEPHONE (OUTPATIENT)
Dept: GASTROENTEROLOGY | Facility: CLINIC | Age: 44
End: 2024-08-09
Payer: COMMERCIAL

## 2024-08-09 VITALS
OXYGEN SATURATION: 96 % | HEART RATE: 82 BPM | TEMPERATURE: 97 F | DIASTOLIC BLOOD PRESSURE: 82 MMHG | HEIGHT: 67 IN | SYSTOLIC BLOOD PRESSURE: 106 MMHG | RESPIRATION RATE: 18 BRPM | BODY MASS INDEX: 23.07 KG/M2 | WEIGHT: 147 LBS

## 2024-08-09 DIAGNOSIS — K70.31 ALCOHOLIC CIRRHOSIS OF LIVER WITH ASCITES: ICD-10-CM

## 2024-08-09 PROBLEM — Z12.11 COLON CANCER SCREENING: Status: ACTIVE | Noted: 2024-08-09

## 2024-08-09 PROBLEM — E87.6 HYPOKALEMIA: Status: RESOLVED | Noted: 2022-08-06 | Resolved: 2024-08-09

## 2024-08-09 PROBLEM — R11.2 NAUSEA & VOMITING: Status: RESOLVED | Noted: 2022-08-06 | Resolved: 2024-08-09

## 2024-08-09 PROBLEM — I85.10 SECONDARY ESOPHAGEAL VARICES WITHOUT BLEEDING: Status: ACTIVE | Noted: 2024-08-09

## 2024-08-09 PROBLEM — K92.2 UPPER GI BLEED: Status: RESOLVED | Noted: 2024-01-31 | Resolved: 2024-08-09

## 2024-08-09 LAB — B-HCG UR QL: NEGATIVE

## 2024-08-09 PROCEDURE — 25010000002 PROPOFOL 10 MG/ML EMULSION: Performed by: NURSE ANESTHETIST, CERTIFIED REGISTERED

## 2024-08-09 PROCEDURE — 81025 URINE PREGNANCY TEST: CPT | Performed by: NURSE ANESTHETIST, CERTIFIED REGISTERED

## 2024-08-09 PROCEDURE — 43235 EGD DIAGNOSTIC BRUSH WASH: CPT | Performed by: INTERNAL MEDICINE

## 2024-08-09 PROCEDURE — 25810000003 SODIUM CHLORIDE 0.9 % SOLUTION: Performed by: NURSE ANESTHETIST, CERTIFIED REGISTERED

## 2024-08-09 PROCEDURE — 25010000002 CEFTRIAXONE PER 250 MG: Performed by: INTERNAL MEDICINE

## 2024-08-09 PROCEDURE — 45378 DIAGNOSTIC COLONOSCOPY: CPT | Performed by: INTERNAL MEDICINE

## 2024-08-09 RX ORDER — CEFTRIAXONE 1 G/1
1 INJECTION, POWDER, FOR SOLUTION INTRAMUSCULAR; INTRAVENOUS EVERY 24 HOURS
Status: SHIPPED | OUTPATIENT
Start: 2024-08-09 | End: 2024-08-10

## 2024-08-09 RX ORDER — SODIUM CHLORIDE 0.9 % (FLUSH) 0.9 %
10 SYRINGE (ML) INJECTION AS NEEDED
Status: DISCONTINUED | OUTPATIENT
Start: 2024-08-09 | End: 2024-08-09 | Stop reason: HOSPADM

## 2024-08-09 RX ORDER — CARVEDILOL 6.25 MG/1
6.25 TABLET ORAL 2 TIMES DAILY WITH MEALS
Qty: 60 TABLET | Refills: 3 | Status: SHIPPED | OUTPATIENT
Start: 2024-08-09

## 2024-08-09 RX ORDER — LIDOCAINE HYDROCHLORIDE 20 MG/ML
INJECTION, SOLUTION EPIDURAL; INFILTRATION; INTRACAUDAL; PERINEURAL AS NEEDED
Status: DISCONTINUED | OUTPATIENT
Start: 2024-08-09 | End: 2024-08-09 | Stop reason: SURG

## 2024-08-09 RX ORDER — PROPOFOL 10 MG/ML
VIAL (ML) INTRAVENOUS AS NEEDED
Status: DISCONTINUED | OUTPATIENT
Start: 2024-08-09 | End: 2024-08-09 | Stop reason: SURG

## 2024-08-09 RX ORDER — SODIUM CHLORIDE 9 MG/ML
500 INJECTION, SOLUTION INTRAVENOUS CONTINUOUS PRN
Status: DISCONTINUED | OUTPATIENT
Start: 2024-08-09 | End: 2024-08-09 | Stop reason: HOSPADM

## 2024-08-09 RX ADMIN — PROPOFOL 700 MG: 10 INJECTION, EMULSION INTRAVENOUS at 07:46

## 2024-08-09 RX ADMIN — SODIUM CHLORIDE 500 ML: 9 INJECTION, SOLUTION INTRAVENOUS at 07:29

## 2024-08-09 RX ADMIN — SODIUM CHLORIDE 1000 MG: 900 INJECTION INTRAVENOUS at 07:29

## 2024-08-09 RX ADMIN — GLYCOPYRROLATE 0.1 MG: 0.2 INJECTION INTRAMUSCULAR; INTRAVENOUS at 07:43

## 2024-08-09 RX ADMIN — LIDOCAINE HYDROCHLORIDE 150 MG: 20 INJECTION, SOLUTION EPIDURAL; INFILTRATION; INTRACAUDAL; PERINEURAL at 07:46

## 2024-08-09 NOTE — ANESTHESIA POSTPROCEDURE EVALUATION
"Patient: Rachana Smart    Procedure Summary       Date: 08/09/24 Room / Location: [facility] ENDOSCOPY 5 / [facility]    Anesthesia Start: 0743 Anesthesia Stop: 0819    Procedures:       ESOPHAGOGASTRODUODENOSCOPY WITH ANESTHESIA      COLONOSCOPY WITH ANESTHESIA Diagnosis:       Alcoholic cirrhosis of liver with ascites      (Alcoholic cirrhosis of liver with ascites [K70.31])    Surgeons: Blanca Bernardo MD Provider: Domenic Barber CRNA    Anesthesia Type: MAC ASA Status: 3            Anesthesia Type: MAC    Vitals  Vitals Value Taken Time   BP 91/62 08/09/24 0817   Temp     Pulse 93 08/09/24 0819   Resp 16 08/09/24 0816   SpO2 95 % 08/09/24 0819   Vitals shown include unfiled device data.        Post Anesthesia Care and Evaluation    Patient location during evaluation: PHASE II  Patient participation: complete - patient participated  Level of consciousness: awake and alert  Pain management: adequate    Airway patency: patent  Anesthetic complications: No anesthetic complications    Cardiovascular status: acceptable  Respiratory status: acceptable  Hydration status: acceptable    Comments: Blood pressure 91/62, pulse 82, temperature 97 °F (36.1 °C), temperature source Temporal, resp. rate 16, height 170.2 cm (67\"), weight 66.7 kg (147 lb), SpO2 96%, not currently breastfeeding.    Pt discharged from PACU based on robbie score >8    "

## 2024-08-09 NOTE — ANESTHESIA PREPROCEDURE EVALUATION
Anesthesia Evaluation     Patient summary reviewed   no history of anesthetic complications:   NPO Solid Status: > 8 hours  NPO Liquid Status: Waived due to emergency           Airway   Mallampati: II  Dental - normal exam         Pulmonary    (+) a smoker Current, asthma,  (-) sleep apnea  Cardiovascular   Exercise tolerance: good (4-7 METS)    (+) hypertension (no  meds), valvular problems/murmurs      Neuro/Psych  (-) seizures, TIA, CVA  GI/Hepatic/Renal/Endo    (+) GI bleeding , hepatitis C, liver disease cirrhosis, diabetes mellitus    Musculoskeletal     Abdominal    Substance History      OB/GYN          Other                      Anesthesia Plan    ASA 3     MAC     intravenous induction     Anesthetic plan, risks, benefits, and alternatives have been provided, discussed and informed consent has been obtained with: patient and spouse/significant other.      CODE STATUS:

## 2024-08-09 NOTE — H&P
Chief Complaint:   Cirrhosis and colon cancer screening    Subjective     HPI:   She has had no prior colonoscopies.  Family history is negative.    Also has alcohol induced cirrhosis of the liver.  Endoscopy 2024 done for GI bleeding unremarkable.  Here to screen for varices in a nonurgent setting.    Past Medical History:   Past Medical History:   Diagnosis Date    Alcoholism in recovery     Allergic     Anxiety     Anxiety     Asthma     Gestational diabetes     Heart murmur     Hepatitis-C     History of ear infections     Hypertension     Sinusitis        Past Surgical History:  Past Surgical History:   Procedure Laterality Date     SECTION      x2    COLONOSCOPY      ENDOSCOPY N/A 2024    no evidence of any fresh or old blood or clots,normal stomach    MOUTH SURGERY      UPPER GASTROINTESTINAL ENDOSCOPY  3/24       Family History:  Family History   Problem Relation Age of Onset    Diabetes Mother     Heart disease Father     Diabetes Father     Stroke Father     Colon cancer Neg Hx     Colon polyps Neg Hx        Social History:   reports that she has been smoking cigarettes. She has a 5 pack-year smoking history. She has never used smokeless tobacco. She reports that she does not currently use alcohol after a past usage of about 10.0 standard drinks of alcohol per week. She reports that she does not use drugs.    Medications:   Medications Prior to Admission   Medication Sig Dispense Refill Last Dose    furosemide (LASIX) 40 MG tablet Take 1 tablet by mouth once daily 90 tablet 1 2024    potassium chloride (KLOR-CON M20) 20 MEQ CR tablet Take 1 tablet by mouth Daily. 14 tablet 0 Past Week    sodium-potassium-magnesium sulfates (Suprep Bowel Prep Kit) 17.5-3.13-1.6 GM/177ML solution oral solution Take as directed by office instructions provided 177 mL 0 2024    spironolactone (Aldactone) 25 MG tablet Take 1 tablet by mouth Daily. 30 tablet 5 2024    multivitamin with minerals  "tablet tablet Take 1 tablet by mouth Daily. 30 tablet 0 More than a month    thiamine (VITAMIN B1) 100 MG tablet Take 1 tablet by mouth Daily. 30 tablet 0 8/5/2024       Allergies:  Codeine    ROS:    Resp: No SOA  Cardiovascular: No CP      Objective     /80 (Patient Position: Sitting)   Pulse 82   Temp 97 °F (36.1 °C) (Temporal)   Resp 18   Ht 170.2 cm (67\")   Wt 66.7 kg (147 lb)   SpO2 99%   BMI 23.02 kg/m²     Physical Exam   Constitutional: Pt is oriented to person, place, and in no distress.  Pulmonary/Chest: No distress.  No audible wheezes   Psychiatric: Mood, memory, affect and judgment appear normal.     Assessment & Plan     Diagnosis:  Colon cancer screening  Cirrhosis screen for varices    Anticipated Surgical Procedure:  Endoscopy and colonoscopy    The risks, benefits, and alternatives of endoscopy were reviewed with the patient today.  Risks including perforation, with or without dilation, possibly requiring surgery.  Additional risks include risk of bleeding.  There is also the risk of a drug reaction or problems with anesthesia.  This will be discussed with the further by the anesthesia team on the day of the procedure. The benefits include the diagnosis and management of disease of the upper digestive tract.  Alternatives to endoscopy include upper GI series, laboratory testing, radiographic evaluation, or no intervention.  The patient verbalizes understanding and agrees.    The risks, benefits, and alternatives of colonoscopy were reviewed with the patient today.  Risks including perforation of the colon possibly requiring surgery or colostomy.  Additional risks include risk of bleeding from biopsies or removal of colon tissue.  There is also the risk of a drug reaction or problems with anesthesia.  This will be discussed with the further by the anesthesia team on the day of the procedure.  Lastly there is a possibility of missing a colon polyp or cancer.  The benefits include the " diagnosis and management of disease of the colon and rectum.  Alternatives to colonoscopy include barium enema, laboratory testing, radiographic evaluation, or no intervention.  The patient verbalizes understanding and agrees.    Please note that portions of this note were completed with a voice recognition program.

## 2024-08-21 DIAGNOSIS — Z12.31 ENCOUNTER FOR SCREENING MAMMOGRAM FOR MALIGNANT NEOPLASM OF BREAST: Primary | ICD-10-CM

## 2024-09-04 LAB
NCCN CRITERIA FLAG: ABNORMAL
TYRER CUZICK SCORE: 17.6

## 2024-09-05 NOTE — PROGRESS NOTES
This patient recently took the CARE risk assessment for a mammogram appointment. Based on the patient's responses, NCCN criteria for genetic testing was met.     Navigator follow-up:   The patient is interested in genetic testing and will contact me if she decides to proceed.  I have sent her a Flow[employer] message with my contact information.

## 2024-09-10 ENCOUNTER — HOSPITAL ENCOUNTER (OUTPATIENT)
Dept: GENERAL RADIOLOGY | Facility: HOSPITAL | Age: 44
Discharge: HOME | End: 2024-09-10
Payer: COMMERCIAL

## 2024-09-10 ENCOUNTER — OFFICE VISIT (OUTPATIENT)
Dept: INTERNAL MEDICINE | Facility: CLINIC | Age: 44
End: 2024-09-10
Payer: COMMERCIAL

## 2024-09-10 ENCOUNTER — LAB (OUTPATIENT)
Dept: LAB | Facility: HOSPITAL | Age: 44
End: 2024-09-10
Payer: COMMERCIAL

## 2024-09-10 VITALS
HEIGHT: 67 IN | DIASTOLIC BLOOD PRESSURE: 80 MMHG | TEMPERATURE: 97.2 F | HEART RATE: 98 BPM | BODY MASS INDEX: 22.79 KG/M2 | WEIGHT: 145.2 LBS | SYSTOLIC BLOOD PRESSURE: 122 MMHG | OXYGEN SATURATION: 98 %

## 2024-09-10 DIAGNOSIS — M25.552 LEFT HIP PAIN: ICD-10-CM

## 2024-09-10 DIAGNOSIS — K70.31 ALCOHOLIC CIRRHOSIS OF LIVER WITH ASCITES: Primary | ICD-10-CM

## 2024-09-10 DIAGNOSIS — M25.562 ACUTE PAIN OF LEFT KNEE: ICD-10-CM

## 2024-09-10 DIAGNOSIS — I10 ESSENTIAL HYPERTENSION: ICD-10-CM

## 2024-09-10 DIAGNOSIS — N92.1 MENORRHAGIA WITH IRREGULAR CYCLE: ICD-10-CM

## 2024-09-10 DIAGNOSIS — I85.10 SECONDARY ESOPHAGEAL VARICES WITHOUT BLEEDING: ICD-10-CM

## 2024-09-10 DIAGNOSIS — K70.31 ALCOHOLIC CIRRHOSIS OF LIVER WITH ASCITES: ICD-10-CM

## 2024-09-10 LAB
ALBUMIN SERPL-MCNC: 3.3 G/DL (ref 3.5–5.2)
ALBUMIN/GLOB SERPL: 0.8 G/DL
ALP SERPL-CCNC: 143 U/L (ref 39–117)
ALT SERPL W P-5'-P-CCNC: 22 U/L (ref 1–33)
ANION GAP SERPL CALCULATED.3IONS-SCNC: 11 MMOL/L (ref 5–15)
AST SERPL-CCNC: 50 U/L (ref 1–32)
BILIRUB SERPL-MCNC: 3.4 MG/DL (ref 0–1.2)
BUN SERPL-MCNC: 7 MG/DL (ref 6–20)
BUN/CREAT SERPL: 8.5 (ref 7–25)
CALCIUM SPEC-SCNC: 9.1 MG/DL (ref 8.6–10.5)
CHLORIDE SERPL-SCNC: 98 MMOL/L (ref 98–107)
CO2 SERPL-SCNC: 30 MMOL/L (ref 22–29)
CREAT SERPL-MCNC: 0.82 MG/DL (ref 0.57–1)
EGFRCR SERPLBLD CKD-EPI 2021: 90 ML/MIN/1.73
GLOBULIN UR ELPH-MCNC: 4.4 GM/DL
GLUCOSE SERPL-MCNC: 117 MG/DL (ref 65–99)
POTASSIUM SERPL-SCNC: 3.1 MMOL/L (ref 3.5–5.2)
PROT SERPL-MCNC: 7.7 G/DL (ref 6–8.5)
SODIUM SERPL-SCNC: 139 MMOL/L (ref 136–145)

## 2024-09-10 PROCEDURE — 73562 X-RAY EXAM OF KNEE 3: CPT

## 2024-09-10 PROCEDURE — 36415 COLL VENOUS BLD VENIPUNCTURE: CPT

## 2024-09-10 PROCEDURE — 80053 COMPREHEN METABOLIC PANEL: CPT

## 2024-09-10 PROCEDURE — 73502 X-RAY EXAM HIP UNI 2-3 VIEWS: CPT

## 2024-09-10 PROCEDURE — 99214 OFFICE O/P EST MOD 30 MIN: CPT | Performed by: INTERNAL MEDICINE

## 2024-09-10 RX ORDER — POTASSIUM CHLORIDE 1500 MG/1
20 TABLET, EXTENDED RELEASE ORAL DAILY
Qty: 14 TABLET | Refills: 0 | Status: SHIPPED | OUTPATIENT
Start: 2024-09-10

## 2024-09-10 RX ORDER — MULTIVIT-MIN/IRON FUM/FOLIC AC 7.5 MG-4
1 TABLET ORAL DAILY
Qty: 30 TABLET | Refills: 11 | Status: SHIPPED | OUTPATIENT
Start: 2024-09-10

## 2024-09-10 NOTE — PROGRESS NOTES
Chief Complaint   Patient presents with    Follow-up     Cirrhosis     Answers submitted by the patient for this visit:  Other (Submitted on 9/10/2024)  Please describe your symptoms.: Follow up appointment liver.  Have you had these symptoms before?: Yes  How long have you been having these symptoms?: Greater than 2 weeks  Primary Reason for Visit (Submitted on 9/10/2024)  What is the primary reason for your visit?: Problem Not Listed      History:  Rachana Smart is a 44 y.o. female who presents today for evaluation of the above problems.      IRENE Reich presents today for 3-month follow-up.  For the most part she is doing well.  She had EGD and colonoscopy on August 9, 2024 which was reviewed.  She had grade 1 varices and was started on carvedilol 6.25 mg twice a day although she states that she has not been taking this medication regularly.  She has appointment already scheduled with Dr. Jason Ricardo on October 10, 2024.    Mammogram is scheduled for tomorrow    She has been experiencing left hip pain and left knee pain.  She is concerned because her younger brother had a hip replacement at the age of 38 and he was told that if she was genetic.    On August 30, 2024 she had heavy menses for 4 days and then spotting with clots for 2 days.  She had not had a menses in many months.  She states there is no chance of pregnancy.    Social History     Socioeconomic History    Marital status:    Tobacco Use    Smoking status: Every Day     Current packs/day: 0.50     Average packs/day: 0.5 packs/day for 10.0 years (5.0 ttl pk-yrs)     Types: Cigarettes    Smokeless tobacco: Never   Vaping Use    Vaping status: Never Used   Substance and Sexual Activity    Alcohol use: Not Currently     Alcohol/week: 10.0 standard drinks of alcohol     Types: 10 Shots of liquor per week     Comment: vodka a few times a week    Drug use: Never    Sexual activity: Yes     Partners: Male     Birth control/protection: None        ROS:  Review of Systems  As above      Current Outpatient Medications:     carvedilol (Coreg) 6.25 MG tablet, Take 1 tablet by mouth 2 (Two) Times a Day With Meals., Disp: 60 tablet, Rfl: 3    furosemide (LASIX) 40 MG tablet, Take 1 tablet by mouth once daily, Disp: 90 tablet, Rfl: 1    multivitamin with minerals tablet tablet, Take 1 tablet by mouth Daily., Disp: 30 tablet, Rfl: 11    potassium chloride (KLOR-CON M20) 20 MEQ CR tablet, Take 1 tablet by mouth Daily., Disp: 14 tablet, Rfl: 0    spironolactone (Aldactone) 25 MG tablet, Take 1 tablet by mouth Daily., Disp: 30 tablet, Rfl: 5    thiamine (VITAMIN B1) 100 MG tablet, Take 1 tablet by mouth Daily., Disp: 30 tablet, Rfl: 0    Lab Results   Component Value Date    GLUCOSE 132 (H) 05/24/2024    BUN 6 05/24/2024    CREATININE 0.71 05/24/2024     (L) 05/24/2024    K 3.5 05/24/2024    CL 98 05/24/2024    CALCIUM 9.7 05/24/2024    PROTEINTOT 8.2 05/24/2024    ALBUMIN 3.4 (L) 05/24/2024    ALT 33 05/24/2024    AST 74 (H) 05/24/2024    ALKPHOS 88 05/24/2024    BILITOT 3.2 (H) 05/24/2024    GLOB 4.8 05/24/2024    AGRATIO 0.7 05/24/2024    BCR 8.5 05/24/2024    ANIONGAP 9.0 05/24/2024    EGFR 107.0 05/24/2024       WBC   Date Value Ref Range Status   05/24/2024 6.38 3.40 - 10.80 10*3/mm3 Final     RBC   Date Value Ref Range Status   05/24/2024 3.70 (L) 3.77 - 5.28 10*6/mm3 Final     Hemoglobin   Date Value Ref Range Status   05/24/2024 11.4 (L) 12.0 - 15.9 g/dL Final     Hematocrit   Date Value Ref Range Status   05/24/2024 35.8 34.0 - 46.6 % Final     MCV   Date Value Ref Range Status   05/24/2024 96.8 79.0 - 97.0 fL Final     MCH   Date Value Ref Range Status   05/24/2024 30.8 26.6 - 33.0 pg Final     MCHC   Date Value Ref Range Status   05/24/2024 31.8 31.5 - 35.7 g/dL Final     RDW   Date Value Ref Range Status   05/24/2024 20.8 (H) 12.3 - 15.4 % Final     RDW-SD   Date Value Ref Range Status   05/24/2024 74.2 (H) 37.0 - 54.0 fl Final     MPV   Date  "Value Ref Range Status   05/24/2024 9.8 6.0 - 12.0 fL Final     Platelets   Date Value Ref Range Status   05/24/2024 155 140 - 450 10*3/mm3 Final     Neutrophil %   Date Value Ref Range Status   03/18/2024 64.1 42.7 - 76.0 % Final     Lymphocyte %   Date Value Ref Range Status   03/18/2024 19.1 (L) 19.6 - 45.3 % Final     Monocyte %   Date Value Ref Range Status   03/18/2024 9.9 5.0 - 12.0 % Final     Eosinophil %   Date Value Ref Range Status   03/18/2024 5.0 0.3 - 6.2 % Final     Basophil %   Date Value Ref Range Status   03/18/2024 1.2 0.0 - 1.5 % Final     Immature Grans %   Date Value Ref Range Status   03/18/2024 0.7 (H) 0.0 - 0.5 % Final     Neutrophils, Absolute   Date Value Ref Range Status   03/18/2024 6.29 1.70 - 7.00 10*3/mm3 Final     Lymphocytes, Absolute   Date Value Ref Range Status   03/18/2024 1.88 0.70 - 3.10 10*3/mm3 Final     Monocytes, Absolute   Date Value Ref Range Status   03/18/2024 0.97 (H) 0.10 - 0.90 10*3/mm3 Final     Eosinophils, Absolute   Date Value Ref Range Status   03/18/2024 0.49 (H) 0.00 - 0.40 10*3/mm3 Final     Basophils, Absolute   Date Value Ref Range Status   03/18/2024 0.12 0.00 - 0.20 10*3/mm3 Final     Immature Grans, Absolute   Date Value Ref Range Status   03/18/2024 0.07 (H) 0.00 - 0.05 10*3/mm3 Final     nRBC   Date Value Ref Range Status   03/18/2024 0.0 0.0 - 0.2 /100 WBC Final         OBJECTIVE:  Visit Vitals  /80 (BP Location: Left arm, Patient Position: Sitting, Cuff Size: Adult)   Pulse 98   Temp 97.2 °F (36.2 °C) (Temporal)   Ht 170.2 cm (67\")   Wt 65.9 kg (145 lb 3.2 oz)   SpO2 98%   BMI 22.74 kg/m²      Physical Exam  Constitutional:       General: She is not in acute distress.     Appearance: She is well-developed. She is not diaphoretic.   HENT:      Head: Normocephalic and atraumatic.   Eyes:      General: No scleral icterus.     Pupils: Pupils are equal, round, and reactive to light.   Neck:      Thyroid: No thyromegaly.      Trachea: Phonation " normal.   Cardiovascular:      Rate and Rhythm: Normal rate and regular rhythm.   Pulmonary:      Effort: No respiratory distress.      Breath sounds: No wheezing or rales.   Musculoskeletal:      Right lower leg: No edema.      Left lower leg: No edema.      Comments: There is posterior left hip and posterior left knee pain on examination    Skin:     Coloration: Skin is not jaundiced or pale.      Findings: No erythema.      Comments: Spider hemangiomas   Neurological:      Mental Status: She is alert.   Psychiatric:         Behavior: Behavior normal.         Thought Content: Thought content normal.         Judgment: Judgment normal.           Assessment/Plan      Diagnoses and all orders for this visit:    1. Alcoholic cirrhosis of liver with ascites (Primary)  -     Comprehensive metabolic panel; Future    2. Essential hypertension  -     Comprehensive metabolic panel; Future    3. Secondary esophageal varices without bleeding    4. Acute pain of left knee  -     XR Knee 3 View Left; Future    5. Left hip pain  -     XR Hip With or Without Pelvis 2 - 3 View Left; Future    6. Menorrhagia with irregular cycle  -     Ambulatory Referral to Gynecology    Other orders  -     multivitamin with minerals tablet tablet; Take 1 tablet by mouth Daily.  Dispense: 30 tablet; Refill: 11    Would like to get a follow-up CMP for her cirrhosis and hypertension.  She is on carvedilol for her varices.  She has not been very compliant with medication, and I have encouraged compliance.    Will check x-rays of the left knee and left hip.    Refer to gynecology for the menorrhagia.  She assures me that there is no possibility of pregnancy.      Return in about 4 months (around 1/10/2025) for Annual physical.      ROSALINDA Holloway MD  13:06 CDT  9/10/2024   Electronically signed

## 2024-09-11 ENCOUNTER — HOSPITAL ENCOUNTER (OUTPATIENT)
Dept: MAMMOGRAPHY | Facility: HOSPITAL | Age: 44
Discharge: HOME | End: 2024-09-11
Admitting: INTERNAL MEDICINE
Payer: COMMERCIAL

## 2024-09-11 ENCOUNTER — TELEPHONE (OUTPATIENT)
Dept: INTERNAL MEDICINE | Facility: CLINIC | Age: 44
End: 2024-09-11
Payer: COMMERCIAL

## 2024-09-11 DIAGNOSIS — R92.8 ABNORMAL MAMMOGRAM OF LEFT BREAST: Primary | ICD-10-CM

## 2024-09-11 DIAGNOSIS — M25.562 ACUTE PAIN OF LEFT KNEE: Primary | ICD-10-CM

## 2024-09-11 DIAGNOSIS — Z12.31 ENCOUNTER FOR SCREENING MAMMOGRAM FOR MALIGNANT NEOPLASM OF BREAST: ICD-10-CM

## 2024-09-11 PROCEDURE — 77063 BREAST TOMOSYNTHESIS BI: CPT

## 2024-09-11 PROCEDURE — 77067 SCR MAMMO BI INCL CAD: CPT

## 2024-09-11 NOTE — TELEPHONE ENCOUNTER
Please find out what he is requesting.  I did not discuss MRI or ultrasound with his wife during the office visit.

## 2024-09-11 NOTE — TELEPHONE ENCOUNTER
Hub staff attempted to follow warm transfer process and was unsuccessful     Caller: Kurt Smart    Relationship to patient: Emergency Contact    Best call back number: [phone]     Patient is needing: TO LET DOCTOR NELY KNOW THEY WOULD LIKE TO MOVE FORWARD WITH THE ULTRASOUND OR WHATEVER HE RECOMMENDS, WANTING TO KNOW HOW THEY CAN GET THIS SCHEDULED

## 2024-09-13 ENCOUNTER — HOSPITAL ENCOUNTER (OUTPATIENT)
Dept: ULTRASOUND IMAGING | Facility: HOSPITAL | Age: 44
Discharge: HOME | End: 2024-09-13
Payer: COMMERCIAL

## 2024-09-13 ENCOUNTER — HOSPITAL ENCOUNTER (OUTPATIENT)
Dept: MAMMOGRAPHY | Facility: HOSPITAL | Age: 44
Discharge: HOME | End: 2024-09-13
Payer: COMMERCIAL

## 2024-09-13 DIAGNOSIS — R92.8 ABNORMAL FINDING ON BREAST IMAGING: ICD-10-CM

## 2024-09-13 DIAGNOSIS — R92.8 ABNORMAL MAMMOGRAM OF LEFT BREAST: ICD-10-CM

## 2024-09-13 PROCEDURE — A4648 IMPLANTABLE TISSUE MARKER: HCPCS

## 2024-09-13 PROCEDURE — 76642 ULTRASOUND BREAST LIMITED: CPT

## 2024-09-13 RX ORDER — LIDOCAINE HYDROCHLORIDE 10 MG/ML
10 INJECTION, SOLUTION INFILTRATION; PERINEURAL ONCE
Status: DISPENSED | OUTPATIENT
Start: 2024-09-13

## 2024-09-13 RX ORDER — LIDOCAINE HYDROCHLORIDE AND EPINEPHRINE 10; 10 MG/ML; UG/ML
10 INJECTION, SOLUTION INFILTRATION; PERINEURAL ONCE
Status: DISPENSED | OUTPATIENT
Start: 2024-09-13

## 2024-09-17 ENCOUNTER — TELEPHONE (OUTPATIENT)
Dept: INTERNAL MEDICINE | Facility: CLINIC | Age: 44
End: 2024-09-17
Payer: COMMERCIAL

## 2024-09-17 DIAGNOSIS — C50.912 INVASIVE DUCTAL CARCINOMA OF BREAST, FEMALE, LEFT: Primary | ICD-10-CM

## 2024-09-18 ENCOUNTER — OFFICE VISIT (OUTPATIENT)
Dept: SURGERY | Facility: CLINIC | Age: 44
End: 2024-09-18
Payer: COMMERCIAL

## 2024-09-18 VITALS
HEART RATE: 99 BPM | HEIGHT: 67 IN | OXYGEN SATURATION: 96 % | BODY MASS INDEX: 22.76 KG/M2 | SYSTOLIC BLOOD PRESSURE: 115 MMHG | DIASTOLIC BLOOD PRESSURE: 74 MMHG | WEIGHT: 145 LBS

## 2024-09-18 DIAGNOSIS — F17.210 NICOTINE DEPENDENCE, CIGARETTES, UNCOMPLICATED: ICD-10-CM

## 2024-09-18 DIAGNOSIS — Z84.81 FAMILY HISTORY OF BRCA GENE MUTATION: ICD-10-CM

## 2024-09-18 DIAGNOSIS — C50.212 MALIGNANT NEOPLASM OF UPPER-INNER QUADRANT OF LEFT FEMALE BREAST, UNSPECIFIED ESTROGEN RECEPTOR STATUS: Primary | ICD-10-CM

## 2024-09-18 DIAGNOSIS — Z80.3 FAMILY HISTORY OF BREAST CANCER: ICD-10-CM

## 2024-09-20 ENCOUNTER — LAB (OUTPATIENT)
Dept: LAB | Facility: HOSPITAL | Age: 44
End: 2024-09-20
Payer: COMMERCIAL

## 2024-09-20 ENCOUNTER — HOSPITAL ENCOUNTER (OUTPATIENT)
Dept: ULTRASOUND IMAGING | Facility: HOSPITAL | Age: 44
Discharge: HOME | End: 2024-09-20
Payer: COMMERCIAL

## 2024-09-20 ENCOUNTER — HOSPITAL ENCOUNTER (OUTPATIENT)
Dept: MRI IMAGING | Facility: HOSPITAL | Age: 44
Discharge: HOME | End: 2024-09-20
Payer: COMMERCIAL

## 2024-09-20 DIAGNOSIS — C50.212 MALIGNANT NEOPLASM OF UPPER-INNER QUADRANT OF LEFT FEMALE BREAST, UNSPECIFIED ESTROGEN RECEPTOR STATUS: ICD-10-CM

## 2024-09-20 DIAGNOSIS — M25.562 ACUTE PAIN OF LEFT KNEE: ICD-10-CM

## 2024-09-20 DIAGNOSIS — Z13.79 GENETIC TESTING: Primary | ICD-10-CM

## 2024-09-20 DIAGNOSIS — Z13.79 GENETIC TESTING: ICD-10-CM

## 2024-09-20 LAB
CYTO UR: NORMAL
LAB AP CASE REPORT: NORMAL
LAB AP DIAGNOSIS COMMENT: NORMAL
LAB AP INTRADEPARTMENTAL CONSULT: NORMAL
Lab: NORMAL
PATH REPORT.FINAL DX SPEC: NORMAL
PATH REPORT.GROSS SPEC: NORMAL

## 2024-09-20 PROCEDURE — 25510000001 GADOPICLENOL 0.5 MMOL/ML SOLUTION: Performed by: STUDENT IN AN ORGANIZED HEALTH CARE EDUCATION/TRAINING PROGRAM

## 2024-09-20 PROCEDURE — 76882 US LMTD JT/FCL EVL NVASC XTR: CPT

## 2024-09-20 PROCEDURE — A9573 GADOPICLENOL 0.5 MMOL/ML SOLUTION: HCPCS | Performed by: STUDENT IN AN ORGANIZED HEALTH CARE EDUCATION/TRAINING PROGRAM

## 2024-09-20 PROCEDURE — 77049 MRI BREAST C-+ W/CAD BI: CPT

## 2024-09-20 RX ADMIN — GADOPICLENOL 6.5 ML: 485.1 INJECTION INTRAVENOUS at 11:11

## 2024-10-02 ENCOUNTER — TELEPHONE (OUTPATIENT)
Dept: GENETICS | Facility: HOSPITAL | Age: 44
End: 2024-10-02
Payer: COMMERCIAL

## 2024-10-02 ENCOUNTER — OFFICE VISIT (OUTPATIENT)
Dept: OBSTETRICS AND GYNECOLOGY | Age: 44
End: 2024-10-02
Payer: COMMERCIAL

## 2024-10-02 VITALS
BODY MASS INDEX: 22.13 KG/M2 | DIASTOLIC BLOOD PRESSURE: 80 MMHG | HEIGHT: 67 IN | WEIGHT: 141 LBS | SYSTOLIC BLOOD PRESSURE: 120 MMHG

## 2024-10-02 DIAGNOSIS — Z12.4 SCREENING FOR CERVICAL CANCER: ICD-10-CM

## 2024-10-02 DIAGNOSIS — N92.1 MENORRHAGIA WITH IRREGULAR CYCLE: ICD-10-CM

## 2024-10-02 DIAGNOSIS — N83.201 RIGHT OVARIAN CYST: ICD-10-CM

## 2024-10-02 DIAGNOSIS — Z01.419 WOMEN'S ANNUAL ROUTINE GYNECOLOGICAL EXAMINATION: Primary | ICD-10-CM

## 2024-10-02 DIAGNOSIS — F17.210 CIGARETTE SMOKER: ICD-10-CM

## 2024-10-02 PROCEDURE — G0123 SCREEN CERV/VAG THIN LAYER: HCPCS | Performed by: NURSE PRACTITIONER

## 2024-10-02 PROCEDURE — 87624 HPV HI-RISK TYP POOLED RSLT: CPT | Performed by: NURSE PRACTITIONER

## 2024-10-02 RX ORDER — FERROUS SULFATE 325(65) MG
325 TABLET ORAL
COMMUNITY

## 2024-10-02 RX ORDER — MULTIVITAMIN
1 TABLET ORAL DAILY
COMMUNITY
Start: 2024-09-10

## 2024-10-02 NOTE — PROGRESS NOTES
Chief Complaint   Patient presents with    Gynecologic Exam     Patient is new to our office and here to establish care. Patient is due for annual well GYN Exam. Last well GYN exam and pap 13+ years ago, normal per report. Last Breast MRI 9/20/24, BIRADS Cat 4- Current Breast CA diagnosis. TVUS today for menorrhagia with irregular cycle. No period for 10 months, then had bleeding in August. Patient denies current pelvic pain, abnormal vaginal discharge, dyspareunia, urinary incontinence, and voices no other complaints.        History:  Rachana Smart is a 44 y.o. female who presents today for evaluation of the above problems.       Rachana Smart is a 44 y.o. female ,  who comes to the office today for annual GYN examination.  Last menstrual period was 08/30/2024.   Periods are irregular and painful.  Her last Pap smear was 13+ years ago.  Her medical history is reviewed.           ROS:  Review of Systems   Constitutional: Negative.    HENT: Negative.     Eyes: Negative.    Respiratory: Negative.     Cardiovascular: Negative.    Gastrointestinal: Negative.    Endocrine: Negative.    Genitourinary:  Positive for menstrual problem.   Musculoskeletal: Negative.    Skin: Negative.    Neurological: Negative.    Psychiatric/Behavioral: Negative.         Allergies   Allergen Reactions    Codeine Rash and GI Intolerance     Past Medical History:   Diagnosis Date    Alcoholism in recovery     Allergic     Anemia [id number]    Anxiety     Anxiety     Asthma     Breast cancer 2024    Cancer [id number]    Cirrhosis 2023    Endometriosis 1993    Gestational diabetes     Gestational hypertension 2006    Heart murmur     Hepatitis C 2022    Hepatitis-C     History of ear infections     History of transfusion 2024    Hypertension     Migraine 2020    Multiple gestation 2006    Ovarian cyst 1995    PMS (premenstrual syndrome) 1991    PONV (postoperative nausea and vomiting) 1994    Preeclampsia 2006    Secondary esophageal  varices without bleeding 2024    Sinusitis     Varicella      Past Surgical History:   Procedure Laterality Date    BREAST BIOPSY       SECTION      x2    COLONOSCOPY      COLONOSCOPY N/A 2024    Procedure: COLONOSCOPY WITH ANESTHESIA;  Surgeon: Blanca Bernardo MD;  Location: [facility] ENDOSCOPY;  Service: Gastroenterology;  Laterality: N/A;  pre: screen  post: diverticulosis  [place]    ENDOSCOPY N/A 2024    no evidence of any fresh or old blood or clots,normal stomach    ENDOSCOPY N/A 2024    Procedure: ESOPHAGOGASTRODUODENOSCOPY WITH ANESTHESIA;  Surgeon: Blanca Bernardo MD;  Location: [facility] ENDOSCOPY;  Service: Gastroenterology;  Laterality: N/A;  pre: cirrhosis   post: PHG. Varices.  [place]    MOUTH SURGERY      UPPER GASTROINTESTINAL ENDOSCOPY  3/24    WISDOM TOOTH EXTRACTION       Family History   Problem Relation Age of Onset    Heart disease Father     Diabetes Father     Stroke Father     Diabetes Mother     Breast cancer Sister 45    Ovarian cancer Maternal Grandmother     Colon cancer Neg Hx     Colon polyps Neg Hx     Uterine cancer Neg Hx     Melanoma Neg Hx       reports that she has been smoking cigarettes. She has a 5 pack-year smoking history. She has never used smokeless tobacco. She reports that she does not currently use alcohol after a past usage of about 10.0 standard drinks of alcohol per week. She reports that she does not use drugs.      Current Outpatient Medications:     carvedilol (Coreg) 6.25 MG tablet, Take 1 tablet by mouth 2 (Two) Times a Day With Meals., Disp: 60 tablet, Rfl: 3    ferrous sulfate 325 (65 FE) MG tablet, Take 1 tablet by mouth Daily With Breakfast., Disp: , Rfl:     furosemide (LASIX) 40 MG tablet, Take 1 tablet by mouth once daily, Disp: 90 tablet, Rfl: 1    Multivitamin tablet tablet, Take 1 tablet by mouth Daily., Disp: , Rfl:     multivitamin with minerals tablet tablet, Take 1 tablet by mouth Daily., Disp: 30 tablet,  "Rfl: 11    potassium chloride (KLOR-CON M20) 20 MEQ CR tablet, Take 1 tablet by mouth Daily., Disp: 14 tablet, Rfl: 0    spironolactone (Aldactone) 25 MG tablet, Take 1 tablet by mouth Daily., Disp: 30 tablet, Rfl: 5    thiamine (VITAMIN B1) 100 MG tablet, Take 1 tablet by mouth Daily., Disp: 30 tablet, Rfl: 0    Current Facility-Administered Medications:     lidocaine (XYLOCAINE) 1 % injection 10 mL, 10 mL, Subcutaneous, Once, Amina Camarillo MD    lidocaine 1% - EPINEPHrine 1:916215 (XYLOCAINE W/EPI) 1 %-1:950990 injection 10 mL, 10 mL, Injection, Once, Amina Camarillo MD    OBJECTIVE:  /80   Ht 170.2 cm (67\")   Wt 64 kg (141 lb)   LMP 08/30/2024   BMI 22.08 kg/m²    Physical Exam  Exam conducted with a chaperone present.   Constitutional:       Appearance: She is well-developed.   HENT:      Head: Normocephalic and atraumatic.   Eyes:      General: Lids are normal.      Conjunctiva/sclera: Conjunctivae normal.      Pupils: Pupils are equal, round, and reactive to light.   Neck:      Thyroid: No thyromegaly.   Cardiovascular:      Rate and Rhythm: Normal rate and regular rhythm.      Heart sounds: Normal heart sounds.   Pulmonary:      Effort: Pulmonary effort is normal.      Breath sounds: Normal breath sounds.   Chest:   Breasts:     Breasts are symmetrical.      Right: No inverted nipple, mass, nipple discharge, skin change or tenderness.      Left: No inverted nipple, mass, nipple discharge, skin change or tenderness.   Abdominal:      General: Bowel sounds are normal.      Palpations: Abdomen is soft.   Genitourinary:     Exam position: Supine.      Labia:         Right: No rash, tenderness, lesion or injury.         Left: No rash, tenderness, lesion or injury.       Vagina: No signs of injury and foreign body. No vaginal discharge, erythema, tenderness or bleeding.      Cervix: No cervical motion tenderness, discharge or friability.      Uterus: Not deviated, not enlarged, not fixed and not " tender.       Adnexa:         Right: No mass, tenderness or fullness.          Left: No mass, tenderness or fullness.        Rectum: Normal. No tenderness or external hemorrhoid.   Musculoskeletal:         General: Normal range of motion.      Cervical back: Normal range of motion and neck supple.   Skin:     General: Skin is warm and dry.   Neurological:      Mental Status: She is alert and oriented to person, place, and time.         Assessment/Plan    Diagnoses and all orders for this visit:    1. Women's annual routine gynecological examination (Primary)    2. Screening for cervical cancer  -     Cancel: Liquid-based Pap Smear, Screening  -     Liquid-based Pap Smear, Screening  -     HPV DNA Probe, Direct - ThinPrep Vial, Cervix    3. Menorrhagia with irregular cycle    4. Right ovarian cyst    5. Cigarette smoker    She will return in one year. In the meantime if she develops questions or problems, she will notify the office.        An After Visit Summary was printed and given to the patient at discharge.  Return in about 1 year (around 10/2/2025) for Annual physical.          Vandana YOST 10/6/2024   Electronically signed

## 2024-10-03 ENCOUNTER — TELEPHONE (OUTPATIENT)
Dept: SURGERY | Facility: CLINIC | Age: 44
End: 2024-10-03
Payer: COMMERCIAL

## 2024-10-03 ENCOUNTER — PREP FOR SURGERY (OUTPATIENT)
Dept: OTHER | Facility: HOSPITAL | Age: 44
End: 2024-10-03
Payer: COMMERCIAL

## 2024-10-03 DIAGNOSIS — C50.212 MALIGNANT NEOPLASM OF UPPER-INNER QUADRANT OF LEFT FEMALE BREAST, UNSPECIFIED ESTROGEN RECEPTOR STATUS: Primary | ICD-10-CM

## 2024-10-03 PROBLEM — C50.912 INVASIVE DUCTAL CARCINOMA OF BREAST, FEMALE, LEFT: Status: ACTIVE | Noted: 2024-10-03

## 2024-10-03 RX ORDER — ENOXAPARIN SODIUM 100 MG/ML
40 INJECTION SUBCUTANEOUS DAILY
OUTPATIENT
Start: 2024-10-03

## 2024-10-03 RX ORDER — SODIUM CHLORIDE, SODIUM LACTATE, POTASSIUM CHLORIDE, CALCIUM CHLORIDE 600; 310; 30; 20 MG/100ML; MG/100ML; MG/100ML; MG/100ML
100 INJECTION, SOLUTION INTRAVENOUS CONTINUOUS
OUTPATIENT
Start: 2024-10-03

## 2024-10-03 RX ORDER — HEPARIN SODIUM 5000 [USP'U]/ML
5000 INJECTION, SOLUTION INTRAVENOUS; SUBCUTANEOUS ONCE
OUTPATIENT
Start: 2024-10-03 | End: 2024-10-03

## 2024-10-03 RX ORDER — ACETAMINOPHEN 500 MG
1000 TABLET ORAL ONCE
OUTPATIENT
Start: 2024-10-03 | End: 2024-10-03

## 2024-10-03 RX ORDER — CELECOXIB 200 MG/1
200 CAPSULE ORAL ONCE
OUTPATIENT
Start: 2024-10-03 | End: 2024-10-03

## 2024-10-03 NOTE — PROGRESS NOTES
Rachana Smart is a 44 y.o. female who was referred for genetic counseling due to a personal history of breast cancer. Her  was present at the time of the call. Genetic counseling was performed via telephone. Ms. Smart confirmed her full name, date of birth, and that she was physically located in the [facility] at the time of the appointment. She was recently diagnosed with a left breast invasive ductal carcinoma which was found to be ER/DE positive and HER2 negative. She is planning a double mastectomy on 11/5. She retains her uterus and ovaries. She had a colonoscopy in August of 2024 and reports no polyps. She has a recall of every 5-10 years. Ms. Smart was interested in discussing her risk for a hereditary cancer syndrome, and decided to pursue genetic testing. The CancerNext-Expanded panel was ordered through [employer] which analyzes 71 genes associated with an increased cancer risk. She is planning to have her blood drawn at [facility] on 10/3/2024. Results are expected 2-3 weeks after the lab receives her sample.    PERTINENT FAMILY HISTORY:  Mat. Half-Sister:    Breast cancer, 45, BRCA2+  Mat. Aunt:    Skin cancer  Mat. Grandmother:   Cervical cancer, 40s  Mat. Great-Grandmother:  Liver cancer  Father:     Melanoma, 40s       Esophageal cancer, 60s  Pat. Uncle:    Pancreatic cancer  Pat. Aunt:    Possible skin cancer  Pat. Grandmother:    Skin cancer, 80s  Pat. Grandfather:   Mesothelioma, metastatic, 50s    We do not have medical records regarding any of these diagnoses. A copy of Ms. Smart's sister's genetic testing results was not available for review. We discussed how having a copy of these results for review could be helpful.     RISK ASSESSMENT:  Ms. Smart's personal history of breast cancer led to concern for a hereditary cancer syndrome. she clearly meets NCCN criteria for genetic testing for high penetrance breast cancer genes based on her personal history of breast cancer  diagnosed at age 44. We discussed multigene panel testing that would evaluate multiple genes simultaneously associated with hereditary cancer risk. This risk assessment is based on the family history information provided at the time of the appointment and could change in the future should new information be obtained.    GENETIC COUNSELING (30 minutes) We reviewed the family history information in detail.  Cases of cancer follow three general patterns: sporadic, familial, and hereditary.  While most cancer is sporadic, some cases appear to occur in family clusters.  These cases are said to be familial and account for 10-20% of cancer cases.  Familial cases may be due to a combination of shared genes and environmental factors among family members.  In even fewer families, the cancer is said to be inherited, and the genes responsible for the cancer are known.      Family histories typical of hereditary cancer syndromes usually include multiple first- and second-degree relatives diagnosed with cancer types that define a syndrome.  These cases tend to be diagnosed at younger-than-expected ages and can be bilateral or multifocal.  The cancer in these families follows an autosomal dominant inheritance pattern, which indicates the likely presence of a mutation in a cancer susceptibility gene.  Children and siblings of an individual believed to carry this mutation have a 50% chance of inheriting that mutation, thereby inheriting the increased risk to develop cancer.  These mutations can be passed down from the maternal or the paternal lineage.    Hereditary breast cancer accounts for 5-10% of all cases of breast cancer.  A significant proportion of hereditary breast and ovarian cancer can be attributed to mutations in the BRCA1 and BRCA2 genes.  Mutations in these genes confer an increased risk for breast cancer, ovarian cancer, male breast cancer, prostate cancer, and pancreatic cancer. Women with a BRCA1 or BRCA2 mutation  who have already been diagnosed with breast cancer have a 20-40% risk of a contralateral breast cancer. Women with a BRCA1 or BRCA2 mutation have up to a 58% risk of ovarian cancer.  BRCA1 has been more significantly associated with triple negative breast cancers than other genes. There are other genes considered to confer a high risk for breast cancer.    There are other genes that are known to be associated with an increased risk for cancer.  Some of these genes have well defined risks and established management guidelines.  Other genes that can be tested for have been more recently described, and there may be less data regarding the risks and therefore may not have established management guidelines. Based on Ms. Smart's desire to get as much information as possible regarding her personal risks and potential risks for her family, she opted to pursue testing through a panel that would evaluate multiple genes that have been associated with cancer risk.     GENETIC TESTING:  The risks, benefits and limitations of genetic testing and implications for clinical management following testing were reviewed.  DNA test results can influence decisions regarding screening, prevention and surgical management.  Genetic testing can have significant psychological implications for both individuals and families.  Also discussed was the possibility of employment and insurance discrimination based on genetic test results and the laws in place to prevent this ([place]ARC[place]).    We discussed panel testing that would evaluate 71 genes associated with increased cancer risk. The implications of a positive or negative test result were discussed. We discussed the possibility that, in some cases, genetic test results may be informative or may be ambiguous due to the identification of a genetic variant. These variants may or may not be associated with an increased cancer risk.  With multigene panel testing, it is not uncommon for a variant of  uncertain significance (VUS) to be identified.  If a VUS is identified, testing unaffected family members is typically not recommended and screening recommendations are made based on the family history.  The laboratories that perform genetic testing work to reclassify the VUS and send out an amended report if and when a VUS is reclassified.  The majority of variant findings are ultimately reclassified to a negative result.  Given her personal and family history, a negative test result would not eliminate all risk to her relatives.      PLAN: Genetic testing was ordered via the CancerNext-Expanded Panel through [employer]. She is planning to have her blood drawn at [facility] on 10/3/2024. We will order the BRCAPlus STAT panel to get the high/moderate risk breast cancer genes back within 5-10 days. Results from the remainder of the panel are expected 2-3 weeks after the lab receives her sample. If she has any questions in the meantime, she is welcome to call me at [phone].     Mary Lou Ji MS, [facility]  Licensed Certified Genetic Counselor

## 2024-10-03 NOTE — TELEPHONE ENCOUNTER
I personally called the patient and answered her questions. She would like to proceed with bilateral mastectomies with left axillary sentinel lymph node biopsy and delayed reconstruction 2/2 her nicotine dependence. She would like to wait until early November due to some life events. Will schedule her for 11/5/24.     Fide Verduzco MD  10/03/24

## 2024-10-04 ENCOUNTER — CLINICAL SUPPORT (OUTPATIENT)
Dept: GENETICS | Facility: HOSPITAL | Age: 44
End: 2024-10-04
Payer: COMMERCIAL

## 2024-10-04 ENCOUNTER — LAB (OUTPATIENT)
Dept: LAB | Facility: HOSPITAL | Age: 44
End: 2024-10-04
Payer: COMMERCIAL

## 2024-10-04 ENCOUNTER — TELEPHONE (OUTPATIENT)
Dept: SURGERY | Facility: CLINIC | Age: 44
End: 2024-10-04
Payer: COMMERCIAL

## 2024-10-04 DIAGNOSIS — Z84.81 FAMILY HISTORY OF BRCA2 GENE POSITIVE: ICD-10-CM

## 2024-10-04 DIAGNOSIS — Z80.42 FAMILY HISTORY OF PROSTATE CANCER: ICD-10-CM

## 2024-10-04 DIAGNOSIS — Z80.8 FAMILY HISTORY OF NONMELANOMA SKIN CANCER: ICD-10-CM

## 2024-10-04 DIAGNOSIS — Z13.79 GENETIC TESTING: Primary | ICD-10-CM

## 2024-10-04 DIAGNOSIS — C50.212 MALIGNANT NEOPLASM OF UPPER-INNER QUADRANT OF LEFT BREAST IN FEMALE, ESTROGEN RECEPTOR POSITIVE: ICD-10-CM

## 2024-10-04 DIAGNOSIS — Z17.0 MALIGNANT NEOPLASM OF UPPER-INNER QUADRANT OF LEFT BREAST IN FEMALE, ESTROGEN RECEPTOR POSITIVE: ICD-10-CM

## 2024-10-04 DIAGNOSIS — Z80.3 FAMILY HISTORY OF BREAST CANCER: ICD-10-CM

## 2024-10-04 DIAGNOSIS — Z80.8 FAMILY HISTORY OF MALIGNANT MELANOMA: ICD-10-CM

## 2024-10-04 LAB
GEN CATEG CVX/VAG CYTO-IMP: NORMAL
HPV I/H RISK 4 DNA CVX QL PROBE+SIG AMP: NOT DETECTED
LAB AP CASE REPORT: NORMAL
LAB AP GYN ADDITIONAL INFORMATION: NORMAL
LAB AP GYN OTHER FINDINGS: NORMAL
Lab: NORMAL
PATH INTERP SPEC-IMP: NORMAL
STAT OF ADQ CVX/VAG CYTO-IMP: NORMAL

## 2024-10-04 NOTE — TELEPHONE ENCOUNTER
I called patient and gave her instructions for surgery, she does see a Liver specialist at [facility] so I will be sending a Medical Clearance to make sure patient is good to undergo surgery. Patient voiced understandings.

## 2024-10-10 ENCOUNTER — TELEPHONE (OUTPATIENT)
Dept: INTERNAL MEDICINE | Facility: CLINIC | Age: 44
End: 2024-10-10
Payer: COMMERCIAL

## 2024-10-10 NOTE — TELEPHONE ENCOUNTER
Caller: Rachana Smart    Relationship: Self    Best call back number:     [phone]        What is the medical concern/diagnosis: KNEE PAIN    What specialty or service is being requested: MRI    What is the provider, practice or medical service name[facility]    What is the office location: BY THE OFFICE    Any additional details: US SHOWED NOTHING & WAS TOLD TO HAVE AN MRI DONE.    MORNINGS ARE BETTER SO SHE CAN EAT.

## 2024-10-11 ENCOUNTER — TELEPHONE (OUTPATIENT)
Dept: INTERNAL MEDICINE | Facility: CLINIC | Age: 44
End: 2024-10-11
Payer: COMMERCIAL

## 2024-10-14 DIAGNOSIS — R18.8 CIRRHOSIS OF LIVER WITH ASCITES, UNSPECIFIED HEPATIC CIRRHOSIS TYPE: ICD-10-CM

## 2024-10-14 DIAGNOSIS — K74.60 CIRRHOSIS OF LIVER WITH ASCITES, UNSPECIFIED HEPATIC CIRRHOSIS TYPE: ICD-10-CM

## 2024-10-14 RX ORDER — SPIRONOLACTONE 25 MG/1
25 TABLET ORAL DAILY
Qty: 90 TABLET | Refills: 3 | Status: SHIPPED | OUTPATIENT
Start: 2024-10-14

## 2024-10-14 NOTE — TELEPHONE ENCOUNTER
Rx Refill Note  Requested Prescriptions     Pending Prescriptions Disp Refills    spironolactone (ALDACTONE) 25 MG tablet [Pharmacy Med Name: Spironolactone 25 MG Oral Tablet] 90 tablet 0     Sig: Take 1 tablet by mouth once daily      Last office visit with prescribing clinician: 9/10/2024   Last telemedicine visit with prescribing clinician: Visit date not found   Next office visit with prescribing clinician: 1/10/2025                         Would you like a call back once the refill request has been completed: [] Yes [] No    If the office needs to give you a call back, can they leave a voicemail: [] Yes [] No    Dimas Bower MA  10/14/24, 08:34 CDT

## 2024-10-15 ENCOUNTER — PROCEDURE VISIT (OUTPATIENT)
Dept: OBSTETRICS AND GYNECOLOGY | Age: 44
End: 2024-10-15
Payer: COMMERCIAL

## 2024-10-15 ENCOUNTER — TELEPHONE (OUTPATIENT)
Dept: GENETICS | Facility: HOSPITAL | Age: 44
End: 2024-10-15
Payer: COMMERCIAL

## 2024-10-15 VITALS
DIASTOLIC BLOOD PRESSURE: 72 MMHG | WEIGHT: 141 LBS | SYSTOLIC BLOOD PRESSURE: 118 MMHG | HEIGHT: 67 IN | BODY MASS INDEX: 22.13 KG/M2

## 2024-10-15 DIAGNOSIS — R87.618 UNEXPLAINED ENDOMETRIAL CELLS ON CERVICAL PAP SMEAR: Primary | ICD-10-CM

## 2024-10-15 LAB
B-HCG UR QL: NEGATIVE
EXPIRATION DATE: NORMAL
INTERNAL NEGATIVE CONTROL: NEGATIVE
INTERNAL POSITIVE CONTROL: POSITIVE
Lab: NORMAL

## 2024-10-15 PROCEDURE — 88305 TISSUE EXAM BY PATHOLOGIST: CPT | Performed by: NURSE PRACTITIONER

## 2024-10-15 RX ORDER — UBIDECARENONE 75 MG
100 CAPSULE ORAL DAILY
COMMUNITY

## 2024-10-15 NOTE — TELEPHONE ENCOUNTER
Akiko Flores, RN1 hour ago (10:32 AM)    AG  SURGERY SCHEDULING REQUIREMENTS INCLUDE:  Facility: [facility]  Admission Type: Day Surgery   Time Needed: 30 minutes  Rep Required: NO  Anesthesia: IV Sedation  Preop Required (MILD, SCS perm): No  MRSA Swab Required: No  NPO: Yes   Procedure: Right L4-5, L5-S1 RFA  Dx/CPT CODE: 91107, 07458 M47.817  Anticoagulation:   Is the patient on Coumadin/Warfarin, Lovenox, Plavix, or Aspirin/Excedrin? Hold NAPROXEN for 4 days prior to procedure and 24 hours following.    Hold IBUPROFEN for 24 hours prior to procedure and 24 hours following.    You do not need to stop Aspirin for your procedure.   NSAIDS (OTC products)? - Hold for 24 hours     Does patient take any GLP-1 inhibitors/agonists? NO If yes, need to hold for 7 days prior to any procedures with sedation. Patients need to stop all solids 24 hours prior to surgery, may have clear liquids until 8 hours prior to surgery, then NPO.  INR Check: No  Platelets WNL?     PLT (K/mcL)  Date Value  02/02/2024 232         Sleep Apnea: Yes  Latex Allergy: No  Diabetic: No Insulin pump or glucose monitor (N/A for PNS procedure): No  Pacemaker: No  Heart/Lung Issues: CD, HL, LUNA (if yes need to verify approval for anesthesia with MD)  Stroke/MI in the past 6 months: No  Estimated body mass index is 31 kg/m² as calculated from the following:    Height as of 2/6/24: 5' 3\" (1.6 m).    Weight as of 2/6/24: 79.4 kg (175 lb).  Special Instructions: Under Fluoroscopy      Follow up: Yoselin         Spoke with pt and disclosed negative results on the 13 BRCAPlus STAT genes. Pt is aware the rest of her panel is still pending and we will give her another call when the rest of her results are back.

## 2024-10-15 NOTE — PROGRESS NOTES
An endometrial biopsy was performed in the office today.  The cervix was visualized with a speculum and prepped with Betadine.  The anterior lip was grasped with a tenaculum and a standard endometrial sampling Pipelle was passed into the uterine cavity.  The patient experienced mild cramping and the uterus sounded to 6 cm.  A small amount of tissue was obtained with 1 pass.  The tenaculum was removed and the site was hemostatic.  She tolerated the procedure well.

## 2024-10-16 ENCOUNTER — DOCUMENTATION (OUTPATIENT)
Dept: GENETICS | Facility: HOSPITAL | Age: 44
End: 2024-10-16
Payer: COMMERCIAL

## 2024-10-16 ENCOUNTER — TELEPHONE (OUTPATIENT)
Dept: SURGERY | Facility: CLINIC | Age: 44
End: 2024-10-16
Payer: COMMERCIAL

## 2024-10-16 NOTE — TELEPHONE ENCOUNTER
Attempted to call patient but call was not going through. We have received paperwork but I can not fill out until the day of surgery.

## 2024-10-16 NOTE — PROGRESS NOTES
Chief Complaint   Patient presents with    Gynecologic Exam     Patient is here to have an EMB after finding of endometrial cells on pap. Pt denies recent intercourse. Was on period at time of last pap.       History:  Rachana Smart is a 44 y.o. female who presents today for follow-up for evaluation of the above:    Gynecologic Exam    Patient presents today for f/u on endometrial cells seen on Pap.           ROS:  Review of Systems   Constitutional: Negative.    HENT: Negative.     Eyes: Negative.    Respiratory: Negative.     Cardiovascular: Negative.    Gastrointestinal: Negative.    Endocrine: Negative.    Genitourinary: Negative.    Musculoskeletal: Negative.    Skin: Negative.    Neurological: Negative.    Psychiatric/Behavioral: Negative.         Ms. Smart  reports that she has been smoking cigarettes. She has a 5 pack-year smoking history. She has never used smokeless tobacco. She reports that she does not currently use alcohol after a past usage of about 10.0 standard drinks of alcohol per week. She reports that she does not use drugs.      Current Outpatient Medications:     carvedilol (Coreg) 6.25 MG tablet, Take 1 tablet by mouth 2 (Two) Times a Day With Meals., Disp: 60 tablet, Rfl: 3    ferrous sulfate 325 (65 FE) MG tablet, Take 1 tablet by mouth Daily With Breakfast., Disp: , Rfl:     furosemide (LASIX) 40 MG tablet, Take 1 tablet by mouth once daily, Disp: 90 tablet, Rfl: 1    Multivitamin tablet tablet, Take 1 tablet by mouth Daily., Disp: , Rfl:     multivitamin with minerals tablet tablet, Take 1 tablet by mouth Daily., Disp: 30 tablet, Rfl: 11    potassium chloride (KLOR-CON M20) 20 MEQ CR tablet, Take 1 tablet by mouth Daily., Disp: 14 tablet, Rfl: 0    spironolactone (ALDACTONE) 25 MG tablet, Take 1 tablet by mouth Daily., Disp: 90 tablet, Rfl: 3    thiamine (VITAMIN B1) 100 MG tablet, Take 1 tablet by mouth Daily., Disp: 30 tablet, Rfl: 0    vitamin B-12 (cyanocobalamin) 100 MCG  "tablet, Take 1 tablet by mouth Daily., Disp: , Rfl:     Current Facility-Administered Medications:     lidocaine (XYLOCAINE) 1 % injection 10 mL, 10 mL, Subcutaneous, Once, Amina Camarillo MD    lidocaine 1% - EPINEPHrine 1:456664 (XYLOCAINE W/EPI) 1 %-1:208906 injection 10 mL, 10 mL, Injection, Once, Amina Camarillo MD      OBJECTIVE:  /72   Ht 170.2 cm (67\")   Wt 64 kg (141 lb)   LMP 09/29/2024 (Exact Date)   BMI 22.08 kg/m²    Physical Exam  Exam conducted with a chaperone present.   Constitutional:       Appearance: She is not ill-appearing.   Pulmonary:      Effort: No respiratory distress.   Genitourinary:     Vagina: No tenderness.      Cervix: No cervical motion tenderness or friability.   Neurological:      Mental Status: She is oriented to person, place, and time.   Psychiatric:         Behavior: Behavior normal.         Assessment/Plan    Diagnoses and all orders for this visit:    1. Unexplained endometrial cells on cervical Pap smear (Primary)  -     POC Pregnancy, Urine  -     Tissue Pathology Exam         An After Visit Summary was printed and given to the patient at discharge.  Return for based on pathology results. . Sooner if problems arise.          Vandananessa YOST. 10/16/2024   Electronically Signed  "

## 2024-10-16 NOTE — PROGRESS NOTES
Genetic testing was negative for mutations in BRCA1/2 and 11 additional high/moderate risk breast cancer genes.   These results were discussed with the patient by telephone.  This part of the panel was reported out first to help with surgical decision making.  The remainder of the panel including lower risk genes, non-breast cancer related genes, and RNA analysis is still pending.  Patient will be contacted with remainder of panel once available.    Cc: Fide Stone MD

## 2024-10-16 NOTE — TELEPHONE ENCOUNTER
Hub staff attempted to follow warm transfer process and was unsuccessful     Caller: Kurt Smart    Relationship to patient: Emergency Contact    Best call back number: [phone]     Patient is needing: CALLING TO CHECK ON PW DROPPED OFF, [employer] FORMS FOR REIMBURSEMENT

## 2024-10-21 ENCOUNTER — OFFICE VISIT (OUTPATIENT)
Age: 44
End: 2024-10-21
Payer: COMMERCIAL

## 2024-10-21 ENCOUNTER — DOCUMENTATION (OUTPATIENT)
Dept: GENETICS | Facility: HOSPITAL | Age: 44
End: 2024-10-21
Payer: COMMERCIAL

## 2024-10-21 VITALS
WEIGHT: 143.3 LBS | BODY MASS INDEX: 22.49 KG/M2 | HEIGHT: 67 IN | SYSTOLIC BLOOD PRESSURE: 142 MMHG | DIASTOLIC BLOOD PRESSURE: 84 MMHG

## 2024-10-21 DIAGNOSIS — R87.618 UNEXPLAINED ENDOMETRIAL CELLS ON CERVICAL PAP SMEAR: Primary | ICD-10-CM

## 2024-10-21 DIAGNOSIS — C50.212 MALIGNANT NEOPLASM OF UPPER-INNER QUADRANT OF LEFT FEMALE BREAST, UNSPECIFIED ESTROGEN RECEPTOR STATUS: ICD-10-CM

## 2024-10-21 PROCEDURE — 99213 OFFICE O/P EST LOW 20 MIN: CPT | Performed by: OBSTETRICS & GYNECOLOGY

## 2024-10-21 RX ORDER — SODIUM CHLORIDE 0.9 % (FLUSH) 0.9 %
10 SYRINGE (ML) INJECTION EVERY 12 HOURS SCHEDULED
OUTPATIENT
Start: 2024-10-21

## 2024-10-21 RX ORDER — SODIUM CHLORIDE 9 MG/ML
40 INJECTION, SOLUTION INTRAVENOUS AS NEEDED
OUTPATIENT
Start: 2024-10-21 | End: 2024-10-21

## 2024-10-21 RX ORDER — SODIUM CHLORIDE 0.9 % (FLUSH) 0.9 %
1-10 SYRINGE (ML) INJECTION AS NEEDED
OUTPATIENT
Start: 2024-10-21

## 2024-10-21 RX ORDER — SODIUM CHLORIDE 9 MG/ML
100 INJECTION, SOLUTION INTRAVENOUS CONTINUOUS
OUTPATIENT
Start: 2024-10-21 | End: 2024-10-21

## 2024-10-21 NOTE — H&P (VIEW-ONLY)
[facility]  Rachana Smart  : 1980  MRN: [id number]  CSN: [id number]    History and Physical    Subjective   Rachana Smart is a 44 y.o. year old  who presents for consultation about surgery due to endometrial cells on pap smear.  Pt with irregular VB over the last year. Attempt at EMB was unsatisfactory.  Pt with recent breast cancer diagnosis.  Pt also now noted to be ha positive.  Would like to have hysterectomy with BSO, however we discussed that we first need endometrial sampling.  Will discuss with Dr. Verduzco about doing a joint case on  at the time of her mastectomy. Pelvic US with slightly enlarged uterus with ES 9.9 mm and normal ovaries bilaterally.     Past Medical History:   Diagnosis Date    Alcoholism in recovery     Allergic     Anemia 220    Anxiety     Anxiety     Asthma     Breast cancer     Cancer 920    Cirrhosis     Endometriosis     Gestational diabetes     Gestational hypertension     Heart murmur     Hepatitis C     Hepatitis-C     History of ear infections     History of transfusion     Hypertension     Migraine     Multiple gestation     Ovarian cyst     PMS (premenstrual syndrome)     PONV (postoperative nausea and vomiting)     Preeclampsia     Secondary esophageal varices without bleeding 2024    Sinusitis     Varicella 1989     Past Surgical History:   Procedure Laterality Date    BREAST BIOPSY       SECTION      x2    COLONOSCOPY      COLONOSCOPY N/A 2024    Procedure: COLONOSCOPY WITH ANESTHESIA;  Surgeon: Blanca Bernardo MD;  Location: [facility] ENDOSCOPY;  Service: Gastroenterology;  Laterality: N/A;  pre: screen  post: diverticulosis  [place]    ENDOSCOPY N/A 2024    no evidence of any fresh or old blood or clots,normal stomach    ENDOSCOPY N/A 2024    Procedure: ESOPHAGOGASTRODUODENOSCOPY WITH ANESTHESIA;  Surgeon: Blanca Bernardo MD;  Location: [facility]  ENDOSCOPY;  Service: Gastroenterology;  Laterality: N/A;  pre: cirrhosis   post: PHG. Varices.  [facility]    MOUTH SURGERY      UPPER GASTROINTESTINAL ENDOSCOPY  3/24    WISDOM TOOTH EXTRACTION  1995     Social History    Tobacco Use      Smoking status: Every Day        Packs/day: 0.50        Years: 0.5 packs/day for 10.0 years (5.0 ttl pk-yrs)        Types: Cigarettes      Smokeless tobacco: Never      Current Outpatient Medications:     carvedilol (Coreg) 6.25 MG tablet, Take 1 tablet by mouth 2 (Two) Times a Day With Meals., Disp: 60 tablet, Rfl: 3    ferrous sulfate 325 (65 FE) MG tablet, Take 1 tablet by mouth Daily With Breakfast., Disp: , Rfl:     furosemide (LASIX) 40 MG tablet, Take 1 tablet by mouth once daily, Disp: 90 tablet, Rfl: 1    Multivitamin tablet tablet, Take 1 tablet by mouth Daily., Disp: , Rfl:     multivitamin with minerals tablet tablet, Take 1 tablet by mouth Daily., Disp: 30 tablet, Rfl: 11    potassium chloride (KLOR-CON M20) 20 MEQ CR tablet, Take 1 tablet by mouth Daily., Disp: 14 tablet, Rfl: 0    spironolactone (ALDACTONE) 25 MG tablet, Take 1 tablet by mouth Daily., Disp: 90 tablet, Rfl: 3    thiamine (VITAMIN B1) 100 MG tablet, Take 1 tablet by mouth Daily., Disp: 30 tablet, Rfl: 0    vitamin B-12 (cyanocobalamin) 100 MCG tablet, Take 1 tablet by mouth Daily., Disp: , Rfl:     Current Facility-Administered Medications:     lidocaine (XYLOCAINE) 1 % injection 10 mL, 10 mL, Subcutaneous, Once, Amina Camarillo MD    lidocaine 1% - EPINEPHrine 1:464987 (XYLOCAINE W/EPI) 1 %-1:953803 injection 10 mL, 10 mL, Injection, Once, Amina Camarillo MD    Allergies   Allergen Reactions    Codeine Rash and GI Intolerance       Family History   Problem Relation Age of Onset    Diabetes Mother     Heart disease Father     Diabetes Father     Stroke Father     Melanoma Father 40 - 49    Esophageal cancer Father 60 - 69    Cervical cancer Maternal Grandmother 40 - 49    Skin cancer Paternal  "Grandmother     Cancer Paternal Grandfather 50 - 59        Mesothelioma    Breast cancer Half-Sister 45    Skin cancer Maternal Aunt     Pancreatic cancer Paternal Uncle     Colon cancer Neg Hx     Colon polyps Neg Hx     Uterine cancer Neg Hx      Review of Systems   Constitutional:  Positive for fatigue. Negative for activity change, appetite change and unexpected weight change.   Respiratory:  Negative for cough, chest tightness, shortness of breath and wheezing.    Cardiovascular:  Negative for chest pain and palpitations.   Gastrointestinal:  Negative for constipation, diarrhea, nausea and vomiting.   Genitourinary:  Positive for menstrual problem and pelvic pain. Negative for dyspareunia, dysuria, vaginal bleeding, vaginal discharge and vaginal pain.         Objective   /84   Ht 170.2 cm (67.01\")   Wt 65 kg (143 lb 4.8 oz)   LMP 09/29/2024 (Exact Date)   BMI 22.44 kg/m²     Physical Exam   Physical Exam  Constitutional:       Appearance: Normal appearance.   Cardiovascular:      Rate and Rhythm: Normal rate and regular rhythm.      Pulses: Normal pulses.      Heart sounds: Normal heart sounds.   Pulmonary:      Effort: Pulmonary effort is normal.      Breath sounds: Normal breath sounds.   Abdominal:      General: Abdomen is flat. Bowel sounds are normal.      Palpations: Abdomen is soft.   Musculoskeletal:      Cervical back: Normal range of motion and neck supple.   Neurological:      Mental Status: She is alert.   Psychiatric:         Mood and Affect: Mood normal.         Behavior: Behavior normal.         Labs  Lab Results   Component Value Date     05/24/2024    HGB 11.4 (L) 05/24/2024    HCT 35.8 05/24/2024    WBC 6.38 05/24/2024     09/10/2024    K 3.1 (L) 09/10/2024    CL 98 09/10/2024    CO2 30.0 (H) 09/10/2024    BUN 7 09/10/2024    CREATININE 0.82 09/10/2024    GLUCOSE 117 (H) 09/10/2024    ALBUMIN 3.3 (L) 09/10/2024    CALCIUM 9.1 09/10/2024    AST 50 (H) 09/10/2024    ALT 22 " 09/10/2024    BILITOT 3.4 (H) 09/10/2024        Assessment & Plan    Diagnoses and all orders for this visit:    1. Unexplained endometrial cells on cervical Pap smear (Primary)  43 y/o CF with recent breast cancer diagnosis who is scheduled for mastectomy with Dr. Verduzco. Pt also with endometrial cells on pap smear.  Will proceed with D&C, HSC with Dr. Verduzco if it is ok with her schedule.  Will reach out to Dr. Verduzco.   2. Malignant neoplasm of upper-inner quadrant of left female breast, unspecified estrogen receptor status        Glo Benavides MD  10/21/2024  14:14 CDT

## 2024-10-21 NOTE — PROGRESS NOTES
[facility]  Rachana Smart  : 1980  MRN: [id number]  CSN: [id number]    History and Physical    Subjective   Rachana Smart is a 44 y.o. year old  who presents for consultation about surgery due to endometrial cells on pap smear.  Pt with irregular VB over the last year. Attempt at EMB was unsatisfactory.  Pt with recent breast cancer diagnosis.  Pt also now noted to be ha positive.  Would like to have hysterectomy with BSO, however we discussed that we first need endometrial sampling.  Will discuss with Dr. Verduzco about doing a joint case on  at the time of her mastectomy. Pelvic US with slightly enlarged uterus with ES 9.9 mm and normal ovaries bilaterally.     Past Medical History:   Diagnosis Date    Alcoholism in recovery     Allergic     Anemia 220    Anxiety     Anxiety     Asthma     Breast cancer     Cancer 920    Cirrhosis     Endometriosis     Gestational diabetes     Gestational hypertension     Heart murmur     Hepatitis C     Hepatitis-C     History of ear infections     History of transfusion     Hypertension     Migraine     Multiple gestation     Ovarian cyst     PMS (premenstrual syndrome)     PONV (postoperative nausea and vomiting)     Preeclampsia     Secondary esophageal varices without bleeding 2024    Sinusitis     Varicella 1989     Past Surgical History:   Procedure Laterality Date    BREAST BIOPSY       SECTION      x2    COLONOSCOPY      COLONOSCOPY N/A 2024    Procedure: COLONOSCOPY WITH ANESTHESIA;  Surgeon: Blanca Bernardo MD;  Location: [facility] ENDOSCOPY;  Service: Gastroenterology;  Laterality: N/A;  pre: screen  post: diverticulosis  [place]    ENDOSCOPY N/A 2024    no evidence of any fresh or old blood or clots,normal stomach    ENDOSCOPY N/A 2024    Procedure: ESOPHAGOGASTRODUODENOSCOPY WITH ANESTHESIA;  Surgeon: Blanca Bernardo MD;  Location: [facility]  ENDOSCOPY;  Service: Gastroenterology;  Laterality: N/A;  pre: cirrhosis   post: PHG. Varices.  [facility]    MOUTH SURGERY      UPPER GASTROINTESTINAL ENDOSCOPY  3/24    WISDOM TOOTH EXTRACTION  1995     Social History    Tobacco Use      Smoking status: Every Day        Packs/day: 0.50        Years: 0.5 packs/day for 10.0 years (5.0 ttl pk-yrs)        Types: Cigarettes      Smokeless tobacco: Never      Current Outpatient Medications:     carvedilol (Coreg) 6.25 MG tablet, Take 1 tablet by mouth 2 (Two) Times a Day With Meals., Disp: 60 tablet, Rfl: 3    ferrous sulfate 325 (65 FE) MG tablet, Take 1 tablet by mouth Daily With Breakfast., Disp: , Rfl:     furosemide (LASIX) 40 MG tablet, Take 1 tablet by mouth once daily, Disp: 90 tablet, Rfl: 1    Multivitamin tablet tablet, Take 1 tablet by mouth Daily., Disp: , Rfl:     multivitamin with minerals tablet tablet, Take 1 tablet by mouth Daily., Disp: 30 tablet, Rfl: 11    potassium chloride (KLOR-CON M20) 20 MEQ CR tablet, Take 1 tablet by mouth Daily., Disp: 14 tablet, Rfl: 0    spironolactone (ALDACTONE) 25 MG tablet, Take 1 tablet by mouth Daily., Disp: 90 tablet, Rfl: 3    thiamine (VITAMIN B1) 100 MG tablet, Take 1 tablet by mouth Daily., Disp: 30 tablet, Rfl: 0    vitamin B-12 (cyanocobalamin) 100 MCG tablet, Take 1 tablet by mouth Daily., Disp: , Rfl:     Current Facility-Administered Medications:     lidocaine (XYLOCAINE) 1 % injection 10 mL, 10 mL, Subcutaneous, Once, Amina Camarillo MD    lidocaine 1% - EPINEPHrine 1:035245 (XYLOCAINE W/EPI) 1 %-1:908338 injection 10 mL, 10 mL, Injection, Once, Amina Camarillo MD    Allergies   Allergen Reactions    Codeine Rash and GI Intolerance       Family History   Problem Relation Age of Onset    Diabetes Mother     Heart disease Father     Diabetes Father     Stroke Father     Melanoma Father 40 - 49    Esophageal cancer Father 60 - 69    Cervical cancer Maternal Grandmother 40 - 49    Skin cancer Paternal  "Grandmother     Cancer Paternal Grandfather 50 - 59        Mesothelioma    Breast cancer Half-Sister 45    Skin cancer Maternal Aunt     Pancreatic cancer Paternal Uncle     Colon cancer Neg Hx     Colon polyps Neg Hx     Uterine cancer Neg Hx      Review of Systems   Constitutional:  Positive for fatigue. Negative for activity change, appetite change and unexpected weight change.   Respiratory:  Negative for cough, chest tightness, shortness of breath and wheezing.    Cardiovascular:  Negative for chest pain and palpitations.   Gastrointestinal:  Negative for constipation, diarrhea, nausea and vomiting.   Genitourinary:  Positive for menstrual problem and pelvic pain. Negative for dyspareunia, dysuria, vaginal bleeding, vaginal discharge and vaginal pain.         Objective   /84   Ht 170.2 cm (67.01\")   Wt 65 kg (143 lb 4.8 oz)   LMP 09/29/2024 (Exact Date)   BMI 22.44 kg/m²     Physical Exam   Physical Exam  Constitutional:       Appearance: Normal appearance.   Cardiovascular:      Rate and Rhythm: Normal rate and regular rhythm.      Pulses: Normal pulses.      Heart sounds: Normal heart sounds.   Pulmonary:      Effort: Pulmonary effort is normal.      Breath sounds: Normal breath sounds.   Abdominal:      General: Abdomen is flat. Bowel sounds are normal.      Palpations: Abdomen is soft.   Musculoskeletal:      Cervical back: Normal range of motion and neck supple.   Neurological:      Mental Status: She is alert.   Psychiatric:         Mood and Affect: Mood normal.         Behavior: Behavior normal.         Labs  Lab Results   Component Value Date     05/24/2024    HGB 11.4 (L) 05/24/2024    HCT 35.8 05/24/2024    WBC 6.38 05/24/2024     09/10/2024    K 3.1 (L) 09/10/2024    CL 98 09/10/2024    CO2 30.0 (H) 09/10/2024    BUN 7 09/10/2024    CREATININE 0.82 09/10/2024    GLUCOSE 117 (H) 09/10/2024    ALBUMIN 3.3 (L) 09/10/2024    CALCIUM 9.1 09/10/2024    AST 50 (H) 09/10/2024    ALT 22 " 09/10/2024    BILITOT 3.4 (H) 09/10/2024        Assessment & Plan    Diagnoses and all orders for this visit:    1. Unexplained endometrial cells on cervical Pap smear (Primary)  43 y/o CF with recent breast cancer diagnosis who is scheduled for mastectomy with Dr. Verduzco. Pt also with endometrial cells on pap smear.  Will proceed with D&C, HSC with Dr. Verduzco if it is ok with her schedule.  Will reach out to Dr. Verduzco.   2. Malignant neoplasm of upper-inner quadrant of left female breast, unspecified estrogen receptor status        Glo Benavides MD  10/21/2024  14:14 CDT

## 2024-10-21 NOTE — PROGRESS NOTES
Rachana Smart is a 44 y.o. female who was referred for genetic counseling due to a personal history of breast cancer. Her  was present at the time of the call. Genetic counseling was performed via telephone. Ms. Smart confirmed her full name, date of birth, and that she was physically located in the [facility] at the time of the appointment. 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 She is planning a double mastectomy on 11/5. She retains her uterus and ovaries. She had a colonoscopy in August of 2024 and reports no polyps. She has a recall of every 5-10 years. Ms. Smart was interested in discussing her risk for a hereditary cancer syndrome, and decided to pursue genetic testing. The CancerNext-Expanded panel was ordered through [employer] which analyzes 71 genes associated with an increased cancer risk. 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 Smart by telephone on 10/21/2024.     PERTINENT FAMILY HISTORY:  Mat. Half-Sister:                                  Breast cancer, 45, BRCA2+  Mat. Aunt:                                            Skin cancer  Mat. Grandmother:                              Cervical cancer, 40s  Mat. Great-Grandmother:                   Liver cancer  Father:                                                 Melanoma, 40s                                                              Esophageal cancer, 60s  Pat. Uncle:                                          Pancreatic cancer  Pat. Aunt:                                            Possible skin cancer  Pat. Grandmother:                              Skin cancer, 80s  Pat. Grandfather:                                Mesothelioma, metastatic, 50s     We do not have medical records regarding any of these diagnoses. A copy of Ms.  Berry's sister's genetic testing results was not available for review. We discussed how having a copy of these results for review could be helpful.      RISK ASSESSMENT:  Ms. Smart's personal history of breast cancer led to concern for a hereditary cancer syndrome. she clearly meets NCCN criteria for genetic testing for high penetrance breast cancer genes based on her personal history of breast cancer diagnosed at age 44. We discussed multigene panel testing that would evaluate multiple genes simultaneously associated with hereditary cancer risk. This risk assessment is based on the family history information provided at the time of the appointment and could change in the future should new information be obtained.     GENETIC COUNSELING (30 minutes) We reviewed the family history information in detail.  Cases of cancer follow three general patterns: sporadic, familial, and hereditary.  While most cancer is sporadic, some cases appear to occur in family clusters.  These cases are said to be familial and account for 10-20% of cancer cases.  Familial cases may be due to a combination of shared genes and environmental factors among family members.  In even fewer families, the cancer is said to be inherited, and the genes responsible for the cancer are known.       Family histories typical of hereditary cancer syndromes usually include multiple first- and second-degree relatives diagnosed with cancer types that define a syndrome.  These cases tend to be diagnosed at younger-than-expected ages and can be bilateral or multifocal.  The cancer in these families follows an autosomal dominant inheritance pattern, which indicates the likely presence of a mutation in a cancer susceptibility gene.  Children and siblings of an individual believed to carry this mutation have a 50% chance of inheriting that mutation, thereby inheriting the increased risk to develop cancer.  These mutations can be passed down from the maternal or  the paternal lineage.     Hereditary breast cancer accounts for 5-10% of all cases of breast cancer.  A significant proportion of hereditary breast and ovarian cancer can be attributed to mutations in the BRCA1 and BRCA2 genes.  Mutations in these genes confer an increased risk for breast cancer, ovarian cancer, male breast cancer, prostate cancer, and pancreatic cancer. Women with a BRCA1 or BRCA2 mutation who have already been diagnosed with breast cancer have a 20-40% risk of a contralateral breast cancer. Women with a BRCA1 or BRCA2 mutation have up to a 58% risk of ovarian cancer.  BRCA1 has been more significantly associated with triple negative breast cancers than other genes. There are other genes considered to confer a high risk for breast cancer.     There are other genes that are known to be associated with an increased risk for cancer.  Some of these genes have well defined risks and established management guidelines.  Other genes that can be tested for have been more recently described, and there may be less data regarding the risks and therefore may not have established management guidelines. Based on Ms. Smart's desire to get as much information as possible regarding her personal risks and potential risks for her family, she opted to pursue testing through a panel that would evaluate multiple genes that have been associated with cancer risk.      GENETIC TESTING:  The risks, benefits and limitations of genetic testing and implications for clinical management following testing were reviewed.  DNA test results can influence decisions regarding screening, prevention and surgical management.  Genetic testing can have significant psychological implications for both individuals and families.  Also discussed was the possibility of employment and insurance discrimination based on genetic test results and the laws in place to prevent this ([place]).     We discussed panel testing that would evaluate 71 genes  associated with increased cancer risk. The implications of a positive or negative test result were discussed. We discussed the possibility that, in some cases, genetic test results may be informative or may be ambiguous due to the identification of a genetic variant. These variants may or may not be associated with an increased cancer risk.  With multigene panel testing, it is not uncommon for a variant of uncertain significance (VUS) to be identified.  If a VUS is identified, testing unaffected family members is typically not recommended and screening recommendations are made based on the family history.  The laboratories that perform genetic testing work to reclassify the VUS and send out an amended report if and when a VUS is reclassified.  The majority of variant findings are ultimately reclassified to a negative result.  Given her personal and family history, a negative test result would not eliminate all risk to her relatives.       TEST RESULTS: Ms. Smart's genetic testing identified a pathogenic mutation (p.S46I) in the PMS2 gene, consistent with a diagnosis of PMS2-related Narayanan syndrome (see attached results). Genetic testing for this mutation can be offered to other family members to determine whether they have also inherited this risk factor. PMS2-related Narayanan syndrome is inherited in an autosomal dominant manner, meaning siblings and children of Ms. Smart each have a chance to have inherited this mutation. Her children have a 50% chance. Her half-siblings have a 25% chance.     Until each at-risk family member has been proven not to carry the PMS2 mutation, they could be offered increased surveillance. We would be happy to see family members who live in the area in our clinic to further discuss this information and testing options. They can request a referral to [facility], and our coordinator will contact the individual to schedule an appointment once the referral is received. They can  call [phone] to receive more information on how to place the referral. For family members who live elsewhere, there are genetic counselors at most [facility] centers. They can find a genetic counselor by visiting the National Society of Genetic Counselors website at www.nsgc.org. Testing is available to individuals once they are 18 years of age. Relatives would need a copy of Ms. Smart's genetic test result to ensure they were being tested for the correct mutation.    CANCER RISK:  The most recent NCCN guidelines (2.2024) outline risk estimates for individuals with Narayanan syndrome by specific gene. The lifetime risk for colon cancer (through age 80) for individuals with Narayanan syndrome due to a PMS2 mutation is approximately 8.7-20% if there is no intervention (i.e. removal of polyps detected on colonoscopy). Routine screening colonoscopy has been shown to greatly reduce the incidence of colon cancer in families with Narayanan syndrome.     For women with a PMS2 mutation, risks include cancer of the endometrium/uterus (13-26%). The ovarian cancer risk is estimated to be 1.3-3%.     Additional cancer risks that have been reported with the other Narayanan syndrome genes (gastric, pancreatic, small bowel, prostate, brain, renal pelvis/ureter, biliary tract, and skin) have not been well established with PMS2, so there is not a consensus on whether PMS2 mutation carriers have significantly elevated risks for cancers other than endometrial and colon. A 2018 study (jerry Galvan et al., 2018) evaluated 284 families with PMS2-related Narayanan syndrome and did not show clear evidence of increased risks for cancers outside of colon and endometrial cancer. The overall cancer risks due to PMS2 mutations are significantly lower than what is seen with Narayanan syndrome due to MLH1 and MSH2 mutations.     There is limited evidence of an increased risk of breast cancer linked with Narayanan syndrome. Management for breast cancer risks should be  based on personal and family history.     MANAGEMENT GUIDELINES: Recently updated NCCN guidelines provide screening recommendations specific to PMS2, to reflect the risks that are lower than other Narayanan syndrome genes MLH1 and MSH2. This includes screening colonoscopies every 1-3 years beginning at 30-35 years of age, rather than at age 20-25 as would be recommended with the higher risk Narayanan syndrome genes.       For women, NCCN guidelines do include consideration of hysterectomy for risk reduction of endometrial cancer. Timing of hysterectomy is individualized based on family history and whether childbearing is complete. It is noted that endometrial cancer screening does not have a proven benefit in this population, but that screening via biopsy every 1-2 years can be considered. Insufficient evidence exists to make a specific recommendation for risk reducing salpingo-oophorectomy (RRSO) for PMS2 mutation carriers. The decision to have a BSO for risk-reduction should be individualized and done in consultation with a gynecologist/GYN oncologist with expertise in this area.     There is not clear evidence to support surveillance for urothelial cancers. Additional considerations for screening for extracolonic Narayanan syndrome cancers may be made for individuals with a family history of urothelial cancer, including annual urinalysis starting at 30-35.     Upper endoscopy can be considered beginning at age 30-40, repeating every 2-4 years or more frequently based on family history and high-risk endoscopic findings. Individuals not undergoing upper endoscopic surveillance should have one-time noninvasive testing for H. pylori at the time of LS diagnosis, with treatment indicated if H. pylori is detected.     While skin manifestations such as sebaceous tumors and keratoacanthomas have not been documented with PMS2, NCCN guidelines recommends consideration of skin exam every 1-2 years.  Again, while these risks have been  described with the other Narayanan syndrome related genes, it is unclear if these risks are elevated with PMS2.      For her breast cancer diagnosis, Ms. Smart's surveillance and management will be determined by her oncology team. Despite the negative genetic test results for breast cancer-related genes, Ms. Smart's female relatives may have a somewhat increased lifetime risk for breast cancer based on family history. Female relatives could have a risk assessment performed using a family history-based model, such as the Tyrer-Cuzick model, to determine their individual risks.  Surveillance for individuals with a high lifetime risk of breast cancer (>20%, versus the average risk of 12%), based on NCCN guidelines, would consist of semi-annual clinical breast exams and monthly self-breast exams starting by age 18 and annual mammography starting 10 years younger than the earliest diagnosis in a close relative, or starting by age 40, whichever is earliest.  According to an American Cancer Society expert panel, annual breast MRI should be offered to women whose lifetime risk of breast cancer is 20-25 percent or more, also starting by age 40 or earlier if indicated by family history.      PLAN:  Genetic counseling remains available to Ms. Smart and her family. She had a colonoscopy and upper EGD in August of this year and had no findings. We discussed that her gastroenterologist would likely change the recommendation for her screening recall to every 1-3 years based on this information. She is scheduled to meet with a gynecologist later today and plans to discuss these results with them for recommendations regarding hysterectomy. If she has any questions or concerns in the future, she is welcome to contact us at [phone].     Mary Lou Ji, MS, [facility] [place], [facility]  Licensed Certified Genetic Counselor     Cc: Blanca Vuong MD Hogancamp, D Ryan, MD Williams, Kristen N, MD

## 2024-10-23 ENCOUNTER — TELEPHONE (OUTPATIENT)
Dept: GASTROENTEROLOGY | Facility: CLINIC | Age: 44
End: 2024-10-23
Payer: COMMERCIAL

## 2024-10-23 PROBLEM — Z15.09 LYNCH SYNDROME: Status: ACTIVE | Noted: 2024-10-23

## 2024-10-23 NOTE — TELEPHONE ENCOUNTER
Please let her know that I received a copy of her genetic testing due to breast cancer.  This shows that she carries 1 abnormal gene--PMS2.  This is associated with Narayanan syndrome which carries an increased risk of colon and stomach cancer.  This was reviewed with her by the genetic counselor.  Now that I know this information, which was not available at the time of her endoscopy and colonoscopy in 8/2024, I need to change her follow-up.  She needs both an endoscopy and a colonoscopy every 3 years.  Please place in recall for 8/2027.  Advise her to keep her already scheduled follow-up with [facility] in December regarding her liver.    Blanca Bernardo MD

## 2024-10-24 NOTE — TELEPHONE ENCOUNTER
Called pt, but no answer. Left vm asking pt to return my call. Placed pt in 3 yr endo/colon recall.

## 2024-10-29 ENCOUNTER — PRE-ADMISSION TESTING (OUTPATIENT)
Dept: PREADMISSION TESTING | Facility: HOSPITAL | Age: 44
End: 2024-10-29
Payer: COMMERCIAL

## 2024-10-29 ENCOUNTER — HOSPITAL ENCOUNTER (OUTPATIENT)
Dept: GENERAL RADIOLOGY | Facility: HOSPITAL | Age: 44
Discharge: HOME | End: 2024-10-29
Payer: COMMERCIAL

## 2024-10-29 VITALS
BODY MASS INDEX: 22.84 KG/M2 | OXYGEN SATURATION: 98 % | DIASTOLIC BLOOD PRESSURE: 75 MMHG | HEART RATE: 88 BPM | SYSTOLIC BLOOD PRESSURE: 124 MMHG | RESPIRATION RATE: 16 BRPM | HEIGHT: 67 IN | WEIGHT: 145.5 LBS

## 2024-10-29 DIAGNOSIS — C50.212 MALIGNANT NEOPLASM OF UPPER-INNER QUADRANT OF LEFT FEMALE BREAST, UNSPECIFIED ESTROGEN RECEPTOR STATUS: ICD-10-CM

## 2024-10-29 LAB
ALBUMIN SERPL-MCNC: 3.1 G/DL (ref 3.5–5.2)
ALBUMIN/GLOB SERPL: 0.7 G/DL
ALP SERPL-CCNC: 125 U/L (ref 39–117)
ALT SERPL W P-5'-P-CCNC: 17 U/L (ref 1–33)
ANION GAP SERPL CALCULATED.3IONS-SCNC: 9 MMOL/L (ref 5–15)
AST SERPL-CCNC: 40 U/L (ref 1–32)
BASOPHILS # BLD AUTO: 0.1 10*3/MM3 (ref 0–0.2)
BASOPHILS NFR BLD AUTO: 1.4 % (ref 0–1.5)
BILIRUB SERPL-MCNC: 2.8 MG/DL (ref 0–1.2)
BUN SERPL-MCNC: 10 MG/DL (ref 6–20)
BUN/CREAT SERPL: 13.7 (ref 7–25)
CALCIUM SPEC-SCNC: 8.7 MG/DL (ref 8.6–10.5)
CHLORIDE SERPL-SCNC: 98 MMOL/L (ref 98–107)
CO2 SERPL-SCNC: 29 MMOL/L (ref 22–29)
CREAT SERPL-MCNC: 0.73 MG/DL (ref 0.57–1)
DEPRECATED RDW RBC AUTO: 58.9 FL (ref 37–54)
EGFRCR SERPLBLD CKD-EPI 2021: 103.5 ML/MIN/1.73
EOSINOPHIL # BLD AUTO: 0.24 10*3/MM3 (ref 0–0.4)
EOSINOPHIL NFR BLD AUTO: 3.2 % (ref 0.3–6.2)
ERYTHROCYTE [DISTWIDTH] IN BLOOD BY AUTOMATED COUNT: 15.7 % (ref 12.3–15.4)
GLOBULIN UR ELPH-MCNC: 4.2 GM/DL
GLUCOSE SERPL-MCNC: 148 MG/DL (ref 65–99)
HCT VFR BLD AUTO: 33.9 % (ref 34–46.6)
HGB BLD-MCNC: 11 G/DL (ref 12–15.9)
IMM GRANULOCYTES # BLD AUTO: 0.03 10*3/MM3 (ref 0–0.05)
IMM GRANULOCYTES NFR BLD AUTO: 0.4 % (ref 0–0.5)
LYMPHOCYTES # BLD AUTO: 1.97 10*3/MM3 (ref 0.7–3.1)
LYMPHOCYTES NFR BLD AUTO: 26.6 % (ref 19.6–45.3)
MCH RBC QN AUTO: 33 PG (ref 26.6–33)
MCHC RBC AUTO-ENTMCNC: 32.4 G/DL (ref 31.5–35.7)
MCV RBC AUTO: 101.8 FL (ref 79–97)
MONOCYTES # BLD AUTO: 0.63 10*3/MM3 (ref 0.1–0.9)
MONOCYTES NFR BLD AUTO: 8.5 % (ref 5–12)
NEUTROPHILS NFR BLD AUTO: 4.43 10*3/MM3 (ref 1.7–7)
NEUTROPHILS NFR BLD AUTO: 59.9 % (ref 42.7–76)
NRBC BLD AUTO-RTO: 0 /100 WBC (ref 0–0.2)
PLATELET # BLD AUTO: 120 10*3/MM3 (ref 140–450)
PMV BLD AUTO: 9.6 FL (ref 6–12)
POTASSIUM SERPL-SCNC: 3.1 MMOL/L (ref 3.5–5.2)
PROT SERPL-MCNC: 7.3 G/DL (ref 6–8.5)
RBC # BLD AUTO: 3.33 10*6/MM3 (ref 3.77–5.28)
SODIUM SERPL-SCNC: 136 MMOL/L (ref 136–145)
WBC NRBC COR # BLD AUTO: 7.4 10*3/MM3 (ref 3.4–10.8)

## 2024-10-29 PROCEDURE — 85025 COMPLETE CBC W/AUTO DIFF WBC: CPT

## 2024-10-29 PROCEDURE — 36415 COLL VENOUS BLD VENIPUNCTURE: CPT

## 2024-10-29 PROCEDURE — 80053 COMPREHEN METABOLIC PANEL: CPT

## 2024-10-29 PROCEDURE — 71045 X-RAY EXAM CHEST 1 VIEW: CPT

## 2024-10-29 PROCEDURE — 93005 ELECTROCARDIOGRAM TRACING: CPT

## 2024-10-29 NOTE — DISCHARGE INSTRUCTIONS
Preparing for Surgery  Follow these instructions before the procedure:  Several days or weeks before your procedure      Ask your health care provider about:  Changing or stopping your regular medicines. This is especially important if you are taking diabetes medicines or blood thinners.  Taking medicines such as aspirin and ibuprofen. These medicines can thin your blood. Do not take these medicines unless your health care provider tells you to take them.  Taking over-the-counter medicines, vitamins, herbs, and supplements.    Contact your surgeon if you:  Develop a fever of more than 100.4°F (38°C) or other feelings of illness during the 48 hours before your surgery.  Have symptoms that get worse.  Have questions or concerns about your surgery.  If you are going home the same day of your surgery you will need to arrange for a responsible adult, age 18 years old or older, to drive you home from the hospital and stay with you for 24 hours. Verification of the  will be made prior to any procedure requiring sedation. You may not go home in a taxi or any form of public transportation by yourself.     Day before your procedure    24 hours before your procedure DO NOT drink alcoholic beverages or smoke.  24 hours before your procedure STOP taking Erectile Dysfunction medication (i.e.,Cialis, Viagra)   You may be asked to shower with a germ-killing soap.  Day of your procedure   You may take the following medication(s) the morning of surgery with a sip of water: CARVEDILOL      8 hours before your scheduled arrival time, STOP all food, any dairy products, and full liquids. This includes hard candy, chewing gum or mints. This is extremely important to prevent serious complications.     Up to 2 hours before your scheduled arrival time, you may have clear liquids no cream, powder, or pulp of any kind. Safe options are water, black coffee, plain tea, soda, Gatorade/Powerade, clear broth, apple juice.    2 hours before  your scheduled arrival time, STOP drinking clear liquids.    You may need to take another shower with a germ-killing soap before you leave home in the morning. Do not use perfumes, colognes, or body lotions.  Wear comfortable loose-fitting clothing.  Remove all jewelry including body piercing and rings, dark colored nail polish, and make up prior to arrival at the hospital. Leave all valuables at home.   Bring your hearing aids if you rely on them.  Do not wear contact lenses. If you wear eyeglasses remember to bring a case to store them in while you are in surgery.  Do not use denture adhesives since you will be asked to remove them during your surgery.    You do not need to bring your home medications into the hospital.   Bring your sleep apnea device with you on the day of your surgery (if this applies to you).  If you wear portable oxygen, bring it with you.   If you are staying overnight, you may bring a bag of items you may need such as slippers, robe and a change of clothes for your discharge. You may want to leave these items in the car until you are ready for them since your family will take your belongings when you leave the pre-operative area.  Arrive at the hospital as scheduled by the office. You will be asked to arrive 2 hours prior to your surgery time in order to prepare for your procedure.  When you arrive at the hospital  Go to the registration desk located at the main entrance of the hospital.  After registration is completed, you will be given a beeper and a sticker sheet. Take the stickers to Outpatient Surgery and place in the tray at the end of the desk to notify the staff that you have arrived and registered.   Return to the lobby to wait. You are not always called back according to the time of arrival but rather the time your doctor will be ready.  When your beeper lights up and vibrates proceed through the double doors, under the stairs, and a member of the Outpatient Surgery staff will  escort you to your preoperative room.   How to Use Chlorhexidine Before Surgery  Chlorhexidine gluconate (CHG) is a germ-killing (antiseptic) solution that is used to clean the skin. It can get rid of the bacteria that normally live on the skin and can keep them away for about 24 hours. To clean your skin with CHG, you may be given:  A CHG solution to use in the shower or as part of a sponge bath.  A prepackaged cloth that contains CHG.  Cleaning your skin with CHG may help lower the risk for infection:  While you are staying in the intensive care unit of the hospital.  If you have a vascular access, such as a central line, to provide short-term or long-term access to your veins.  If you have a catheter to drain urine from your bladder.  If you are on a ventilator. A ventilator is a machine that helps you breathe by moving air in and out of your lungs.  After surgery.  What are the risks?  Risks of using CHG include:  A skin reaction.  Hearing loss, if CHG gets in your ears and you have a perforated eardrum.  Eye injury, if CHG gets in your eyes and is not rinsed out.  The CHG product catching fire.  Make sure that you avoid smoking and flames after applying CHG to your skin.  Do not use CHG:  If you have a chlorhexidine allergy or have previously reacted to chlorhexidine.  On babies younger than 2 months of age.  How to use CHG solution  Use CHG only as told by your health care provider, and follow the instructions on the label.  Use the full amount of CHG as directed. Usually, this is one bottle.  During a shower    Follow these steps when using CHG solution during a shower (unless your health care provider gives you different instructions):  Start the shower.  Use your normal soap and shampoo to wash your face and hair.  Turn off the shower or move out of the shower stream.  Pour the CHG onto a clean washcloth. Do not use any type of brush or rough-edged sponge.  Starting at your neck, lather your body down to  your toes. Make sure you follow these instructions:  If you will be having surgery, pay special attention to the part of your body where you will be having surgery. Scrub this area for at least 1 minute.  Do not use CHG on your head or face. If the solution gets into your ears or eyes, rinse them well with water.  Avoid your genital area.  Avoid any areas of skin that have broken skin, cuts, or scrapes.  Scrub your back and under your arms. Make sure to wash skin folds.  Let the lather sit on your skin for 1-2 minutes or as long as told by your health care provider.  Thoroughly rinse your entire body in the shower. Make sure that all body creases and crevices are rinsed well.  Dry off with a clean towel. Do not put any substances on your body afterward--such as powder, lotion, or perfume--unless you are told to do so by your health care provider. Only use lotions that are recommended by the .  Put on clean clothes or pajamas.  If it is the night before your surgery, sleep in clean sheets.     During a sponge bath  Follow these steps when using CHG solution during a sponge bath (unless your health care provider gives you different instructions):  Use your normal soap and shampoo to wash your face and hair.  Pour the CHG onto a clean washcloth.  Starting at your neck, lather your body down to your toes. Make sure you follow these instructions:  If you will be having surgery, pay special attention to the part of your body where you will be having surgery. Scrub this area for at least 1 minute.  Do not use CHG on your head or face. If the solution gets into your ears or eyes, rinse them well with water.  Avoid your genital area.  Avoid any areas of skin that have broken skin, cuts, or scrapes.  Scrub your back and under your arms. Make sure to wash skin folds.  Let the lather sit on your skin for 1-2 minutes or as long as told by your health care provider.  Using a different clean, wet washcloth, thoroughly  rinse your entire body. Make sure that all body creases and crevices are rinsed well.  Dry off with a clean towel. Do not put any substances on your body afterward--such as powder, lotion, or perfume--unless you are told to do so by your health care provider. Only use lotions that are recommended by the .  Put on clean clothes or pajamas.  If it is the night before your surgery, sleep in clean sheets.  How to use CHG prepackaged cloths  Only use CHG cloths as told by your health care provider, and follow the instructions on the label.  Use the CHG cloth on clean, dry skin.  Do not use the CHG cloth on your head or face unless your health care provider tells you to.  When washing with the CHG cloth:  Avoid your genital area.  Avoid any areas of skin that have broken skin, cuts, or scrapes.  Before surgery    Follow these steps when using a CHG cloth to clean before surgery (unless your health care provider gives you different instructions):  Using the CHG cloth, vigorously scrub the part of your body where you will be having surgery. Scrub using a back-and-forth motion for 3 minutes. The area on your body should be completely wet with CHG when you are done scrubbing.  Do not rinse. Discard the cloth and let the area air-dry. Do not put any substances on the area afterward, such as powder, lotion, or perfume.  Put on clean clothes or pajamas.  If it is the night before your surgery, sleep in clean sheets.     For general bathing  Follow these steps when using CHG cloths for general bathing (unless your health care provider gives you different instructions).  Use a separate CHG cloth for each area of your body. Make sure you wash between any folds of skin and between your fingers and toes. Wash your body in the following order, switching to a new cloth after each step:  The front of your neck, shoulders, and chest.  Both of your arms, under your arms, and your hands.  Your stomach and groin area, avoiding  the genitals.  Your right leg and foot.  Your left leg and foot.  The back of your neck, your back, and your buttocks.  Do not rinse. Discard the cloth and let the area air-dry. Do not put any substances on your body afterward--such as powder, lotion, or perfume--unless you are told to do so by your health care provider. Only use lotions that are recommended by the .  Put on clean clothes or pajamas.  Contact a health care provider if:  Your skin gets irritated after scrubbing.  You have questions about using your solution or cloth.  You swallow any chlorhexidine. Call your local poison control center (1-889.603.1798 in the [place]  Get help right away if:  Your eyes itch badly, or they become very red or swollen.  Your skin itches badly and is red or swollen.  Your hearing changes.  You have trouble seeing.  You have swelling or tingling in your mouth or throat.  You have trouble breathing.  These symptoms may represent a serious problem that is an emergency. Do not wait to see if the symptoms will go away. Get medical help right away. Call your local emergency services (007 in the [place] Do not drive yourself to the hospital.  Summary  Chlorhexidine gluconate (CHG) is a germ-killing (antiseptic) solution that is used to clean the skin. Cleaning your skin with CHG may help to lower your risk for infection.  You may be given CHG to use for bathing. It may be in a bottle or in a prepackaged cloth to use on your skin. Carefully follow your health care provider's instructions and the instructions on the product label.  Do not use CHG if you have a chlorhexidine allergy.  Contact your health care provider if your skin gets irritated after scrubbing.  This information is not intended to replace advice given to you by your health care provider. Make sure you discuss any questions you have with your health care provider.  Document Revised: 04/17/2023 Document Reviewed: 02/28/2022  Elsevier Patient Education ©  202 [employer]

## 2024-11-04 ENCOUNTER — TELEPHONE (OUTPATIENT)
Dept: SURGERY | Facility: CLINIC | Age: 44
End: 2024-11-04
Payer: COMMERCIAL

## 2024-11-04 LAB
QT INTERVAL: 432 MS
QTC INTERVAL: 495 MS

## 2024-11-04 NOTE — TELEPHONE ENCOUNTER
I called patient and reminded her of arrival time of 8:00 am and to make sure nothing to eat or drink after midnight tonight. Patient voiced understandings.

## 2024-11-05 ENCOUNTER — HOSPITAL ENCOUNTER (OUTPATIENT)
Facility: HOSPITAL | Age: 44
Discharge: HOME | End: 2024-11-06
Attending: STUDENT IN AN ORGANIZED HEALTH CARE EDUCATION/TRAINING PROGRAM | Admitting: STUDENT IN AN ORGANIZED HEALTH CARE EDUCATION/TRAINING PROGRAM
Payer: COMMERCIAL

## 2024-11-05 ENCOUNTER — ANESTHESIA (OUTPATIENT)
Dept: PERIOP | Facility: HOSPITAL | Age: 44
End: 2024-11-05
Payer: COMMERCIAL

## 2024-11-05 ENCOUNTER — ANESTHESIA EVENT (OUTPATIENT)
Dept: PERIOP | Facility: HOSPITAL | Age: 44
End: 2024-11-05
Payer: COMMERCIAL

## 2024-11-05 DIAGNOSIS — C50.212 MALIGNANT NEOPLASM OF UPPER-INNER QUADRANT OF LEFT FEMALE BREAST, UNSPECIFIED ESTROGEN RECEPTOR STATUS: ICD-10-CM

## 2024-11-05 DIAGNOSIS — R87.618 UNEXPLAINED ENDOMETRIAL CELLS ON CERVICAL PAP SMEAR: ICD-10-CM

## 2024-11-05 PROBLEM — C50.919 BREAST CANCER: Status: ACTIVE | Noted: 2024-11-05

## 2024-11-05 LAB
B-HCG UR QL: NEGATIVE
GLUCOSE BLDC GLUCOMTR-MCNC: 122 MG/DL (ref 70–130)

## 2024-11-05 PROCEDURE — 58558 HYSTEROSCOPY BIOPSY: CPT | Performed by: OBSTETRICS & GYNECOLOGY

## 2024-11-05 PROCEDURE — 19303 MAST SIMPLE COMPLETE: CPT | Performed by: STUDENT IN AN ORGANIZED HEALTH CARE EDUCATION/TRAINING PROGRAM

## 2024-11-05 PROCEDURE — 25010000002 PROPOFOL 10 MG/ML EMULSION: Performed by: NURSE ANESTHETIST, CERTIFIED REGISTERED

## 2024-11-05 PROCEDURE — 25010000002 MIDAZOLAM PER 1MG: Performed by: ANESTHESIOLOGY

## 2024-11-05 PROCEDURE — 25010000002 VASOPRESSIN 20 UNIT/ML SOLUTION: Performed by: NURSE ANESTHETIST, CERTIFIED REGISTERED

## 2024-11-05 PROCEDURE — 25010000002 ALBUMIN HUMAN 5% PER 50 ML: Performed by: NURSE ANESTHETIST, CERTIFIED REGISTERED

## 2024-11-05 PROCEDURE — 82948 REAGENT STRIP/BLOOD GLUCOSE: CPT

## 2024-11-05 PROCEDURE — 25010000002 ROPIVACAINE PER 1 MG: Performed by: ANESTHESIOLOGY

## 2024-11-05 PROCEDURE — G0378 HOSPITAL OBSERVATION PER HR: HCPCS

## 2024-11-05 PROCEDURE — 25010000002 FENTANYL CITRATE (PF) 100 MCG/2ML SOLUTION: Performed by: NURSE ANESTHETIST, CERTIFIED REGISTERED

## 2024-11-05 PROCEDURE — 25010000002 CEFAZOLIN PER 500 MG: Performed by: STUDENT IN AN ORGANIZED HEALTH CARE EDUCATION/TRAINING PROGRAM

## 2024-11-05 PROCEDURE — 25010000002 ONDANSETRON PER 1 MG: Performed by: NURSE ANESTHETIST, CERTIFIED REGISTERED

## 2024-11-05 PROCEDURE — 38525 BIOPSY/REMOVAL LYMPH NODES: CPT | Performed by: STUDENT IN AN ORGANIZED HEALTH CARE EDUCATION/TRAINING PROGRAM

## 2024-11-05 PROCEDURE — 25010000002 GLYCOPYRROLATE 0.4 MG/2ML SOLUTION: Performed by: NURSE ANESTHETIST, CERTIFIED REGISTERED

## 2024-11-05 PROCEDURE — 25010000002 FENTANYL CITRATE (PF) 50 MCG/ML SOLUTION: Performed by: ANESTHESIOLOGY

## 2024-11-05 PROCEDURE — A9697: HCPCS | Performed by: STUDENT IN AN ORGANIZED HEALTH CARE EDUCATION/TRAINING PROGRAM

## 2024-11-05 PROCEDURE — 25010000002 ENOXAPARIN PER 10 MG: Performed by: STUDENT IN AN ORGANIZED HEALTH CARE EDUCATION/TRAINING PROGRAM

## 2024-11-05 PROCEDURE — 25810000003 LACTATED RINGERS PER 1000 ML: Performed by: STUDENT IN AN ORGANIZED HEALTH CARE EDUCATION/TRAINING PROGRAM

## 2024-11-05 PROCEDURE — S0260 H&P FOR SURGERY: HCPCS | Performed by: STUDENT IN AN ORGANIZED HEALTH CARE EDUCATION/TRAINING PROGRAM

## 2024-11-05 PROCEDURE — 88307 TISSUE EXAM BY PATHOLOGIST: CPT | Performed by: STUDENT IN AN ORGANIZED HEALTH CARE EDUCATION/TRAINING PROGRAM

## 2024-11-05 PROCEDURE — P9041 ALBUMIN (HUMAN),5%, 50ML: HCPCS | Performed by: NURSE ANESTHETIST, CERTIFIED REGISTERED

## 2024-11-05 PROCEDURE — 25010000002 LIDOCAINE PF 2% 2 % SOLUTION: Performed by: NURSE ANESTHETIST, CERTIFIED REGISTERED

## 2024-11-05 PROCEDURE — 88305 TISSUE EXAM BY PATHOLOGIST: CPT | Performed by: STUDENT IN AN ORGANIZED HEALTH CARE EDUCATION/TRAINING PROGRAM

## 2024-11-05 PROCEDURE — 25010000002 DEXAMETHASONE PER 1 MG: Performed by: ANESTHESIOLOGY

## 2024-11-05 PROCEDURE — 88342 IMHCHEM/IMCYTCHM 1ST ANTB: CPT | Performed by: STUDENT IN AN ORGANIZED HEALTH CARE EDUCATION/TRAINING PROGRAM

## 2024-11-05 PROCEDURE — 19303 MAST SIMPLE COMPLETE: CPT

## 2024-11-05 PROCEDURE — 81025 URINE PREGNANCY TEST: CPT | Performed by: STUDENT IN AN ORGANIZED HEALTH CARE EDUCATION/TRAINING PROGRAM

## 2024-11-05 DEVICE — HEMOST ABS SURGICEL PWDR 3GM: Type: IMPLANTABLE DEVICE | Site: BREAST | Status: FUNCTIONAL

## 2024-11-05 DEVICE — MARKR TISS/BRST MAGTRACE NO/RADITON INJ/LIQ 2ML: Type: IMPLANTABLE DEVICE | Site: BREAST | Status: FUNCTIONAL

## 2024-11-05 DEVICE — LIGACLIP MCA MULTIPLE CLIP APPLIERS, 30 MEDIUM CLIPS
Type: IMPLANTABLE DEVICE | Site: BREAST | Status: FUNCTIONAL
Brand: LIGACLIP

## 2024-11-05 RX ORDER — ENOXAPARIN SODIUM 100 MG/ML
40 INJECTION SUBCUTANEOUS DAILY
Status: DISCONTINUED | OUTPATIENT
Start: 2024-11-05 | End: 2024-11-05 | Stop reason: HOSPADM

## 2024-11-05 RX ORDER — DIPHENHYDRAMINE HYDROCHLORIDE 50 MG/ML
12.5 INJECTION INTRAMUSCULAR; INTRAVENOUS EVERY 6 HOURS PRN
Status: DISCONTINUED | OUTPATIENT
Start: 2024-11-05 | End: 2024-11-06 | Stop reason: HOSPADM

## 2024-11-05 RX ORDER — SODIUM CHLORIDE 0.9 % (FLUSH) 0.9 %
3 SYRINGE (ML) INJECTION EVERY 12 HOURS SCHEDULED
Status: DISCONTINUED | OUTPATIENT
Start: 2024-11-05 | End: 2024-11-05 | Stop reason: HOSPADM

## 2024-11-05 RX ORDER — SODIUM CHLORIDE 0.9 % (FLUSH) 0.9 %
3 SYRINGE (ML) INJECTION AS NEEDED
Status: DISCONTINUED | OUTPATIENT
Start: 2024-11-05 | End: 2024-11-05 | Stop reason: HOSPADM

## 2024-11-05 RX ORDER — SODIUM CHLORIDE, SODIUM LACTATE, POTASSIUM CHLORIDE, CALCIUM CHLORIDE 600; 310; 30; 20 MG/100ML; MG/100ML; MG/100ML; MG/100ML
1000 INJECTION, SOLUTION INTRAVENOUS CONTINUOUS
Status: DISCONTINUED | OUTPATIENT
Start: 2024-11-05 | End: 2024-11-05

## 2024-11-05 RX ORDER — HEPARIN SODIUM 5000 [USP'U]/ML
5000 INJECTION, SOLUTION INTRAVENOUS; SUBCUTANEOUS ONCE
Status: DISCONTINUED | OUTPATIENT
Start: 2024-11-05 | End: 2024-11-05 | Stop reason: HOSPADM

## 2024-11-05 RX ORDER — ONDANSETRON 2 MG/ML
INJECTION INTRAMUSCULAR; INTRAVENOUS AS NEEDED
Status: DISCONTINUED | OUTPATIENT
Start: 2024-11-05 | End: 2024-11-05 | Stop reason: SURG

## 2024-11-05 RX ORDER — HYDROCODONE BITARTRATE AND ACETAMINOPHEN 10; 325 MG/1; MG/1
1 TABLET ORAL EVERY 4 HOURS PRN
Status: DISCONTINUED | OUTPATIENT
Start: 2024-11-05 | End: 2024-11-05 | Stop reason: HOSPADM

## 2024-11-05 RX ORDER — CARVEDILOL 6.25 MG/1
6.25 TABLET ORAL 2 TIMES DAILY WITH MEALS
Status: DISCONTINUED | OUTPATIENT
Start: 2024-11-05 | End: 2024-11-06 | Stop reason: HOSPADM

## 2024-11-05 RX ORDER — DROPERIDOL 2.5 MG/ML
0.62 INJECTION, SOLUTION INTRAMUSCULAR; INTRAVENOUS ONCE AS NEEDED
Status: DISCONTINUED | OUTPATIENT
Start: 2024-11-05 | End: 2024-11-05 | Stop reason: HOSPADM

## 2024-11-05 RX ORDER — SODIUM CHLORIDE 0.9 % (FLUSH) 0.9 %
3-10 SYRINGE (ML) INJECTION AS NEEDED
Status: DISCONTINUED | OUTPATIENT
Start: 2024-11-05 | End: 2024-11-05 | Stop reason: HOSPADM

## 2024-11-05 RX ORDER — LIDOCAINE HYDROCHLORIDE 10 MG/ML
0.5 INJECTION, SOLUTION EPIDURAL; INFILTRATION; INTRACAUDAL; PERINEURAL ONCE AS NEEDED
Status: DISCONTINUED | OUTPATIENT
Start: 2024-11-05 | End: 2024-11-05 | Stop reason: HOSPADM

## 2024-11-05 RX ORDER — ALBUMIN, HUMAN INJ 5% 5 %
SOLUTION INTRAVENOUS CONTINUOUS PRN
Status: DISCONTINUED | OUTPATIENT
Start: 2024-11-05 | End: 2024-11-05 | Stop reason: SURG

## 2024-11-05 RX ORDER — CYCLOBENZAPRINE HCL 10 MG
5 TABLET ORAL 3 TIMES DAILY PRN
Status: DISCONTINUED | OUTPATIENT
Start: 2024-11-05 | End: 2024-11-06 | Stop reason: HOSPADM

## 2024-11-05 RX ORDER — MIDAZOLAM HYDROCHLORIDE 2 MG/2ML
2 INJECTION, SOLUTION INTRAMUSCULAR; INTRAVENOUS ONCE
Status: COMPLETED | OUTPATIENT
Start: 2024-11-05 | End: 2024-11-05

## 2024-11-05 RX ORDER — SPIRONOLACTONE 25 MG/1
25 TABLET ORAL DAILY
Status: DISCONTINUED | OUTPATIENT
Start: 2024-11-05 | End: 2024-11-06 | Stop reason: HOSPADM

## 2024-11-05 RX ORDER — DEXAMETHASONE SODIUM PHOSPHATE 4 MG/ML
4 INJECTION, SOLUTION INTRA-ARTICULAR; INTRALESIONAL; INTRAMUSCULAR; INTRAVENOUS; SOFT TISSUE ONCE AS NEEDED
Status: COMPLETED | OUTPATIENT
Start: 2024-11-05 | End: 2024-11-05

## 2024-11-05 RX ORDER — FLUMAZENIL 0.1 MG/ML
0.2 INJECTION INTRAVENOUS AS NEEDED
Status: DISCONTINUED | OUTPATIENT
Start: 2024-11-05 | End: 2024-11-05 | Stop reason: HOSPADM

## 2024-11-05 RX ORDER — ROPIVACAINE HYDROCHLORIDE 2 MG/ML
INJECTION, SOLUTION EPIDURAL; INFILTRATION; PERINEURAL
Status: COMPLETED | OUTPATIENT
Start: 2024-11-05 | End: 2024-11-05

## 2024-11-05 RX ORDER — ONDANSETRON 2 MG/ML
4 INJECTION INTRAMUSCULAR; INTRAVENOUS EVERY 6 HOURS PRN
Status: DISCONTINUED | OUTPATIENT
Start: 2024-11-05 | End: 2024-11-06 | Stop reason: HOSPADM

## 2024-11-05 RX ORDER — ACETAMINOPHEN 500 MG
1000 TABLET ORAL ONCE
Status: COMPLETED | OUTPATIENT
Start: 2024-11-05 | End: 2024-11-05

## 2024-11-05 RX ORDER — CEPHALEXIN 500 MG/1
500 CAPSULE ORAL EVERY 6 HOURS SCHEDULED
Status: DISCONTINUED | OUTPATIENT
Start: 2024-11-05 | End: 2024-11-06 | Stop reason: HOSPADM

## 2024-11-05 RX ORDER — ONDANSETRON 2 MG/ML
4 INJECTION INTRAMUSCULAR; INTRAVENOUS ONCE AS NEEDED
Status: DISCONTINUED | OUTPATIENT
Start: 2024-11-05 | End: 2024-11-05 | Stop reason: HOSPADM

## 2024-11-05 RX ORDER — GLYCOPYRROLATE 0.2 MG/ML
INJECTION INTRAMUSCULAR; INTRAVENOUS AS NEEDED
Status: DISCONTINUED | OUTPATIENT
Start: 2024-11-05 | End: 2024-11-05 | Stop reason: SURG

## 2024-11-05 RX ORDER — PROMETHAZINE HYDROCHLORIDE 12.5 MG/1
6.25 SUPPOSITORY RECTAL EVERY 6 HOURS PRN
Status: DISCONTINUED | OUTPATIENT
Start: 2024-11-05 | End: 2024-11-06 | Stop reason: HOSPADM

## 2024-11-05 RX ORDER — PHENYLEPHRINE HCL IN 0.9% NACL 1 MG/10 ML
SYRINGE (ML) INTRAVENOUS AS NEEDED
Status: DISCONTINUED | OUTPATIENT
Start: 2024-11-05 | End: 2024-11-05 | Stop reason: SURG

## 2024-11-05 RX ORDER — IBUPROFEN 600 MG/1
600 TABLET, FILM COATED ORAL EVERY 6 HOURS PRN
Status: DISCONTINUED | OUTPATIENT
Start: 2024-11-05 | End: 2024-11-05 | Stop reason: HOSPADM

## 2024-11-05 RX ORDER — FENTANYL CITRATE 50 UG/ML
50 INJECTION, SOLUTION INTRAMUSCULAR; INTRAVENOUS ONCE
Status: COMPLETED | OUTPATIENT
Start: 2024-11-05 | End: 2024-11-05

## 2024-11-05 RX ORDER — FENTANYL CITRATE 50 UG/ML
INJECTION, SOLUTION INTRAMUSCULAR; INTRAVENOUS AS NEEDED
Status: DISCONTINUED | OUTPATIENT
Start: 2024-11-05 | End: 2024-11-05 | Stop reason: SURG

## 2024-11-05 RX ORDER — SODIUM CHLORIDE 0.9 % (FLUSH) 0.9 %
1-10 SYRINGE (ML) INJECTION AS NEEDED
Status: DISCONTINUED | OUTPATIENT
Start: 2024-11-05 | End: 2024-11-05 | Stop reason: HOSPADM

## 2024-11-05 RX ORDER — ROPIVACAINE HYDROCHLORIDE 5 MG/ML
INJECTION, SOLUTION EPIDURAL; INFILTRATION; PERINEURAL
Status: COMPLETED | OUTPATIENT
Start: 2024-11-05 | End: 2024-11-05

## 2024-11-05 RX ORDER — HYDROCODONE BITARTRATE AND ACETAMINOPHEN 5; 325 MG/1; MG/1
1 TABLET ORAL EVERY 4 HOURS PRN
Status: DISCONTINUED | OUTPATIENT
Start: 2024-11-05 | End: 2024-11-05 | Stop reason: HOSPADM

## 2024-11-05 RX ORDER — FERROUS SULFATE 325(65) MG
325 TABLET ORAL
Status: DISCONTINUED | OUTPATIENT
Start: 2024-11-06 | End: 2024-11-06 | Stop reason: HOSPADM

## 2024-11-05 RX ORDER — SODIUM CHLORIDE, SODIUM LACTATE, POTASSIUM CHLORIDE, CALCIUM CHLORIDE 600; 310; 30; 20 MG/100ML; MG/100ML; MG/100ML; MG/100ML
100 INJECTION, SOLUTION INTRAVENOUS CONTINUOUS
Status: DISCONTINUED | OUTPATIENT
Start: 2024-11-05 | End: 2024-11-05

## 2024-11-05 RX ORDER — PROMETHAZINE HYDROCHLORIDE 25 MG/1
6.25 TABLET ORAL EVERY 6 HOURS PRN
Status: DISCONTINUED | OUTPATIENT
Start: 2024-11-05 | End: 2024-11-06 | Stop reason: HOSPADM

## 2024-11-05 RX ORDER — MAGNESIUM HYDROXIDE 1200 MG/15ML
LIQUID ORAL AS NEEDED
Status: DISCONTINUED | OUTPATIENT
Start: 2024-11-05 | End: 2024-11-05 | Stop reason: HOSPADM

## 2024-11-05 RX ORDER — SODIUM CHLORIDE 9 MG/ML
100 INJECTION, SOLUTION INTRAVENOUS CONTINUOUS
Status: DISCONTINUED | OUTPATIENT
Start: 2024-11-05 | End: 2024-11-05

## 2024-11-05 RX ORDER — IBUPROFEN 600 MG/1
600 TABLET, FILM COATED ORAL EVERY 6 HOURS PRN
Status: DISCONTINUED | OUTPATIENT
Start: 2024-11-05 | End: 2024-11-06 | Stop reason: HOSPADM

## 2024-11-05 RX ORDER — SODIUM CHLORIDE 9 MG/ML
40 INJECTION, SOLUTION INTRAVENOUS AS NEEDED
Status: DISCONTINUED | OUTPATIENT
Start: 2024-11-05 | End: 2024-11-05

## 2024-11-05 RX ORDER — LIDOCAINE HYDROCHLORIDE 20 MG/ML
INJECTION, SOLUTION EPIDURAL; INFILTRATION; INTRACAUDAL; PERINEURAL AS NEEDED
Status: DISCONTINUED | OUTPATIENT
Start: 2024-11-05 | End: 2024-11-05 | Stop reason: SURG

## 2024-11-05 RX ORDER — LABETALOL HYDROCHLORIDE 5 MG/ML
5 INJECTION, SOLUTION INTRAVENOUS
Status: DISCONTINUED | OUTPATIENT
Start: 2024-11-05 | End: 2024-11-05 | Stop reason: HOSPADM

## 2024-11-05 RX ORDER — NEOSTIGMINE METHYLSULFATE 5 MG/5 ML
SYRINGE (ML) INTRAVENOUS AS NEEDED
Status: DISCONTINUED | OUTPATIENT
Start: 2024-11-05 | End: 2024-11-05 | Stop reason: SURG

## 2024-11-05 RX ORDER — TRAMADOL HYDROCHLORIDE 50 MG/1
50 TABLET ORAL EVERY 6 HOURS PRN
Status: DISCONTINUED | OUTPATIENT
Start: 2024-11-05 | End: 2024-11-06 | Stop reason: HOSPADM

## 2024-11-05 RX ORDER — ROCURONIUM BROMIDE 10 MG/ML
INJECTION, SOLUTION INTRAVENOUS AS NEEDED
Status: DISCONTINUED | OUTPATIENT
Start: 2024-11-05 | End: 2024-11-05 | Stop reason: SURG

## 2024-11-05 RX ORDER — FENTANYL CITRATE 50 UG/ML
50 INJECTION, SOLUTION INTRAMUSCULAR; INTRAVENOUS
Status: DISCONTINUED | OUTPATIENT
Start: 2024-11-05 | End: 2024-11-05 | Stop reason: HOSPADM

## 2024-11-05 RX ORDER — SCOLOPAMINE TRANSDERMAL SYSTEM 1 MG/1
1 PATCH, EXTENDED RELEASE TRANSDERMAL ONCE
Status: DISCONTINUED | OUTPATIENT
Start: 2024-11-05 | End: 2024-11-05

## 2024-11-05 RX ORDER — OXYCODONE HYDROCHLORIDE 5 MG/1
5 TABLET ORAL EVERY 6 HOURS PRN
Status: DISCONTINUED | OUTPATIENT
Start: 2024-11-05 | End: 2024-11-06 | Stop reason: HOSPADM

## 2024-11-05 RX ORDER — CELECOXIB 200 MG/1
200 CAPSULE ORAL ONCE
Status: COMPLETED | OUTPATIENT
Start: 2024-11-05 | End: 2024-11-05

## 2024-11-05 RX ORDER — PROPOFOL 10 MG/ML
VIAL (ML) INTRAVENOUS AS NEEDED
Status: DISCONTINUED | OUTPATIENT
Start: 2024-11-05 | End: 2024-11-05 | Stop reason: SURG

## 2024-11-05 RX ORDER — SODIUM CHLORIDE 0.9 % (FLUSH) 0.9 %
10 SYRINGE (ML) INJECTION EVERY 12 HOURS SCHEDULED
Status: DISCONTINUED | OUTPATIENT
Start: 2024-11-05 | End: 2024-11-05 | Stop reason: HOSPADM

## 2024-11-05 RX ORDER — SODIUM CHLORIDE 9 MG/ML
40 INJECTION, SOLUTION INTRAVENOUS AS NEEDED
Status: DISCONTINUED | OUTPATIENT
Start: 2024-11-05 | End: 2024-11-05 | Stop reason: HOSPADM

## 2024-11-05 RX ORDER — GABAPENTIN 100 MG/1
100 CAPSULE ORAL 3 TIMES DAILY
Status: DISCONTINUED | OUTPATIENT
Start: 2024-11-05 | End: 2024-11-06 | Stop reason: HOSPADM

## 2024-11-05 RX ORDER — FUROSEMIDE 40 MG/1
40 TABLET ORAL DAILY
Status: DISCONTINUED | OUTPATIENT
Start: 2024-11-06 | End: 2024-11-06 | Stop reason: HOSPADM

## 2024-11-05 RX ORDER — UREA 10 %
500 LOTION (ML) TOPICAL DAILY
Status: DISCONTINUED | OUTPATIENT
Start: 2024-11-05 | End: 2024-11-06 | Stop reason: HOSPADM

## 2024-11-05 RX ORDER — NALOXONE HCL 0.4 MG/ML
0.4 VIAL (ML) INJECTION AS NEEDED
Status: DISCONTINUED | OUTPATIENT
Start: 2024-11-05 | End: 2024-11-05 | Stop reason: HOSPADM

## 2024-11-05 RX ADMIN — THIAMINE HCL TAB 100 MG 250 MG: 100 TAB at 16:34

## 2024-11-05 RX ADMIN — ACETAMINOPHEN 1000 MG: 500 TABLET, FILM COATED ORAL at 08:34

## 2024-11-05 RX ADMIN — ROPIVACAINE HYDROCHLORIDE 40 ML: 2 INJECTION, SOLUTION EPIDURAL; INFILTRATION at 10:19

## 2024-11-05 RX ADMIN — ALBUMIN (HUMAN): 12.5 INJECTION, SOLUTION INTRAVENOUS at 12:29

## 2024-11-05 RX ADMIN — DEXMEDETOMIDINE HYDROCHLORIDE 4 MCG: 4 INJECTION, SOLUTION INTRAVENOUS at 11:38

## 2024-11-05 RX ADMIN — Medication 200 MCG: at 11:35

## 2024-11-05 RX ADMIN — ONDANSETRON 4 MG: 2 INJECTION INTRAMUSCULAR; INTRAVENOUS at 12:23

## 2024-11-05 RX ADMIN — Medication 200 MCG: at 11:04

## 2024-11-05 RX ADMIN — CELECOXIB 200 MG: 200 CAPSULE ORAL at 08:34

## 2024-11-05 RX ADMIN — CEFAZOLIN 2000 MG: 2 INJECTION, POWDER, FOR SOLUTION INTRAMUSCULAR; INTRAVENOUS at 11:04

## 2024-11-05 RX ADMIN — GLYCOPYRROLATE 0.4 MG: 0.2 INJECTION INTRAMUSCULAR; INTRAVENOUS at 13:11

## 2024-11-05 RX ADMIN — PROPOFOL 150 MG: 10 INJECTION, EMULSION INTRAVENOUS at 10:57

## 2024-11-05 RX ADMIN — Medication 3 MG: at 13:11

## 2024-11-05 RX ADMIN — ENOXAPARIN SODIUM 40 MG: 100 INJECTION SUBCUTANEOUS at 10:33

## 2024-11-05 RX ADMIN — SCOPALAMINE 1 PATCH: 1 PATCH, EXTENDED RELEASE TRANSDERMAL at 10:12

## 2024-11-05 RX ADMIN — Medication 100 MCG: at 13:13

## 2024-11-05 RX ADMIN — CEPHALEXIN 500 MG: 500 CAPSULE ORAL at 18:10

## 2024-11-05 RX ADMIN — ROPIVACAINE HYDROCHLORIDE 20 ML: 5 INJECTION EPIDURAL; INFILTRATION; PERINEURAL at 10:19

## 2024-11-05 RX ADMIN — MIDAZOLAM HYDROCHLORIDE 2 MG: 1 INJECTION, SOLUTION INTRAMUSCULAR; INTRAVENOUS at 10:12

## 2024-11-05 RX ADMIN — FENTANYL CITRATE 50 MCG: 50 INJECTION, SOLUTION INTRAMUSCULAR; INTRAVENOUS at 10:12

## 2024-11-05 RX ADMIN — Medication 200 MCG: at 12:44

## 2024-11-05 RX ADMIN — ROCURONIUM 50 MG: 50 INJECTION, SOLUTION INTRAVENOUS at 10:58

## 2024-11-05 RX ADMIN — DEXAMETHASONE SODIUM PHOSPHATE 4 MG: 4 INJECTION, SOLUTION INTRA-ARTICULAR; INTRALESIONAL; INTRAMUSCULAR; INTRAVENOUS; SOFT TISSUE at 10:12

## 2024-11-05 RX ADMIN — Medication 200 MCG: at 11:08

## 2024-11-05 RX ADMIN — SODIUM CHLORIDE, POTASSIUM CHLORIDE, SODIUM LACTATE AND CALCIUM CHLORIDE 100 ML/HR: 600; 310; 30; 20 INJECTION, SOLUTION INTRAVENOUS at 08:47

## 2024-11-05 RX ADMIN — DEXMEDETOMIDINE HYDROCHLORIDE 8 MCG: 4 INJECTION, SOLUTION INTRAVENOUS at 11:24

## 2024-11-05 RX ADMIN — FENTANYL CITRATE 50 MCG: 50 INJECTION, SOLUTION INTRAMUSCULAR; INTRAVENOUS at 11:26

## 2024-11-05 RX ADMIN — Medication 200 MCG: at 12:14

## 2024-11-05 RX ADMIN — Medication 200 MCG: at 11:52

## 2024-11-05 RX ADMIN — GABAPENTIN 100 MG: 100 CAPSULE ORAL at 16:34

## 2024-11-05 RX ADMIN — LIDOCAINE HYDROCHLORIDE 5 ML: 20 INJECTION, SOLUTION EPIDURAL; INFILTRATION; INTRACAUDAL; PERINEURAL at 10:57

## 2024-11-05 RX ADMIN — Medication 500 MCG: at 16:34

## 2024-11-05 RX ADMIN — DEXMEDETOMIDINE HYDROCHLORIDE 4 MCG: 4 INJECTION, SOLUTION INTRAVENOUS at 11:42

## 2024-11-05 RX ADMIN — FENTANYL CITRATE 50 MCG: 50 INJECTION, SOLUTION INTRAMUSCULAR; INTRAVENOUS at 10:56

## 2024-11-05 RX ADMIN — GABAPENTIN 100 MG: 100 CAPSULE ORAL at 22:40

## 2024-11-05 NOTE — H&P
Fide Verduzco MD - General Surgery History and Physical     Referring Provider: Fide Verduzco MD    Patient Care Team:  INA Holloway MD as PCP - General (Internal Medicine)    Chief complaint breast cancer    Subjective .     History of present illness:  The patient is a 44 y.o. female who presents for bilateral mastectomy with left axillary sentinel lymph node biopsy for treatment of her breast cancer.    Review of Systems    Review of Systems - General ROS: negative  ENT ROS: negative  Respiratory ROS: no cough, shortness of breath, or wheezing  Cardiovascular ROS: no chest pain or dyspnea on exertion  Gastrointestinal ROS: no abdominal pain, change in bowel habits, or black or bloody stools  Genito-Urinary ROS: no dysuria, trouble voiding, or hematuria  Dermatological ROS: negative   Breast ROS: positive for breast cancer  Hematological and Lymphatic ROS: negative  Musculoskeletal ROS: negative   Neurological ROS: no TIA or stroke symptoms    Psychological ROS: negative  Endocrine ROS: negative    History  Past Medical History:   Diagnosis Date    Alcoholism in recovery     Allergic     Anemia [id number]    Anxiety     Asthma     Breast cancer     Cancer 920    Cirrhosis     Endometriosis 1993    Gestational diabetes     Gestational hypertension 2006    Hepatitis C     History of ear infections     History of transfusion     Hypertension     Leaky heart valve     Migraine     Multiple gestation     Ovarian cyst     PMS (premenstrual syndrome)     PONV (postoperative nausea and vomiting)     Preeclampsia     Secondary esophageal varices without bleeding 2024    Sinusitis     Varicella    ,   Past Surgical History:   Procedure Laterality Date    BREAST BIOPSY       SECTION      x2    COLONOSCOPY      COLONOSCOPY N/A 2024    Procedure: COLONOSCOPY WITH ANESTHESIA;  Surgeon: Blanca Bernardo MD;  Location: [facility] ENDOSCOPY;  Service:  Gastroenterology;  Laterality: N/A;  pre: screen  post: diverticulosis  [place]    ENDOSCOPY N/A 01/31/2024    no evidence of any fresh or old blood or clots,normal stomach    ENDOSCOPY N/A 08/09/2024    Procedure: ESOPHAGOGASTRODUODENOSCOPY WITH ANESTHESIA;  Surgeon: Blanca Bernardo MD;  Location: [facility] ENDOSCOPY;  Service: Gastroenterology;  Laterality: N/A;  pre: cirrhosis   post: PHG. Varices.  [place]    MOUTH SURGERY      UPPER GASTROINTESTINAL ENDOSCOPY  3/24    WISDOM TOOTH EXTRACTION  1995   ,   Family History   Problem Relation Age of Onset    Diabetes Mother     Heart disease Father     Diabetes Father     Stroke Father     Melanoma Father 40 - 49    Esophageal cancer Father 60 - 69    Cervical cancer Maternal Grandmother 40 - 49    Skin cancer Paternal Grandmother     Cancer Paternal Grandfather 50 - 59        Mesothelioma    Breast cancer Half-Sister 45    Skin cancer Maternal Aunt     Pancreatic cancer Paternal Uncle     Colon cancer Neg Hx     Colon polyps Neg Hx     Uterine cancer Neg Hx    ,   Social History     Tobacco Use    Smoking status: Every Day     Current packs/day: 0.50     Average packs/day: 0.5 packs/day for 10.0 years (5.0 ttl pk-yrs)     Types: Cigarettes    Smokeless tobacco: Never   Vaping Use    Vaping status: Never Used   Substance Use Topics    Alcohol use: Not Currently     Alcohol/week: 10.0 standard drinks of alcohol     Comment: vodka a few times a week    Drug use: Never   ,   Facility-Administered Medications Prior to Admission   Medication Dose Route Frequency Provider Last Rate Last Admin    lidocaine (XYLOCAINE) 1 % injection 10 mL  10 mL Subcutaneous Once Amina Camarillo MD        lidocaine 1% - EPINEPHrine 1:[id number] (XYLOCAINE W/EPI) 1 %-1:056220 injection 10 mL  10 mL Injection Once Amina Camarillo MD         Medications Prior to Admission   Medication Sig Dispense Refill Last Dose/Taking    carvedilol (Coreg) 6.25 MG tablet Take 1 tablet by mouth 2 (Two)  Times a Day With Meals. 60 tablet 3 11/4/2024 at  9:30 PM    ferrous sulfate 325 (65 FE) MG tablet Take 1 tablet by mouth Daily With Breakfast.   11/4/2024 at  9:30 PM    furosemide (LASIX) 40 MG tablet Take 1 tablet by mouth once daily 90 tablet 1 11/4/2024 at  9:30 PM    Multivitamin tablet tablet Take 1 tablet by mouth Daily.   Taking    multivitamin with minerals tablet tablet Take 1 tablet by mouth Daily. 30 tablet 11 11/4/2024 at  9:30 PM    potassium chloride (KLOR-CON M20) 20 MEQ CR tablet Take 1 tablet by mouth Daily. 14 tablet 0 11/4/2024 at  9:30 PM    spironolactone (ALDACTONE) 25 MG tablet Take 1 tablet by mouth Daily. 90 tablet 3 11/4/2024 at  9:30 PM    thiamine (VITAMIN B1) 100 MG tablet Take 1 tablet by mouth Daily. (Patient taking differently: Take 2.5 tablets by mouth Daily.) 30 tablet 0 11/4/2024 at  9:30 PM    vitamin B-12 (cyanocobalamin) 100 MCG tablet Take 5 tablets by mouth Daily.   11/4/2024 at  9:30 PM    and Allergies:  Codeine    Current Facility-Administered Medications:     ceFAZolin 2000 mg IVPB in 100 mL NS (MBP), 2,000 mg, Intravenous, Once, Fide Verduzco MD    Enoxaparin Sodium (LOVENOX) syringe 40 mg, 40 mg, Subcutaneous, Daily, Fide Verduzco MD    heparin (porcine) 5000 UNIT/ML injection 5,000 Units, 5,000 Units, Subcutaneous, Once, Fide Verudzco MD    lactated ringers infusion 1,000 mL, 1,000 mL, Intravenous, Continuous, Fide Verduzco MD    lactated ringers infusion, 100 mL/hr, Intravenous, Continuous, Fide Verduzco MD, Last Rate: 100 mL/hr at 11/05/24 0847, 100 mL/hr at 11/05/24 0847    lactated ringers infusion, 100 mL/hr, Intravenous, Continuous, Modesta Cerda MD    lidocaine PF 1% (XYLOCAINE) injection 0.5 mL, 0.5 mL, Intradermal, Once PRN, Fide Verduzco MD    scopolamine patch 1 mg/72 hr, 1 patch, Transdermal, Once, Modesta Cerda MD, 1 patch at 11/05/24 1012    sodium chloride 0.9 % flush 1-10 mL, 1-10 mL,  Intravenous, PRN, Glo Benavides MD    sodium chloride 0.9 % flush 10 mL, 10 mL, Intravenous, Q12H, Glo Benavides MD    sodium chloride 0.9 % flush 3 mL, 3 mL, Intravenous, PRN, Fide Verduzco MD    sodium chloride 0.9 % flush 3 mL, 3 mL, Intravenous, Q12H, Modesta Cerda MD    sodium chloride 0.9 % flush 3-10 mL, 3-10 mL, Intravenous, PRN, Modesta Cerda MD    sodium chloride 0.9 % infusion 40 mL, 40 mL, Intravenous, PRN, Glo Benavides MD    sodium chloride 0.9 % infusion 40 mL, 40 mL, Intravenous, PRNPrashant Emily Blythe, MD    sodium chloride 0.9 % infusion, 100 mL/hr, Intravenous, Continuous, Glo Benavides MD    Objective     Vital Signs   Temp:  [99.1 °F (37.3 °C)] 99.1 °F (37.3 °C)  Heart Rate:  [90-92] 92  Resp:  [18] 18  BP: (107-125)/(70-84) 107/70    Physical Exam:  General appearance - alert, well appearing, and in no distress  Mental status - alert, oriented to person, place, and time  Eyes - pupils equal and reactive, extraocular eye movements intact  Neck - supple, no significant adenopathy  Chest - no tachypnea, retractions or cyanosis  Heart - normal rate and regular rhythm  Abdomen - soft, nontender, nondistended, no masses or organomegaly  Neurological - alert, oriented, normal speech, no focal findings or movement disorder noted  Breast Exam: Bilateral breasts without obvious deformities. Bilateral nipples everted. Patient examined in the supine position with the ipsilateral arm above the head. No palpable masses bilaterally. No nipple discharge bilaterally. No palpable axillary nor supraclavicular adenopathy bilaterally.     Results Review:     Lab Results (last 24 hours)       Procedure Component Value Units Date/Time    Pregnancy, Urine - Urine, Clean Catch [[id number]]  (Normal) Collected: 11/05/24 0836    Specimen: Urine, Clean Catch Updated: 11/05/24 0853     HCG, Urine QL Negative          Imaging Results (Last 24 Hours)       ** No  results found for the last 24 hours. **              Assessment & Plan       Ms. Smart is a 44 year old female with a 0.7 cm left breast invasive ductal carcinoma at 11:00, 7 cm FN with normal appearing lymph nodes on the imaging. ER+RI low +, Her2-. 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 She saw Dr. Prieto with the plan for delayed reconstruction. He will nahid her today. Dr. Benavides will also perform a D&C today at the time of surgery. To OR today. Will admit overnight for drain teaching.       Fide Verduzco MD  11/05/24  10:22 CST

## 2024-11-05 NOTE — ANESTHESIA PROCEDURE NOTES
Peripheral Block    Pre-sedation assessment completed: 11/5/2024 10:12 AM    Patient reassessed immediately prior to procedure    Start time: 11/5/2024 10:16 AM  Stop time: 11/5/2024 10:19 AM  Reason for block: procedure for pain, at surgeon's request, post-op pain management and Requested by Dr. Verduzco  Performed by  Anesthesiologist: Modesta Cerda MD  Preanesthetic Checklist  Completed: patient identified, IV checked, site marked, risks and benefits discussed, surgical consent, monitors and equipment checked, pre-op evaluation and timeout performed  Prep:  Pt Position: supine  Sterile barriers:cap, gloves, washed/disinfected hands and mask  Prep: ChloraPrep  Patient monitoring: blood pressure monitoring, continuous pulse oximetry and EKG  Procedure    Sedation: yes    Guidance:ultrasound guided and Pectoralis muscles and serratus muscles identified  and local anesthetic visualized spreading in PECS I and PECS II plane    ULTRASOUND INTERPRETATION.  Using ultrasound guidance a 20 G gauge needle was placed in close proximity to the nerve, at which point, under ultrasound guidance anesthetic was injected in the area of the nerve and spread of the anesthesia was seen on ultrasound in close proximity thereto.  There were no abnormalities seen on ultrasound; a digital image was taken; and the patient tolerated the procedure with no complications. Images:still images obtained, printed/placed on chart    Laterality:Bilateral  Block Type:PECS I and PECS II  Injection Technique:single-shotNeedle Gauge:20 G  Resistance on Injection: none    Medications Used: ropivacaine (NAROPIN) 0.2% injection - Peripheral Nerve   40 mL - 11/5/2024 10:19:00 AM  ropivacaine (NAROPIN) injection 0.5 % - Injection   20 mL - 11/5/2024 10:19:00 AM      Post Assessment  Injection Assessment: negative aspiration for heme, no paresthesia on injection and incremental injection  Patient Tolerance:comfortable throughout  block  Complications:no  Performed by: Modesta Cerda MD

## 2024-11-05 NOTE — ANESTHESIA POSTPROCEDURE EVALUATION
Patient: Rachana Smart    Procedure Summary       Date: 11/05/24 Room / Location:  [facility] OR 06 /  [facility] OR    Anesthesia Start: 1052 Anesthesia Stop: 1325    Procedures:       BILATERAL SKIN SPARING MASTECTOMY WITH LEFT AXILLARY MAGTRACE GUIDED SENTINEL LYMPH NODE BIOPSY (Dr. Prieto to nahid) (Bilateral: Breast)      DILATATION AND CURETTAGE HYSTEROSCOPY (Uterus) Diagnosis:       Malignant neoplasm of upper-inner quadrant of left female breast, unspecified estrogen receptor status      (Malignant neoplasm of upper-inner quadrant of left female breast, unspecified estrogen receptor status [C50.212])    Surgeons: Fide Verduzco MD; Glo Benavides MD Provider: Fide Montgomery CRNA    Anesthesia Type: general with block ASA Status: 3            Anesthesia Type: general with block    Vitals  Vitals Value Taken Time   /74 11/05/24 1412   Temp 98 °F (36.7 °C) 11/05/24 1412   Pulse 78 11/05/24 1413   Resp 14 11/05/24 1412   SpO2 95 % 11/05/24 1413   Vitals shown include unfiled device data.        Post Anesthesia Care and Evaluation    Patient location during evaluation: PHASE II  Patient participation: complete - patient participated  Level of consciousness: awake and awake and alert  Pain score: 0  Pain management: adequate    Airway patency: patent  Anesthetic complications: No anesthetic complications  PONV Status: none  Cardiovascular status: acceptable  Respiratory status: acceptable  Hydration status: acceptable    Comments: Patient discharged according to acceptable Jared score per RN assessment. See nursing records for further information.     Blood pressure 108/74, pulse 78, temperature 98 °F (36.7 °C), temperature source Temporal, resp. rate 14, last menstrual period 09/25/2024, SpO2 96%, not currently breastfeeding.

## 2024-11-05 NOTE — OP NOTE
[facility]  Rachana Smart  : 1980  MRN: [id number]  CSN: [id number]  Date: 2024    Operative Note         Pre-op Diagnosis:  Malignant neoplasm of upper-inner quadrant of left female breast, unspecified estrogen receptor status [C50.212]   Post-op Diagnosis:  Post-Op Diagnosis Codes:     * Malignant neoplasm of upper-inner quadrant of left female breast, unspecified estrogen receptor status [C50.212]   Procedure: Procedure(s):  BILATERAL SKIN SPARING MASTECTOMY WITH LEFT AXILLARY MAGTRACE GUIDED SENTINEL LYMPH NODE BIOPSY (Dr. Prieto to nahid)  DILATATION AND CURETTAGE HYSTEROSCOPY   Surgeon: Surgeon(s):  Glo Benavides MD Williams, Kristen N, MD       Anesthesia: General with Block      Estimated Blood Loss: 25   mls   Fluids: See completed case    UOP: 50   mls   Drains: None   ABx: None     Specimens:  Endometrial curettings   Findings: Normal endometrial canal, sounded to 8 cm    Complications: None      INDICATION: Rachana Smart is a 44 y.o. female  with endometrial cells on pap smear. EMB attempted in clinic however no tissue noted. Pt to the OR for bilateral mastectomy with D&C.   PROCEDURE IN DETAIL: After informed consent was obtained, the patient was taken to the operating room, where general anesthesia was administered. She was placed in the lithotomy position in Yellofin stirrups, and her perineum and vagina were prepped and draped in normal sterile fashion. An open sided speculum was placed in the vagina and her bladder drained with a metal catheter. The anterior lip of the cervix was visualized and grasped with a single-tooth tenaculum.  The uterus sounded to 8 cm.  The cervix was dilated with Hegar dilators to accept a diagnostic hysteroscope.  Normal saline was used for distention and the cavity was inspected showing no polyp.  The tubal ostia were both identified.  The hysteroscope was removed.  A medium size sharp curette was then placed in the uterus and a gritty  texture was noted in all 4 quadrants.  The curettings were collected on a Telfa pad.  The cervix was then observed and no active bleeding was noted.  The tenaculum was removed and the cervix was hemostatic.  All instruments were then removed from the vagina.   The patient was then awakened and taken to the recovery room in stable condition.  She tolerated the procedure well and without complications.  All sponge needle and instrument counts were correct times 3 per the OR staff.    Glo Benavides MD   11/5/2024  13:18 CST

## 2024-11-05 NOTE — OP NOTE
Bilateral Skin Sparing Mastectomy with Left Axillary [place] Lymph Node biopsy Operative Report:     Patient: Rachana Smart  MRN: [id number]    YOB: 1980  Age: 44 y.o.  Sex: female  Unit:  PAD OR Room/Bed: PAD OR/MAIN OR Location: [facility]      Admitting Physician: ABBIE PARDO    Primary Care Physician: INA Holloway MD             INDICATIONS: Ms. Smart is a 44 year old female with a 0.7 cm left breast invasive ductal carcinoma at 11:00, 7 cm FN with normal appearing lymph nodes on the imaging. ER+KY low +, Her2-. 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 She saw Dr. Prieto with the plan for delayed reconstruction. He will nahid her today. Dr. Benavides will also perform a D&C today at the time of surgery. To OR today. Will admit overnight for drain teaching.     DATE OF OPERATION: 11/5/2024     Surgeons and Role:  Panel 1:     * Abbie Pardo MD - Primary  Farzana Izaguirre PA-C - assist   Panel 2:     * Glo Benavides MD - Primary    ANESTHESIA: General with Block     PREOPERATIVE DIAGNOSIS: Malignant neoplasm of upper-inner quadrant of left female breast, unspecified estrogen receptor status [C50.212]    POSTOPERATIVE DIAGNOSIS: Same    PROCEDURES PERFORMED:    (1) Bilateral Skin Sparing Mastectomy  (2) Left Axillary [place] Lymph Node Biopsy     PROCEDURE DETAILS:    After informed consent was obtained, the patient was brought to the operating room and the site of surgery was confirmed. General anesthesia without paralysis was induced.  Then 2 mL of methylene blue diluted in 3 mL injectable saline as well as 2 mL of Magtrace were injected periareolary on the left breast. It was then massaged. The bilateral chest and left axilla were prepped with ChloraPrep and draped in sterile fashion. A time out was completed verifying the correct patient, procedure, site, positioning and special equipment needed prior to incision.       An elliptical skin incision was  made on the right that encompassed the nipple-areolar complex in a generally transverse direction across the breast. Flaps were raised in the avascular plane between the subcutaneous tissue and the breast tissue from the clavicle superiorly, the sternum medially, the anterior rectus sheath inferiorly, and the anterior border of the latissimus dorsi muscle laterally. Hemostasis was achieved on the flaps. Next the breast tissue and underlying pectoralis fascia were excised from the pectoralis major, progressing from medially to laterally. At the lateral border of the pectoralis major muscle, the breast tissue was swung laterally and a lateral pedicle identified where the breast tissue gave way to fat of the axilla; this pedicle was incised and the specimen removed. The specimen was marked with a short stitch superiorly and a long stitch laterally.         An elliptical skin incision was made on the left  that encompassed the nipple-areolar complex in a generally transverse direction across the breast. Flaps were raised in the avascular plane between the subcutaneous tissue and the breast tissue from the clavicle superiorly, the sternum medially, the anterior rectus sheath inferiorly, and the anterior border of the latissimus dorsi muscle laterally. Hemostasis was achieved on the flaps. Next the breast tissue and underlying pectoralis fascia were excised from the pectoralis major, progressing from medially to laterally. At the lateral border of the pectoralis major muscle, the breast tissue was swung laterally and a lateral pedicle identified where the breast tissue gave way to fat of the axilla; this pedicle was incised and the specimen removed. The specimen was marked with a short stitch superiorly and a long stitch laterally.     We then proceeded with a left axillary sentinel lymph node biopsy through the mastectomy incision. The Mag-Probe  was used to localize the sentinel lymph node. It was excised with cautery.  It was noted to be hot and blue. It was in the deep plane just superior to the muscle. There were no nodes that were at least 10% of the sentinel node, no palpable nodes, and no blue nodes. The node was sent for permanent pathology.        Both surgical beds were washed out with sterile water and hemostasis was again confirmed. Surgicel powder was placed in both wound beds.  Two 15 Fr CESAR drains were placed in either mastectomy bed. The skin flaps were closed with interrupted 3-0 Vicryl dermal sutures followed by a running 4-0 Monocryl subcuticular suture.  The incisions were dressed with mastisol, steri strips, ABDs, fluffs and an ace wrap. Drain dressings were placed. The patient tolerated the procedure well and was transferred to PACU in good condition.     Farzana Izaguirre PA-C was responsible for performing the following activities: Retraction, Suction, Irrigation, Suturing, Closing, and Placing Dressing and their skilled assistance was necessary for the success of this case.    Findings: one hot and blue axillary node    Estimated Blood Loss:  45 mL   Complications: none apparent   Specimens:   (1) Right mastectomy - short superior and long right lateral   (2) Left mastectomy - short superior and long left lateral   (3) Left axillary sentinel node      [place] Node Biopsy for Breast Cancer - Left  Operation performed with curative intent. Yes   Tracer(s) used to identify sentinel nodes in the upfront surgery (non-neoadjuvant) setting (select all that apply). Dye and Superparamagnetic iron oxide   Tracer(s) used to identify sentinel nodes in the neoadjuvant setting (select all that apply). N/A   All nodes (colored or non-colored) present at the end of a dye-filled lymphatic channel were removed. Yes   All significantly radioactive nodes were removed. Yes   All palpably suspicious nodes were removed. Yes   Biopsy-proven positive nodes marked with clips prior to chemotherapy were identified and removed. N/A       Disposition: PACU - hemodynamically stable.           Condition: stable    Fide Verduzco MD  10/04/2024

## 2024-11-05 NOTE — ANESTHESIA PROCEDURE NOTES
Airway  Urgency: elective    Date/Time: 11/5/2024 10:59 AM  Airway not difficult    General Information and Staff    Patient location during procedure: OR  CRNA/CAA: Fide Montgomery CRNA    Indications and Patient Condition  Indications for airway management: airway protection    Preoxygenated: yes  Mask difficulty assessment: 1 - vent by mask    Final Airway Details  Final airway type: endotracheal airway      Successful airway: ETT  Cuffed: yes   Successful intubation technique: direct laryngoscopy  Facilitating devices/methods: intubating stylet  Endotracheal tube insertion site: oral  Blade: Joey  Blade size: 3.5  ETT size (mm): 7.0  Cormack-Lehane Classification: grade I - full view of glottis  Placement verified by: chest auscultation and capnometry   Cuff volume (mL): 8  Measured from: teeth  Number of attempts at approach: 1  Assessment: lips, teeth, and gum same as pre-op and atraumatic intubation    Additional Comments  Placed by PEYTON WILKS

## 2024-11-05 NOTE — ANESTHESIA PREPROCEDURE EVALUATION
Anesthesia Evaluation     Patient summary reviewed   no history of anesthetic complications:   NPO Solid Status: > 8 hours  NPO Liquid Status: > 8 hours           Airway   Mallampati: II  Dental - normal exam         Pulmonary    (+) a smoker Current, asthma,  (-) sleep apnea  Cardiovascular   Exercise tolerance: good (4-7 METS)    (+) hypertension (no  meds), valvular problems/murmurs      Neuro/Psych  (-) seizures, TIA, CVA  GI/Hepatic/Renal/Endo    (+) hepatitis C, liver disease cirrhosis, diabetes mellitus    Musculoskeletal     Abdominal    Substance History      OB/GYN          Other   blood dyscrasia thrombocytopenia,                   Anesthesia Plan    ASA 3     general with block     intravenous induction     Anesthetic plan, risks, benefits, and alternatives have been provided, discussed and informed consent has been obtained with: patient.      CODE STATUS:

## 2024-11-06 ENCOUNTER — TELEPHONE (OUTPATIENT)
Dept: SURGERY | Facility: CLINIC | Age: 44
End: 2024-11-06
Payer: COMMERCIAL

## 2024-11-06 VITALS
TEMPERATURE: 98.5 F | OXYGEN SATURATION: 96 % | HEART RATE: 83 BPM | SYSTOLIC BLOOD PRESSURE: 107 MMHG | DIASTOLIC BLOOD PRESSURE: 67 MMHG | RESPIRATION RATE: 18 BRPM

## 2024-11-06 PROBLEM — C50.212 MALIGNANT NEOPLASM OF UPPER-INNER QUADRANT OF LEFT FEMALE BREAST, UNSPECIFIED ESTROGEN RECEPTOR STATUS: Status: ACTIVE | Noted: 2024-11-06

## 2024-11-06 PROCEDURE — 99024 POSTOP FOLLOW-UP VISIT: CPT | Performed by: STUDENT IN AN ORGANIZED HEALTH CARE EDUCATION/TRAINING PROGRAM

## 2024-11-06 PROCEDURE — G0378 HOSPITAL OBSERVATION PER HR: HCPCS

## 2024-11-06 RX ORDER — IBUPROFEN 200 MG
600 TABLET ORAL EVERY 8 HOURS
Start: 2024-11-06 | End: 2025-11-06

## 2024-11-06 RX ORDER — CEPHALEXIN 500 MG/1
500 CAPSULE ORAL 2 TIMES DAILY
Qty: 20 CAPSULE | Refills: 0 | Status: SHIPPED | OUTPATIENT
Start: 2024-11-06 | End: 2024-11-16

## 2024-11-06 RX ORDER — OXYCODONE HYDROCHLORIDE 5 MG/1
5 TABLET ORAL EVERY 8 HOURS PRN
Qty: 10 TABLET | Refills: 0 | Status: SHIPPED | OUTPATIENT
Start: 2024-11-06 | End: 2025-11-06

## 2024-11-06 RX ORDER — ONDANSETRON 4 MG/1
4 TABLET, FILM COATED ORAL EVERY 8 HOURS PRN
Qty: 15 TABLET | Refills: 0 | Status: SHIPPED | OUTPATIENT
Start: 2024-11-06 | End: 2025-11-06

## 2024-11-06 RX ORDER — CYCLOBENZAPRINE HCL 5 MG
5 TABLET ORAL EVERY 8 HOURS PRN
Qty: 15 TABLET | Refills: 0 | Status: SHIPPED | OUTPATIENT
Start: 2024-11-06 | End: 2024-11-11

## 2024-11-06 RX ADMIN — THIAMINE HCL TAB 100 MG 250 MG: 100 TAB at 08:24

## 2024-11-06 RX ADMIN — FUROSEMIDE 40 MG: 40 TABLET ORAL at 08:26

## 2024-11-06 RX ADMIN — GABAPENTIN 100 MG: 100 CAPSULE ORAL at 08:25

## 2024-11-06 RX ADMIN — CEPHALEXIN 500 MG: 500 CAPSULE ORAL at 01:34

## 2024-11-06 RX ADMIN — CEPHALEXIN 500 MG: 500 CAPSULE ORAL at 05:36

## 2024-11-06 RX ADMIN — Medication 500 MCG: at 08:24

## 2024-11-06 RX ADMIN — IBUPROFEN 600 MG: 600 TABLET, FILM COATED ORAL at 08:23

## 2024-11-06 RX ADMIN — FERROUS SULFATE TAB 325 MG (65 MG ELEMENTAL FE) 325 MG: 325 (65 FE) TAB at 08:24

## 2024-11-06 RX ADMIN — TRAMADOL HYDROCHLORIDE 50 MG: 50 TABLET, FILM COATED ORAL at 05:36

## 2024-11-06 NOTE — PLAN OF CARE
Goal Outcome Evaluation:           Progress: improving  Outcome Evaluation: VSS. Ambulatinga and voiding. Pain controlled w/ po prn pain meds per pt. Spouse at bedside.                              Visit warranted for length of stay. Limited nutrition and weight history obtained secondary to patient mental status. Per RN, patient currently has good oral intake of meals. No GI distress of nausea/ vomiting per PCA at bedside. Patient physical appearance appears thin, suspected malnutrition at this time.  Food preferences obtain from PCA.  +BM: 2/20.  NKFA. Micronutrient supplementation of MVI noted from H&P PTA. Visit warranted for length of stay. Limited nutrition and weight history obtained secondary to patient mental status. Per RN, patient currently has good oral intake of meals. No GI distress of nausea/ vomiting per PCA at bedside. Patient physical appearance appears thin, suspected milk malnutrition at this time.  Food preferences obtain from PCA.  +BM: 2/20.  NKFA. Micronutrient supplementation of MVI noted from H&P PTA. Visit warranted for length of stay. Limited nutrition and weight history obtained secondary to patient mental status. Per RN, patient currently has good oral intake of meals. No GI distress of nausea/ vomiting per PCA at bedside. Patient physical appearance appears thin, suspected mil malnutrition at this time.  Food preferences obtain from PCA.  +BM: 2/20.  NKFA. Micronutrient supplementation of MVI noted from H&P PTA.

## 2024-11-06 NOTE — DISCHARGE INSTRUCTIONS
Wound:   - Keep dressing in place. Okay to sponge bathe, no showering or bathing.     Activity:   - Activity as tolerated. NO heavy lifting x 4 weeks - no more than 25lb at a time.   - No driving or operating machinery on narcotic pain medication.     Pain medication:   - Take 600mg of ibuprofen (motrin) every 8 hours for 3 days. After three days, take it prn.   - You have a prescription for a narcotic. It will be roxicodone 5mg tabs. Take these only as needed after you have taken the ibuprofen. If you are taking the roxicodone, make sure to take a stool softener (colace) with it as it can cause constipation.   - The narcotic may make you nauseated, you will have a prescription for zofran in case of nausea.     Follow up:   - make an appointment to see me on Monday   - If you have any concerns before then, call me office at [phone]

## 2024-11-06 NOTE — DISCHARGE INSTR - APPOINTMENTS
Appointment with  on Monday November 11th at 11:30 am     Appointment with  on November 19th at 2:30 pm

## 2024-11-06 NOTE — DISCHARGE INSTR - ACTIVITY
Pelvic rest for 2 weeks: no tampons or intercourse.  Ambulate every 2-3hrs during the day  Cramping is normal for 2 weeks -- take over the counter pain medication and call the office if medications are not helping and you are concerned.

## 2024-11-06 NOTE — DISCHARGE SUMMARY
Consults       No orders found for last 30 day(s).         Fide Verduzco MD - Discharge Summary    Date of Discharge:  11/6/2024    Discharge Diagnosis: breast cancer     Presenting Problem/History of Present Illness  Malignant neoplasm of upper-inner quadrant of left female breast, unspecified estrogen receptor status [C50.212]  Breast cancer [C50.919]  Invasive ductal carcinoma of breast, female, left [C50.912]     Hospital Course  Patient is a 44 y.o. female presented with invasive ductal carcinoma of the left breast for bilateral mastectomies with left axillary sentinel lymph node biopsy on 11/5/24. Post operatively her diet was advanced and drain teaching was completed. Pain was well controlled. DC home with follow up in my office on Monday.       Procedures Performed  Procedure(s):  BILATERAL SKIN SPARING MASTECTOMY WITH LEFT AXILLARY MAGTRACE GUIDED SENTINEL LYMPH NODE BIOPSY (Dr. Prieto to nahid)  DILATATION AND CURETTAGE HYSTEROSCOPY       Consults:   Consults       No orders found for last 30 day(s).            Condition on Discharge: stable     Vital Signs  Temp:  [97.2 °F (36.2 °C)-99.1 °F (37.3 °C)] 98.5 °F (36.9 °C)  Heart Rate:  [] 83  Resp:  [10-20] 18  BP: ()/(51-84) 107/67    Physical Exam:   See History and Physical found in chart.    Discharge Disposition  Home or Self Care    Discharge Medications     Discharge Medications        New Medications        Instructions Start Date   cephalexin 500 MG capsule  Commonly known as: KEFLEX   500 mg, Oral, 2 Times Daily      cyclobenzaprine 5 MG tablet  Commonly known as: FLEXERIL   5 mg, Oral, Every 8 Hours PRN      ibuprofen 200 MG tablet  Commonly known as: Motrin IB   600 mg, Oral, Every 8 Hours, Take every 8 hours for three days then take as needed.      ondansetron 4 MG tablet  Commonly known as: Zofran   4 mg, Oral, Every 8 Hours PRN      oxyCODONE 5 MG immediate release tablet  Commonly known as: Roxicodone   5 mg, Oral, Every 8  Hours PRN             Continue These Medications        Instructions Start Date   carvedilol 6.25 MG tablet  Commonly known as: Coreg   6.25 mg, Oral, 2 Times Daily With Meals      ferrous sulfate 325 (65 FE) MG tablet   325 mg, Daily With Breakfast      furosemide 40 MG tablet  Commonly known as: LASIX   40 mg, Oral, Daily      Multivitamin tablet tablet  Generic drug: multivitamin   1 tablet, Daily      multivitamin with minerals tablet  Generic drug: multivitamin with minerals   1 tablet, Oral, Daily      potassium chloride 20 MEQ CR tablet  Commonly known as: KLOR-CON M20   20 mEq, Oral, Daily      spironolactone 25 MG tablet  Commonly known as: ALDACTONE   25 mg, Oral, Daily      thiamine 100 MG tablet  Commonly known as: VITAMIN B1   100 mg, Oral, Daily      vitamin B-12 100 MCG tablet  Commonly known as: CYANOCOBALAMIN   500 mcg, Daily               Discharge Diet: regular     Activity at Discharge: see dc instructions     Follow-up Appointments  Future Appointments   Date Time Provider Department Center   12/3/2024  2:15 PM Glo Bailon APRN [facility] P[facility] PAD   1/10/2025  2:00 PM INA Holloway MD [facility] PAD PAD   10/3/2025 10:30 AM Vandana Sherwood APRN [facility] PAD PAD         Test Results Pending at Discharge  Pending Labs       Order Current Status    Tissue Pathology Exam In process             Fide Verduzco MD  11/06/24  08:01 CST

## 2024-11-06 NOTE — TELEPHONE ENCOUNTER
Post OP phone call visit:    Type of surgery: Bilateral skin sparing mastectomy with left axillary magtrace guided sentinel lymph node biopsy;   How are you feeling? Doing good.   Are you having any pain or Nausea? Some pain. Taking pain meds. No nausea.   Do you have a normal appetite? Yes.   Are you passing gas and having any BM? Passing gas. No BM. Encouraged Miralax and a stool softener.   How is your activity? Okay.   How many drains? 4.   Output: Have not emptied since discharge from hospital.   Any drainage or fever? No redness. No drainage. No fever.

## 2024-11-07 ENCOUNTER — TELEPHONE (OUTPATIENT)
Dept: SURGERY | Facility: CLINIC | Age: 44
End: 2024-11-07
Payer: COMMERCIAL

## 2024-11-07 DIAGNOSIS — C50.212 MALIGNANT NEOPLASM OF UPPER-INNER QUADRANT OF LEFT FEMALE BREAST, UNSPECIFIED ESTROGEN RECEPTOR STATUS: Primary | ICD-10-CM

## 2024-11-07 RX ORDER — TRAMADOL HYDROCHLORIDE 50 MG/1
50 TABLET ORAL EVERY 6 HOURS PRN
Qty: 10 TABLET | Refills: 0 | Status: SHIPPED | OUTPATIENT
Start: 2024-11-07 | End: 2025-11-07

## 2024-11-07 NOTE — TELEPHONE ENCOUNTER
Spouse called with c/o Hives and itching after taking prescribed Roxicodone. Instructed to stop Roxicodone and take a  Benadryl. Spoke with Dr. Verduzco. New order for Tramadol called into [facility] pharmacy on [place], [place].

## 2024-11-11 ENCOUNTER — OFFICE VISIT (OUTPATIENT)
Dept: SURGERY | Facility: CLINIC | Age: 44
End: 2024-11-11
Payer: COMMERCIAL

## 2024-11-11 VITALS — HEART RATE: 106 BPM | WEIGHT: 145 LBS | HEIGHT: 67 IN | OXYGEN SATURATION: 97 % | BODY MASS INDEX: 22.76 KG/M2

## 2024-11-11 DIAGNOSIS — F17.210 NICOTINE DEPENDENCE, CIGARETTES, UNCOMPLICATED: ICD-10-CM

## 2024-11-11 DIAGNOSIS — C50.212 MALIGNANT NEOPLASM OF UPPER-INNER QUADRANT OF LEFT FEMALE BREAST, UNSPECIFIED ESTROGEN RECEPTOR STATUS: Primary | ICD-10-CM

## 2024-11-11 DIAGNOSIS — Z90.13 S/P BILATERAL MASTECTOMY: ICD-10-CM

## 2024-11-11 LAB
CYTO UR: NORMAL
LAB AP CASE REPORT: NORMAL
LAB AP SPECIAL STAINS: NORMAL
LAB AP SYNOPTIC CHECKLIST: NORMAL
Lab: NORMAL
PATH REPORT.FINAL DX SPEC: NORMAL
PATH REPORT.GROSS SPEC: NORMAL

## 2024-11-11 PROCEDURE — 99024 POSTOP FOLLOW-UP VISIT: CPT | Performed by: STUDENT IN AN ORGANIZED HEALTH CARE EDUCATION/TRAINING PROGRAM

## 2024-11-11 RX ORDER — TRAMADOL HYDROCHLORIDE 50 MG/1
50 TABLET ORAL EVERY 8 HOURS PRN
Qty: 5 TABLET | Refills: 0 | Status: SHIPPED | OUTPATIENT
Start: 2024-11-11 | End: 2024-11-14

## 2024-11-11 NOTE — PROGRESS NOTES
"Patient: Rachana Smart    YOB: 1980    Date: 11/11/2024    Primary Care Provider: INA Holloway MD    Vital Signs:   Vitals:    11/11/24 1131   Pulse: 106   SpO2: 97%   Weight: 65.8 kg (145 lb)   Height: 169 cm (66.54\")     S/p bilateral mastectomies. Pain well controlled on tramadol. Drain output reviewed. Steristrips in place.      Results Review:   I reviewed the patient's new clinical results.        Assessment / Plan:    Diagnoses and all orders for this visit:    1. Malignant neoplasm of upper-inner quadrant of left female breast, unspecified estrogen receptor status (Primary)    2. Nicotine dependence, cigarettes, uncomplicated    3. S/P bilateral mastectomy  -     traMADol (Ultram) 50 MG tablet; Take 1 tablet by mouth Every 8 (Eight) Hours As Needed for Severe Pain for up to 3 days.  Dispense: 5 tablet; Refill: 0      Drains 1 and 3 removed. Follow up in one week. Ultram sent to pharmacy.     Electronically signed by Fide Verduzco MD  11/11/24  11:40 CST                  "

## 2024-11-12 ENCOUNTER — TELEPHONE (OUTPATIENT)
Dept: SURGERY | Facility: CLINIC | Age: 44
End: 2024-11-12
Payer: COMMERCIAL

## 2024-11-12 NOTE — TELEPHONE ENCOUNTER
Called patient to let her know that pathology is back and we got all the cancer and lymph nodes were negative. Pt understood.

## 2024-11-18 ENCOUNTER — OFFICE VISIT (OUTPATIENT)
Dept: SURGERY | Facility: CLINIC | Age: 44
End: 2024-11-18
Payer: COMMERCIAL

## 2024-11-18 VITALS
WEIGHT: 145 LBS | HEIGHT: 67 IN | BODY MASS INDEX: 22.76 KG/M2 | DIASTOLIC BLOOD PRESSURE: 76 MMHG | OXYGEN SATURATION: 100 % | HEART RATE: 97 BPM | SYSTOLIC BLOOD PRESSURE: 132 MMHG

## 2024-11-18 DIAGNOSIS — F17.210 NICOTINE DEPENDENCE, CIGARETTES, UNCOMPLICATED: ICD-10-CM

## 2024-11-18 DIAGNOSIS — Z90.13 S/P BILATERAL MASTECTOMY: ICD-10-CM

## 2024-11-18 DIAGNOSIS — Z80.3 FAMILY HISTORY OF BREAST CANCER: ICD-10-CM

## 2024-11-18 DIAGNOSIS — C50.212 MALIGNANT NEOPLASM OF UPPER-INNER QUADRANT OF LEFT FEMALE BREAST, UNSPECIFIED ESTROGEN RECEPTOR STATUS: Primary | ICD-10-CM

## 2024-11-18 PROCEDURE — 99024 POSTOP FOLLOW-UP VISIT: CPT | Performed by: STUDENT IN AN ORGANIZED HEALTH CARE EDUCATION/TRAINING PROGRAM

## 2024-11-18 NOTE — PROGRESS NOTES
"Patient: Rachana Smart    YOB: 1980    Date: 11/18/2024    Primary Care Provider: INA Holloway MD    Vital Signs:   Vitals:    11/18/24 0859   BP: 132/76   BP Location: Left arm   Patient Position: Sitting   Cuff Size: Adult   Pulse: 97   SpO2: 100%   Weight: 65.8 kg (145 lb)   Height: 169 cm (66.54\")       She is overall doing well. Pain well controlled. Drain output decreasing.     Both drains removed. Right incision healing well. Let incision with minimal skin break down centrally but healthy tissue underneath the superficial sloughing.     Results Review:   I reviewed the patient's new clinical results.        Assessment / Plan:    Diagnoses and all orders for this visit:    1. Malignant neoplasm of upper-inner quadrant of left female breast, unspecified estrogen receptor status (Primary)    2. Nicotine dependence, cigarettes, uncomplicated    3. S/P bilateral mastectomy    4. Family history of breast cancer        She sees Dr. Prieto Monday. Follow up in one week.     Electronically signed by Fide Verduzco MD  11/18/24  09:11 CST                  "

## 2024-11-18 NOTE — PATIENT INSTRUCTIONS
Smoking Tobacco Information, Adult  Smoking tobacco can be harmful to your health. Tobacco contains a poisonous (toxic), colorless chemical called nicotine. Nicotine is addictive. It changes the brain and can make it hard to stop smoking. Tobacco also has other toxic chemicals that can hurt your body and raise your risk of many cancers.  How can smoking tobacco affect me?  Smoking tobacco puts you at risk for:  Cancer. Smoking is most commonly associated with lung cancer, but can also lead to cancer in other parts of the body.  Chronic obstructive pulmonary disease (COPD). This is a long-term lung condition that makes it hard to breathe. It also gets worse over time.  High blood pressure (hypertension), heart disease, stroke, or heart attack.  Lung infections, such as pneumonia.  Cataracts. This is when the lenses in the eyes become clouded.  Digestive problems. This may include peptic ulcers, heartburn, and gastroesophageal reflux disease (GERD).  Oral health problems, such as gum disease and tooth loss.  Loss of taste and smell.  Smoking can affect your appearance by causing:  Wrinkles.  Yellow or stained teeth, fingers, and fingernails.  Smoking tobacco can also affect your social life, because:  It may be challenging to find places to smoke when away from home. Many workplaces, restaurants, hotels, and public places are tobacco-free.  Smoking is expensive. This is due to the cost of tobacco and the long-term costs of treating health problems from smoking.  Secondhand smoke may affect those around you. Secondhand smoke can cause lung cancer, breathing problems, and heart disease. Children of smokers have a higher risk for:  Sudden infant death syndrome (SIDS).  Ear infections.  Lung infections.  If you currently smoke tobacco, quitting now can help you:  Lead a longer and healthier life.  Look, smell, breathe, and feel better over time.  Save money.  Protect others from the harms of secondhand smoke.  What  actions can I take to prevent health problems?  Quit smoking    Do not start smoking. Quit if you already do.  Make a plan to quit smoking and commit to it. Look for programs to help you and ask your health care provider for recommendations and ideas.  Set a date and write down all the reasons you want to quit.  Let your friends and family know you are quitting so they can help and support you. Consider finding friends who also want to quit. It can be easier to quit with someone else, so that you can support each other.  Talk with your health care provider about using nicotine replacement medicines to help you quit, such as gum, lozenges, patches, sprays, or pills.  Do not replace cigarette smoking with electronic cigarettes, which are commonly called e-cigarettes. The safety of e-cigarettes is not known, and some may contain harmful chemicals.  If you try to quit but return to smoking, stay positive. It is common to slip up when you first quit, so take it one day at a time.  Be prepared for cravings. When you feel the urge to smoke, chew gum or suck on hard candy.    Lifestyle  Stay busy and take care of your body.  Drink enough fluid to keep your urine pale yellow.  Get plenty of exercise and eat a healthy diet. This can help prevent weight gain after quitting.  Monitor your eating habits. Quitting smoking can cause you to have a larger appetite than when you smoke.  Find ways to relax. Go out with friends or family to a movie or a restaurant where people do not smoke.  Ask your health care provider about having regular tests (screenings) to check for cancer. This may include blood tests, imaging tests, and other tests.  Find ways to manage your stress, such as meditation, yoga, or exercise.  Where to find support  To get support to quit smoking, consider:  Asking your health care provider for more information and resources.  Taking classes to learn more about quitting smoking.  Looking for local organizations  that offer resources about quitting smoking.  Joining a support group for people who want to quit smoking in your local community.  Calling the smokefree.gov counselor helpline: 1-800-Quit-Now (1-401.275.5598)  Where to find more information  You may find more information about quitting smoking from:  HelpGuide.org: www.helpguide.org  Smokefree.gov: smokefree.gov  American Lung Association: www.lung.org  Contact a health care provider if you:  Have problems breathing.  Notice that your lips, nose, or fingers turn blue.  Have chest pain.  Are coughing up blood.  Feel faint or you pass out.  Have other health changes that cause you to worry.  Summary  Smoking tobacco can negatively affect your health, the health of those around you, your finances, and your social life.  Do not start smoking. Quit if you already do. If you need help quitting, ask your health care provider.  Think about joining a support group for people who want to quit smoking in your local community. There are many effective programs that will help you to quit this behavior.  This information is not intended to replace advice given to you by your health care provider. Make sure you discuss any questions you have with your health care provider.  Document Revised: 09/11/2020 Document Reviewed: 01/02/2018  Elsevier Patient Education © 2021 [employer]

## 2024-11-19 ENCOUNTER — OFFICE VISIT (OUTPATIENT)
Age: 44
End: 2024-11-19
Payer: COMMERCIAL

## 2024-11-19 VITALS
HEIGHT: 67 IN | SYSTOLIC BLOOD PRESSURE: 108 MMHG | DIASTOLIC BLOOD PRESSURE: 78 MMHG | BODY MASS INDEX: 23.26 KG/M2 | WEIGHT: 148.2 LBS

## 2024-11-19 DIAGNOSIS — Z09 FOLLOW-UP EXAMINATION, FOLLOWING OTHER SURGERY: Primary | ICD-10-CM

## 2024-11-19 DIAGNOSIS — R87.618 UNEXPLAINED ENDOMETRIAL CELLS ON CERVICAL PAP SMEAR: ICD-10-CM

## 2024-11-19 PROCEDURE — 99024 POSTOP FOLLOW-UP VISIT: CPT | Performed by: OBSTETRICS & GYNECOLOGY

## 2024-11-19 NOTE — PROGRESS NOTES
"Chief Complaint  Post-op Follow-up (Pt here for 2 wk postop visit, D&C 11-5-2024, d/t endometrial cells on pap. Pt denies any problems today.)    Subjective        Rachana Smart presents to [facility] for post op appt. Pt with endometrial cells on pap smear. D&C performed at the time of her mastectomy.  Pathology benign.  Pt still to under reconstruction and meet with oncologist. Pt doing well.  No complaints.   History of Present Illness    Objective   Vital Signs:  /78   Ht 169 cm (66.54\")   Wt 67.2 kg (148 lb 3.2 oz)   BMI 23.54 kg/m²   Estimated body mass index is 23.54 kg/m² as calculated from the following:    Height as of this encounter: 169 cm (66.54\").    Weight as of this encounter: 67.2 kg (148 lb 3.2 oz).    BMI is within normal parameters. No other follow-up for BMI required.      Physical Exam   deferred  Result Review :                Assessment and Plan   Diagnoses and all orders for this visit:    1. Follow-up examination, following other surgery (Primary)  [place] [facility] [place] Pathology secretory endometrium.  Discussed findings. RTC yearly for pap smear.  Pt with breast cancer and has undergone mastectomy. Pt to undergo reconstruction but after that may wish to proceed with TLH/BSO.   2. Unexplained endometrial cells on cervical Pap smear             Follow Up   No follow-ups on file.  Patient was given instructions and counseling regarding her condition or for health maintenance advice. Please see specific information pulled into the AVS if appropriate.             "

## 2024-11-25 ENCOUNTER — OFFICE VISIT (OUTPATIENT)
Dept: SURGERY | Facility: CLINIC | Age: 44
End: 2024-11-25
Payer: COMMERCIAL

## 2024-11-25 VITALS
SYSTOLIC BLOOD PRESSURE: 118 MMHG | BODY MASS INDEX: 23.23 KG/M2 | DIASTOLIC BLOOD PRESSURE: 68 MMHG | HEIGHT: 67 IN | WEIGHT: 148 LBS

## 2024-11-25 DIAGNOSIS — Z90.13 S/P BILATERAL MASTECTOMY: Primary | ICD-10-CM

## 2024-11-25 PROCEDURE — 99024 POSTOP FOLLOW-UP VISIT: CPT

## 2024-11-25 NOTE — PROGRESS NOTES
"Patient: Rachana Smart    YOB: 1980    Date: 11/25/2024    Primary Care Provider: INA Holloway MD    Vital Signs:   Vitals:    11/25/24 1313   BP: 118/68   Weight: 67.1 kg (148 lb)   Height: 169 cm (66.54\")       The patient is tolerating a regular diet and has no complaints s/p bilateral mastectomy on 11/5/24 by Dr. Verduzco. The patient denies fevers, chills, nausea, vomiting, and excessive pain. The incisions are healing well without signs of infection or wound dehiscence. She had her drains removed last week. She is here today for recheck for possible seroma drainage. She saw Dr. Prieto earlier and sees Oncology next week.     Assessment / Plan:    Diagnoses and all orders for this visit:    1. S/P bilateral mastectomy (Primary)    Upon physical exam, Rachana Smart is in need of drainage of the seroma present in bilateral mastectomy beds. After discussion of the procedure, as well as risks (including bleeding, infection, and damage to adjacent structures) and benefits, the patient elects to proceed. The left mastectomy bed was prepped with iodine. 1 cc of lidocaine was used to anesthetize the area. An 18 gauge and 20cc syringe were inserted in the area and 130 mLs of straw colored fluid were drained from the area. The syringe was removed and a bandage was placed over the area. Attention was then turned to the right mastectomy bed. 1 cc of lidocaine was used to anesthetize the area. An 18 gauge and 20cc syringe were inserted in the area and 68 mLs of blood tinged fluid were drained from the area. The syringe was removed and a bandage was placed over the area.The patient tolerated the procedure well. I have instructed her that it is important to keep compression over the area for the next week until she is seen in clinic again. She will call for a sooner appointment if she notices significant fluid collecting again.       Rachana Smart is a 44 y.o. female who presents to " the clinic 3 weeks s/p bilateral mastectomy. She is overall doing well at this time. The incisions are healing well. At this time, she will follow up in office in 1 week with me for recheck after seroma drainage. I instructed the patient to call for an appointment sooner if she has any new problems or concerns. She voiced understanding and is agreeable to the plan.    Follow up:     Return in about 1 week (around 12/2/2024) for Recheck.        Electronically signed by Farzana Izaguirre PA-C  11/25/24  14:00 CST

## 2024-11-26 DIAGNOSIS — K70.31 ALCOHOLIC CIRRHOSIS OF LIVER WITH ASCITES: Primary | ICD-10-CM

## 2024-11-28 NOTE — PROGRESS NOTES
[facility] ON[facility] HEMATOLOGY & ONCOLOGY  [address]  [facility] [address] [phone]    Patient Name: Rachana Smart  Encounter Date: 2024  YOB: 1980  Patient Number: [id number]      REASON FOR CONSULTATION: Invasive ductal carcinoma of the left breast     HISTORY OF PRESENT ILLNESS: Rachana Smart is a 44 yoF  , +Narayanan, endometriosis, alcoholism in recovery, anemia, transfusions, anxiety, asthma, hepC, cirrhosis, esophageal varices wo bleeding, migraines, cigarette smoker (0.5 ppd x 24 yr), recent breast cancer    - 24- Mammogram- LEFT breast 6 mm focal asymmetry with spiculation/distortion. Recommend targeted ultrasound. No suspicious mammographic findings in the RIGHT breast. BIRADS 0    - 24- US left breast-FINDINGS: Targeted LEFT breast ultrasound 11:00 position, 7 centimeters from the nipple demonstrates a 0.5 x 0.4 x 0.7 cm oval, parallel, hypoechoic mass with spiculated margins and internal vascularity. Posterior shadowing is present. This correlates in size, position and character with the mammographic finding.IMPRESSION:  Suspicious LEFT breast mass. Recommend ultrasound-guided biopsy.    - 24- US guided breast biopsy- Left breast mass at 11 o'clock position, 7 cm from nipple, core biopsies: A.  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 B.  A prominent associated low-grade ductal carcinoma in situ component is present. C.  Linear length of invasive tumor is 4.6 mm. AJCC stage: pTX pNX. 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  Lifetime risk of 8.7-20% for colorectal cancer and 13-26% for endometrial cancer.    - 10/15/24-endometrial biopsy: Nondiagnostic specimen    -  10/29/24-glucose 148, potassium 3.1, albumin 3.1, AST 40, alk phos 125, total bili 2.8, Hgb 11, .8, platelets 120,000 otherwise normal CBC    - 11/5/24-bilateral skin sparing mastectomies and left sentinel lymph node biopsies: Final diagnoses:  1.  Left breast, mastectomy:  A.  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  B.  Minor associated low-grade ductal carcinoma in situ component.  C.  Invasive tumor is 7 mm.  D.  Radial scar adjacent to prior biopsy site exhibiting atypical ductal hyperplasia, usual ductal hyperplasia and columnar cell changes without atypia (4.8 mm).  E.  No malignancy or atypia identified at the margins of surgical resection.  F.  Invasive malignancy is 4.9 mm from the closest inked (anterosuperior) margin.  G.  Prior biopsy site changes.  H.  No evidence of Paget's disease of nipple.  I.  Overlying skin is negative for malignancy.     2.  Right breast, mastectomy:  A.  No mammary carcinoma identified.  B.  Radial scar with columnar cell changes and atypical ductal hyperplasia identified in random section of lower outer quadrant (3.6 mm).  C.  No evidence of Paget's disease of nipple.  D.  Overlying skin is negative for malignancy.  E.  Benign intramammary lymph node (1).  F.  Focal fibroadenomatoid changes identified in random section of central breast.     3.  Left axillary sentinel lymph nodes:  A.  Lymph nodes (4), negative for metastatic carcinoma.  B.  Absence of micrometastases is confirmed utilizing immunohistochemical stains for CK AE1/AE3.     AJCC stage: pT1b snpN0    Synoptic Checklist   INVASIVE CARCINOMA OF THE BREAST: Resection   8th Edition - Protocol posted: 6/19/2024INVASIVE CARCINOMA OF THE BREAST: RESECTION - All Specimens  SPECIMEN   Procedure  Total mastectomy   Specimen Laterality  Left   TUMOR   Tumor Site  Upper inner quadrant   Histologic Type  Invasive carcinoma of no special type (ductal)   Histologic Grade (Shara Histologic Score)      Glandular (Acinar) / Tubular Differentiation  Score 2   Nuclear Pleomorphism  Score 2   Mitotic Rate  Score 2   Overall Grade  Grade 2 (scores of 6 or 7)   Tumor Size  Greatest dimension of largest invasive focus (Millimeters): 7 mm   Tumor Focality  Single focus of invasive carcinoma   Ductal Carcinoma In Situ (DCIS)  Present     Negative for extensive intraductal component (EIC)   Nuclear Grade  Grade I (low)   Lymphatic and / or Vascular Invasion  Not identified   Dermal Lymphatic and / or Vascular Invasion  Not identified   Treatment Effect in the Breast  No known presurgical therapy   MARGINS   Margin Status for Invasive Carcinoma  All margins negative for invasive carcinoma   Distance from Invasive Carcinoma to Closest Margin  4.9 mm   Closest Margin(s) to Invasive Carcinoma  Anterosuperior   Margin Status for DCIS  All margins negative for DCIS   Distance from DCIS to Closest Margin  Greater than: 5 mm mm   Closest Margin(s) to DCIS  Anterosuperior   REGIONAL LYMPH NODES   Regional Lymph Node Status  All regional lymph nodes negative for tumor   Total Number of Lymph Nodes Examined (sentinel and non-sentinel)  4   Number of [place] Nodes Examined  4   pTNM CLASSIFICATION (AJCC 8th Edition)   Reporting of pT, pN, and (when applicable) pM categories is based on information available to the pathologist at the time the report is issued. As per the AJCC (Chapter 1, 8th Ed.) it is the managing physician's responsibility to establish the final pathologic stage based upon all pertinent information, including but potentially not limited to this pathology report.   pT Category  pT1b   pN Category  pN0   N Suffix  (sn)   SPECIAL STUDIES        Estrogen Receptor (ER) Status  Positive (greater than 10% of cells demonstrate nuclear positivity)   Percentage of Cells with Nuclear Positivity  90 %        Progesterone Receptor (PgR) Status  Positive   Percentage of Cells with Nuclear Positivity  8 %        HER2 (by  immunohistochemistry)  Negative (Score 1+)        Ki-67 Percentage of Positive Nuclei  11 %        I have reviewed the HPI and verified with the patient the accuracy of it. No changes to interval history since the information was documented. Matthew Cruz MD 12/05/24      LABS    Lab Results - Last 18 Months   Lab Units 10/29/24  1035 05/24/24  0905 04/22/24  1131 03/18/24  1741 03/16/24  1429 02/01/24  0439 01/31/24  1521 01/31/24  0604   HEMOGLOBIN g/dL 11.0* 11.4* 9.9* 7.2* 5.2* 7.8*   < > 8.0*   HEMATOCRIT % 33.9* 35.8 32.2* 24.0* 18.6* 24.0*   < > 25.1*   MCV fL 101.8* 96.8 92.8 87.6 89.4 97.6*  --  105.9*   WBC 10*3/mm3 7.40 6.38 8.85 9.82 10.38 5.41  --  8.73   RDW % 15.7* 20.8* 24.5* 22.1* 23.6* 19.5*  --  14.2   MPV fL 9.6 9.8 9.7 9.7 9.5 10.9  --  10.6   PLATELETS 10*3/mm3 120* 155 238 266 298 71*  --  100*   IMM GRAN % % 0.4  --   --  0.7* 0.5 0.2  --   --    NEUTROS ABS 10*3/mm3 4.43  --   --  6.29 6.32 2.71  --  5.51   LYMPHS ABS 10*3/mm3 1.97  --   --  1.88 2.32 1.89  --  2.12   MONOS ABS 10*3/mm3 0.63  --   --  0.97* 1.03* 0.47  --  0.88   EOS ABS 10*3/mm3 0.24  --   --  0.49* 0.56* 0.26  --  0.10   BASOS ABS 10*3/mm3 0.10  --   --  0.12 0.10 0.07  --  0.07   IMMATURE GRANS (ABS) 10*3/mm3 0.03  --   --  0.07* 0.05 0.01  --   --    NRBC /100 WBC 0.0  --   --  0.0 0.0 0.0  --   --     < > = values in this interval not displayed.       Lab Results - Last 18 Months   Lab Units 10/29/24  1035 09/10/24  1346 05/24/24  0905 04/22/24  1131 03/18/24  1741 03/16/24  1429   GLUCOSE mg/dL 148* 117* 132* 107* 104* 122*   SODIUM mmol/L 136 139 135* 136 135* 138   POTASSIUM mmol/L 3.1* 3.1* 3.5 3.2* 3.1* 3.0*   CO2 mmol/L 29.0 30.0* 28.0 28.0 25.0 29.0   CHLORIDE mmol/L 98 98 98 98 100 99   ANION GAP mmol/L 9.0 11.0 9.0 10.0 10.0 10.0   CREATININE mg/dL 0.73 0.82 0.71 0.69 0.59 0.67   BUN mg/dL 10 7 6 6 6 7   BUN / CREAT RATIO  13.7 8.5 8.5 8.7 10.2 10.4   CALCIUM mg/dL 8.7 9.1 9.7 8.7 7.9* 8.2*   ALK PHOS U/L  "125* 143* 88 106 84 94   TOTAL PROTEIN g/dL 7.3 7.7 8.2 8.7* 7.3 7.3   ALT (SGPT) U/L 17 22 33 12 13 14   AST (SGOT) U/L 40* 50* 74* 38* 36* 41*   BILIRUBIN mg/dL 2.8* 3.4* 3.2* 2.5* 2.5* 1.8*   ALBUMIN g/dL 3.1* 3.3* 3.4* 3.0* 2.7* 2.6*   GLOBULIN gm/dL 4.2 4.4 4.8 5.7 4.6 4.7       No results for input(s): \"MSPIKE\", \"KAPPALAMB\", \"IGLFLC\", \"URICACID\", \"FREEKAPPAL\", \"CEA\", \"LDH\", \"REFLABREPO\" in the last [id number] hours.    Lab Results - Last 18 Months   Lab Units 03/18/24  1741 03/16/24  1429   IRON mcg/dL  --  11*   TIBC mcg/dL  --  244*   IRON SATURATION (TSAT) %  --  5*   FERRITIN ng/mL  --  18.30   TSH uIU/mL 1.350 1.600   FOLATE ng/mL  --  9.22         PAST MEDICAL HISTORY:  ALLERGIES:  Allergies   Allergen Reactions    Oxycodone Itching     Pt states any pain medication causes severe itching, nausea    Codeine Rash and GI Intolerance     CURRENT MEDICATIONS:  Outpatient Encounter Medications as of 12/5/2024   Medication Sig Dispense Refill    carvedilol (Coreg) 6.25 MG tablet Take 1 tablet by mouth 2 (Two) Times a Day With Meals. 60 tablet 3    ferrous sulfate 325 (65 FE) MG tablet Take 1 tablet by mouth Daily With Breakfast.      furosemide (LASIX) 40 MG tablet Take 1 tablet by mouth once daily 90 tablet 1    Multivitamin tablet tablet Take 1 tablet by mouth Daily.      potassium chloride (KLOR-CON M20) 20 MEQ CR tablet Take 1 tablet by mouth Daily. 14 tablet 0    spironolactone (ALDACTONE) 25 MG tablet Take 1 tablet by mouth Daily. 90 tablet 3    thiamine (VITAMIN B1) 100 MG tablet Take 1 tablet by mouth Daily. (Patient taking differently: Take 2.5 tablets by mouth Daily.) 30 tablet 0    vitamin B-12 (cyanocobalamin) 100 MCG tablet Take 5 tablets by mouth Daily.      ibuprofen (Motrin IB) 200 MG tablet Take 3 tablets by mouth Every 8 (Eight) Hours. Take every 8 hours for three days then take as needed. (Patient not taking: Reported on 12/5/2024)      [DISCONTINUED] traMADol (Ultram) 50 MG tablet Take 1 tablet " by mouth Every 6 (Six) Hours As Needed for Severe Pain. 10 tablet 0     Facility-Administered Encounter Medications as of 2024   Medication Dose Route Frequency Provider Last Rate Last Admin    lidocaine 1% - EPINEPHrine 1:[id number] (XYLOCAINE W/EPI) 1 %-1:822913 injection 10 mL  10 mL Injection Once Amina Camarillo MD         ADULT ILLNESSES:  Patient Active Problem List   Diagnosis Code    Tobacco use Z72.0    Anxiety F41.9    Essential hypertension I10    Hypomagnesemia E83.42    Alcoholism F10.20    Macrocytosis D75.89    Anemia - iron deficiency and chronic diseased D64.9    Alcoholic cirrhosis of liver with ascites K70.31    Colon cancer screening Z12.11    Secondary esophageal varices without bleeding I85.10    Invasive ductal carcinoma of breast, female, left C50.912    Malignant neoplasm of upper-inner quadrant of left female breast C50.212    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    Breast cancer C50.919    Malignant neoplasm of upper-inner quadrant of left female breast, unspecified estrogen receptor status C50.212     SURGERIES:  Past Surgical History:   Procedure Laterality Date    BREAST BIOPSY       SECTION      x2    COLONOSCOPY      COLONOSCOPY N/A 2024    Procedure: COLONOSCOPY WITH ANESTHESIA;  Surgeon: Blanca Bernardo MD;  Location: [facility] ENDOSCOPY;  Service: Gastroenterology;  Laterality: N/A;  pre: screen  post: diverticulosis  [place]    D & C HYSTEROSCOPY N/A 2024    Procedure: DILATATION AND CURETTAGE HYSTEROSCOPY;  Surgeon: Glo Benavides MD;  Location: [facility] OR;  Service: Obstetrics/Gynecology;  Laterality: N/A;    ENDOSCOPY N/A 2024    no evidence of any fresh or old blood or clots,normal stomach    ENDOSCOPY N/A 2024    Procedure: ESOPHAGOGASTRODUODENOSCOPY WITH ANESTHESIA;  Surgeon: Blanca Bernardo MD;  Location: [facility] ENDOSCOPY;  Service: Gastroenterology;  Laterality: N/A;  pre: cirrhosis   post: PHG. Varices.  [place]    MASTECTOMY W/ SENTINEL  NODE BIOPSY Bilateral 11/5/2024    Procedure: BILATERAL SKIN SPARING MASTECTOMY WITH LEFT AXILLARY MAGTRACE GUIDED SENTINEL LYMPH NODE BIOPSY (Dr. Prieto to nahid);  Surgeon: Fide Verduzco MD;  Location: [facility] OR;  Service: General;  Laterality: Bilateral;    MOUTH SURGERY      UPPER GASTROINTESTINAL ENDOSCOPY  3/24    WISDOM TOOTH EXTRACTION  1995     HEALTH MAINTENANCE ITEMS:  Health Maintenance Due   Topic Date Due    ANNUAL PHYSICAL  Never done    Hepatitis B (2 of 3 - Hep B Twinrix 3-dose series) 03/29/2023    INFLUENZA VACCINE  Never done    COVID-19 Vaccine (1 - 2024-25 season) Never done       <no information>  Last Completed Colonoscopy            COLORECTAL CANCER SCREENING (COLONOSCOPY - Every 3 Years) Next due on 8/9/2027 08/09/2024  Colonoscopy    08/09/2024  Surgical Procedure: COLONOSCOPY    03/16/2024  Fecal Occult Blood component of POC Occult Blood Stool    01/31/2024  Fecal Occult Blood component of POCT Occult Blood, Stool                  Immunization History   Administered Date(s) Administered    Hep A / Hep B 03/01/2023    Pneumococcal Conjugate 20-Valent (PCV20) 03/01/2023    Tdap 01/17/2024     Last Completed Mammogram            Discontinued - MAMMOGRAM  Discontinued        Frequency changed to Never automatically (Topic No Longer Applies)    09/11/2024  Mammo Screening Digital Tomosynthesis Bilateral With CAD                      FAMILY HISTORY:  Family History   Problem Relation Age of Onset    Diabetes Mother     Heart disease Father     Diabetes Father     Stroke Father     Melanoma Father 40 - 49    Esophageal cancer Father 60 - 69    Cervical cancer Maternal Grandmother 40 - 49    Skin cancer Paternal Grandmother     Cancer Paternal Grandfather 50 - 59        Mesothelioma    Breast cancer Half-Sister 45    Skin cancer Maternal Aunt     Pancreatic cancer Paternal Uncle     Colon cancer Neg Hx     Colon polyps Neg Hx     Uterine cancer Neg Hx      SOCIAL HISTORY:  Social  "History     Socioeconomic History    Marital status:    Tobacco Use    Smoking status: Every Day     Current packs/day: 0.50     Average packs/day: 0.5 packs/day for 10.0 years (5.0 ttl pk-yrs)     Types: Cigarettes    Smokeless tobacco: Never   Vaping Use    Vaping status: Never Used   Substance and Sexual Activity    Alcohol use: Not Currently     Alcohol/week: 10.0 standard drinks of alcohol     Comment: vodka a few times a week    Drug use: Never    Sexual activity: Yes     Partners: Male     Birth control/protection: None, Coitus interruptus       REVIEW OF SYSTEMS:  Review of Systems   Constitutional:  Positive for fatigue.        Manages her ADLs, chores, errands, and driving.  Is up and about, \"all the time.\"   HENT:  Positive for postnasal drip.    Eyes: Negative.    Respiratory: Negative.          Current cigarette smoker-age 20 -present:  Less than 1/2 pack/day   Cardiovascular: Negative.    Gastrointestinal: Negative.    Endocrine: Positive for heat intolerance (\"Hot flashes\").        Menarche age 10    Irregular menstrual cycles occurring on and off with stretches of up to 10 months with no bleeding.  Is anticipating hysterectomy and ovariectomy within the next several weeks.    G3,  (miscarriage)   Genitourinary: Negative.    Musculoskeletal:  Positive for arthralgias (Chronic), back pain (Chronic) and myalgias (Nocturnal leg).   Skin: Negative.    Allergic/Immunologic: Negative.    Neurological:  Positive for dizziness (Postural dizziness).   Hematological: Negative.    Psychiatric/Behavioral: Negative.         /68   Pulse 76   Temp 97.9 °F (36.6 °C)   Resp 16   Ht 169 cm (66.54\")   Wt 68.2 kg (150 lb 4.8 oz)   LMP 2024 (Approximate)   SpO2 98%   Breastfeeding No   BMI 23.87 kg/m²  Body surface area is 1.78 meters squared.  Pain Score    24 0847   PainSc: 0-No pain       Physical Exam  Vitals and nursing note reviewed. Exam conducted with a chaperone present. "   Constitutional:       Comments: Pleasant, cooperative, modestly Middle-aged female.  Ambulatory.  ECOG 0.  Accompanied by her spouse, Kurt ARELLANOT:      Head: Normocephalic and atraumatic.   Eyes:      General: No scleral icterus.     Extraocular Movements: Extraocular movements intact.      Pupils: Pupils are equal, round, and reactive to light.   Cardiovascular:      Rate and Rhythm: Normal rate.   Pulmonary:      Effort: Pulmonary effort is normal.   Chest:   Breasts:     Right: Absent.      Left: Absent.          Comments: Exam deferred-she is still wearing heavy bandages postop.    No overt supraclavicular/axillary adenopathy bilaterally.  Abdominal:      Palpations: Abdomen is soft.      Tenderness: There is no abdominal tenderness.   Musculoskeletal:         General: Normal range of motion.      Cervical back: Normal range of motion.   Lymphadenopathy:      Cervical: No cervical adenopathy.      Upper Body:      Right upper body: No supraclavicular or axillary adenopathy.      Left upper body: No supraclavicular or axillary adenopathy.   Skin:     General: Skin is warm.   Neurological:      General: No focal deficit present.      Mental Status: She is alert and oriented to person, place, and time.   Psychiatric:         Mood and Affect: Mood normal.         Behavior: Behavior normal.         Thought Content: Thought content normal.         .Assessment:  1.   Invasive mammary (ductal) carcinoma, left breast            Original tumor stage:  IA (pT1b, p(sn)N0, Mx, G2). ER 90%+, MO 8 %+, HER-2 (IHC) 1+-low.negative.            Original tumor burden:     - 9/11/24- Mammogram- LEFT breast 6 mm focal asymmetry with spiculation/distortion. Recommend targeted ultrasound. No suspicious mammographic findings in the RIGHT breast. BIRADS 0  - 9/13/24- US left breast-FINDINGS: Targeted LEFT breast ultrasound 11:00 position, 7 centimeters from the nipple demonstrates a 0.5 x 0.4 x 0.7 cm oval, parallel, hypoechoic mass  with spiculated margins and internal vascularity. Posterior shadowing is present. This correlates in size, position and character with the mammographic finding.IMPRESSION:  Suspicious LEFT breast mass. Recommend ultrasound-guided biopsy.  - 9/13/24- US guided breast biopsy- Left breast mass at 11 o'clock position, 7 cm from nipple, core biopsies: A.  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 B.  A prominent associated low-grade ductal carcinoma in situ component is present. C.  Linear length of invasive tumor is 4.6 mm. AJCC stage: pTX pNX. 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  Lifetime risk of 8.7-20% for colorectal cancer and 13-26% for endometrial cancer.             Tumor status:   - 11/5/24-bilateral skin sparing mastectomies and left sentinel lymph node biopsies: Final diagnoses:  1.  Left breast, mastectomy:  A.  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  B.  Minor associated low-grade ductal carcinoma in situ component.  C.  Invasive tumor is 7 mm.  D.  Radial scar adjacent to prior biopsy site exhibiting atypical ductal hyperplasia, usual ductal hyperplasia and columnar cell changes without atypia (4.8 mm).  E.  No malignancy or atypia identified at the margins of surgical resection.  F.  Invasive malignancy is 4.9 mm from the closest inked (anterosuperior) margin.  G.  Prior biopsy site changes.  H.  No evidence of Paget's disease of nipple.  I.  Overlying skin is negative for malignancy.  2.  Left axillary sentinel lymph nodes:  A.  Lymph nodes (4), negative for metastatic carcinoma.  B.  Absence of micrometastases is confirmed utilizing immunohistochemical stains for CK AE1/AE3.   -AJCC stage: pT1b snpN0    --Anticipate adjuvant Tamoxifen     2.   Narayanan syndrome  - 8/9/24- Colonoscopy- - Diverticulosis in the left  colon. - The examination was otherwise normal on direct and retroflexion views. - No specimens collected. Repeat 10 yr  - 10/2/24- Pap smear- Negative IEL/HPV not detected  - 10/15/24-Endometrium, curettage:  A.  Late secretory endometrium.  B.  Fragments of squamous mucosa and endocervical mucosa.  C.  Blood.  D.  No evidence of atypia or malignancy.    3.   Anemia, macromocytic.  From cirrhosis  --Hgb 11; .8, 10/29/24 (prior hemant:Hgb 5.2, 3/16/24)  4.   HepC/cirrhosis/esophageal varices  - 8/9/24- EGD-- Grade I esophageal varices. - Portal hypertensive gastropathy. - Normal examined duodenum. - No specimens collected. Rx:  Coreg  5.   Alcoholism in remission  6.   Tobacco smoker- <1/2 ppd since age 20  7.   Perimenopausal        Plan:  1.   Apprised of the available diagnostic information, including pathologic diagnosis on breast biopsy and mastectomies (above).    2.   Draw baseline CBC with differential, iron, iron saturation, ferritin, B12, folate, CMP, CEA and CA 27-29  3.   Send tumor specimen for Oncotype DX  4.   Reappoint to Gyn Re:  OSEAS/BSO  5.   Discussed the rationale and potential toxicities of Tamoxifen (thromboembolism, stroke, endometrial cancer, uterine sarcoma, uterine fibroids, ovarian cysts, hypersensitivity reaction, erythema multiforme, Salguero-Garrett syndrome, thrombocytopenia, leukopenia, neutropenia, pancytopenia, hypercalcemia, hepatotoxicity, hepatic cancer, pancreatitis, interstitial pneumonitis, retinopathy, cataracts, hot flashes, edema, vaginal discharge, nausea, vomiting, menstrual irregularities, vaginal bleeding, weight changes, asthenia, pain/arthritis, URI, rash, mood disturbance, hypertension, arthralgia, UTI, infection, insomnia, cough, headache, constipation, dizziness, dyspnea, diarrhea, fractures, cataract, flu syndrome, dyspepsia, sweating, alopecia, ovarian cysts, myalgia, paresthesias, vaginitis, increased LFTs, hypercholesterolemia, impotence,) discussed at  length.  Questions answered.  She agrees to a trial of therapy.  Will switch to aromatase inhibitor after she undergoes OSEAS/BSO.     6.   Schedule baseline CT scans of the chest, abdomen/pelvis with p.o. and IV contrast and DEXA scan at [facility]     7.   Rx:  Tamoxifen 20 p.o. daily dispense 30 x 2 RF                 8.   Review NCCN guidelines version invasive breast cancer-systemic adjuvant treatment: HR positive, HER-2 negative disease in premenopausal patients with pT1-3 and pN0 tumors(ductal, lobular, mixed)-tumor </= 0.5 cm and pN0 -consider adjuvant endocrine therapy (category 2B).  If tumor>0.5cm and pN0-strongly consider 21 gene RT-PCR assay if candidate for chemotherapy (category 1)-if not done: Adjuvant chemotherapy followed by endocrine therapy (category 1) or adjuvant endocrine therapy; recurrence score<15: Adjuvant endocrine therapy/ovarian suppression/ablation; recurrence score 16-25-adjuvant endocrine therapy/ovarian suppression/ablation or adjuvant chemotherapy followed by endocrine therapy; recurrence score >26-adjuvant chemotherapy followed by endocrine therapy.  Surveillance follow-up: H&P 1-4 times per year as clinically appropriate for 5 years then annually.  Genetic screening.  Postsurgical management: Lymphedema management.  Imaging: Mammogram every 12 months.  Routine imaging of reconstructed breast not indicated.  For patient receiving anthracycline based therapy: Echocardiogram recommendation per NCCN guidelines for survivorship.  Screening for metastasis: In the absence of clinical signs and symptoms suggestive of recurrent disease no indication for laboratory or imaging studies for metastatic screening.    Patients on tamoxifen: Age-appropriate gynecologic screening.     9.  Continue management per primary care and other specialists  10.   Return to office in 6 weeks with preoffice CBC and differential, CMP, CEA and CA 27-29  11.   Importance of Smoking Cessation discussed with patient and  informed patient additional information will be on today's [facility]'s Flyer - Plan to Be Tobacco Free handout provided to patient         I spent ~93 minutes caring for Rachana on this date of service. This time includes time spent by me in the following activities: preparing for the visit, reviewing tests, performing a medically appropriate examination and/or evaluation, counseling and educating the patient/family/caregiver, ordering medications, tests, or procedures and documenting information in the medical record.

## 2024-12-02 ENCOUNTER — OFFICE VISIT (OUTPATIENT)
Dept: SURGERY | Facility: CLINIC | Age: 44
End: 2024-12-02
Payer: COMMERCIAL

## 2024-12-02 VITALS — WEIGHT: 148 LBS | OXYGEN SATURATION: 97 % | HEART RATE: 77 BPM | BODY MASS INDEX: 23.23 KG/M2 | HEIGHT: 67 IN

## 2024-12-02 DIAGNOSIS — Z90.13 S/P BILATERAL MASTECTOMY: Primary | ICD-10-CM

## 2024-12-02 PROCEDURE — 99024 POSTOP FOLLOW-UP VISIT: CPT

## 2024-12-02 NOTE — PROGRESS NOTES
"Patient: Rachana Smart    YOB: 1980    Date: 12/02/2024    Primary Care Provider: INA Holloway MD    Vital Signs:   Vitals:    12/02/24 1313   Pulse: 77   SpO2: 97%   Weight: 67.1 kg (148 lb)   Height: 169 cm (66.54\")       The patient is tolerating a regular diet and has no complaints s/p bilateral mastectomy on 11/5/2024 by Dr. Verduzco. The patient denies fevers, chills, nausea, vomiting, and excessive pain.  She had her drains removed 2 weeks ago and return to clinic last week with bilateral seromas present in the mastectomy beds.  She returns to clinic today for reevaluation and to determine if there are seromas present bilaterally again.  She is Compression over the area as instructed.  She sees oncology next week.    Upon physical exam, Rachana Smart is in need of drainage of the seroma present in bilateral mastectomy beds. After discussion of the procedure, as well as risks (including bleeding, infection, and damage to adjacent structures) and benefits, the patient elects to proceed. The right mastectomy bed was prepped with iodine. 2 ccs of lidocaine were used to anesthetize the area. An 18 gauge and 20cc syringe were inserted in the area and 60 mLs of straw colored fluid were drained from the area. The syringe was removed and a bandage was placed over the area.  Attention was then turned to the left mastectomy bed. 2 ccs of lidocaine were used to anesthetize the area. An 18 gauge and 20cc syringe were inserted in the area and 30 mLs of blood tinged fluid were drained from the area. The syringe was removed and a bandage was placed over the area.The patient tolerated the procedure well. I have instructed her that it is important to keep compression over the area for the next week until she is seen in clinic again. She will call for a sooner appointment if she notices significant fluid collecting again.      Assessment / Plan:    Diagnoses and all orders for this " visit:    1. S/P bilateral mastectomy (Primary)        Rachana Smart is a 44 y.o. female who presents to the clinic four weeks s/p bilateral mastectomy. She is overall doing well at this time. The incisions are healing well.  There are seromas present in bilateral mastectomy beds in need of drainage.  At this time, she will follow up in office in 1 week with me for recheck. I instructed the patient to call for an appointment sooner if she has any new problems or concerns. She voiced understanding and is agreeable to the plan.    Follow up:     Return in about 1 week (around 12/9/2024) for Recheck.        Electronically signed by Farzana Izaguirre PA-C  12/02/24  19:44 CST

## 2024-12-05 ENCOUNTER — CONSULT (OUTPATIENT)
Dept: ONCOLOGY | Facility: CLINIC | Age: 44
End: 2024-12-05
Payer: COMMERCIAL

## 2024-12-05 ENCOUNTER — TELEPHONE (OUTPATIENT)
Dept: ONCOLOGY | Facility: CLINIC | Age: 44
End: 2024-12-05

## 2024-12-05 ENCOUNTER — LAB (OUTPATIENT)
Dept: LAB | Facility: HOSPITAL | Age: 44
End: 2024-12-05
Payer: COMMERCIAL

## 2024-12-05 VITALS
SYSTOLIC BLOOD PRESSURE: 120 MMHG | OXYGEN SATURATION: 98 % | WEIGHT: 150.3 LBS | HEIGHT: 67 IN | TEMPERATURE: 97.9 F | RESPIRATION RATE: 16 BRPM | HEART RATE: 76 BPM | DIASTOLIC BLOOD PRESSURE: 68 MMHG | BODY MASS INDEX: 23.59 KG/M2

## 2024-12-05 DIAGNOSIS — Z17.0 MALIGNANT NEOPLASM OF UPPER-INNER QUADRANT OF LEFT BREAST IN FEMALE, ESTROGEN RECEPTOR POSITIVE: ICD-10-CM

## 2024-12-05 DIAGNOSIS — Z17.0 MALIGNANT NEOPLASM OF LEFT BREAST IN FEMALE, ESTROGEN RECEPTOR POSITIVE, UNSPECIFIED SITE OF BREAST: Primary | ICD-10-CM

## 2024-12-05 DIAGNOSIS — C50.912 MALIGNANT NEOPLASM OF LEFT BREAST IN FEMALE, ESTROGEN RECEPTOR POSITIVE, UNSPECIFIED SITE OF BREAST: Primary | ICD-10-CM

## 2024-12-05 DIAGNOSIS — E87.6 HYPOKALEMIA: Primary | ICD-10-CM

## 2024-12-05 DIAGNOSIS — K70.31 ALCOHOLIC CIRRHOSIS OF LIVER WITH ASCITES: ICD-10-CM

## 2024-12-05 DIAGNOSIS — C50.212 MALIGNANT NEOPLASM OF UPPER-INNER QUADRANT OF LEFT BREAST IN FEMALE, ESTROGEN RECEPTOR POSITIVE: ICD-10-CM

## 2024-12-05 LAB
25(OH)D3 SERPL-MCNC: 10.1 NG/ML (ref 30–100)
ALBUMIN SERPL-MCNC: 2.9 G/DL (ref 3.5–5.2)
ALBUMIN/GLOB SERPL: 0.7 G/DL
ALP SERPL-CCNC: 131 U/L (ref 39–117)
ALPHA-FETOPROTEIN: 4.92 NG/ML (ref 0–8.3)
ALT SERPL W P-5'-P-CCNC: 17 U/L (ref 1–33)
ANION GAP SERPL CALCULATED.3IONS-SCNC: 8 MMOL/L (ref 5–15)
AST SERPL-CCNC: 40 U/L (ref 1–32)
BASOPHILS # BLD AUTO: 0.11 10*3/MM3 (ref 0–0.2)
BASOPHILS NFR BLD AUTO: 1.6 % (ref 0–1.5)
BILIRUB SERPL-MCNC: 2.3 MG/DL (ref 0–1.2)
BUN SERPL-MCNC: 6 MG/DL (ref 6–20)
BUN/CREAT SERPL: 7.2 (ref 7–25)
CALCIUM SPEC-SCNC: 8.4 MG/DL (ref 8.6–10.5)
CANCER AG125 SERPL QL: 59.3 U/ML (ref 0–38.1)
CHLORIDE SERPL-SCNC: 98 MMOL/L (ref 98–107)
CO2 SERPL-SCNC: 31 MMOL/L (ref 22–29)
CREAT SERPL-MCNC: 0.83 MG/DL (ref 0.57–1)
DEPRECATED RDW RBC AUTO: 54.1 FL (ref 37–54)
EGFRCR SERPLBLD CKD-EPI 2021: 88.7 ML/MIN/1.73
EOSINOPHIL # BLD AUTO: 0.46 10*3/MM3 (ref 0–0.4)
EOSINOPHIL NFR BLD AUTO: 6.6 % (ref 0.3–6.2)
ERYTHROCYTE [DISTWIDTH] IN BLOOD BY AUTOMATED COUNT: 14.9 % (ref 12.3–15.4)
FERRITIN SERPL-MCNC: 75.3 NG/ML (ref 13–150)
FOLATE SERPL-MCNC: 9.23 NG/ML (ref 4.78–24.2)
GLOBULIN UR ELPH-MCNC: 4.1 GM/DL
GLUCOSE SERPL-MCNC: 110 MG/DL (ref 65–99)
HCT VFR BLD AUTO: 32.9 % (ref 34–46.6)
HGB BLD-MCNC: 10.7 G/DL (ref 12–15.9)
HOLD SPECIMEN: NORMAL
IMM GRANULOCYTES # BLD AUTO: 0.02 10*3/MM3 (ref 0–0.05)
IMM GRANULOCYTES NFR BLD AUTO: 0.3 % (ref 0–0.5)
INR PPP: 1.26 (ref 0.91–1.09)
IRON 24H UR-MRATE: 68 MCG/DL (ref 37–145)
IRON SATN MFR SERPL: 25 % (ref 20–50)
LYMPHOCYTES # BLD AUTO: 2.33 10*3/MM3 (ref 0.7–3.1)
LYMPHOCYTES NFR BLD AUTO: 33.4 % (ref 19.6–45.3)
MCH RBC QN AUTO: 32.4 PG (ref 26.6–33)
MCHC RBC AUTO-ENTMCNC: 32.5 G/DL (ref 31.5–35.7)
MCV RBC AUTO: 99.7 FL (ref 79–97)
MONOCYTES # BLD AUTO: 0.67 10*3/MM3 (ref 0.1–0.9)
MONOCYTES NFR BLD AUTO: 9.6 % (ref 5–12)
NEUTROPHILS NFR BLD AUTO: 3.38 10*3/MM3 (ref 1.7–7)
NEUTROPHILS NFR BLD AUTO: 48.5 % (ref 42.7–76)
NRBC BLD AUTO-RTO: 0 /100 WBC (ref 0–0.2)
PLATELET # BLD AUTO: 136 10*3/MM3 (ref 140–450)
PMV BLD AUTO: 9.4 FL (ref 6–12)
POTASSIUM SERPL-SCNC: 2.5 MMOL/L (ref 3.5–5.2)
PROT SERPL-MCNC: 7 G/DL (ref 6–8.5)
PROTHROMBIN TIME: 16.3 SECONDS (ref 11.8–14.8)
RBC # BLD AUTO: 3.3 10*6/MM3 (ref 3.77–5.28)
SODIUM SERPL-SCNC: 137 MMOL/L (ref 136–145)
TIBC SERPL-MCNC: 274 MCG/DL (ref 298–536)
TRANSFERRIN SERPL-MCNC: 184 MG/DL (ref 200–360)
VIT B12 BLD-MCNC: >2000 PG/ML (ref 211–946)
WBC NRBC COR # BLD AUTO: 6.97 10*3/MM3 (ref 3.4–10.8)

## 2024-12-05 PROCEDURE — 82746 ASSAY OF FOLIC ACID SERUM: CPT

## 2024-12-05 PROCEDURE — 86304 IMMUNOASSAY TUMOR CA 125: CPT

## 2024-12-05 PROCEDURE — 36415 COLL VENOUS BLD VENIPUNCTURE: CPT

## 2024-12-05 PROCEDURE — 85610 PROTHROMBIN TIME: CPT

## 2024-12-05 PROCEDURE — 83540 ASSAY OF IRON: CPT

## 2024-12-05 PROCEDURE — 82607 VITAMIN B-12: CPT

## 2024-12-05 PROCEDURE — 80053 COMPREHEN METABOLIC PANEL: CPT

## 2024-12-05 PROCEDURE — 82105 ALPHA-FETOPROTEIN SERUM: CPT

## 2024-12-05 PROCEDURE — 82728 ASSAY OF FERRITIN: CPT

## 2024-12-05 PROCEDURE — 82306 VITAMIN D 25 HYDROXY: CPT

## 2024-12-05 PROCEDURE — 86300 IMMUNOASSAY TUMOR CA 15-3: CPT

## 2024-12-05 PROCEDURE — 85025 COMPLETE CBC W/AUTO DIFF WBC: CPT

## 2024-12-05 PROCEDURE — 84466 ASSAY OF TRANSFERRIN: CPT

## 2024-12-05 RX ORDER — POTASSIUM CHLORIDE 1500 MG/1
20 TABLET, EXTENDED RELEASE ORAL 3 TIMES DAILY
Qty: 9 TABLET | Refills: 0 | Status: SHIPPED | OUTPATIENT
Start: 2024-12-05

## 2024-12-05 RX ORDER — POTASSIUM CHLORIDE 1500 MG/1
TABLET, EXTENDED RELEASE ORAL
Qty: 9 TABLET | Refills: 0 | Status: SHIPPED | OUTPATIENT
Start: 2024-12-05 | End: 2024-12-05

## 2024-12-05 RX ORDER — TAMOXIFEN CITRATE 20 MG/1
20 TABLET ORAL DAILY
Qty: 30 TABLET | Refills: 2 | Status: SHIPPED | OUTPATIENT
Start: 2024-12-05

## 2024-12-05 NOTE — TELEPHONE ENCOUNTER
----- Message from Matthew Cruz sent at 12/5/2024 10:40 AM CST -----  K-Dur 20 p.o. 3 times daily x 3 days-please fax these labs to her PCP  ----- Message -----  From: Lab, Background User  Sent: 12/5/2024  10:00 AM CST  To: Matthew Cruz MD

## 2024-12-05 NOTE — TELEPHONE ENCOUNTER
Patient is scheduled to see Dr. Benavides on 01/06/25 at 2:15 pm to discuss OSEAS/BSO .    Attempted to call patient. Voicemail was full.

## 2024-12-05 NOTE — TELEPHONE ENCOUNTER
Notified patient Rachana Smart that her potassium level is down @ 2.5 per Dr Cruz instructions a RX for oral potassium tablets will be sent to her local pharmacy for , take as directed. Patient also encouraged to increase use of OJ and bananas, and other potassium rich foods to help bring her potassium rich levels up. Patient v/u  Copy of lab faxed to PCP Dr Holloway for review

## 2024-12-05 NOTE — TELEPHONE ENCOUNTER
CRITICAL LAB VALUE   Received call from [facility] hematology with CRITICAL LAB VALUE OF:    POTASSIUM: 2.5   This information was sent to Dr Cruz for review and advise

## 2024-12-05 NOTE — TELEPHONE ENCOUNTER
Pt is aware of low potassium level. Will  script and take tid for 3 days and f/u with her pcp. She v/u

## 2024-12-06 ENCOUNTER — TELEPHONE (OUTPATIENT)
Dept: ONCOLOGY | Facility: CLINIC | Age: 44
End: 2024-12-06
Payer: COMMERCIAL

## 2024-12-06 DIAGNOSIS — Z17.0 MALIGNANT NEOPLASM OF UPPER-INNER QUADRANT OF LEFT BREAST IN FEMALE, ESTROGEN RECEPTOR POSITIVE: Primary | ICD-10-CM

## 2024-12-06 DIAGNOSIS — C50.212 MALIGNANT NEOPLASM OF UPPER-INNER QUADRANT OF LEFT BREAST IN FEMALE, ESTROGEN RECEPTOR POSITIVE: Primary | ICD-10-CM

## 2024-12-06 NOTE — TELEPHONE ENCOUNTER
----- Message from Matthew Cruz sent at 12/6/2024  8:12 AM CST -----   was elevated but we did not order?  Please schedule pelvic ultrasound  ----- Message -----  From: Lab, Background User  Sent: 12/5/2024  10:00 AM CST  To: Matthew Cruz MD

## 2024-12-06 NOTE — TELEPHONE ENCOUNTER
Called and spoke with patient's , Kurt. He reports that she has had pelvic u/s in October. Dr. Cruz reviewed and we will not need to repeat.

## 2024-12-07 LAB — CANCER AG27-29 SERPL-ACNC: 56.5 U/ML (ref 0–38.6)

## 2024-12-09 ENCOUNTER — OFFICE VISIT (OUTPATIENT)
Dept: SURGERY | Facility: CLINIC | Age: 44
End: 2024-12-09
Payer: COMMERCIAL

## 2024-12-09 VITALS
SYSTOLIC BLOOD PRESSURE: 121 MMHG | WEIGHT: 150 LBS | BODY MASS INDEX: 23.54 KG/M2 | DIASTOLIC BLOOD PRESSURE: 82 MMHG | HEIGHT: 67 IN

## 2024-12-09 DIAGNOSIS — Z90.13 S/P BILATERAL MASTECTOMY: Primary | ICD-10-CM

## 2024-12-09 PROCEDURE — 99024 POSTOP FOLLOW-UP VISIT: CPT

## 2024-12-13 ENCOUNTER — HOSPITAL ENCOUNTER (OUTPATIENT)
Dept: ULTRASOUND IMAGING | Facility: HOSPITAL | Age: 44
Discharge: HOME | End: 2024-12-13
Payer: COMMERCIAL

## 2024-12-13 ENCOUNTER — HOSPITAL ENCOUNTER (OUTPATIENT)
Dept: BONE DENSITY | Facility: HOSPITAL | Age: 44
Discharge: HOME | End: 2024-12-13
Payer: COMMERCIAL

## 2024-12-13 ENCOUNTER — TELEPHONE (OUTPATIENT)
Dept: ONCOLOGY | Facility: CLINIC | Age: 44
End: 2024-12-13
Payer: COMMERCIAL

## 2024-12-13 DIAGNOSIS — Z17.0 MALIGNANT NEOPLASM OF LEFT BREAST IN FEMALE, ESTROGEN RECEPTOR POSITIVE, UNSPECIFIED SITE OF BREAST: ICD-10-CM

## 2024-12-13 DIAGNOSIS — C50.912 MALIGNANT NEOPLASM OF LEFT BREAST IN FEMALE, ESTROGEN RECEPTOR POSITIVE, UNSPECIFIED SITE OF BREAST: ICD-10-CM

## 2024-12-13 DIAGNOSIS — K70.31 ALCOHOLIC CIRRHOSIS OF LIVER WITH ASCITES: ICD-10-CM

## 2024-12-13 DIAGNOSIS — C50.912 INVASIVE DUCTAL CARCINOMA OF BREAST, FEMALE, LEFT: Primary | ICD-10-CM

## 2024-12-13 LAB
CYTO UR: NORMAL
LAB AP CASE REPORT: NORMAL
LAB AP SPECIAL STAINS: NORMAL
LAB AP SYNOPTIC CHECKLIST: NORMAL
Lab: NORMAL
PATH REPORT.ADDENDUM SPEC: NORMAL
PATH REPORT.FINAL DX SPEC: NORMAL
PATH REPORT.GROSS SPEC: NORMAL

## 2024-12-13 PROCEDURE — 76705 ECHO EXAM OF ABDOMEN: CPT

## 2024-12-13 PROCEDURE — 77080 DXA BONE DENSITY AXIAL: CPT

## 2024-12-13 NOTE — TELEPHONE ENCOUNTER
----- Message from Matthew Cruz sent at 12/13/2024 10:16 AM CST -----  Start Prolia 60 mg SC then every 6 months  Confirmed that she has started Os-Floyd 600/400 p.o. twice daily  ----- Message -----  From: Interface, Rad Results [place] In  Sent: 12/13/2024   9:09 AM CST  To: Matthew Cruz MD

## 2024-12-16 ENCOUNTER — OFFICE VISIT (OUTPATIENT)
Dept: SURGERY | Facility: CLINIC | Age: 44
End: 2024-12-16
Payer: COMMERCIAL

## 2024-12-16 VITALS
BODY MASS INDEX: 23.54 KG/M2 | HEART RATE: 99 BPM | OXYGEN SATURATION: 98 % | DIASTOLIC BLOOD PRESSURE: 83 MMHG | HEIGHT: 67 IN | SYSTOLIC BLOOD PRESSURE: 130 MMHG | WEIGHT: 150 LBS

## 2024-12-16 DIAGNOSIS — M85.80 OSTEOPENIA DUE TO CANCER THERAPY: Primary | ICD-10-CM

## 2024-12-16 DIAGNOSIS — C50.212 MALIGNANT NEOPLASM OF UPPER-INNER QUADRANT OF LEFT FEMALE BREAST, UNSPECIFIED ESTROGEN RECEPTOR STATUS: ICD-10-CM

## 2024-12-16 DIAGNOSIS — Z90.13 S/P BILATERAL MASTECTOMY: Primary | ICD-10-CM

## 2024-12-16 DIAGNOSIS — Z79.811 LONG TERM CURRENT USE OF AROMATASE INHIBITOR: ICD-10-CM

## 2024-12-16 PROCEDURE — 99024 POSTOP FOLLOW-UP VISIT: CPT

## 2024-12-16 NOTE — PROGRESS NOTES
"Patient: Rachana Smart    YOB: 1980    Date: 12/16/2024    Primary Care Provider: INA Holloway MD    Vital Signs:   Vitals:    12/16/24 1451   BP: 130/83   Pulse: 99   SpO2: 98%   Weight: 68 kg (150 lb)   Height: 169 cm (66.54\")       The patient is tolerating a regular diet and has no complaints s/p bilateral mastectomy on 11/5/2024 by Dr. Verduzco. The patient denies fevers, chills, nausea, vomiting, and excessive pain. The incisions are healing well without signs of infection or wound dehiscence.  She is here for recheck of seromas in bilateral mastectomy beds.    Procedure  Upon physical exam, Rachana Smart is in need of drainage of the seroma present in the right mastectomy bed. After discussion of the procedure, as well as risks (including bleeding, infection, and damage to adjacent structures) and benefits, the patient elects to proceed. The right mastectomy bed was prepped with iodine. 1 cc of lidocaine was used to anesthetize the area. An 18 gauge and 20cc syringe were inserted in the area and 40 mLs of blood tinged fluid was drained from the area. The syringe was removed and a bandage was placed over the area. The patient tolerated the procedure well. I have instructed her that it is important to keep compression over the area for the next week until she is seen in clinic again. She will call for a sooner appointment if she notices significant fluid collecting again.     Assessment / Plan:    Diagnoses and all orders for this visit:    1. S/P bilateral mastectomy (Primary)        Rachana Smart is a 44 y.o. female who presents to the clinic 6 weeks s/p bilateral mastectomy. She is overall doing well at this time. The incisions are healing well. At this time, she will follow up in office in 1 week with me for recheck. I instructed the patient to call for an appointment sooner if she has any new problems or concerns. She voiced understanding and is agreeable to the " plan.    Follow up:     Return in about 1 week (around 12/23/2024) for Recheck.        Electronically signed by Farzana Izaguirre PA-C  12/16/24  16:18 CST

## 2024-12-16 NOTE — TELEPHONE ENCOUNTER
Patient's  called back and I went over the results. We will try to get scheduled for next Friday after the CT scan.

## 2024-12-23 ENCOUNTER — OFFICE VISIT (OUTPATIENT)
Dept: SURGERY | Facility: CLINIC | Age: 44
End: 2024-12-23
Payer: COMMERCIAL

## 2024-12-23 VITALS
WEIGHT: 150 LBS | HEIGHT: 67 IN | DIASTOLIC BLOOD PRESSURE: 76 MMHG | SYSTOLIC BLOOD PRESSURE: 118 MMHG | BODY MASS INDEX: 23.54 KG/M2

## 2024-12-23 DIAGNOSIS — Z90.13 S/P BILATERAL MASTECTOMY: Primary | ICD-10-CM

## 2024-12-23 PROCEDURE — 99024 POSTOP FOLLOW-UP VISIT: CPT

## 2024-12-23 NOTE — PROGRESS NOTES
"Patient: Rachana Smart    YOB: 1980    Date: 12/23/2024    Primary Care Provider: INA Holloway MD    Vital Signs:   Vitals:    12/23/24 1126   BP: 118/76   Weight: 68 kg (150 lb)   Height: 169 cm (66.54\")       The patient is tolerating a regular diet and has no complaints s/p bilateral mastectomy on 11/5/24 by Dr. Verduzco. The patient denies fevers, chills, nausea, vomiting, and excessive pain. The incisions are healing well without signs of infection or wound dehiscence. She is here today for seroma recheck. Upon physical exam, it appears that the patient no longer has seromas present in either mastectomy bed.     Assessment / Plan:    Diagnoses and all orders for this visit:    1. S/P bilateral mastectomy (Primary)        Rachana Smart is a 44 y.o. female who presents to the clinic 6 weeks s/p bilateral mastectomy. She is overall doing well at this time. The incisions are healing well. At this time, she will follow up in office in 2 months with Dr. Verduzco for her 3 month post op appointment. I instructed the patient to call for an appointment sooner if she has any new problems or concerns. She voiced understanding and is agreeable to the plan.    Follow up:     Return in about 2 months (around 2/23/2025) for 3 month post op bilateral mastectomy with KNW.        Electronically signed by Farzana Izaguirre PA-C  12/24/24  15:17 CST                   "

## 2024-12-27 ENCOUNTER — TELEPHONE (OUTPATIENT)
Dept: ONCOLOGY | Facility: CLINIC | Age: 44
End: 2024-12-27
Payer: COMMERCIAL

## 2024-12-27 ENCOUNTER — INFUSION (OUTPATIENT)
Dept: ONCOLOGY | Facility: HOSPITAL | Age: 44
End: 2024-12-27
Payer: COMMERCIAL

## 2024-12-27 ENCOUNTER — HOSPITAL ENCOUNTER (OUTPATIENT)
Dept: CT IMAGING | Facility: HOSPITAL | Age: 44
Discharge: HOME | End: 2024-12-27
Payer: COMMERCIAL

## 2024-12-27 ENCOUNTER — LAB (OUTPATIENT)
Dept: LAB | Facility: HOSPITAL | Age: 44
End: 2024-12-27
Payer: COMMERCIAL

## 2024-12-27 VITALS
WEIGHT: 153.2 LBS | DIASTOLIC BLOOD PRESSURE: 68 MMHG | BODY MASS INDEX: 24.04 KG/M2 | TEMPERATURE: 97.4 F | HEART RATE: 84 BPM | OXYGEN SATURATION: 98 % | RESPIRATION RATE: 18 BRPM | SYSTOLIC BLOOD PRESSURE: 130 MMHG | HEIGHT: 67 IN

## 2024-12-27 DIAGNOSIS — M85.80 OSTEOPENIA DUE TO CANCER THERAPY: Primary | ICD-10-CM

## 2024-12-27 DIAGNOSIS — E83.42 HYPOMAGNESEMIA: ICD-10-CM

## 2024-12-27 DIAGNOSIS — E87.6 HYPOKALEMIA: ICD-10-CM

## 2024-12-27 DIAGNOSIS — Z17.0 MALIGNANT NEOPLASM OF LEFT BREAST IN FEMALE, ESTROGEN RECEPTOR POSITIVE, UNSPECIFIED SITE OF BREAST: ICD-10-CM

## 2024-12-27 DIAGNOSIS — Z79.811 LONG TERM CURRENT USE OF AROMATASE INHIBITOR: ICD-10-CM

## 2024-12-27 DIAGNOSIS — E83.39 HYPOPHOSPHATEMIA: ICD-10-CM

## 2024-12-27 DIAGNOSIS — C50.212 MALIGNANT NEOPLASM OF UPPER-INNER QUADRANT OF LEFT FEMALE BREAST, UNSPECIFIED ESTROGEN RECEPTOR STATUS: ICD-10-CM

## 2024-12-27 DIAGNOSIS — C50.912 MALIGNANT NEOPLASM OF LEFT BREAST IN FEMALE, ESTROGEN RECEPTOR POSITIVE, UNSPECIFIED SITE OF BREAST: ICD-10-CM

## 2024-12-27 DIAGNOSIS — C50.912 INVASIVE DUCTAL CARCINOMA OF BREAST, FEMALE, LEFT: ICD-10-CM

## 2024-12-27 DIAGNOSIS — M85.80 OSTEOPENIA DUE TO CANCER THERAPY: ICD-10-CM

## 2024-12-27 LAB
ALBUMIN SERPL-MCNC: 3.2 G/DL (ref 3.5–5.2)
ALBUMIN/GLOB SERPL: 0.8 G/DL
ALP SERPL-CCNC: 112 U/L (ref 39–117)
ALT SERPL W P-5'-P-CCNC: 15 U/L (ref 1–33)
ANION GAP SERPL CALCULATED.3IONS-SCNC: 10 MMOL/L (ref 5–15)
AST SERPL-CCNC: 34 U/L (ref 1–32)
BASOPHILS # BLD AUTO: 0.14 10*3/MM3 (ref 0–0.2)
BASOPHILS NFR BLD AUTO: 1.7 % (ref 0–1.5)
BILIRUB SERPL-MCNC: 2.2 MG/DL (ref 0–1.2)
BUN SERPL-MCNC: 10 MG/DL (ref 6–20)
BUN/CREAT SERPL: 11.5 (ref 7–25)
CALCIUM SPEC-SCNC: 8.5 MG/DL (ref 8.6–10.5)
CEA SERPL-MCNC: 5.61 NG/ML
CHLORIDE SERPL-SCNC: 93 MMOL/L (ref 98–107)
CO2 SERPL-SCNC: 29 MMOL/L (ref 22–29)
CREAT SERPL-MCNC: 0.87 MG/DL (ref 0.57–1)
DEPRECATED RDW RBC AUTO: 51.8 FL (ref 37–54)
EGFRCR SERPLBLD CKD-EPI 2021: 83.9 ML/MIN/1.73
EOSINOPHIL # BLD AUTO: 0.43 10*3/MM3 (ref 0–0.4)
EOSINOPHIL NFR BLD AUTO: 5.3 % (ref 0.3–6.2)
ERYTHROCYTE [DISTWIDTH] IN BLOOD BY AUTOMATED COUNT: 14.6 % (ref 12.3–15.4)
GLOBULIN UR ELPH-MCNC: 3.9 GM/DL
GLUCOSE SERPL-MCNC: 117 MG/DL (ref 65–99)
HCT VFR BLD AUTO: 33.3 % (ref 34–46.6)
HGB BLD-MCNC: 10.7 G/DL (ref 12–15.9)
IMM GRANULOCYTES # BLD AUTO: 0.03 10*3/MM3 (ref 0–0.05)
IMM GRANULOCYTES NFR BLD AUTO: 0.4 % (ref 0–0.5)
LYMPHOCYTES # BLD AUTO: 2.59 10*3/MM3 (ref 0.7–3.1)
LYMPHOCYTES NFR BLD AUTO: 32.2 % (ref 19.6–45.3)
MAGNESIUM SERPL-MCNC: 1.5 MG/DL (ref 1.6–2.6)
MCH RBC QN AUTO: 31.1 PG (ref 26.6–33)
MCHC RBC AUTO-ENTMCNC: 32.1 G/DL (ref 31.5–35.7)
MCV RBC AUTO: 96.8 FL (ref 79–97)
MONOCYTES # BLD AUTO: 0.72 10*3/MM3 (ref 0.1–0.9)
MONOCYTES NFR BLD AUTO: 8.9 % (ref 5–12)
NEUTROPHILS NFR BLD AUTO: 4.14 10*3/MM3 (ref 1.7–7)
NEUTROPHILS NFR BLD AUTO: 51.5 % (ref 42.7–76)
NRBC BLD AUTO-RTO: 0 /100 WBC (ref 0–0.2)
PHOSPHATE SERPL-MCNC: 2.4 MG/DL (ref 2.5–4.5)
PLATELET # BLD AUTO: 142 10*3/MM3 (ref 140–450)
PMV BLD AUTO: 9.7 FL (ref 6–12)
POTASSIUM SERPL-SCNC: 2.7 MMOL/L (ref 3.5–5.2)
PROT SERPL-MCNC: 7.1 G/DL (ref 6–8.5)
RBC # BLD AUTO: 3.44 10*6/MM3 (ref 3.77–5.28)
SODIUM SERPL-SCNC: 132 MMOL/L (ref 136–145)
WBC NRBC COR # BLD AUTO: 8.05 10*3/MM3 (ref 3.4–10.8)

## 2024-12-27 PROCEDURE — 25010000002 DENOSUMAB 60 MG/ML SOLUTION PREFILLED SYRINGE: Performed by: INTERNAL MEDICINE

## 2024-12-27 PROCEDURE — 74177 CT ABD & PELVIS W/CONTRAST: CPT

## 2024-12-27 PROCEDURE — 85025 COMPLETE CBC W/AUTO DIFF WBC: CPT

## 2024-12-27 PROCEDURE — 25810000003 SODIUM CHLORIDE 0.9 % SOLUTION: Performed by: INTERNAL MEDICINE

## 2024-12-27 PROCEDURE — 96368 THER/DIAG CONCURRENT INF: CPT

## 2024-12-27 PROCEDURE — 83735 ASSAY OF MAGNESIUM: CPT

## 2024-12-27 PROCEDURE — 25510000001 IOPAMIDOL 61 % SOLUTION: Performed by: INTERNAL MEDICINE

## 2024-12-27 PROCEDURE — 84100 ASSAY OF PHOSPHORUS: CPT

## 2024-12-27 PROCEDURE — 96372 THER/PROPH/DIAG INJ SC/IM: CPT

## 2024-12-27 PROCEDURE — 96365 THER/PROPH/DIAG IV INF INIT: CPT

## 2024-12-27 PROCEDURE — 80053 COMPREHEN METABOLIC PANEL: CPT

## 2024-12-27 PROCEDURE — 96366 THER/PROPH/DIAG IV INF ADDON: CPT

## 2024-12-27 PROCEDURE — 71260 CT THORAX DX C+: CPT

## 2024-12-27 PROCEDURE — 82378 CARCINOEMBRYONIC ANTIGEN: CPT

## 2024-12-27 PROCEDURE — 25010000002 MAGNESIUM SULFATE 2 GM/50ML SOLUTION: Performed by: INTERNAL MEDICINE

## 2024-12-27 PROCEDURE — 36415 COLL VENOUS BLD VENIPUNCTURE: CPT

## 2024-12-27 RX ORDER — IOPAMIDOL 612 MG/ML
100 INJECTION, SOLUTION INTRAVASCULAR
Status: COMPLETED | OUTPATIENT
Start: 2024-12-27 | End: 2024-12-27

## 2024-12-27 RX ORDER — POTASSIUM CHLORIDE 1500 MG/1
20 TABLET, EXTENDED RELEASE ORAL 3 TIMES DAILY
Qty: 9 TABLET | Refills: 0 | Status: SHIPPED | OUTPATIENT
Start: 2024-12-27

## 2024-12-27 RX ORDER — MAGNESIUM SULFATE HEPTAHYDRATE 40 MG/ML
2 INJECTION, SOLUTION INTRAVENOUS ONCE
Status: COMPLETED | OUTPATIENT
Start: 2024-12-27 | End: 2024-12-27

## 2024-12-27 RX ORDER — SODIUM CHLORIDE 9 MG/ML
20 INJECTION, SOLUTION INTRAVENOUS ONCE
Status: CANCELLED | OUTPATIENT
Start: 2024-12-27

## 2024-12-27 RX ORDER — SODIUM CHLORIDE 9 MG/ML
20 INJECTION, SOLUTION INTRAVENOUS ONCE
Status: COMPLETED | OUTPATIENT
Start: 2024-12-27 | End: 2024-12-27

## 2024-12-27 RX ORDER — MAGNESIUM SULFATE HEPTAHYDRATE 40 MG/ML
2 INJECTION, SOLUTION INTRAVENOUS ONCE
Status: CANCELLED | OUTPATIENT
Start: 2024-12-27

## 2024-12-27 RX ADMIN — POTASSIUM PHOSPHATE, MONOBASIC AND POTASSIUM PHOSPHATE, DIBASIC 15 MMOL: 224; 236 INJECTION, SOLUTION, CONCENTRATE INTRAVENOUS at 11:14

## 2024-12-27 RX ADMIN — MAGNESIUM SULFATE HEPTAHYDRATE 2 G: 2 INJECTION, SOLUTION INTRAVENOUS at 11:14

## 2024-12-27 RX ADMIN — IOPAMIDOL 100 ML: 612 INJECTION, SOLUTION INTRAVENOUS at 09:05

## 2024-12-27 RX ADMIN — SODIUM CHLORIDE 20 ML/HR: 9 INJECTION, SOLUTION INTRAVENOUS at 11:13

## 2024-12-27 RX ADMIN — DENOSUMAB 60 MG: 60 INJECTION SUBCUTANEOUS at 13:13

## 2024-12-27 NOTE — TELEPHONE ENCOUNTER
----- Message from Matthew Cruz sent at 12/27/2024 10:41 AM CST -----  2 g IV magnesium, 1 dose of IV phosphorus, K-Dur 20 p.o. 3 times daily x 4 days-repeat labs next week  ----- Message -----  From: Lab, Background User  Sent: 12/27/2024   9:32 AM CST  To: Matthew Cruz MD

## 2024-12-27 NOTE — TELEPHONE ENCOUNTER
Received call from Nurse [facility], she calls to report patient Rachana Smart received IV Infusion of Potassium Phosphate scheduled in the treatment center today 12/27/24. Nurse reports that patient infusion inadvertently ran over 2 hours instead of recommended infusion time of 4 hours. When this was discovered they contacted Pharmacist SK which calculated the dosing of Potassium Phosphate vs IV fluids Pharmacist informed Nurse and Supervisor that there should be no issues or repercussions due to rapid infusion time.   Nurse reports patient vitals are stable, and patient has no c/o at this time.  Recommendations were:    Continue to monitor patient, in the infusion area  More IV fluids were hung for infusion        Patient stable and monitored at this time        Nurse reports a Safe report will be filed

## 2024-12-27 NOTE — CODE DOCUMENTATION
"Message to office:  \"Rachana Smart, [birth year] here today for first Prolia.  Labs Corrected Calcium was 9.14, Phos 2.4, mag 1.5, Sodium 132, and Potassium 2.7.  OK to give and any further orders?\"    Orders received from office: \"Berry-2 g IV magnesium, 1 dose of IV phosphorus, K-Dur 20 p.o. 3 times daily x 4 days-repeat labs next week  Okay for Prolia\"    Patient notified of the infusions and instructed on getting the Kdur from pharmacy and how to take.  Verbalized understanding.  "

## 2025-01-03 ENCOUNTER — LAB (OUTPATIENT)
Dept: LAB | Facility: HOSPITAL | Age: 45
End: 2025-01-03
Payer: COMMERCIAL

## 2025-01-03 DIAGNOSIS — E87.6 HYPOKALEMIA: ICD-10-CM

## 2025-01-03 LAB
ALBUMIN SERPL-MCNC: 3.2 G/DL (ref 3.5–5.2)
ALBUMIN/GLOB SERPL: 0.8 G/DL
ALP SERPL-CCNC: 116 U/L (ref 39–117)
ALT SERPL W P-5'-P-CCNC: 14 U/L (ref 1–33)
ANION GAP SERPL CALCULATED.3IONS-SCNC: 9 MMOL/L (ref 5–15)
AST SERPL-CCNC: 38 U/L (ref 1–32)
BILIRUB SERPL-MCNC: 2.7 MG/DL (ref 0–1.2)
BUN SERPL-MCNC: 11 MG/DL (ref 6–20)
BUN/CREAT SERPL: 13.3 (ref 7–25)
CALCIUM SPEC-SCNC: 8.7 MG/DL (ref 8.6–10.5)
CHLORIDE SERPL-SCNC: 97 MMOL/L (ref 98–107)
CO2 SERPL-SCNC: 27 MMOL/L (ref 22–29)
CREAT SERPL-MCNC: 0.83 MG/DL (ref 0.57–1)
EGFRCR SERPLBLD CKD-EPI 2021: 88.7 ML/MIN/1.73
GLOBULIN UR ELPH-MCNC: 4.1 GM/DL
GLUCOSE SERPL-MCNC: 182 MG/DL (ref 65–99)
MAGNESIUM SERPL-MCNC: 2.1 MG/DL (ref 1.6–2.6)
PHOSPHATE SERPL-MCNC: 2.9 MG/DL (ref 2.5–4.5)
POTASSIUM SERPL-SCNC: 3.6 MMOL/L (ref 3.5–5.2)
PROT SERPL-MCNC: 7.3 G/DL (ref 6–8.5)
SODIUM SERPL-SCNC: 133 MMOL/L (ref 136–145)

## 2025-01-03 PROCEDURE — 80053 COMPREHEN METABOLIC PANEL: CPT

## 2025-01-03 PROCEDURE — 36415 COLL VENOUS BLD VENIPUNCTURE: CPT

## 2025-01-03 PROCEDURE — 83735 ASSAY OF MAGNESIUM: CPT

## 2025-01-03 PROCEDURE — 84100 ASSAY OF PHOSPHORUS: CPT

## 2025-01-06 ENCOUNTER — TELEPHONE (OUTPATIENT)
Age: 45
End: 2025-01-06
Payer: COMMERCIAL

## 2025-01-06 ENCOUNTER — OFFICE VISIT (OUTPATIENT)
Age: 45
End: 2025-01-06
Payer: COMMERCIAL

## 2025-01-06 VITALS
BODY MASS INDEX: 23.4 KG/M2 | SYSTOLIC BLOOD PRESSURE: 104 MMHG | HEIGHT: 67 IN | DIASTOLIC BLOOD PRESSURE: 72 MMHG | WEIGHT: 149.1 LBS

## 2025-01-06 DIAGNOSIS — Z15.09 LYNCH SYNDROME: ICD-10-CM

## 2025-01-06 DIAGNOSIS — Z85.3 HISTORY OF LEFT BREAST CANCER: Primary | ICD-10-CM

## 2025-01-06 RX ORDER — SODIUM CHLORIDE 9 MG/ML
100 INJECTION, SOLUTION INTRAVENOUS CONTINUOUS
OUTPATIENT
Start: 2025-01-06 | End: 2025-01-06

## 2025-01-06 RX ORDER — SODIUM CHLORIDE 0.9 % (FLUSH) 0.9 %
1-10 SYRINGE (ML) INJECTION AS NEEDED
OUTPATIENT
Start: 2025-01-06

## 2025-01-06 RX ORDER — SODIUM CHLORIDE 9 MG/ML
40 INJECTION, SOLUTION INTRAVENOUS AS NEEDED
OUTPATIENT
Start: 2025-01-06

## 2025-01-06 RX ORDER — SODIUM CHLORIDE 0.9 % (FLUSH) 0.9 %
10 SYRINGE (ML) INJECTION EVERY 12 HOURS SCHEDULED
OUTPATIENT
Start: 2025-01-06

## 2025-01-06 NOTE — TELEPHONE ENCOUNTER
Called surgery scheduling, spoke with Maite, scheduled H BSO with Tanna on 1- arrive @ 0630, pre op labs 1-17-25 @ 0815. Called pt and informed of preop lab, surgery dates/times, needs to be NPO after midnight night before surgery. Pt voiced understanding.

## 2025-01-07 NOTE — H&P (VIEW-ONLY)
[facility]  Rachana Smart  : 1980  MRN: [id number]  CSN: [id number]    History and Physical    Subjective   Rachana Smart is a 44 y.o. year old  who presents for consultation about surgery due to hx breast cancer with ha syndrome.  Pt currently on tamoxifen for chemo prophylaxis.  Pt with estrogen receptor positive breast cancer. Recommend TLH/BSO robotically. We discussed procedure including risks of infection, bleeding, damage to surrounding organs. Pt with cirrhosis and portal HTN.  Pt with slightly elevated coags, however recently tolerated her bilateral mastectomy well.  Pt with normal CT abd/pelvis with the exception of small amount of ascites with cirrhosis.   slightly elevated however I feel this is also related to ascites from cirrhosis.  Pt is still drinking but reports rare use of alcohol.  Pt desires to proceed on 2025.     Past Medical History:   Diagnosis Date    Alcoholism in recovery     Allergic     Anemia 220    Anxiety     Asthma     Breast cancer     Cancer 920    Cirrhosis     Endometriosis     Gestational diabetes     Gestational hypertension 2006    Hepatitis C     History of ear infections     History of transfusion     Hypertension     Leaky heart valve     Migraine     Multiple gestation     Osteopenia due to cancer therapy 2024    Ovarian cyst     PMS (premenstrual syndrome)     PONV (postoperative nausea and vomiting)     Preeclampsia     Secondary esophageal varices without bleeding 2024    Sinusitis     Varicella      Past Surgical History:   Procedure Laterality Date    BREAST BIOPSY       SECTION      x2    COLONOSCOPY      COLONOSCOPY N/A 2024    Procedure: COLONOSCOPY WITH ANESTHESIA;  Surgeon: Blanca Bernardo MD;  Location: [facility] ENDOSCOPY;  Service: Gastroenterology;  Laterality: N/A;  pre: screen  post: diverticulosis  [facility]    D & C HYSTEROSCOPY N/A  11/5/2024    Procedure: DILATATION AND CURETTAGE HYSTEROSCOPY;  Surgeon: Glo Benavides MD;  Location: [facility] OR;  Service: Obstetrics/Gynecology;  Laterality: N/A;    ENDOSCOPY N/A 01/31/2024    no evidence of any fresh or old blood or clots,normal stomach    ENDOSCOPY N/A 08/09/2024    Procedure: ESOPHAGOGASTRODUODENOSCOPY WITH ANESTHESIA;  Surgeon: Blanca Bernardo MD;  Location: [facility] ENDOSCOPY;  Service: Gastroenterology;  Laterality: N/A;  pre: cirrhosis   post: PHG. Varices.  [facility]    MASTECTOMY W/ SENTINEL NODE BIOPSY Bilateral 11/5/2024    Procedure: BILATERAL SKIN SPARING MASTECTOMY WITH LEFT AXILLARY MAGTRACE GUIDED SENTINEL LYMPH NODE BIOPSY (Dr. Prieto to nahid);  Surgeon: Fide Verduzco MD;  Location: [facility] OR;  Service: General;  Laterality: Bilateral;    MOUTH SURGERY      UPPER GASTROINTESTINAL ENDOSCOPY  3/24    WISDOM TOOTH EXTRACTION  1995     Social History    Tobacco Use      Smoking status: Every Day        Packs/day: 0.50        Years: 0.5 packs/day for 10.0 years (5.0 ttl pk-yrs)        Types: Cigarettes      Smokeless tobacco: Never      Current Outpatient Medications:     carvedilol (Coreg) 6.25 MG tablet, Take 1 tablet by mouth 2 (Two) Times a Day With Meals., Disp: 60 tablet, Rfl: 3    ferrous sulfate 325 (65 FE) MG tablet, Take 1 tablet by mouth Daily With Breakfast., Disp: , Rfl:     furosemide (LASIX) 40 MG tablet, Take 1 tablet by mouth once daily, Disp: 90 tablet, Rfl: 1    ibuprofen (Motrin IB) 200 MG tablet, Take 3 tablets by mouth Every 8 (Eight) Hours. Take every 8 hours for three days then take as needed., Disp: , Rfl:     Multivitamin tablet tablet, Take 1 tablet by mouth Daily., Disp: , Rfl:     potassium chloride (KLOR-CON M20) 20 MEQ CR tablet, Take 1 tablet by mouth 3 (Three) Times a Day., Disp: 9 tablet, Rfl: 0    tamoxifen (NOLVADEX) 20 MG chemo tablet, Take 1 tablet by mouth Daily., Disp: 30 tablet, Rfl: 2    thiamine (VITAMIN B1) 100 MG tablet, Take 1  "tablet by mouth Daily. (Patient taking differently: Take 2.5 tablets by mouth Daily.), Disp: 30 tablet, Rfl: 0    vitamin B-12 (cyanocobalamin) 100 MCG tablet, Take 5 tablets by mouth Daily., Disp: , Rfl:     Current Facility-Administered Medications:     lidocaine 1% - EPINEPHrine 1:[id number] (XYLOCAINE W/EPI) 1 %-1:056434 injection 10 mL, 10 mL, Injection, Once, Amina Camarillo MD    Allergies   Allergen Reactions    Oxycodone Itching     Pt states any pain medication causes severe itching, nausea    Codeine Rash and GI Intolerance       Family History   Problem Relation Age of Onset    Diabetes Mother     Heart disease Father     Diabetes Father     Stroke Father     Melanoma Father 40 - 49    Esophageal cancer Father 60 - 69    Cervical cancer Maternal Grandmother 40 - 49    Skin cancer Paternal Grandmother     Cancer Paternal Grandfather 50 - 59        Mesothelioma    Breast cancer Half-Sister 45    Skin cancer Maternal Aunt     Pancreatic cancer Paternal Uncle     Colon cancer Neg Hx     Colon polyps Neg Hx     Uterine cancer Neg Hx      Review of Systems   Constitutional:  Negative for activity change, appetite change and fatigue.   Respiratory:  Negative for cough, chest tightness, shortness of breath and wheezing.    Cardiovascular:  Negative for chest pain and palpitations.   Gastrointestinal:  Negative for constipation, diarrhea, nausea and vomiting.   Genitourinary:  Negative for dyspareunia, menstrual problem, pelvic pain, vaginal bleeding, vaginal discharge and vaginal pain.         Objective   /72   Ht 169 cm (66.54\")   Wt 67.6 kg (149 lb 1.6 oz)   LMP 09/29/2024 (Exact Date)   BMI 23.68 kg/m²     Physical Exam   Physical Exam    Labs  Lab Results   Component Value Date     12/27/2024    HGB 10.7 (L) 12/27/2024    HCT 33.3 (L) 12/27/2024    WBC 8.05 12/27/2024     (L) 01/03/2025    K 3.6 01/03/2025    CL 97 (L) 01/03/2025    CO2 27.0 01/03/2025    BUN 11 01/03/2025    CREATININE " 0.83 01/03/2025    GLUCOSE 182 (H) 01/03/2025    ALBUMIN 3.2 (L) 01/03/2025    CALCIUM 8.7 01/03/2025    AST 38 (H) 01/03/2025    ALT 14 01/03/2025    BILITOT 2.7 (H) 01/03/2025        Assessment & Plan    Diagnoses and all orders for this visit:    1. 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  To the OR 1/23/2025 for TLH/BSO robotic. Discussed procedure including risks and possible complications. Pt desires to proceed.   2. Ha syndrome    Other orders  -     Follow Anesthesia Guidelines / Protocol; Future  -     Follow Anesthesia Guidelines / Protocol; Standing  -     Verify / Perform Chlorhexidine Skin Prep; Standing  -     Provide NPO Instructions to Patient; Future  -     Chlorhexidine Skin Prep; Future  -     Type & Screen; Standing  -     Insert Peripheral IV; Standing  -     Saline Lock & Maintain IV Access; Standing  -     Place Sequential Compression Device; Standing  -     Maintain Sequential Compression Device; Standing        Glo Benavides MD  1/7/2025  07:59 CST

## 2025-01-07 NOTE — PROGRESS NOTES
[facility]  Rachana Smart  : 1980  MRN: [id number]  CSN: [id number]    History and Physical    Subjective   Rachana Smart is a 44 y.o. year old  who presents for consultation about surgery due to hx breast cancer with ha syndrome.  Pt currently on tamoxifen for chemo prophylaxis.  Pt with estrogen receptor positive breast cancer. Recommend TLH/BSO robotically. We discussed procedure including risks of infection, bleeding, damage to surrounding organs. Pt with cirrhosis and portal HTN.  Pt with slightly elevated coags, however recently tolerated her bilateral mastectomy well.  Pt with normal CT abd/pelvis with the exception of small amount of ascites with cirrhosis.   slightly elevated however I feel this is also related to ascites from cirrhosis.  Pt is still drinking but reports rare use of alcohol.  Pt desires to proceed on 2025.     Past Medical History:   Diagnosis Date    Alcoholism in recovery     Allergic     Anemia 220    Anxiety     Asthma     Breast cancer     Cancer 920    Cirrhosis     Endometriosis     Gestational diabetes     Gestational hypertension 2006    Hepatitis C     History of ear infections     History of transfusion     Hypertension     Leaky heart valve     Migraine     Multiple gestation     Osteopenia due to cancer therapy 2024    Ovarian cyst     PMS (premenstrual syndrome)     PONV (postoperative nausea and vomiting)     Preeclampsia     Secondary esophageal varices without bleeding 2024    Sinusitis     Varicella      Past Surgical History:   Procedure Laterality Date    BREAST BIOPSY       SECTION      x2    COLONOSCOPY      COLONOSCOPY N/A 2024    Procedure: COLONOSCOPY WITH ANESTHESIA;  Surgeon: Blanca Bernardo MD;  Location: [facility] ENDOSCOPY;  Service: Gastroenterology;  Laterality: N/A;  pre: screen  post: diverticulosis  [facility] [place]    D & C HYSTEROSCOPY N/A  11/5/2024    Procedure: DILATATION AND CURETTAGE HYSTEROSCOPY;  Surgeon: Glo Benavides MD;  Location: [facility] OR;  Service: Obstetrics/Gynecology;  Laterality: N/A;    ENDOSCOPY N/A 01/31/2024    no evidence of any fresh or old blood or clots,normal stomach    ENDOSCOPY N/A 08/09/2024    Procedure: ESOPHAGOGASTRODUODENOSCOPY WITH ANESTHESIA;  Surgeon: Blanca Bernardo MD;  Location: [facility] ENDOSCOPY;  Service: Gastroenterology;  Laterality: N/A;  pre: cirrhosis   post: PHG. Varices.  [place]    MASTECTOMY W/ SENTINEL NODE BIOPSY Bilateral 11/5/2024    Procedure: BILATERAL SKIN SPARING MASTECTOMY WITH LEFT AXILLARY MAGTRACE GUIDED SENTINEL LYMPH NODE BIOPSY (Dr. Prieto to nahid);  Surgeon: Fide Verduzco MD;  Location: [facility] OR;  Service: General;  Laterality: Bilateral;    MOUTH SURGERY      UPPER GASTROINTESTINAL ENDOSCOPY  3/24    WISDOM TOOTH EXTRACTION  1995     Social History    Tobacco Use      Smoking status: Every Day        Packs/day: 0.50        Years: 0.5 packs/day for 10.0 years (5.0 ttl pk-yrs)        Types: Cigarettes      Smokeless tobacco: Never      Current Outpatient Medications:     carvedilol (Coreg) 6.25 MG tablet, Take 1 tablet by mouth 2 (Two) Times a Day With Meals., Disp: 60 tablet, Rfl: 3    ferrous sulfate 325 (65 FE) MG tablet, Take 1 tablet by mouth Daily With Breakfast., Disp: , Rfl:     furosemide (LASIX) 40 MG tablet, Take 1 tablet by mouth once daily, Disp: 90 tablet, Rfl: 1    ibuprofen (Motrin IB) 200 MG tablet, Take 3 tablets by mouth Every 8 (Eight) Hours. Take every 8 hours for three days then take as needed., Disp: , Rfl:     Multivitamin tablet tablet, Take 1 tablet by mouth Daily., Disp: , Rfl:     potassium chloride (KLOR-CON M20) 20 MEQ CR tablet, Take 1 tablet by mouth 3 (Three) Times a Day., Disp: 9 tablet, Rfl: 0    tamoxifen (NOLVADEX) 20 MG chemo tablet, Take 1 tablet by mouth Daily., Disp: 30 tablet, Rfl: 2    thiamine (VITAMIN B1) 100 MG tablet, Take 1  "tablet by mouth Daily. (Patient taking differently: Take 2.5 tablets by mouth Daily.), Disp: 30 tablet, Rfl: 0    vitamin B-12 (cyanocobalamin) 100 MCG tablet, Take 5 tablets by mouth Daily., Disp: , Rfl:     Current Facility-Administered Medications:     lidocaine 1% - EPINEPHrine 1:[id number] (XYLOCAINE W/EPI) 1 %-1:198836 injection 10 mL, 10 mL, Injection, Once, Amina Camarillo MD    Allergies   Allergen Reactions    Oxycodone Itching     Pt states any pain medication causes severe itching, nausea    Codeine Rash and GI Intolerance       Family History   Problem Relation Age of Onset    Diabetes Mother     Heart disease Father     Diabetes Father     Stroke Father     Melanoma Father 40 - 49    Esophageal cancer Father 60 - 69    Cervical cancer Maternal Grandmother 40 - 49    Skin cancer Paternal Grandmother     Cancer Paternal Grandfather 50 - 59        Mesothelioma    Breast cancer Half-Sister 45    Skin cancer Maternal Aunt     Pancreatic cancer Paternal Uncle     Colon cancer Neg Hx     Colon polyps Neg Hx     Uterine cancer Neg Hx      Review of Systems   Constitutional:  Negative for activity change, appetite change and fatigue.   Respiratory:  Negative for cough, chest tightness, shortness of breath and wheezing.    Cardiovascular:  Negative for chest pain and palpitations.   Gastrointestinal:  Negative for constipation, diarrhea, nausea and vomiting.   Genitourinary:  Negative for dyspareunia, menstrual problem, pelvic pain, vaginal bleeding, vaginal discharge and vaginal pain.         Objective   /72   Ht 169 cm (66.54\")   Wt 67.6 kg (149 lb 1.6 oz)   LMP 09/29/2024 (Exact Date)   BMI 23.68 kg/m²     Physical Exam   Physical Exam    Labs  Lab Results   Component Value Date     12/27/2024    HGB 10.7 (L) 12/27/2024    HCT 33.3 (L) 12/27/2024    WBC 8.05 12/27/2024     (L) 01/03/2025    K 3.6 01/03/2025    CL 97 (L) 01/03/2025    CO2 27.0 01/03/2025    BUN 11 01/03/2025    CREATININE " 0.83 01/03/2025    GLUCOSE 182 (H) 01/03/2025    ALBUMIN 3.2 (L) 01/03/2025    CALCIUM 8.7 01/03/2025    AST 38 (H) 01/03/2025    ALT 14 01/03/2025    BILITOT 2.7 (H) 01/03/2025        Assessment & Plan    Diagnoses and all orders for this visit:    1. 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  To the OR 1/23/2025 for TLH/BSO robotic. Discussed procedure including risks and possible complications. Pt desires to proceed.   2. Ha syndrome    Other orders  -     Follow Anesthesia Guidelines / Protocol; Future  -     Follow Anesthesia Guidelines / Protocol; Standing  -     Verify / Perform Chlorhexidine Skin Prep; Standing  -     Provide NPO Instructions to Patient; Future  -     Chlorhexidine Skin Prep; Future  -     Type & Screen; Standing  -     Insert Peripheral IV; Standing  -     Saline Lock & Maintain IV Access; Standing  -     Place Sequential Compression Device; Standing  -     Maintain Sequential Compression Device; Standing        Glo Benavides MD  1/7/2025  07:59 CST

## 2025-01-17 ENCOUNTER — PRE-ADMISSION TESTING (OUTPATIENT)
Dept: PREADMISSION TESTING | Facility: HOSPITAL | Age: 45
End: 2025-01-17
Payer: COMMERCIAL

## 2025-01-17 ENCOUNTER — TELEPHONE (OUTPATIENT)
Dept: ONCOLOGY | Facility: CLINIC | Age: 45
End: 2025-01-17
Payer: COMMERCIAL

## 2025-01-17 ENCOUNTER — TELEPHONE (OUTPATIENT)
Dept: ONCOLOGY | Facility: CLINIC | Age: 45
End: 2025-01-17

## 2025-01-17 ENCOUNTER — APPOINTMENT (OUTPATIENT)
Dept: LAB | Facility: HOSPITAL | Age: 45
End: 2025-01-17
Payer: COMMERCIAL

## 2025-01-17 VITALS
RESPIRATION RATE: 18 BRPM | OXYGEN SATURATION: 100 % | WEIGHT: 154.1 LBS | SYSTOLIC BLOOD PRESSURE: 112 MMHG | DIASTOLIC BLOOD PRESSURE: 78 MMHG | HEART RATE: 84 BPM | BODY MASS INDEX: 24.19 KG/M2 | HEIGHT: 67 IN

## 2025-01-17 DIAGNOSIS — Z17.0 MALIGNANT NEOPLASM OF LEFT BREAST IN FEMALE, ESTROGEN RECEPTOR POSITIVE, UNSPECIFIED SITE OF BREAST: ICD-10-CM

## 2025-01-17 DIAGNOSIS — C50.912 MALIGNANT NEOPLASM OF LEFT BREAST IN FEMALE, ESTROGEN RECEPTOR POSITIVE, UNSPECIFIED SITE OF BREAST: ICD-10-CM

## 2025-01-17 LAB
ALBUMIN SERPL-MCNC: 3.4 G/DL (ref 3.5–5.2)
ALBUMIN/GLOB SERPL: 0.8 G/DL
ALP SERPL-CCNC: 112 U/L (ref 39–117)
ALT SERPL W P-5'-P-CCNC: 13 U/L (ref 1–33)
ANION GAP SERPL CALCULATED.3IONS-SCNC: 14 MMOL/L (ref 5–15)
AST SERPL-CCNC: 33 U/L (ref 1–32)
BILIRUB SERPL-MCNC: 2.4 MG/DL (ref 0–1.2)
BUN SERPL-MCNC: 16 MG/DL (ref 6–20)
BUN/CREAT SERPL: 16 (ref 7–25)
CALCIUM SPEC-SCNC: 9.4 MG/DL (ref 8.6–10.5)
CEA SERPL-MCNC: 4.78 NG/ML
CHLORIDE SERPL-SCNC: 93 MMOL/L (ref 98–107)
CO2 SERPL-SCNC: 31 MMOL/L (ref 22–29)
CREAT SERPL-MCNC: 1 MG/DL (ref 0.57–1)
EGFRCR SERPLBLD CKD-EPI 2021: 70.9 ML/MIN/1.73
GLOBULIN UR ELPH-MCNC: 4.4 GM/DL
GLUCOSE SERPL-MCNC: 93 MG/DL (ref 65–99)
POTASSIUM SERPL-SCNC: 2.8 MMOL/L (ref 3.5–5.2)
PROT SERPL-MCNC: 7.8 G/DL (ref 6–8.5)
SODIUM SERPL-SCNC: 138 MMOL/L (ref 136–145)

## 2025-01-17 PROCEDURE — 81003 URINALYSIS AUTO W/O SCOPE: CPT | Performed by: OBSTETRICS & GYNECOLOGY

## 2025-01-17 PROCEDURE — 85610 PROTHROMBIN TIME: CPT | Performed by: OBSTETRICS & GYNECOLOGY

## 2025-01-17 PROCEDURE — 82378 CARCINOEMBRYONIC ANTIGEN: CPT

## 2025-01-17 PROCEDURE — 80053 COMPREHEN METABOLIC PANEL: CPT

## 2025-01-17 PROCEDURE — 86300 IMMUNOASSAY TUMOR CA 15-3: CPT

## 2025-01-17 PROCEDURE — 85025 COMPLETE CBC W/AUTO DIFF WBC: CPT | Performed by: OBSTETRICS & GYNECOLOGY

## 2025-01-17 RX ORDER — SPIRONOLACTONE 25 MG/1
TABLET ORAL
COMMUNITY
Start: 2025-01-13 | End: 2025-01-17

## 2025-01-17 RX ORDER — POTASSIUM CHLORIDE 1500 MG/1
20 TABLET, EXTENDED RELEASE ORAL 3 TIMES DAILY
Qty: 9 TABLET | Refills: 0 | Status: SHIPPED | OUTPATIENT
Start: 2025-01-17 | End: 2025-01-20

## 2025-01-17 NOTE — TELEPHONE ENCOUNTER
Caller: Kurt Smart    Relationship to patient: Emergency Contact    Best call back number: [phone]    Type of visit: FU    Requested date: PUSH OUT 1 WEEK     If rescheduling, when is the original appointment: 1/24     Additional notes:PT IS HAVING SURGERY AND WON'T BE READY

## 2025-01-17 NOTE — DISCHARGE INSTRUCTIONS
Preparing for Surgery  Follow these instructions before the procedure:  Several days or weeks before your procedure        Ask your health care provider about:  Changing or stopping your regular medicines. This is especially important if you are taking diabetes medicines or blood thinners.  Taking medicines such as aspirin and ibuprofen. These medicines can thin your blood. Do not take these medicines unless your health care provider tells you to take them.  Taking over-the-counter medicines, vitamins, herbs, and supplements.    Contact your surgeon if you:  Develop a fever of more than 100.4°F (38°C) or other feelings of illness during the 48 hours before your surgery.  Have symptoms that get worse.  Have questions or concerns about your surgery.  If you are going home the same day of your surgery you will need to arrange for a responsible adult, age 18 years old or older, to drive you home from the hospital and stay with you for 24 hours. Verification of the  will be made prior to any procedure requiring sedation. You may not go home in a taxi or any form of public transportation by yourself.     Day before your procedure      24 hours before your procedure DO NOT drink alcoholic beverages or smoke.  24 hours before your procedure STOP taking Erectile Dysfunction medication (i.e.,Cialis, Viagra)   You may be asked to shower with a germ-killing soap.  Day of your procedure   You may take the following medication(s) the morning of surgery with a sip of water: CARVEDILOL      8 hours before your scheduled arrival time, STOP all food, any dairy products, and full liquids. This includes hard candy, chewing gum or mints. This is extremely important to prevent serious complications.     Up to 2 hours before your scheduled arrival time, you may have clear liquids no cream, powder, or pulp of any kind. Safe options are water, black coffee, plain tea, soda, Gatorade/Powerade, clear broth, apple juice.    2 hours  before your scheduled arrival time, STOP drinking clear liquids.    You may need to take another shower with a germ-killing soap before you leave home in the morning. Do not use perfumes, colognes, or body lotions.  Wear comfortable loose-fitting clothing.  Remove all jewelry including body piercing and rings, dark colored nail polish, and make up prior to arrival at the hospital. Leave all valuables at home.   Bring your hearing aids if you rely on them.  Do not wear contact lenses. If you wear eyeglasses remember to bring a case to store them in while you are in surgery.  Do not use denture adhesives since you will be asked to remove them during your surgery.    You do not need to bring your home medications into the hospital.   Bring your sleep apnea device with you on the day of your surgery (if this applies to you).  If you have an Inspire implant for sleep apnea, please bring the remote with you on the day of surgery.  If you wear portable oxygen, bring it with you.   If you are staying overnight, you may bring a bag of items you may need such as slippers, robe and a change of clothes for your discharge. You may want to leave these items in the car until you are ready for them since your family will take your belongings when you leave the pre-operative area.  Arrive at the hospital as scheduled by the office. You will be asked to arrive 2 hours prior to your surgery time in order to prepare for your procedure.  When you arrive at the hospital  Go to the registration desk located at the main entrance of the hospital.  After registration is completed, you will be given a beeper and a sticker sheet. Take the stickers to Outpatient Surgery and place in the tray at the end of the desk to notify the staff that you have arrived and registered.   Return to the lobby to wait. You are not always called back according to the time of arrival but rather the time your doctor will be ready.  When your beeper lights up and  vibrates proceed through the double doors, under the stairs, and a member of the Outpatient Surgery staff will escort you to your preoperative room.   How to Use Chlorhexidine Before Surgery  Chlorhexidine gluconate (CHG) is a germ-killing (antiseptic) solution that is used to clean the skin. It can get rid of the bacteria that normally live on the skin and can keep them away for about 24 hours. To clean your skin with CHG, you may be given:  A CHG solution to use in the shower or as part of a sponge bath.  A prepackaged cloth that contains CHG.  Cleaning your skin with CHG may help lower the risk for infection:  While you are staying in the intensive care unit of the hospital.  If you have a vascular access, such as a central line, to provide short-term or long-term access to your veins.  If you have a catheter to drain urine from your bladder.  If you are on a ventilator. A ventilator is a machine that helps you breathe by moving air in and out of your lungs.  After surgery.  What are the risks?  Risks of using CHG include:  A skin reaction.  Hearing loss, if CHG gets in your ears and you have a perforated eardrum.  Eye injury, if CHG gets in your eyes and is not rinsed out.  The CHG product catching fire.  Make sure that you avoid smoking and flames after applying CHG to your skin.  Do not use CHG:  If you have a chlorhexidine allergy or have previously reacted to chlorhexidine.  On babies younger than 2 months of age.  How to use CHG solution  Use CHG only as told by your health care provider, and follow the instructions on the label.  Use the full amount of CHG as directed. Usually, this is one bottle.  During a shower    Follow these steps when using CHG solution during a shower (unless your health care provider gives you different instructions):  Start the shower.  Use your normal soap and shampoo to wash your face and hair.  Turn off the shower or move out of the shower stream.  Pour the CHG onto a clean  washcloth. Do not use any type of brush or rough-edged sponge.  Starting at your neck, lather your body down to your toes. Make sure you follow these instructions:  If you will be having surgery, pay special attention to the part of your body where you will be having surgery. Scrub this area for at least 1 minute.  Do not use CHG on your head or face. If the solution gets into your ears or eyes, rinse them well with water.  Avoid your genital area.  Avoid any areas of skin that have broken skin, cuts, or scrapes.  Scrub your back and under your arms. Make sure to wash skin folds.  Let the lather sit on your skin for 1-2 minutes or as long as told by your health care provider.  Thoroughly rinse your entire body in the shower. Make sure that all body creases and crevices are rinsed well.  Dry off with a clean towel. Do not put any substances on your body afterward--such as powder, lotion, or perfume--unless you are told to do so by your health care provider. Only use lotions that are recommended by the .  Put on clean clothes or pajamas.  If it is the night before your surgery, sleep in clean sheets.     During a sponge bath  Follow these steps when using CHG solution during a sponge bath (unless your health care provider gives you different instructions):  Use your normal soap and shampoo to wash your face and hair.  Pour the CHG onto a clean washcloth.  Starting at your neck, lather your body down to your toes. Make sure you follow these instructions:  If you will be having surgery, pay special attention to the part of your body where you will be having surgery. Scrub this area for at least 1 minute.  Do not use CHG on your head or face. If the solution gets into your ears or eyes, rinse them well with water.  Avoid your genital area.  Avoid any areas of skin that have broken skin, cuts, or scrapes.  Scrub your back and under your arms. Make sure to wash skin folds.  Let the lather sit on your skin for  1-2 minutes or as long as told by your health care provider.  Using a different clean, wet washcloth, thoroughly rinse your entire body. Make sure that all body creases and crevices are rinsed well.  Dry off with a clean towel. Do not put any substances on your body afterward--such as powder, lotion, or perfume--unless you are told to do so by your health care provider. Only use lotions that are recommended by the .  Put on clean clothes or pajamas.  If it is the night before your surgery, sleep in clean sheets.  How to use CHG prepackaged cloths  Only use CHG cloths as told by your health care provider, and follow the instructions on the label.  Use the CHG cloth on clean, dry skin.  Do not use the CHG cloth on your head or face unless your health care provider tells you to.  When washing with the CHG cloth:  Avoid your genital area.  Avoid any areas of skin that have broken skin, cuts, or scrapes.  Before surgery    Follow these steps when using a CHG cloth to clean before surgery (unless your health care provider gives you different instructions):  Using the CHG cloth, vigorously scrub the part of your body where you will be having surgery. Scrub using a back-and-forth motion for 3 minutes. The area on your body should be completely wet with CHG when you are done scrubbing.  Do not rinse. Discard the cloth and let the area air-dry. Do not put any substances on the area afterward, such as powder, lotion, or perfume.  Put on clean clothes or pajamas.  If it is the night before your surgery, sleep in clean sheets.     For general bathing  Follow these steps when using CHG cloths for general bathing (unless your health care provider gives you different instructions).  Use a separate CHG cloth for each area of your body. Make sure you wash between any folds of skin and between your fingers and toes. Wash your body in the following order, switching to a new cloth after each step:  The front of your neck,  shoulders, and chest.  Both of your arms, under your arms, and your hands.  Your stomach and groin area, avoiding the genitals.  Your right leg and foot.  Your left leg and foot.  The back of your neck, your back, and your buttocks.  Do not rinse. Discard the cloth and let the area air-dry. Do not put any substances on your body afterward--such as powder, lotion, or perfume--unless you are told to do so by your health care provider. Only use lotions that are recommended by the .  Put on clean clothes or pajamas.  Contact a health care provider if:  Your skin gets irritated after scrubbing.  You have questions about using your solution or cloth.  You swallow any chlorhexidine. Call your local poison control center (1-712.161.2604 in the [place]  Get help right away if:  Your eyes itch badly, or they become very red or swollen.  Your skin itches badly and is red or swollen.  Your hearing changes.  You have trouble seeing.  You have swelling or tingling in your mouth or throat.  You have trouble breathing.  These symptoms may represent a serious problem that is an emergency. Do not wait to see if the symptoms will go away. Get medical help right away. Call your local emergency services (824 in the [place] Do not drive yourself to the hospital.  Summary  Chlorhexidine gluconate (CHG) is a germ-killing (antiseptic) solution that is used to clean the skin. Cleaning your skin with CHG may help to lower your risk for infection.  You may be given CHG to use for bathing. It may be in a bottle or in a prepackaged cloth to use on your skin. Carefully follow your health care provider's instructions and the instructions on the product label.  Do not use CHG if you have a chlorhexidine allergy.  Contact your health care provider if your skin gets irritated after scrubbing.  This information is not intended to replace advice given to you by your health care provider. Make sure you discuss any questions you have with your  health care provider.  Document Revised: 04/17/2023 Document Reviewed: 02/28/2022  Elsevier Patient Education © 2023 [employer]

## 2025-01-17 NOTE — TELEPHONE ENCOUNTER
Caller: Kurt Smart    Relationship: Emergency Contact    Best call back number: [phone]      Who are you requesting to speak with (clinical staff, provider,  specific staff member): CLINICAL      What was the call regarding: PT HAD HER LABS RUN THIS MORNING AT A PRE-OP APPT AND WOULD LIKE DR KEITH TO REVIEW WHEN HE HAS A CHANCE AND FOLLOW UP WITH PT  ALSO TO DISCUSS ALL IMAGING RESULTS FROM LAST MONTH

## 2025-01-17 NOTE — TELEPHONE ENCOUNTER
----- Message from Matthew Cruz sent at 1/17/2025 11:32 AM CST -----  Potassium 2.8-please call in K-Dur 20 p.o. 3 times daily x 3 days-  ----- Message -----  From: Lab, Background User  Sent: 1/17/2025   9:25 AM CST  To: Matthew Cruz MD

## 2025-01-18 LAB — CANCER AG27-29 SERPL-ACNC: 46.3 U/ML (ref 0–38.6)

## 2025-01-23 ENCOUNTER — HOSPITAL ENCOUNTER (OUTPATIENT)
Facility: HOSPITAL | Age: 45
Setting detail: HOSPITAL OUTPATIENT SURGERY
Discharge: HOME | End: 2025-01-23
Attending: OBSTETRICS & GYNECOLOGY | Admitting: OBSTETRICS & GYNECOLOGY
Payer: COMMERCIAL

## 2025-01-23 ENCOUNTER — ANESTHESIA EVENT (OUTPATIENT)
Dept: PERIOP | Facility: HOSPITAL | Age: 45
End: 2025-01-23
Payer: COMMERCIAL

## 2025-01-23 ENCOUNTER — ANESTHESIA (OUTPATIENT)
Dept: PERIOP | Facility: HOSPITAL | Age: 45
End: 2025-01-23
Payer: COMMERCIAL

## 2025-01-23 VITALS
OXYGEN SATURATION: 95 % | DIASTOLIC BLOOD PRESSURE: 59 MMHG | HEART RATE: 73 BPM | RESPIRATION RATE: 16 BRPM | TEMPERATURE: 97 F | SYSTOLIC BLOOD PRESSURE: 96 MMHG

## 2025-01-23 DIAGNOSIS — G89.18 POST-OP PAIN: Primary | ICD-10-CM

## 2025-01-23 DIAGNOSIS — Z85.3 HISTORY OF LEFT BREAST CANCER: ICD-10-CM

## 2025-01-23 LAB
ABO GROUP BLD: NORMAL
B-HCG UR QL: NEGATIVE
BLD GP AB SCN SERPL QL: NEGATIVE
RH BLD: POSITIVE
T&S EXPIRATION DATE: NORMAL

## 2025-01-23 PROCEDURE — 25010000002 FENTANYL CITRATE (PF) 250 MCG/5ML SOLUTION: Performed by: NURSE ANESTHETIST, CERTIFIED REGISTERED

## 2025-01-23 PROCEDURE — 25010000002 MIDAZOLAM PER 1MG: Performed by: ANESTHESIOLOGY

## 2025-01-23 PROCEDURE — 25010000002 GLYCOPYRROLATE 0.4 MG/2ML SOLUTION: Performed by: NURSE ANESTHETIST, CERTIFIED REGISTERED

## 2025-01-23 PROCEDURE — 25810000003 LACTATED RINGERS PER 1000 ML: Performed by: OBSTETRICS & GYNECOLOGY

## 2025-01-23 PROCEDURE — 86850 RBC ANTIBODY SCREEN: CPT | Performed by: OBSTETRICS & GYNECOLOGY

## 2025-01-23 PROCEDURE — 25010000002 DEXAMETHASONE PER 1 MG: Performed by: NURSE ANESTHETIST, CERTIFIED REGISTERED

## 2025-01-23 PROCEDURE — 58571 TLH W/T/O 250 G OR LESS: CPT | Performed by: OBSTETRICS & GYNECOLOGY

## 2025-01-23 PROCEDURE — P9041 ALBUMIN (HUMAN),5%, 50ML: HCPCS | Performed by: ANESTHESIOLOGY

## 2025-01-23 PROCEDURE — 86900 BLOOD TYPING SEROLOGIC ABO: CPT | Performed by: OBSTETRICS & GYNECOLOGY

## 2025-01-23 PROCEDURE — 86901 BLOOD TYPING SEROLOGIC RH(D): CPT | Performed by: OBSTETRICS & GYNECOLOGY

## 2025-01-23 PROCEDURE — 25010000002 CEFAZOLIN PER 500 MG: Performed by: OBSTETRICS & GYNECOLOGY

## 2025-01-23 PROCEDURE — C1713 ANCHOR/SCREW BN/BN,TIS/BN: HCPCS | Performed by: OBSTETRICS & GYNECOLOGY

## 2025-01-23 PROCEDURE — 81025 URINE PREGNANCY TEST: CPT | Performed by: OBSTETRICS & GYNECOLOGY

## 2025-01-23 PROCEDURE — 25010000002 ALBUMIN HUMAN 5% PER 50 ML: Performed by: ANESTHESIOLOGY

## 2025-01-23 PROCEDURE — 25810000003 SODIUM CHLORIDE PER 500 ML: Performed by: OBSTETRICS & GYNECOLOGY

## 2025-01-23 PROCEDURE — 25010000002 ONDANSETRON PER 1 MG: Performed by: NURSE ANESTHETIST, CERTIFIED REGISTERED

## 2025-01-23 PROCEDURE — 88307 TISSUE EXAM BY PATHOLOGIST: CPT | Performed by: OBSTETRICS & GYNECOLOGY

## 2025-01-23 PROCEDURE — 25010000002 BUPIVACAINE 0.25 % SOLUTION: Performed by: OBSTETRICS & GYNECOLOGY

## 2025-01-23 PROCEDURE — 25010000002 KETOROLAC TROMETHAMINE PER 15 MG: Performed by: NURSE ANESTHETIST, CERTIFIED REGISTERED

## 2025-01-23 PROCEDURE — 25010000002 DEXAMETHASONE PER 1 MG: Performed by: ANESTHESIOLOGY

## 2025-01-23 PROCEDURE — 25010000002 LIDOCAINE PF 2% 2 % SOLUTION: Performed by: NURSE ANESTHETIST, CERTIFIED REGISTERED

## 2025-01-23 PROCEDURE — 25010000002 PROPOFOL 10 MG/ML EMULSION: Performed by: NURSE ANESTHETIST, CERTIFIED REGISTERED

## 2025-01-23 DEVICE — HEMOST ABS SURGICEL PWDR 3GM: Type: IMPLANTABLE DEVICE | Site: ABDOMEN | Status: FUNCTIONAL

## 2025-01-23 DEVICE — ABSORBABLE WOUND CLOSURE DEVICE
Type: IMPLANTABLE DEVICE | Site: ABDOMEN | Status: FUNCTIONAL
Brand: V-LOC 180

## 2025-01-23 RX ORDER — KETOROLAC TROMETHAMINE 30 MG/ML
INJECTION, SOLUTION INTRAMUSCULAR; INTRAVENOUS AS NEEDED
Status: DISCONTINUED | OUTPATIENT
Start: 2025-01-23 | End: 2025-01-23 | Stop reason: SURG

## 2025-01-23 RX ORDER — SODIUM CHLORIDE 9 MG/ML
40 INJECTION, SOLUTION INTRAVENOUS AS NEEDED
Status: DISCONTINUED | OUTPATIENT
Start: 2025-01-23 | End: 2025-01-23 | Stop reason: HOSPADM

## 2025-01-23 RX ORDER — DEXAMETHASONE SODIUM PHOSPHATE 4 MG/ML
INJECTION, SOLUTION INTRA-ARTICULAR; INTRALESIONAL; INTRAMUSCULAR; INTRAVENOUS; SOFT TISSUE AS NEEDED
Status: DISCONTINUED | OUTPATIENT
Start: 2025-01-23 | End: 2025-01-23 | Stop reason: SURG

## 2025-01-23 RX ORDER — LIDOCAINE HYDROCHLORIDE 20 MG/ML
INJECTION, SOLUTION EPIDURAL; INFILTRATION; INTRACAUDAL; PERINEURAL AS NEEDED
Status: DISCONTINUED | OUTPATIENT
Start: 2025-01-23 | End: 2025-01-23 | Stop reason: SURG

## 2025-01-23 RX ORDER — NEOSTIGMINE METHYLSULFATE 5 MG/5 ML
SYRINGE (ML) INTRAVENOUS AS NEEDED
Status: DISCONTINUED | OUTPATIENT
Start: 2025-01-23 | End: 2025-01-23 | Stop reason: SURG

## 2025-01-23 RX ORDER — HYDROMORPHONE HYDROCHLORIDE 1 MG/ML
0.5 INJECTION, SOLUTION INTRAMUSCULAR; INTRAVENOUS; SUBCUTANEOUS
Status: DISCONTINUED | OUTPATIENT
Start: 2025-01-23 | End: 2025-01-23 | Stop reason: HOSPADM

## 2025-01-23 RX ORDER — SODIUM CHLORIDE 0.9 % (FLUSH) 0.9 %
3 SYRINGE (ML) INJECTION AS NEEDED
Status: DISCONTINUED | OUTPATIENT
Start: 2025-01-23 | End: 2025-01-23 | Stop reason: HOSPADM

## 2025-01-23 RX ORDER — PROPOFOL 10 MG/ML
VIAL (ML) INTRAVENOUS AS NEEDED
Status: DISCONTINUED | OUTPATIENT
Start: 2025-01-23 | End: 2025-01-23 | Stop reason: SURG

## 2025-01-23 RX ORDER — LIDOCAINE HYDROCHLORIDE 10 MG/ML
0.5 INJECTION, SOLUTION EPIDURAL; INFILTRATION; INTRACAUDAL; PERINEURAL ONCE AS NEEDED
Status: DISCONTINUED | OUTPATIENT
Start: 2025-01-23 | End: 2025-01-23 | Stop reason: HOSPADM

## 2025-01-23 RX ORDER — MIDAZOLAM HYDROCHLORIDE 2 MG/2ML
2 INJECTION, SOLUTION INTRAMUSCULAR; INTRAVENOUS
Status: DISCONTINUED | OUTPATIENT
Start: 2025-01-23 | End: 2025-01-23 | Stop reason: HOSPADM

## 2025-01-23 RX ORDER — SODIUM CHLORIDE 9 MG/ML
INJECTION, SOLUTION INTRAVENOUS AS NEEDED
Status: DISCONTINUED | OUTPATIENT
Start: 2025-01-23 | End: 2025-01-23 | Stop reason: HOSPADM

## 2025-01-23 RX ORDER — SODIUM CHLORIDE 0.9 % (FLUSH) 0.9 %
10 SYRINGE (ML) INJECTION AS NEEDED
Status: DISCONTINUED | OUTPATIENT
Start: 2025-01-23 | End: 2025-01-23 | Stop reason: HOSPADM

## 2025-01-23 RX ORDER — SODIUM CHLORIDE, SODIUM LACTATE, POTASSIUM CHLORIDE, CALCIUM CHLORIDE 600; 310; 30; 20 MG/100ML; MG/100ML; MG/100ML; MG/100ML
1000 INJECTION, SOLUTION INTRAVENOUS CONTINUOUS
Status: DISCONTINUED | OUTPATIENT
Start: 2025-01-23 | End: 2025-01-23 | Stop reason: HOSPADM

## 2025-01-23 RX ORDER — FLUMAZENIL 0.1 MG/ML
0.2 INJECTION INTRAVENOUS AS NEEDED
Status: DISCONTINUED | OUTPATIENT
Start: 2025-01-23 | End: 2025-01-23 | Stop reason: HOSPADM

## 2025-01-23 RX ORDER — HYDROCODONE BITARTRATE AND ACETAMINOPHEN 10; 325 MG/1; MG/1
1 TABLET ORAL ONCE AS NEEDED
Status: DISCONTINUED | OUTPATIENT
Start: 2025-01-23 | End: 2025-01-23 | Stop reason: HOSPADM

## 2025-01-23 RX ORDER — BUPIVACAINE HYDROCHLORIDE 2.5 MG/ML
INJECTION, SOLUTION INFILTRATION; PERINEURAL AS NEEDED
Status: DISCONTINUED | OUTPATIENT
Start: 2025-01-23 | End: 2025-01-23 | Stop reason: HOSPADM

## 2025-01-23 RX ORDER — ONDANSETRON 2 MG/ML
4 INJECTION INTRAMUSCULAR; INTRAVENOUS ONCE AS NEEDED
Status: DISCONTINUED | OUTPATIENT
Start: 2025-01-23 | End: 2025-01-23 | Stop reason: HOSPADM

## 2025-01-23 RX ORDER — HYDROMORPHONE HYDROCHLORIDE 2 MG/1
2 TABLET ORAL ONCE AS NEEDED
Status: DISCONTINUED | OUTPATIENT
Start: 2025-01-23 | End: 2025-01-23 | Stop reason: HOSPADM

## 2025-01-23 RX ORDER — SODIUM CHLORIDE 0.9 % (FLUSH) 0.9 %
10 SYRINGE (ML) INJECTION EVERY 12 HOURS SCHEDULED
Status: DISCONTINUED | OUTPATIENT
Start: 2025-01-23 | End: 2025-01-23 | Stop reason: HOSPADM

## 2025-01-23 RX ORDER — ROCURONIUM BROMIDE 10 MG/ML
INJECTION, SOLUTION INTRAVENOUS AS NEEDED
Status: DISCONTINUED | OUTPATIENT
Start: 2025-01-23 | End: 2025-01-23 | Stop reason: SURG

## 2025-01-23 RX ORDER — SODIUM CHLORIDE 9 MG/ML
100 INJECTION, SOLUTION INTRAVENOUS CONTINUOUS
Status: DISPENSED | OUTPATIENT
Start: 2025-01-23 | End: 2025-01-23

## 2025-01-23 RX ORDER — ONDANSETRON 2 MG/ML
4 INJECTION INTRAMUSCULAR; INTRAVENOUS
Status: DISCONTINUED | OUTPATIENT
Start: 2025-01-23 | End: 2025-01-23 | Stop reason: HOSPADM

## 2025-01-23 RX ORDER — NALOXONE HCL 0.4 MG/ML
0.04 VIAL (ML) INJECTION AS NEEDED
Status: DISCONTINUED | OUTPATIENT
Start: 2025-01-23 | End: 2025-01-23 | Stop reason: HOSPADM

## 2025-01-23 RX ORDER — SODIUM CHLORIDE 0.9 % (FLUSH) 0.9 %
3-10 SYRINGE (ML) INJECTION AS NEEDED
Status: DISCONTINUED | OUTPATIENT
Start: 2025-01-23 | End: 2025-01-23 | Stop reason: HOSPADM

## 2025-01-23 RX ORDER — HYDROCODONE BITARTRATE AND ACETAMINOPHEN 5; 325 MG/1; MG/1
1 TABLET ORAL EVERY 6 HOURS PRN
Qty: 15 TABLET | Refills: 0 | Status: SHIPPED | OUTPATIENT
Start: 2025-01-23

## 2025-01-23 RX ORDER — SODIUM CHLORIDE 0.9 % (FLUSH) 0.9 %
1-10 SYRINGE (ML) INJECTION AS NEEDED
Status: DISCONTINUED | OUTPATIENT
Start: 2025-01-23 | End: 2025-01-23 | Stop reason: HOSPADM

## 2025-01-23 RX ORDER — FENTANYL CITRATE 50 UG/ML
INJECTION, SOLUTION INTRAMUSCULAR; INTRAVENOUS AS NEEDED
Status: DISCONTINUED | OUTPATIENT
Start: 2025-01-23 | End: 2025-01-23 | Stop reason: SURG

## 2025-01-23 RX ORDER — IBUPROFEN 600 MG/1
600 TABLET, FILM COATED ORAL EVERY 6 HOURS PRN
Status: DISCONTINUED | OUTPATIENT
Start: 2025-01-23 | End: 2025-01-23 | Stop reason: HOSPADM

## 2025-01-23 RX ORDER — HYDROCODONE BITARTRATE AND ACETAMINOPHEN 5; 325 MG/1; MG/1
1 TABLET ORAL ONCE AS NEEDED
Status: DISCONTINUED | OUTPATIENT
Start: 2025-01-23 | End: 2025-01-23 | Stop reason: HOSPADM

## 2025-01-23 RX ORDER — ACETAMINOPHEN 500 MG
1000 TABLET ORAL ONCE
Status: COMPLETED | OUTPATIENT
Start: 2025-01-23 | End: 2025-01-23

## 2025-01-23 RX ORDER — DEXAMETHASONE SODIUM PHOSPHATE 4 MG/ML
4 INJECTION, SOLUTION INTRA-ARTICULAR; INTRALESIONAL; INTRAMUSCULAR; INTRAVENOUS; SOFT TISSUE ONCE AS NEEDED
Status: COMPLETED | OUTPATIENT
Start: 2025-01-23 | End: 2025-01-23

## 2025-01-23 RX ORDER — SODIUM CHLORIDE 0.9 % (FLUSH) 0.9 %
3 SYRINGE (ML) INJECTION EVERY 12 HOURS SCHEDULED
Status: DISCONTINUED | OUTPATIENT
Start: 2025-01-23 | End: 2025-01-23 | Stop reason: HOSPADM

## 2025-01-23 RX ORDER — ALBUMIN HUMAN 50 G/1000ML
250 SOLUTION INTRAVENOUS ONCE
Status: COMPLETED | OUTPATIENT
Start: 2025-01-23 | End: 2025-01-23

## 2025-01-23 RX ORDER — ONDANSETRON 2 MG/ML
INJECTION INTRAMUSCULAR; INTRAVENOUS AS NEEDED
Status: DISCONTINUED | OUTPATIENT
Start: 2025-01-23 | End: 2025-01-23 | Stop reason: SURG

## 2025-01-23 RX ORDER — FENTANYL CITRATE 50 UG/ML
50 INJECTION, SOLUTION INTRAMUSCULAR; INTRAVENOUS
Status: DISCONTINUED | OUTPATIENT
Start: 2025-01-23 | End: 2025-01-23 | Stop reason: HOSPADM

## 2025-01-23 RX ORDER — GLYCOPYRROLATE 0.2 MG/ML
INJECTION INTRAMUSCULAR; INTRAVENOUS AS NEEDED
Status: DISCONTINUED | OUTPATIENT
Start: 2025-01-23 | End: 2025-01-23 | Stop reason: SURG

## 2025-01-23 RX ORDER — SODIUM CHLORIDE, SODIUM LACTATE, POTASSIUM CHLORIDE, CALCIUM CHLORIDE 600; 310; 30; 20 MG/100ML; MG/100ML; MG/100ML; MG/100ML
100 INJECTION, SOLUTION INTRAVENOUS CONTINUOUS
Status: DISCONTINUED | OUTPATIENT
Start: 2025-01-23 | End: 2025-01-23 | Stop reason: HOSPADM

## 2025-01-23 RX ORDER — PHENYLEPHRINE HCL IN 0.9% NACL 1 MG/10 ML
SYRINGE (ML) INTRAVENOUS AS NEEDED
Status: DISCONTINUED | OUTPATIENT
Start: 2025-01-23 | End: 2025-01-23 | Stop reason: SURG

## 2025-01-23 RX ORDER — LABETALOL HYDROCHLORIDE 5 MG/ML
5 INJECTION, SOLUTION INTRAVENOUS
Status: DISCONTINUED | OUTPATIENT
Start: 2025-01-23 | End: 2025-01-23 | Stop reason: HOSPADM

## 2025-01-23 RX ORDER — SCOPOLAMINE 1 MG/3D
1 PATCH, EXTENDED RELEASE TRANSDERMAL ONCE
Status: DISCONTINUED | OUTPATIENT
Start: 2025-01-23 | End: 2025-01-23 | Stop reason: HOSPADM

## 2025-01-23 RX ADMIN — ONDANSETRON 4 MG: 2 INJECTION INTRAMUSCULAR; INTRAVENOUS at 10:15

## 2025-01-23 RX ADMIN — SODIUM CHLORIDE, POTASSIUM CHLORIDE, SODIUM LACTATE AND CALCIUM CHLORIDE: 600; 310; 30; 20 INJECTION, SOLUTION INTRAVENOUS at 10:32

## 2025-01-23 RX ADMIN — FENTANYL CITRATE 100 MCG: 0.05 INJECTION, SOLUTION INTRAMUSCULAR; INTRAVENOUS at 09:00

## 2025-01-23 RX ADMIN — GLYCOPYRROLATE 0.4 MG: 0.2 INJECTION INTRAMUSCULAR; INTRAVENOUS at 10:16

## 2025-01-23 RX ADMIN — Medication 200 MCG: at 09:10

## 2025-01-23 RX ADMIN — ROCURONIUM BROMIDE 50 MG: 10 INJECTION, SOLUTION INTRAVENOUS at 09:01

## 2025-01-23 RX ADMIN — CEFAZOLIN 2000 MG: 2 INJECTION, POWDER, FOR SOLUTION INTRAMUSCULAR; INTRAVENOUS at 09:10

## 2025-01-23 RX ADMIN — MIDAZOLAM HYDROCHLORIDE 2 MG: 1 INJECTION, SOLUTION INTRAMUSCULAR; INTRAVENOUS at 08:33

## 2025-01-23 RX ADMIN — DEXAMETHASONE SODIUM PHOSPHATE 8 MG: 4 INJECTION, SOLUTION INTRA-ARTICULAR; INTRALESIONAL; INTRAMUSCULAR; INTRAVENOUS; SOFT TISSUE at 09:27

## 2025-01-23 RX ADMIN — ALBUMIN (HUMAN) 250 ML: 12.5 INJECTION, SOLUTION INTRAVENOUS at 11:43

## 2025-01-23 RX ADMIN — LIDOCAINE HYDROCHLORIDE 100 MG: 20 INJECTION, SOLUTION EPIDURAL; INFILTRATION; INTRACAUDAL; PERINEURAL at 09:01

## 2025-01-23 RX ADMIN — Medication 200 MCG: at 10:27

## 2025-01-23 RX ADMIN — Medication 5 ML: at 07:31

## 2025-01-23 RX ADMIN — PROPOFOL 150 MG: 10 INJECTION, EMULSION INTRAVENOUS at 09:01

## 2025-01-23 RX ADMIN — IBUPROFEN 600 MG: 600 TABLET, FILM COATED ORAL at 12:28

## 2025-01-23 RX ADMIN — FENTANYL CITRATE 50 MCG: 0.05 INJECTION, SOLUTION INTRAMUSCULAR; INTRAVENOUS at 09:27

## 2025-01-23 RX ADMIN — SCOLOPAMINE TRANSDERMAL SYSTEM 1 PATCH: 1 PATCH, EXTENDED RELEASE TRANSDERMAL at 08:32

## 2025-01-23 RX ADMIN — KETOROLAC TROMETHAMINE 30 MG: 30 INJECTION, SOLUTION INTRAMUSCULAR; INTRAVENOUS at 10:15

## 2025-01-23 RX ADMIN — Medication 200 MCG: at 10:32

## 2025-01-23 RX ADMIN — Medication 200 MCG: at 09:15

## 2025-01-23 RX ADMIN — Medication 3 MG: at 10:16

## 2025-01-23 RX ADMIN — SODIUM CHLORIDE, POTASSIUM CHLORIDE, SODIUM LACTATE AND CALCIUM CHLORIDE 1000 ML: 600; 310; 30; 20 INJECTION, SOLUTION INTRAVENOUS at 07:15

## 2025-01-23 RX ADMIN — ACETAMINOPHEN 1000 MG: 500 TABLET, FILM COATED ORAL at 08:32

## 2025-01-23 RX ADMIN — DEXAMETHASONE SODIUM PHOSPHATE 4 MG: 4 INJECTION, SOLUTION INTRA-ARTICULAR; INTRALESIONAL; INTRAMUSCULAR; INTRAVENOUS; SOFT TISSUE at 08:32

## 2025-01-23 NOTE — ANESTHESIA PROCEDURE NOTES
Airway  Urgency: elective    Date/Time: 1/23/2025 9:02 AM  Airway not difficult    General Information and Staff    Patient location during procedure: OR  CRNA/CAA: Ivonne Zhu CRNA    Indications and Patient Condition  Indications for airway management: airway protection    Preoxygenated: yes  Mask difficulty assessment: 1 - vent by mask    Final Airway Details  Final airway type: endotracheal airway      Successful airway: ETT  Cuffed: yes   Successful intubation technique: direct laryngoscopy  Facilitating devices/methods: intubating stylet  Endotracheal tube insertion site: oral  Blade: Garcias  Blade size: 2  ETT size (mm): 7.0  Cormack-Lehane Classification: grade I - full view of glottis  Placement verified by: chest auscultation and capnometry   Cuff volume (mL): 8  Measured from: lips  ETT/EBT  to lips (cm): 22  Number of attempts at approach: 1  Assessment: lips, teeth, and gum same as pre-op and atraumatic intubation    Additional Comments  Easy, atraumatic intubation.

## 2025-01-23 NOTE — ANESTHESIA POSTPROCEDURE EVALUATION
Patient: Rachana Smart    Procedure Summary       Date: 01/23/25 Room / Location:  [facility] OR 06 /  [facility]    Anesthesia Start: 0857 Anesthesia Stop: 1041    Procedure: TOTAL LAPAROSCOPIC HYSTERECTOMY BILATERAL SALPINGOOPHORECTOMY WITH DAVINCI ROBOT (removal of uterus, cervix, both fallopian tubes and ovaries using the davinci robot) (Bilateral: Abdomen) Diagnosis:       History of left breast cancer      (History of left breast cancer [Z85.3])    Surgeons: Glo Benavides MD Provider: Ivonne Zhu CRNA    Anesthesia Type: general ASA Status: 3            Anesthesia Type: general    Vitals  Vitals Value Taken Time   BP 90/63 01/23/25 1218   Temp 97 °F (36.1 °C) 01/23/25 1038   Pulse 79 01/23/25 1218   Resp 14 01/23/25 1218   SpO2 92 % 01/23/25 1218           Post Anesthesia Care and Evaluation    Patient location during evaluation: PHASE II  Patient participation: complete - patient participated  Level of consciousness: awake and awake and alert  Pain score: 0  Pain management: adequate    Airway patency: patent  Anesthetic complications: No anesthetic complications  PONV Status: none  Cardiovascular status: acceptable  Respiratory status: acceptable  Hydration status: acceptable    Comments: Patient discharged according to acceptable Jared score per RN assessment. See nursing records for further information.     Blood pressure 90/63, pulse 79, temperature 97 °F (36.1 °C), temperature source Temporal, resp. rate 14, last menstrual period 09/29/2024, SpO2 92%, not currently breastfeeding.

## 2025-01-23 NOTE — OP NOTE
[facility]  Rachana Smart  : 1980  MRN: [id number]  [facility]: [id number]  Date: 2025    Operative Note    Pre-op Diagnosis:  History of left breast cancer [Z85.3]   Post-op Diagnosis:  Post-Op Diagnosis Codes:     * History of left breast cancer [Z85.3]   Procedure: Procedure(s):  TOTAL LAPAROSCOPIC HYSTERECTOMY BILATERAL SALPINGOOPHORECTOMY WITH DAVINCI ROBOT (removal of uterus, cervix, both fallopian tubes and ovaries using the davinci robot)   Surgeon: Surgeon(s):  Glo Benavides MD       Anesthesia: General     Estimated Blood Loss: 100   mls   Fluids: 1000   mls   UOP: 300   mls   Drains: Ramirez   ABx: Kefzol     Specimens:  Uterus, tubes, cervix and both ovaries   Findings: Normal size uterus, tubes and ovaries. Benign simple cyst of the left ovary. Mild ascited   Complications: None       INDICATIONS: Rachana Smart is a 44 y.o. female with left sided breast cancer and ha syndrome who has chosen definitive therapy with hysterectomy/BSO.      PROCEDURE: After informed consent was obtained, the patient was taken to the operating room where general anesthesia was administered. She was placed in the lithotomy position in [facility] and her abdomen, perineum and vagina were prepped and draped in normal sterile fashion. Attention was then turned to the vagina. A speculum was placed and the cervix visualized and grasped with a tenaculum. The cervix was dilated and a medium VCare uterine manipulator was placed into the uterine cavity. The balloon was inflated. The speculum and tenaculum were removed. A Ramirez catheter was inserted. The skin just above the umbilicus was infiltrated with 0.25% MARCAINE solution and then incised with a scalpel approximately 1 cm in length. The subcutaneous tissues were divided bluntly. The abdominal wall was then elevated and the Veress needle placed into the peritoneal cavity without difficulty. Correct placement was confirmed with water drop test  and measurement of gas pressures and flow. After insufflating the abdomen with carbon dioxide, the Veress needle was removed and an 8 mm, tissue-, blunt da Ajna trocar was placed into the abdominal cavity while tenting the abdominal wall. Correct placement was confirmed by visualization with the laparoscope.  Two robotic ports were placed in the lower quadrants, one on each side. The skin was infiltrated with MARCAINE, incised with the scalpel and then the port placed under direct visualization with the laparoscope.  The robotic cart was advanced and docked without difficulty. The vessel sealer was  placed in the right arm and bipolar fenestrated forceps were placed in the left arm. I then moved to the console to perform the hysterectomy.     The pelvis was inspected with the findings noted above. Each ovary  was grasped and elevated. The infundibulopelvic ligaments were cauterized  and transected with the vessel sealer. The round ligaments were cauterized and transected with  as well. The anterior and posterior leaves of the broad ligaments were then easily dissected separately to skeletonize the uterine vessels. There was a nice plane visible with the bladder flap, which was undermined with incised with vessel sealer. The bladder was then mobilized off the anterior aspect of the cervix, well below the cervical cap, which was easily visible. The uterine vessels on each side were then cauterized with bipolar energy. They were then transected with the vessel sealer. Several more small bites with the bipolar forceps ensured good hemostasis and mobilization off of the cervix. The uterosacral ligament insertions to the posterior aspect of the uterus were cauterized and then transected.  The uterus was dusky in appearance and colpotomy was then performed initially anteriorly. The monopolar scissors were used to incise the vagina until vaginal entry was obtained at the cervical cap. This was then followed  around bilaterally using monopolar energy. The uterus was then rotated anteriorly and the posterior incision was finished with the monopolar scissors. Once the cervix was completely excised from the upper vagina, the cervix and uterus were easily removed through the colpotomy incision. The monopolar scissors were then changed out for a needle  and a suture of 0 VLOC was used to close the cuff. The suture was placed starting at the right corner, taking a full thickness bite anteriorly and posteriorly. We proceeded in a running fashion to the left corner, which was brought up well and secured during the process.  A second layer was then placed from left to right.  The needle was then reversed to secure the suture in place prior to removing the needle. The vaginal cuff was then inspected with good hemostasis noted. Surgifoam was placed over the vaginal cuff. The procedure was then terminated, the instruments were removed and the robotic cart undocked and moved away from the patient. The gas flow was discontinued and all gas allowed to escape through the umbilical port. The trocars were removed. The skin incisions were closed with subcuticular 3-0 Vicryl Rapide and reinforced with Steri-Strips.  The vagina was then inspected with the speculum and the cuff was noted to be hemostatic and intact. The Ramirez was removed and the cystoscope placed in the bladder. About 200 mL normal saline was used to distend the bladder. The bladder was inspected and noted to be intact and without obvious lesions. The ureters were identified bilaterally and were observed to be effluxing clear urine. The cystoscope was removed. The patient was then awakened and taken to the recovery room in stable condition. She tolerated the procedure well without complications. All sponge, needle and instrument counts were correct times 3 per the operating room staff.    Glo Benavides MD   1/23/2025  10:22 CST

## 2025-01-23 NOTE — ANESTHESIA PREPROCEDURE EVALUATION
Anesthesia Evaluation     Patient summary reviewed   no history of anesthetic complications:   NPO Solid Status: > 8 hours             Airway   Mallampati: II  Dental - normal exam         Pulmonary    (+) a smoker Current, asthma,  (-) sleep apnea  Cardiovascular   Exercise tolerance: good (4-7 METS)    (+) hypertension (no  meds), valvular problems/murmurs      Neuro/Psych  (-) seizures, TIA, CVA  GI/Hepatic/Renal/Endo    (+) hepatitis C, liver disease cirrhosis, diabetes mellitus    Musculoskeletal     Abdominal    Substance History      OB/GYN          Other   blood dyscrasia thrombocytopenia,   history of cancer                  Anesthesia Plan    ASA 3     general     intravenous induction     Anesthetic plan, risks, benefits, and alternatives have been provided, discussed and informed consent has been obtained with: patient.      CODE STATUS:

## 2025-01-25 DIAGNOSIS — R18.8 CIRRHOSIS OF LIVER WITH ASCITES, UNSPECIFIED HEPATIC CIRRHOSIS TYPE: ICD-10-CM

## 2025-01-25 DIAGNOSIS — K74.60 CIRRHOSIS OF LIVER WITH ASCITES, UNSPECIFIED HEPATIC CIRRHOSIS TYPE: ICD-10-CM

## 2025-01-27 RX ORDER — FUROSEMIDE 40 MG/1
40 TABLET ORAL DAILY
Qty: 90 TABLET | Refills: 1 | Status: SHIPPED | OUTPATIENT
Start: 2025-01-27

## 2025-01-28 ENCOUNTER — TELEPHONE (OUTPATIENT)
Dept: OBSTETRICS AND GYNECOLOGY | Age: 45
End: 2025-01-28
Payer: COMMERCIAL

## 2025-01-28 ENCOUNTER — APPOINTMENT (OUTPATIENT)
Dept: CT IMAGING | Facility: HOSPITAL | Age: 45
End: 2025-01-28
Payer: COMMERCIAL

## 2025-01-28 ENCOUNTER — HOSPITAL ENCOUNTER (EMERGENCY)
Facility: HOSPITAL | Age: 45
Discharge: HOME | End: 2025-01-28
Admitting: FAMILY MEDICINE
Payer: COMMERCIAL

## 2025-01-28 VITALS
RESPIRATION RATE: 18 BRPM | TEMPERATURE: 100 F | HEIGHT: 67 IN | HEART RATE: 81 BPM | BODY MASS INDEX: 25.74 KG/M2 | DIASTOLIC BLOOD PRESSURE: 63 MMHG | OXYGEN SATURATION: 98 % | WEIGHT: 164 LBS | SYSTOLIC BLOOD PRESSURE: 114 MMHG

## 2025-01-28 DIAGNOSIS — K52.9 ACUTE COLITIS: Primary | ICD-10-CM

## 2025-01-28 LAB
ALBUMIN SERPL-MCNC: 2.9 G/DL (ref 3.5–5.2)
ALBUMIN/GLOB SERPL: 0.9 G/DL
ALP SERPL-CCNC: 83 U/L (ref 39–117)
ALT SERPL W P-5'-P-CCNC: 15 U/L (ref 1–33)
ANION GAP SERPL CALCULATED.3IONS-SCNC: 9 MMOL/L (ref 5–15)
AST SERPL-CCNC: 27 U/L (ref 1–32)
B PARAPERT DNA SPEC QL NAA+PROBE: NOT DETECTED
B PERT DNA SPEC QL NAA+PROBE: NOT DETECTED
BACTERIA UR QL AUTO: ABNORMAL /HPF
BASOPHILS # BLD AUTO: 0.06 10*3/MM3 (ref 0–0.2)
BASOPHILS NFR BLD AUTO: 0.5 % (ref 0–1.5)
BILIRUB SERPL-MCNC: 2.2 MG/DL (ref 0–1.2)
BILIRUB UR QL STRIP: NEGATIVE
BUN SERPL-MCNC: 10 MG/DL (ref 6–20)
BUN/CREAT SERPL: 11.8 (ref 7–25)
C PNEUM DNA NPH QL NAA+NON-PROBE: NOT DETECTED
CALCIUM SPEC-SCNC: 8.2 MG/DL (ref 8.6–10.5)
CHLORIDE SERPL-SCNC: 104 MMOL/L (ref 98–107)
CLARITY UR: ABNORMAL
CO2 SERPL-SCNC: 21 MMOL/L (ref 22–29)
COLOR UR: ABNORMAL
CREAT SERPL-MCNC: 0.85 MG/DL (ref 0.57–1)
CRP SERPL-MCNC: 3.1 MG/DL (ref 0–0.5)
D-LACTATE SERPL-SCNC: 2.4 MMOL/L (ref 0.5–2)
DEPRECATED RDW RBC AUTO: 55 FL (ref 37–54)
EGFRCR SERPLBLD CKD-EPI 2021: 86.2 ML/MIN/1.73
EOSINOPHIL # BLD AUTO: 0.23 10*3/MM3 (ref 0–0.4)
EOSINOPHIL NFR BLD AUTO: 1.9 % (ref 0.3–6.2)
ERYTHROCYTE [DISTWIDTH] IN BLOOD BY AUTOMATED COUNT: 15.2 % (ref 12.3–15.4)
ERYTHROCYTE [SEDIMENTATION RATE] IN BLOOD: 22 MM/HR (ref 0–20)
FLUAV SUBTYP SPEC NAA+PROBE: NOT DETECTED
FLUBV RNA ISLT QL NAA+PROBE: NOT DETECTED
GLOBULIN UR ELPH-MCNC: 3.4 GM/DL
GLUCOSE SERPL-MCNC: 126 MG/DL (ref 65–99)
GLUCOSE UR STRIP-MCNC: NEGATIVE MG/DL
HADV DNA SPEC NAA+PROBE: NOT DETECTED
HCOV 229E RNA SPEC QL NAA+PROBE: NOT DETECTED
HCOV HKU1 RNA SPEC QL NAA+PROBE: NOT DETECTED
HCOV NL63 RNA SPEC QL NAA+PROBE: NOT DETECTED
HCOV OC43 RNA SPEC QL NAA+PROBE: NOT DETECTED
HCT VFR BLD AUTO: 31.4 % (ref 34–46.6)
HGB BLD-MCNC: 9.9 G/DL (ref 12–15.9)
HGB UR QL STRIP.AUTO: NEGATIVE
HMPV RNA NPH QL NAA+NON-PROBE: NOT DETECTED
HPIV1 RNA ISLT QL NAA+PROBE: NOT DETECTED
HPIV2 RNA SPEC QL NAA+PROBE: NOT DETECTED
HPIV3 RNA NPH QL NAA+PROBE: NOT DETECTED
HPIV4 P GENE NPH QL NAA+PROBE: NOT DETECTED
HYALINE CASTS UR QL AUTO: ABNORMAL /LPF
IMM GRANULOCYTES # BLD AUTO: 0.13 10*3/MM3 (ref 0–0.05)
IMM GRANULOCYTES NFR BLD AUTO: 1.1 % (ref 0–0.5)
KETONES UR QL STRIP: NEGATIVE
LEUKOCYTE ESTERASE UR QL STRIP.AUTO: ABNORMAL
LIPASE SERPL-CCNC: 32 U/L (ref 13–60)
LYMPHOCYTES # BLD AUTO: 1.69 10*3/MM3 (ref 0.7–3.1)
LYMPHOCYTES NFR BLD AUTO: 13.7 % (ref 19.6–45.3)
M PNEUMO IGG SER IA-ACNC: NOT DETECTED
MCH RBC QN AUTO: 31.1 PG (ref 26.6–33)
MCHC RBC AUTO-ENTMCNC: 31.5 G/DL (ref 31.5–35.7)
MCV RBC AUTO: 98.7 FL (ref 79–97)
MONOCYTES # BLD AUTO: 1.09 10*3/MM3 (ref 0.1–0.9)
MONOCYTES NFR BLD AUTO: 8.9 % (ref 5–12)
NEUTROPHILS NFR BLD AUTO: 73.9 % (ref 42.7–76)
NEUTROPHILS NFR BLD AUTO: 9.11 10*3/MM3 (ref 1.7–7)
NITRITE UR QL STRIP: NEGATIVE
NRBC BLD AUTO-RTO: 0 /100 WBC (ref 0–0.2)
PH UR STRIP.AUTO: 8.5 [PH] (ref 5–8)
PLATELET # BLD AUTO: 108 10*3/MM3 (ref 140–450)
PMV BLD AUTO: 10.7 FL (ref 6–12)
POTASSIUM SERPL-SCNC: 4.9 MMOL/L (ref 3.5–5.2)
PROCALCITONIN SERPL-MCNC: 0.2 NG/ML (ref 0–0.25)
PROT SERPL-MCNC: 6.3 G/DL (ref 6–8.5)
PROT UR QL STRIP: NEGATIVE
RBC # BLD AUTO: 3.18 10*6/MM3 (ref 3.77–5.28)
RBC # UR STRIP: ABNORMAL /HPF
REF LAB TEST METHOD: ABNORMAL
RHINOVIRUS RNA SPEC NAA+PROBE: NOT DETECTED
RSV RNA NPH QL NAA+NON-PROBE: NOT DETECTED
SARS-COV-2 RNA RESP QL NAA+PROBE: NOT DETECTED
SODIUM SERPL-SCNC: 134 MMOL/L (ref 136–145)
SP GR UR STRIP: 1.02 (ref 1–1.03)
SQUAMOUS #/AREA URNS HPF: ABNORMAL /HPF
UROBILINOGEN UR QL STRIP: ABNORMAL
WBC # UR STRIP: ABNORMAL /HPF
WBC NRBC COR # BLD AUTO: 12.31 10*3/MM3 (ref 3.4–10.8)

## 2025-01-28 PROCEDURE — 85025 COMPLETE CBC W/AUTO DIFF WBC: CPT

## 2025-01-28 PROCEDURE — 84145 PROCALCITONIN (PCT): CPT

## 2025-01-28 PROCEDURE — 87040 BLOOD CULTURE FOR BACTERIA: CPT

## 2025-01-28 PROCEDURE — 99285 EMERGENCY DEPT VISIT HI MDM: CPT

## 2025-01-28 PROCEDURE — 83690 ASSAY OF LIPASE: CPT

## 2025-01-28 PROCEDURE — 74177 CT ABD & PELVIS W/CONTRAST: CPT

## 2025-01-28 PROCEDURE — 86140 C-REACTIVE PROTEIN: CPT

## 2025-01-28 PROCEDURE — 83605 ASSAY OF LACTIC ACID: CPT

## 2025-01-28 PROCEDURE — 80053 COMPREHEN METABOLIC PANEL: CPT

## 2025-01-28 PROCEDURE — 0202U NFCT DS 22 TRGT SARS-COV-2: CPT

## 2025-01-28 PROCEDURE — 85652 RBC SED RATE AUTOMATED: CPT

## 2025-01-28 PROCEDURE — 36415 COLL VENOUS BLD VENIPUNCTURE: CPT

## 2025-01-28 PROCEDURE — 81001 URINALYSIS AUTO W/SCOPE: CPT

## 2025-01-28 PROCEDURE — 25510000001 IOPAMIDOL 61 % SOLUTION

## 2025-01-28 RX ORDER — IOPAMIDOL 612 MG/ML
100 INJECTION, SOLUTION INTRAVASCULAR
Status: COMPLETED | OUTPATIENT
Start: 2025-01-28 | End: 2025-01-28

## 2025-01-28 RX ORDER — METRONIDAZOLE 500 MG/1
500 TABLET ORAL 2 TIMES DAILY
Qty: 14 TABLET | Refills: 0 | Status: SHIPPED | OUTPATIENT
Start: 2025-01-28 | End: 2025-02-04

## 2025-01-28 RX ORDER — CIPROFLOXACIN 500 MG/1
500 TABLET, FILM COATED ORAL 2 TIMES DAILY
Qty: 14 TABLET | Refills: 0 | Status: SHIPPED | OUTPATIENT
Start: 2025-01-28 | End: 2025-02-04

## 2025-01-28 RX ORDER — ACETAMINOPHEN 500 MG
1000 TABLET ORAL ONCE
Status: COMPLETED | OUTPATIENT
Start: 2025-01-28 | End: 2025-01-28

## 2025-01-28 RX ADMIN — ACETAMINOPHEN 1000 MG: 500 TABLET, FILM COATED ORAL at 16:45

## 2025-01-28 RX ADMIN — IOPAMIDOL 100 ML: 612 INJECTION, SOLUTION INTRAVENOUS at 17:20

## 2025-01-28 NOTE — ED PROVIDER NOTES
Subjective   History of Present Illness  Patient is a 44-year-old female who presents to the ER with complaints of a fever.  Patient had a recent hysterectomy with Dr. Benavides on this past Thursday.  States that fever developed yesterday.  She is having some abdominal pain to one of her incision sites and there is mild erythema present.  No drainage present on exam.  Abdomen is soft and nondistended.  Patient denies any urinary symptoms.  Denies nausea, vomiting, diarrhea.  Patient does mention that she has been having a cough with sore throat and ear pain.  She was informed by OB/GYN group to come to the ER for further evaluation due to fever and recent surgery.        Review of Systems   Constitutional:  Positive for fever.   HENT:  Positive for ear pain and sore throat.    Respiratory:  Positive for cough.    Gastrointestinal:  Positive for abdominal pain.   All other systems reviewed and are negative.      Past Medical History:   Diagnosis Date    Alcoholism in recovery     Allergic     Anemia 220    Anxiety     Asthma     Breast cancer     Cancer 920    Cirrhosis     Endometriosis     Gestational hypertension     Hepatitis C     History of ear infections     History of transfusion     Hypertension     Leaky heart valve     Migraine     Multiple gestation     Osteopenia due to cancer therapy 2024    Ovarian cyst     PMS (premenstrual syndrome)     PONV (postoperative nausea and vomiting)     Preeclampsia     Secondary esophageal varices without bleeding 2024    Sinusitis     Varicella        Allergies   Allergen Reactions    Oxycodone Itching     Pt states any pain medication causes severe itching, nausea    Codeine Rash and GI Intolerance       Past Surgical History:   Procedure Laterality Date    BREAST BIOPSY       SECTION      x2    COLONOSCOPY      COLONOSCOPY N/A 2024    Dr. Bernardo-- Diverticulosis in the left colon. -  The examination was otherwise normal on direct and retroflexion views. - No specimens collected    D & C HYSTEROSCOPY N/A 11/05/2024    Procedure: DILATATION AND CURETTAGE HYSTEROSCOPY;  Surgeon: Glo Benavides MD;  Location: [facility] OR;  Service: Obstetrics/Gynecology;  Laterality: N/A;    ENDOSCOPY N/A 01/31/2024    no evidence of any fresh or old blood or clots,normal stomach    ENDOSCOPY N/A 08/09/2024    -Grade I esophageal varices. - Portal hypertensive gastropathy. - Normal examined duodenum. - No specimens collected    MASTECTOMY W/ SENTINEL NODE BIOPSY Bilateral 11/05/2024    Procedure: BILATERAL SKIN SPARING MASTECTOMY WITH LEFT AXILLARY MAGTRACE GUIDED SENTINEL LYMPH NODE BIOPSY (Dr. Prieto to nahid);  Surgeon: Fide Verduzco MD;  Location: [facility] OR;  Service: General;  Laterality: Bilateral;    MOUTH SURGERY      TOTAL LAPAROSCOPIC HYSTERECTOMY SALPINGO OOPHORECTOMY Bilateral 1/23/2025    Procedure: TOTAL LAPAROSCOPIC HYSTERECTOMY BILATERAL SALPINGOOPHORECTOMY WITH DAVINCI ROBOT (removal of uterus, cervix, both fallopian tubes and ovaries using the davinci robot);  Surgeon: Glo Benavides MD;  Location: [facility] OR;  Service: Robotics - DaVinci;  Laterality: Bilateral;    UPPER GASTROINTESTINAL ENDOSCOPY  3/24    WISDOM TOOTH EXTRACTION  1995       Family History   Problem Relation Age of Onset    Diabetes Mother     Heart disease Father     Diabetes Father     Stroke Father     Melanoma Father 40 - 49    Esophageal cancer Father 60 - 69    Cervical cancer Maternal Grandmother 40 - 49    Skin cancer Paternal Grandmother     Cancer Paternal Grandfather 50 - 59        Mesothelioma    Breast cancer Half-Sister 45    Skin cancer Maternal Aunt     Pancreatic cancer Paternal Uncle     Colon cancer Neg Hx     Colon polyps Neg Hx     Uterine cancer Neg Hx        Social History     Socioeconomic History    Marital status:    Tobacco Use    Smoking status: Some Days     Current  packs/day: 0.50     Average packs/day: 0.5 packs/day for 10.0 years (5.0 ttl pk-yrs)     Types: Cigarettes    Smokeless tobacco: Never   Vaping Use    Vaping status: Never Used   Substance and Sexual Activity    Alcohol use: Not Currently     Alcohol/week: 10.0 standard drinks of alcohol     Comment: vodka a few times a week    Drug use: Never    Sexual activity: Yes     Partners: Male     Birth control/protection: None, Coitus interruptus           Objective   Physical Exam  Vitals and nursing note reviewed.   Constitutional:       General: She is not in acute distress.     Appearance: Normal appearance. She is normal weight. She is not ill-appearing or toxic-appearing.   HENT:      Head: Normocephalic.   Cardiovascular:      Rate and Rhythm: Normal rate and regular rhythm.      Pulses: Normal pulses.      Heart sounds: Normal heart sounds.   Pulmonary:      Effort: Pulmonary effort is normal.      Breath sounds: Normal breath sounds.   Abdominal:      General: Abdomen is flat. Bowel sounds are normal. There is no distension.      Palpations: Abdomen is soft.      Tenderness: There is abdominal tenderness.      Comments: 3 incision sites present to abdomen.  No drainage present on exam.  There is mild erythema present to the far right incision.  Abdomen is soft and nondistended.  No areas of fluctuation present.   Musculoskeletal:         General: Normal range of motion.      Cervical back: Normal range of motion and neck supple.   Skin:     General: Skin is warm and dry.   Neurological:      General: No focal deficit present.      Mental Status: She is alert and oriented to person, place, and time. Mental status is at baseline.   Psychiatric:         Mood and Affect: Mood normal.         Behavior: Behavior normal.         Procedures       Labs Reviewed   COMPREHENSIVE METABOLIC PANEL - Abnormal; Notable for the following components:       Result Value    Glucose 126 (*)     Sodium 134 (*)     CO2 21.0 (*)      Calcium 8.2 (*)     Albumin 2.9 (*)     Total Bilirubin 2.2 (*)     All other components within normal limits    Narrative:     GFR Categories in Chronic Kidney Disease (CKD)      GFR Category          GFR (mL/min/1.73)    Interpretation  G1                     90 or greater         Normal or high (1)  G2                      60-89                Mild decrease (1)  G3a                   45-59                Mild to moderate decrease  G3b                   30-44                Moderate to severe decrease  G4                    15-29                Severe decrease  G5                    14 or less           Kidney failure          (1)In the absence of evidence of kidney disease, neither GFR category G1 or G2 fulfill the criteria for CKD.    eGFR calculation 2021 CKD-EPI creatinine equation, which does not include race as a factor   URINALYSIS W/ CULTURE IF INDICATED - Abnormal; Notable for the following components:    Color, UA Dark Yellow (*)     Appearance, UA Cloudy (*)     pH, UA 8.5 (*)     Leuk Esterase, UA Trace (*)     Urobilinogen, UA 2.0 E.U./dL (*)     All other components within normal limits    Narrative:     In absence of clinical symptoms, the presence of pyuria, bacteria, and/or nitrites on the urinalysis result does not correlate with infection.   LACTIC ACID, PLASMA - Abnormal; Notable for the following components:    Lactate 2.4 (*)     All other components within normal limits   C-REACTIVE PROTEIN - Abnormal; Notable for the following components:    C-Reactive Protein 3.10 (*)     All other components within normal limits   SEDIMENTATION RATE - Abnormal; Notable for the following components:    Sed Rate 22 (*)     All other components within normal limits   CBC WITH AUTO DIFFERENTIAL - Abnormal; Notable for the following components:    WBC 12.31 (*)     RBC 3.18 (*)     Hemoglobin 9.9 (*)     Hematocrit 31.4 (*)     MCV 98.7 (*)     RDW-SD 55.0 (*)     Platelets 108 (*)     Lymphocyte % 13.7 (*)   "   Immature Grans % 1.1 (*)     Neutrophils, Absolute 9.11 (*)     Monocytes, Absolute 1.09 (*)     Immature Grans, Absolute 0.13 (*)     All other components within normal limits   URINALYSIS, MICROSCOPIC ONLY - Abnormal; Notable for the following components:    Squamous Epithelial Cells, UA 21-30 (*)     All other components within normal limits   RESPIRATORY PANEL PCR W/ COVID-19 (SARS-COV-2), NP SWAB IN UTM/VTP, 2 HR TAT - Normal    Narrative:     In the setting of a positive respiratory panel with a viral infection PLUS a negative procalcitonin without other underlying concern for bacterial infection, consider observing off antibiotics or discontinuation of antibiotics and continue supportive care. If the respiratory panel is positive for atypical bacterial infection (Bordetella pertussis, Chlamydophila pneumoniae, or Mycoplasma pneumoniae), consider antibiotic de-escalation to target atypical bacterial infection.   LIPASE - Normal   PROCALCITONIN - Normal    Narrative:     As a Marker for Sepsis (Non-Neonates):    1. <0.5 ng/mL represents a low risk of severe sepsis and/or septic shock.  2. >2 ng/mL represents a high risk of severe sepsis and/or septic shock.    As a Marker for Lower Respiratory Tract Infections that require antibiotic therapy:    PCT on Admission    Antibiotic Therapy       6-12 Hrs later    >0.5                Strongly Recommended  >0.25 - <0.5        Recommended   0.1 - 0.25          Discouraged              Remeasure/reassess PCT  <0.1                Strongly Discouraged     Remeasure/reassess PCT    As 28 day mortality risk marker: \"Change in Procalcitonin Result\" (>80% or <=80%) if Day 0 (or Day 1) and Day 4 values are available. Refer to http://www.[email or web address]-pct-calculator.com    Change in PCT <=80%  A decrease of PCT levels below or equal to 80% defines a positive change in PCT test result representing a higher risk for 28-day all-cause mortality of patients diagnosed with severe sepsis " for septic shock.    Change in PCT >80%  A decrease of PCT levels of more than 80% defines a negative change in PCT result representing a lower risk for 28-day all-cause mortality of patients diagnosed with severe sepsis or septic shock.      BLOOD CULTURE   BLOOD CULTURE   LACTIC ACID, REFLEX   CBC AND DIFFERENTIAL    Narrative:     The following orders were created for panel order CBC & Differential.  Procedure                               Abnormality         Status                     ---------                               -----------         ------                     CBC Auto Differential[[id number]]        Abnormal            Final result                 Please view results for these tests on the individual orders.     CT Abdomen Pelvis With Contrast   Final Result       1.  Postoperative change of recent hysterectomy with diffuse fat   stranding throughout the peritoneum and mesentery of the abdomen and   pelvis, likely representing postoperative inflammation. No free air or   evidence of abscess.       2.  Redemonstrated findings of cirrhosis with sequela of portal   hypertension including nonocclusive portal vein thrombus, borderline   splenomegaly, upper abdominal varices, and a splenorenal shunt.       3.  Diffuse wall thickening of the ascending colon, likely presenting   acute colitis. Differential includes portal colopathy.       4.  Cholelithiasis. Mild gallbladder wall thickening is present but I   suspect this is related to liver disease and not acute cholecystitis as   the gallbladder is decompressed.           This report was signed and finalized on 1/28/2025 5:32 PM by Dr. Jared Sumner MD.                  ED Course                                                       Medical Decision Making  Patient is a 44-year-old female who presents to the ER with complaints of a fever.  Patient had a recent hysterectomy with Dr. Benavides on this past Thursday.  States that fever developed yesterday.   She is having some abdominal pain to one of her incision sites and there is mild erythema present.  No drainage present on exam.  Abdomen is soft and nondistended.  Patient denies any urinary symptoms.  Denies nausea, vomiting, diarrhea.  Patient does mention that she has been having a cough with sore throat and ear pain.  She was informed by OB/GYN group to come to the ER for further evaluation due to fever and recent surgery.    Patient was non-toxic appearing on arrival. Vital signs stable.     Patient's presentation raises suspicion for differentials including, but not limited to, postoperative abscess, UTI, colitis.     External (non-ED) record review: Surgical history reviewed.    Given this, patient was placed on the monitor. Laboratory studies and imaging studies were ordered including CBC, CMP, CRP, ESR, lactic acid, procalcitonin, blood cultures, CT abdomen pelvis with contrast, urinalysis, respiratory panel.     Patient was given Tylenol for symptomatic relief.    Imaging was reviewed by radiologist. Please refer to above section for results that were interpreted by radiologist.    Labs were reviewed and interpreted. Please refer to above section for results.    On re-evaluation, patient remained hemodynamically stable and appeared to be comfortable and in no acute distress.  CT abdomen pelvis revealed no signs of free air or evidence of abscess postoperatively, but did reveal acute colitis.  Oral antibiotics have been prescribed at discharge.  Informed patient if symptoms persist or are not improving within 48 hours, may likely need return to ER for IV antibiotics and possible admission.    I discussed all of the lab and imaging results with the patient during this visit in the emergency department. I answered all the questions regarding the emergency department evaluation, diagnosis, and treatment plan. We talked about how crucial it is for the patient to follow up by calling their primary care provider  as soon as possible to schedule an appointment for within the next few days or as soon as possible so that the symptoms can be reassessed to see if they have improved or to answer any additional questions. I also provided the patient with advice on returning safely and urged the patient to visit the emergency department right away if any worsening or new symptoms appeared. The patient verbalized understanding of the discharge instructions and agreed with them. Rachana was discharged in stable condition.    Signed by:   PRIYANK Negro 1/28/2025 19:49 CST     Dragon disclaimer:  Part of this note may be an electronic transcription/translation of spoken language to printed text using the Dragon Dictation System.    Problems Addressed:  Acute colitis: acute illness or injury    Amount and/or Complexity of Data Reviewed  Labs: ordered.  Radiology: ordered.    Risk  OTC drugs.  Prescription drug management.        Final diagnoses:   Acute colitis       ED Disposition  ED Disposition       ED Disposition   Discharge    Condition   Stable    Comment   --               INA Holloway MD  [address]  [address]  [facility] [address] [phone]    Schedule an appointment as soon as possible for a visit in 1 day      [facility] EMERGENCY DEPARTMENT  [address] [address] [phone]  Go to   If symptoms worsen         Medication List        New Prescriptions      ciprofloxacin 500 MG tablet  Commonly known as: CIPRO  Take 1 tablet by mouth 2 (Two) Times a Day for 7 days.     metroNIDAZOLE 500 MG tablet  Commonly known as: FLAGYL  Take 1 tablet by mouth 2 (Two) Times a Day for 7 days.            Changed      thiamine 100 MG tablet  Commonly known as: VITAMIN B1  Take 1 tablet by mouth Daily.  What changed: how much to take               Where to Get Your Medications        These medications were sent to [facility] [address] [place] [address] - [address] [facility] -  [phone] [facility] [phone] [place] [address]      Phone: [phone]   ciprofloxacin 500 MG tablet  metroNIDAZOLE 500 MG tablet            Jessa Garner, PRIYANK  01/28/25 1952

## 2025-01-28 NOTE — TELEPHONE ENCOUNTER
Patients  called and stated that patient has been running fever since yesterday. He reports it was 99.0-102 last night and then today it is 102. Patient had TLH/BSO on 1/23/25 by . Per  patient needs to be evaluated in [facility] ED. Informed spouse and he VU.

## 2025-01-29 ENCOUNTER — TELEPHONE (OUTPATIENT)
Age: 45
End: 2025-01-29
Payer: COMMERCIAL

## 2025-01-29 DIAGNOSIS — G89.18 POST-OP PAIN: Primary | ICD-10-CM

## 2025-01-29 RX ORDER — HYDROCODONE BITARTRATE AND ACETAMINOPHEN 5; 325 MG/1; MG/1
1 TABLET ORAL EVERY 6 HOURS PRN
Qty: 12 TABLET | Refills: 0 | Status: SHIPPED | OUTPATIENT
Start: 2025-01-29

## 2025-01-29 NOTE — TELEPHONE ENCOUNTER
Pts  called requesting refill on pts [place], Pt is s/p Primary CD 6 days ago.pts  states she is alternating Tylenol and Motrin and is still in pain. Was seen in ED last night and they are treating her with antibiotics for a bacterial infection. I let pts  know I would request refill. If refilling please call into [facility] pharmacy by [address]

## 2025-01-29 NOTE — DISCHARGE INSTRUCTIONS
Oral antibiotics been prescribed for acute colitis.  Please take full course of this.  Stay well hydrated.  Like we discussed, if symptoms are not improving within 48 hours, may likely need return to ER for IV antibiotics.  Follow with PCP and return to the ER for any new or worsening symptoms.

## 2025-01-31 ENCOUNTER — OFFICE VISIT (OUTPATIENT)
Dept: INTERNAL MEDICINE | Facility: CLINIC | Age: 45
End: 2025-01-31
Payer: COMMERCIAL

## 2025-01-31 ENCOUNTER — OFFICE VISIT (OUTPATIENT)
Dept: GASTROENTEROLOGY | Facility: CLINIC | Age: 45
End: 2025-01-31
Payer: COMMERCIAL

## 2025-01-31 VITALS
SYSTOLIC BLOOD PRESSURE: 98 MMHG | BODY MASS INDEX: 24.96 KG/M2 | HEIGHT: 67 IN | HEART RATE: 86 BPM | OXYGEN SATURATION: 100 % | WEIGHT: 159 LBS | TEMPERATURE: 97.3 F | DIASTOLIC BLOOD PRESSURE: 66 MMHG

## 2025-01-31 VITALS
SYSTOLIC BLOOD PRESSURE: 120 MMHG | DIASTOLIC BLOOD PRESSURE: 70 MMHG | HEART RATE: 81 BPM | OXYGEN SATURATION: 98 % | TEMPERATURE: 98 F | WEIGHT: 158.8 LBS | BODY MASS INDEX: 24.92 KG/M2 | HEIGHT: 67 IN

## 2025-01-31 DIAGNOSIS — K52.9 COLITIS: ICD-10-CM

## 2025-01-31 DIAGNOSIS — R11.2 NAUSEA AND VOMITING, UNSPECIFIED VOMITING TYPE: ICD-10-CM

## 2025-01-31 DIAGNOSIS — K74.60 CIRRHOSIS OF LIVER WITH ASCITES, UNSPECIFIED HEPATIC CIRRHOSIS TYPE: Primary | ICD-10-CM

## 2025-01-31 DIAGNOSIS — Z71.6 TOBACCO ABUSE COUNSELING: ICD-10-CM

## 2025-01-31 DIAGNOSIS — K80.20 CALCULUS OF GALLBLADDER WITHOUT CHOLECYSTITIS WITHOUT OBSTRUCTION: ICD-10-CM

## 2025-01-31 DIAGNOSIS — Z15.09 LYNCH SYNDROME: ICD-10-CM

## 2025-01-31 DIAGNOSIS — Z78.9 NONSMOKER: ICD-10-CM

## 2025-01-31 DIAGNOSIS — K70.31 ALCOHOLIC CIRRHOSIS OF LIVER WITH ASCITES: Primary | ICD-10-CM

## 2025-01-31 DIAGNOSIS — R18.8 CIRRHOSIS OF LIVER WITH ASCITES, UNSPECIFIED HEPATIC CIRRHOSIS TYPE: Primary | ICD-10-CM

## 2025-01-31 DIAGNOSIS — I81 PORTAL VEIN THROMBOSIS: ICD-10-CM

## 2025-01-31 DIAGNOSIS — I85.10 SECONDARY ESOPHAGEAL VARICES WITHOUT BLEEDING: ICD-10-CM

## 2025-01-31 PROCEDURE — 99214 OFFICE O/P EST MOD 30 MIN: CPT | Performed by: INTERNAL MEDICINE

## 2025-01-31 NOTE — PROGRESS NOTES
Rachana Smart  1980 1/31/2025  Chief Complaint   Patient presents with    Cirrhosis     Recent ER visit s/p hysterectomy         HPI    Rachana Smart is a  44 y.o. female here for a follow up visit for cirrhosis course constant ongoing. She has a hx of breast cancer as well as s/p hysterectomy and mastectomy since we last saw her. She has no current associated symptoms. Recovery from hysterectomy has included an ER visit for fever and abdominal pain. CT was performed showing incidental wall thickening of the ascending colon. She has had a recent colonoscopy no IBD noted. She was severely constipated post operatively and this is likely the source for this. She is now going and denies any persistent pain. No fever chills or sweats. No rectal bleeding. No ascites. No peripheral edema. She is on Lasix 40 mg daily.   She has grade I varices and there are no current signs for bleeding. This is secondary to her cirrhosis.   IMPRESSION:     1.  Postoperative change of recent hysterectomy with diffuse fat  stranding throughout the peritoneum and mesentery of the abdomen and  pelvis, likely representing postoperative inflammation. No free air or  evidence of abscess.     2.  Redemonstrated findings of cirrhosis with sequela of portal  hypertension including nonocclusive portal vein thrombus, borderline  splenomegaly, upper abdominal varices, and a splenorenal shunt.     3.  Diffuse wall thickening of the ascending colon, likely presenting  acute colitis. Differential includes portal colopathy.     4.  Cholelithiasis. Mild gallbladder wall thickening is present but I  suspect this is related to liver disease and not acute cholecystitis as  the gallbladder is decompressed.        This report was signed and finalized on 1/28/2025 5:32 PM by Dr. Jared Sumner MD.  Note from 6/3/24  44 y.o. female who presents with a complaint of cirrhosis secondary to ETOH.  She was first diagnosed in [place] and  followed there. She was in a clinical trial there for cirrhosis in 2023. She has appt for follow up with Dr Ricardo. No current signs or symptoms of ascites or fluid in her peripheral extremities.   No nausea or vomiting. No abdominal pain. No signs of bleeding. No fever chills or sweats.   She has grade 2 varices noted on January endoscopy. Suggested a repeat in a few months. No signs of bleeding. She denies any ETOH. Her bilirubin continues to be elevated at 3.2. I have explained cirrhosis in detail and the need for no ETOH.   No change in bowels, no family hx for colon cancer.   ENDOSCOPY, INT (01/31/2024)   Per her note from 3/3/23 [facility]  Full serologic work up: UDS neg, CHARLY, neg, Quant IgGs only with high IgA. Viral hep serologies NR.  -Immunizations: s/p HAV/HBV vaccine  Note per Dr Bernardo:  Please let her know that I received a copy of her genetic testing due to breast cancer. 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 This was reviewed with her by the genetic counselor. Now that I know this information, which was not available at the time of her endoscopy and colonoscopy in 8/2024, I need to change her follow-up. She needs both an endoscopy and a colonoscopy every 3 years. Please place in recall for 8/2027. Advise her to keep her already scheduled follow-up with Glo in December regarding her liver.   Past Medical History:   Diagnosis Date    Alcoholism in recovery     Allergic     Anemia [id number]    Anxiety     Asthma     Bacterial infection     lower intestine    Breast cancer 2024    Calculus of gallbladder without cholecystitis without obstruction 01/31/2025    Cancer [id number]    Cirrhosis 2023    Endometriosis 1993    Gestational hypertension 2006    Hepatitis C 2022    Hypertension     Leaky heart valve     Narayanan syndrome     Migraine 2020    Multiple gestation 2006    Osteopenia due to  cancer therapy 2024    Ovarian cyst     PMS (premenstrual syndrome)     PONV (postoperative nausea and vomiting)     Preeclampsia     Secondary esophageal varices without bleeding 2024    Sinusitis     Varicella      Past Surgical History:   Procedure Laterality Date    BREAST BIOPSY       SECTION      x2    COLONOSCOPY      COLONOSCOPY N/A 2024    Dr. Bernardo-- Diverticulosis in the left colon. - The examination was otherwise normal on direct and retroflexion views. - No specimens collected    D & C HYSTEROSCOPY N/A 2024    Procedure: DILATATION AND CURETTAGE HYSTEROSCOPY;  Surgeon: Glo Benavides MD;  Location: [facility] OR;  Service: Obstetrics/Gynecology;  Laterality: N/A;    ENDOSCOPY N/A 2024    no evidence of any fresh or old blood or clots,normal stomach    ENDOSCOPY N/A 2024    -Grade I esophageal varices. - Portal hypertensive gastropathy. - Normal examined duodenum. - No specimens collected    MASTECTOMY W/ SENTINEL NODE BIOPSY Bilateral 2024    Procedure: BILATERAL SKIN SPARING MASTECTOMY WITH LEFT AXILLARY MAGTRACE GUIDED SENTINEL LYMPH NODE BIOPSY (Dr. Prieto to nahid);  Surgeon: Fide Verduzco MD;  Location:  [facility] OR;  Service: General;  Laterality: Bilateral;    MOUTH SURGERY      TOTAL LAPAROSCOPIC HYSTERECTOMY SALPINGO OOPHORECTOMY Bilateral 2025    Procedure: TOTAL LAPAROSCOPIC HYSTERECTOMY BILATERAL SALPINGOOPHORECTOMY WITH DAVINCI ROBOT (removal of uterus, cervix, both fallopian tubes and ovaries using the davinci robot);  Surgeon: Glo Benavides MD;  Location:  [facility] OR;  Service: Robotics - DaVinci;  Laterality: Bilateral;    UPPER GASTROINTESTINAL ENDOSCOPY  3/24    WISDOM TOOTH EXTRACTION         Outpatient Medications Marked as Taking for the 25 encounter (Office Visit) with Glo Bailon APRN   Medication Sig Dispense Refill    carvedilol (Coreg) 6.25 MG tablet Take 1 tablet  by mouth 2 (Two) Times a Day With Meals. 60 tablet 3    ciprofloxacin (CIPRO) 500 MG tablet Take 1 tablet by mouth 2 (Two) Times a Day for 7 days. 14 tablet 0    ferrous sulfate 325 (65 FE) MG tablet Take 1 tablet by mouth Daily With Breakfast.      furosemide (LASIX) 40 MG tablet Take 1 tablet by mouth once daily 90 tablet 1    ibuprofen (Motrin IB) 200 MG tablet Take 3 tablets by mouth Every 8 (Eight) Hours. Take every 8 hours for three days then take as needed.      metroNIDAZOLE (FLAGYL) 500 MG tablet Take 1 tablet by mouth 2 (Two) Times a Day for 7 days. 14 tablet 0    Multivitamin tablet tablet Take 1 tablet by mouth Daily.      potassium chloride (KLOR-CON M20) 20 MEQ CR tablet Take 1 tablet by mouth 3 (Three) Times a Day. 9 tablet 0    tamoxifen (NOLVADEX) 20 MG chemo tablet Take 1 tablet by mouth Daily. 30 tablet 2    thiamine (VITAMIN B1) 100 MG tablet Take 1 tablet by mouth Daily. (Patient taking differently: Take 2.5 tablets by mouth Daily.) 30 tablet 0    vitamin B-12 (cyanocobalamin) 100 MCG tablet Take 5 tablets by mouth Daily.       Current Facility-Administered Medications for the 1/31/25 encounter (Office Visit) with Glo Bailon APRN   Medication Dose Route Frequency Provider Last Rate Last Admin    lidocaine 1% - EPINEPHrine [id number] (XYLOCAINE W/EPI) 1 %-1:[id number] injection 10 mL  10 mL Injection Once Amina Camarillo MD           Allergies   Allergen Reactions    Oxycodone Itching     Pt states any pain medication causes severe itching, nausea    Codeine Rash and GI Intolerance       Social History     Socioeconomic History    Marital status:    Tobacco Use    Smoking status: Some Days     Current packs/day: 0.50     Average packs/day: 0.5 packs/day for 10.0 years (5.0 ttl pk-yrs)     Types: Cigarettes    Smokeless tobacco: Never   Vaping Use    Vaping status: Never Used   Substance and Sexual Activity    Alcohol use: Not Currently     Alcohol/week: 10.0 standard drinks of  alcohol     Types: 10 Standard drinks or equivalent per week    Drug use: Never    Sexual activity: Yes     Partners: Male     Birth control/protection: None, Coitus interruptus       Family History   Problem Relation Age of Onset    Diabetes Mother     Heart disease Father     Diabetes Father     Stroke Father     Melanoma Father 40 - 49    Esophageal cancer Father 60 - 69    Skin cancer Maternal Aunt     Colon cancer Paternal Uncle     Pancreatic cancer Paternal Uncle     Cervical cancer Maternal Grandmother 40 - 49    Skin cancer Paternal Grandmother     Cancer Paternal Grandfather 50 - 59        Mesothelioma    Breast cancer Half-Sister 45    Colon polyps Neg Hx     Uterine cancer Neg Hx        Review of Systems   Constitutional:  Negative for activity change, appetite change, chills, diaphoresis, fatigue, fever and unexpected weight change.   HENT:  Negative for ear pain, hearing loss, mouth sores, sore throat, trouble swallowing and voice change.    Eyes: Negative.    Respiratory:  Negative for cough, choking, shortness of breath and wheezing.    Cardiovascular:  Negative for chest pain and palpitations.   Gastrointestinal:  Positive for constipation. Negative for abdominal pain, blood in stool, diarrhea, nausea and vomiting.   Endocrine: Negative for cold intolerance and heat intolerance.   Genitourinary:  Negative for decreased urine volume, dysuria, frequency, hematuria and urgency.   Musculoskeletal:  Negative for back pain, gait problem and myalgias.   Skin:  Negative for color change, pallor and rash.   Allergic/Immunologic: Negative for food allergies and immunocompromised state.   Neurological:  Negative for dizziness, tremors, seizures, syncope, weakness, light-headedness, numbness and headaches.   Hematological:  Negative for adenopathy. Does not bruise/bleed easily.   Psychiatric/Behavioral:  Negative for agitation and confusion. The patient is not nervous/anxious.    All other systems reviewed and  "are negative.      BP 98/66   Pulse 86   Temp 97.3 °F (36.3 °C)   Ht 170.2 cm (67\")   Wt 72.1 kg (159 lb)   LMP 09/29/2024 (Exact Date)   SpO2 100%   BMI 24.90 kg/m²   Body mass index is 24.9 kg/m².    Physical Exam  Constitutional:       Appearance: She is well-developed.   HENT:      Head: Normocephalic and atraumatic.   Eyes:      Pupils: Pupils are equal, round, and reactive to light.   Neck:      Trachea: No tracheal deviation.   Cardiovascular:      Rate and Rhythm: Normal rate and regular rhythm.      Heart sounds: Normal heart sounds. No murmur heard.     No friction rub. No gallop.   Pulmonary:      Effort: Pulmonary effort is normal. No respiratory distress.      Breath sounds: Normal breath sounds. No wheezing or rales.   Chest:      Chest wall: No tenderness.   Abdominal:      General: Bowel sounds are normal. There is no distension.      Palpations: Abdomen is soft. Abdomen is not rigid.      Tenderness: There is no abdominal tenderness. There is no guarding or rebound.   Musculoskeletal:         General: No tenderness or deformity. Normal range of motion.      Cervical back: Normal range of motion and neck supple.   Skin:     General: Skin is warm and dry.      Coloration: Skin is not pale.      Findings: No rash.   Neurological:      Mental Status: She is alert and oriented to person, place, and time.      Deep Tendon Reflexes: Reflexes are normal and symmetric.   Psychiatric:         Behavior: Behavior normal.         Thought Content: Thought content normal.         Judgment: Judgment normal.         ASSESSMENT AND PLAN    BMI is within normal parameters. No other follow-up for BMI required.    Diagnoses and all orders for this visit:    1. Alcoholic cirrhosis of liver with ascites (Primary)    2. Nonsmoker    3. Nausea and vomiting, unspecified vomiting type    4. Tobacco abuse counseling    5. Narayanan syndrome    Hepatocellular screening every 6 months  Narayanan syndrome determined with genetic " testing since her breast cancer will screen her with endo/colon every 3 years.   Continue diuretics  Low sodium diet less than 2 grams per day  MELD score is 13 using 1/17/25 labs  Continue Coreg for gastropathy and varices  Long discussion today regarding her medical history and recent surgeries and breast cancer. Will follow  Keep stool soft and do Miralax daily up to twice per day if needed.     Return in about 6 months (around 7/31/2025).'  There are no Patient Instructions on file for this visit.  Glo Bailon, APRN  11:43 CST  1/31/2025    Obesity, Adult  Obesity is the condition of having too much total body fat. Being overweight or obese means that your weight is greater than what is considered healthy for your body size. Obesity is determined by a measurement called BMI. BMI is an estimate of body fat and is calculated from height and weight. For adults, a BMI of 30 or higher is considered obese.  Obesity can eventually lead to other health concerns and major illnesses, including:  Stroke.  Coronary artery disease (CAD).  Type 2 diabetes.  Some types of cancer, including cancers of the colon, breast, uterus, and gallbladder.  Osteoarthritis.  High blood pressure (hypertension).  High cholesterol.  Sleep apnea.  Gallbladder stones.  Infertility problems.  What are the causes?  The main cause of obesity is taking in (consuming) more calories than your body uses for energy. Other factors that contribute to this condition may include:  Being born with genes that make you more likely to become obese.  Having a medical condition that causes obesity. These conditions include:  Hypothyroidism.  Polycystic ovarian syndrome (PCOS).  Binge-eating disorder.  Cushing syndrome.  Taking certain medicines, such as steroids, antidepressants, and seizure medicines.  Not being physically active (sedentary lifestyle).  Living where there are limited places to exercise safely or buy healthy foods.  Not getting enough  sleep.  What increases the risk?  The following factors may increase your risk of this condition:  Having a family history of obesity.  Being a woman of -American descent.  Being a man of  descent.  What are the signs or symptoms?  Having excessive body fat is the main symptom of this condition.  How is this diagnosed?  This condition may be diagnosed based on:  Your symptoms.  Your medical history.  A physical exam. Your health care provider may measure:  Your BMI. If you are an adult with a BMI between 25 and less than 30, you are considered overweight. If you are an adult with a BMI of 30 or higher, you are considered obese.  The distances around your hips and your waist (circumferences). These may be compared to each other to help diagnose your condition.  Your skinfold thickness. Your health care provider may gently pinch a fold of your skin and measure it.  How is this treated?  Treatment for this condition often includes changing your lifestyle. Treatment may include some or all of the following:  Dietary changes. Work with your health care provider and a dietitian to set a weight-loss goal that is healthy and reasonable for you. Dietary changes may include eating:  Smaller portions. A portion size is the amount of a particular food that is healthy for you to eat at one time. This varies from person to person.  Low-calorie or low-fat options.  More whole grains, fruits, and vegetables.  Regular physical activity. This may include aerobic activity (cardio) and strength training.  Medicine to help you lose weight. Your health care provider may prescribe medicine if you are unable to lose 1 pound a week after 6 weeks of eating more healthily and doing more physical activity.  Surgery. Surgical options may include gastric banding and gastric bypass. Surgery may be done if:  Other treatments have not helped to improve your condition.  You have a BMI of 40 or higher.  You have life-threatening health  problems related to obesity.  Follow these instructions at home:     Eating and drinking     Follow recommendations from your health care provider about what you eat and drink. Your health care provider may advise you to:  Limit fast foods, sweets, and processed snack foods.  Choose low-fat options, such as low-fat milk instead of whole milk.  Eat 5 or more servings of fruits or vegetables every day.  Eat at home more often. This gives you more control over what you eat.  Choose healthy foods when you eat out.  Learn what a healthy portion size is.  Keep low-fat snacks on hand.  Avoid sugary drinks, such as soda, fruit juice, iced tea sweetened with sugar, and flavored milk.  Eat a healthy breakfast.  Drink enough water to keep your urine clear or pale yellow.  Do not go without eating for long periods of time (do not fast) or follow a fad diet. Fasting and fad diets can be unhealthy and even dangerous.  Physical Activity   Exercise regularly, as told by your health care provider. Ask your health care provider what types of exercise are safe for you and how often you should exercise.  Warm up and stretch before being active.  Cool down and stretch after being active.  Rest between periods of activity.  Lifestyle   Limit the time that you spend in front of your TV, computer, or video game system.  Find ways to reward yourself that do not involve food.  Limit alcohol intake to no more than 1 drink a day for nonpregnant women and 2 drinks a day for men. One drink equals 12 oz of beer, 5 oz of wine, or 1½ oz of hard liquor.  General instructions   Keep a weight loss journal to keep track of the food you eat and how much you exercise you get.  Take over-the-counter and prescription medicines only as told by your health care provider.  Take vitamins and supplements only as told by your health care provider.  Consider joining a support group. Your health care provider may be able to recommend a support group.  Keep all  follow-up visits as told by your health care provider. This is important.  Contact a health care provider if:  You are unable to meet your weight loss goal after 6 weeks of dietary and lifestyle changes.  This information is not intended to replace advice given to you by your health care provider. Make sure you discuss any questions you have with your health care provider.  Document Released: 01/25/2006 Document Revised: 05/22/2017 Document Reviewed: 10/05/2016  [employer] Interactive Patient Education © 2017 [employer]      IF YOU SMOKE OR USE TOBACCO PLEASE READ THE FOLLOWING:    Why is smoking bad for me?  Smoking increases the risk of heart disease, lung disease, vascular disease, stroke, and cancer.     If you smoke, STOP!    If you would like more information on quitting smoking, please visit the [employer] website: [email or web address]   This link will provide additional resources including the QUIT line and the Beat the Pack support groups.     For more information:    Quit Now [place]  1-800-QUIT-NOW  [email or web address]-[email or web address]

## 2025-01-31 NOTE — PROGRESS NOTES
Chief Complaint   Patient presents with    Here to discuss recent test results         History:  Rachana Smart is a 44 y.o. female who presents today for evaluation of the above problems.      HPI  History of Present Illness  The patient presents for evaluation of nonocclusive portal vein thrombosis, esophageal varices, colitis, gallstones, and Narayanan syndrome.    She underwent a hysterectomy last week, performed by Dr. Benavides on January 23, 2025. She experienced a fever on Monday, prompting an emergency room visit on 01/28/2025. A CT scan revealed colitis and a nonocclusive portal vein thrombosis.  She was not informed about the presence of a portal vein thrombosis.  Dr. Benavides contacted me and I recommended this office visit for further discussion.      She was advised to maintain regular bowel movements due to post-hysterectomy colon sluggishness. She reports satisfactory recovery from the hysterectomy, although with some residual soreness. She has been managing her pain with hydrocodone, which she took more frequently following her mastectomy. However, she experienced constipation as a side effect of the medication.    She has a history of grade 1 esophageal varices and is currently on carvedilol therapy.  She had EGD on August 2024    She had a recent colonoscopy in 08/2024, which yielded normal results. 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 An upper endoscopy was performed in 08/2024.  She has had bilateral mastectomy and recent hysterectomy.    She is currently on ciprofloxacin and Flagyl for the management of colitis.    FAMILY HISTORY  Her sister had BRCA2. Her other sister and brother are getting tested for PMS2.        ROS:  Review of Systems  See above    Current Outpatient Medications:     carvedilol (Coreg) 6.25 MG tablet, Take 1 tablet by mouth 2 (Two) Times a Day With Meals., Disp: 60 tablet, Rfl: 3    ciprofloxacin (CIPRO) 500 MG  tablet, Take 1 tablet by mouth 2 (Two) Times a Day for 7 days., Disp: 14 tablet, Rfl: 0    ferrous sulfate 325 (65 FE) MG tablet, Take 1 tablet by mouth Daily With Breakfast., Disp: , Rfl:     furosemide (LASIX) 40 MG tablet, Take 1 tablet by mouth once daily, Disp: 90 tablet, Rfl: 1    HYDROcodone-acetaminophen (NORCO) 5-325 MG per tablet, Take 1 tablet by mouth Every 6 (Six) Hours As Needed (Pain)., Disp: 15 tablet, Rfl: 0    HYDROcodone-acetaminophen (Norco) 5-325 MG per tablet, Take 1 tablet by mouth Every 6 (Six) Hours As Needed for Moderate Pain., Disp: 12 tablet, Rfl: 0    ibuprofen (Motrin IB) 200 MG tablet, Take 3 tablets by mouth Every 8 (Eight) Hours. Take every 8 hours for three days then take as needed., Disp: , Rfl:     metroNIDAZOLE (FLAGYL) 500 MG tablet, Take 1 tablet by mouth 2 (Two) Times a Day for 7 days., Disp: 14 tablet, Rfl: 0    Multivitamin tablet tablet, Take 1 tablet by mouth Daily., Disp: , Rfl:     potassium chloride (KLOR-CON M20) 20 MEQ CR tablet, Take 1 tablet by mouth 3 (Three) Times a Day., Disp: 9 tablet, Rfl: 0    tamoxifen (NOLVADEX) 20 MG chemo tablet, Take 1 tablet by mouth Daily., Disp: 30 tablet, Rfl: 2    thiamine (VITAMIN B1) 100 MG tablet, Take 1 tablet by mouth Daily. (Patient taking differently: Take 2.5 tablets by mouth Daily.), Disp: 30 tablet, Rfl: 0    vitamin B-12 (cyanocobalamin) 100 MCG tablet, Take 5 tablets by mouth Daily., Disp: , Rfl:     Current Facility-Administered Medications:     lidocaine 1% - EPINEPHrine [id number] (XYLOCAINE W/EPI) 1 %-1:894556 injection 10 mL, 10 mL, Injection, Once, Amina Camarillo MD    Lab Results   Component Value Date    GLUCOSE 126 (H) 01/28/2025    BUN 10 01/28/2025    CREATININE 0.85 01/28/2025     (L) 01/28/2025    K 4.9 01/28/2025     01/28/2025    CALCIUM 8.2 (L) 01/28/2025    PROTEINTOT 6.3 01/28/2025    ALBUMIN 2.9 (L) 01/28/2025    ALT 15 01/28/2025    AST 27 01/28/2025    ALKPHOS 83 01/28/2025    BILITOT  2.2 (H) 01/28/2025    GLOB 3.4 01/28/2025    AGRATIO 0.9 01/28/2025    BCR 11.8 01/28/2025    ANIONGAP 9.0 01/28/2025    EGFR 86.2 01/28/2025       WBC   Date Value Ref Range Status   01/28/2025 12.31 (H) 3.40 - 10.80 10*3/mm3 Final     RBC   Date Value Ref Range Status   01/28/2025 3.18 (L) 3.77 - 5.28 10*6/mm3 Final     Hemoglobin   Date Value Ref Range Status   01/28/2025 9.9 (L) 12.0 - 15.9 g/dL Final     Hematocrit   Date Value Ref Range Status   01/28/2025 31.4 (L) 34.0 - 46.6 % Final     MCV   Date Value Ref Range Status   01/28/2025 98.7 (H) 79.0 - 97.0 fL Final     MCH   Date Value Ref Range Status   01/28/2025 31.1 26.6 - 33.0 pg Final     MCHC   Date Value Ref Range Status   01/28/2025 31.5 31.5 - 35.7 g/dL Final     RDW   Date Value Ref Range Status   01/28/2025 15.2 12.3 - 15.4 % Final     RDW-SD   Date Value Ref Range Status   01/28/2025 55.0 (H) 37.0 - 54.0 fl Final     MPV   Date Value Ref Range Status   01/28/2025 10.7 6.0 - 12.0 fL Final     Platelets   Date Value Ref Range Status   01/28/2025 108 (L) 140 - 450 10*3/mm3 Final     Neutrophil %   Date Value Ref Range Status   01/28/2025 73.9 42.7 - 76.0 % Final     Lymphocyte %   Date Value Ref Range Status   01/28/2025 13.7 (L) 19.6 - 45.3 % Final     Monocyte %   Date Value Ref Range Status   01/28/2025 8.9 5.0 - 12.0 % Final     Eosinophil %   Date Value Ref Range Status   01/28/2025 1.9 0.3 - 6.2 % Final     Basophil %   Date Value Ref Range Status   01/28/2025 0.5 0.0 - 1.5 % Final     Immature Grans %   Date Value Ref Range Status   01/28/2025 1.1 (H) 0.0 - 0.5 % Final     Neutrophils, Absolute   Date Value Ref Range Status   01/28/2025 9.11 (H) 1.70 - 7.00 10*3/mm3 Final     Lymphocytes, Absolute   Date Value Ref Range Status   01/28/2025 1.69 0.70 - 3.10 10*3/mm3 Final     Monocytes, Absolute   Date Value Ref Range Status   01/28/2025 1.09 (H) 0.10 - 0.90 10*3/mm3 Final     Eosinophils, Absolute   Date Value Ref Range Status   01/28/2025 0.23  "0.00 - 0.40 10*3/mm3 Final     Basophils, Absolute   Date Value Ref Range Status   01/28/2025 0.06 0.00 - 0.20 10*3/mm3 Final     Immature Grans, Absolute   Date Value Ref Range Status   01/28/2025 0.13 (H) 0.00 - 0.05 10*3/mm3 Final     nRBC   Date Value Ref Range Status   01/28/2025 0.0 0.0 - 0.2 /100 WBC Final         OBJECTIVE:  Visit Vitals  /70 (BP Location: Left arm, Patient Position: Sitting, Cuff Size: Adult)   Pulse 81   Temp 98 °F (36.7 °C) (Temporal)   Ht 170.2 cm (67\")   Wt 72 kg (158 lb 12.8 oz)   LMP 09/29/2024 (Exact Date)   SpO2 98%   BMI 24.87 kg/m²      Physical Exam  Vitals and nursing note reviewed.   Constitutional:       General: She is not in acute distress.     Appearance: Normal appearance. She is well-developed. She is not diaphoretic.   HENT:      Head: Normocephalic and atraumatic.   Eyes:      Pupils: Pupils are equal, round, and reactive to light.   Neck:      Thyroid: No thyromegaly.      Trachea: Phonation normal.   Cardiovascular:      Rate and Rhythm: Normal rate.   Pulmonary:      Effort: Pulmonary effort is normal.   Skin:     Coloration: Skin is not pale.      Findings: No erythema.   Neurological:      Mental Status: She is alert.   Psychiatric:         Behavior: Behavior normal.         Thought Content: Thought content normal.         Judgment: Judgment normal.         Results  Imaging  CAT scan showed colitis and nonocclusive portal vein thrombosis. Gallbladder stones were observed, but they are not causing problems. Slight thickening of the gallbladder was noted, believed to be related to cirrhosis.    Assessment/Plan      Diagnoses and all orders for this visit:    1. Cirrhosis of liver with ascites, unspecified hepatic cirrhosis type (Primary)    2. Narayanan syndrome    3. Portal vein thrombosis    4. Secondary esophageal varices without bleeding    5. Calculus of gallbladder without cholecystitis without obstruction    6. Colitis      Assessment & Plan  1. Nonocclusive " portal vein thrombosis.  The condition is not completely obstructing the portal vein, allowing for some blood flow.  I called Dr. Ricardo's office for further discussion.  I was able to talk with Dr. Borrego regarding the possibility of anticoagulation for the nonocclusive thrombus.  However, there is increased risk of bleeding with anticoagulation due to her esophageal varices.  Based on the fact that the portal vein thrombus is nonocclusive in the setting of her esophageal varices it was decided to hold off on anticoagulation.  Dr. Borrego will initiate the process of getting Mrs. Smart scheduled in February with Dr. Ricardo.    2.  Grade 1 esophageal varices.  She is currently on carvedilol to manage this condition and prevent bleeding. The risk of bleeding from the esophageal varices was a significant factor in the decision not to start anticoagulants for the portal vein thrombosis.    3. Colitis.  She is currently on ciprofloxacin and Flagyl for the treatment of colitis. She is advised to continue these medications as prescribed.    4.  Cholelithiasis without evidence of cholecystitis.  The presence of gallstones was noted, but they are not causing any symptoms or complications at this time. No immediate intervention is required.    5. Narayanan syndrome.  She has a history of Narayanan syndrome and recently had a colonoscopy in August 2024, which was normal. She is advised to have a colonoscopy every 3 years.    PROCEDURE  The patient underwent a hysterectomy last week. A recent colonoscopy in 08/2024 yielded normal results. An upper endoscopy was performed in 08/2024.      Return for Next scheduled follow up.      ROSALINDA Holloway MD  10:11 CST  1/31/2025   Electronically signed    Patient or patient representative verbalized consent for the use of Ambient Listening during the visit with  INA Holloway MD for chart documentation. 1/31/2025  11:03 CST

## 2025-02-01 NOTE — PROGRESS NOTES
[facility] HEMATOLOGY & ONCOLOGY  [address]  [facility] [address] [phone]    Patient Name: Rachana Smart  Encounter Date: 2024  YOB: 1980  Patient Number: [id number]    REASON FOR VISIT: Rachana Smart is a 44 yoF   who returns in follow-up of invasive mammary (ductal) carcinoma, left breast- original tumor stage:  IA (pT1b, p(sn)N0, Mx, G2). ER 90%+, KY 8 %+, HER-2 (IHC) 1+-low.negative.  Low risk Oncotype (RS=12).  She underwent bilateral skin sparing mastectomies and left sentinel lymph node biopsies, 24. She has been on adjuvant Tamoxifen 20 p.o. daily since 2024. On 25-underwent OSEAS/BSO:  Uterus, complete, with bilateral fallopian tubes and bilateral ovaries:No histologic evidence of malignancy. She is accompanied by her spouse, Kurt    I have reviewed the HPI and verified with the patient the accuracy of it. No changes to interval history since the information was documented. Matthew Cruz MD 25      Diagnostic abnormalities:  - History: +Narayanan, endometriosis, alcoholism in recovery, anemia, transfusions, anxiety, asthma, hepC, cirrhosis, esophageal varices wo bleeding, migraines, cigarette smoker (0.5 ppd x 24 yr), recent breast cancer  - 24- Mammogram- LEFT breast 6 mm focal asymmetry with spiculation/distortion. Recommend targeted ultrasound. No suspicious mammographic findings in the RIGHT breast. BIRADS 0  - 24- US left breast-FINDINGS: Targeted LEFT breast ultrasound 11:00 position, 7 centimeters from the nipple demonstrates a 0.5 x 0.4 x 0.7 cm oval, parallel, hypoechoic mass with spiculated margins and internal vascularity. Posterior shadowing is present. This correlates in size, position and character with the mammographic finding.IMPRESSION: Suspicious LEFT breast mass. Recommend ultrasound-guided biopsy.  - 24- US guided breast biopsy- Left breast  mass at 11 o'clock position, 7 cm from nipple, core biopsies: A.  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 B.  A prominent associated low-grade ductal carcinoma in situ component is present. C.  Linear length of invasive tumor is 4.6 mm. AJCC stage: pTX pNX. 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  Lifetime risk of 8.7-20% for colorectal cancer and 13-26% for endometrial cancer.  - 10/15/24-endometrial biopsy: Nondiagnostic specimen  - 10/29/24-glucose 148, potassium 3.1, albumin 3.1, AST 40, alk phos 125, total bili 2.8, Hgb 11, .8, platelets 120,000 otherwise normal CBC  - 11/5/24- Oncotype DX- RS 12; DRR 3%; ACTB <1%  - 12/5/24-12/27/24- gluc 117, sodium 132, K+ 2.7, bili 2.2, AST 34 otherwise normal CMP, CEA 5.61, mag 1.5, PO 2.4, Hgb 10.7 otherwise normal CBC, CA27-29 56.5,  59.3 (H)  - 12/27/24- CT CAP-No acute abnormality of the chest. No mass. No infiltrate. No lymphadenopathy.No evidence of intra-abdominal or pelvic metastatic disease. Cirrhosis with portal hypertension, interval resolution of ascites. 3.3 cm uncomplicated cyst in the left adnexa probably arising from the left ovary. Small sliding-type hiatal hernia. Partial contraction of the gallbladder, which contains solitary 5 mm gallstone, without convincing evidence of cholecystitis.    Previous interventions:  - 11/5/24-bilateral skin sparing mastectomies and left sentinel lymph node biopsies: Final diagnoses:  1.  Left breast, mastectomy:  A.  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  B.  Minor associated low-grade ductal carcinoma in situ component.  C.  Invasive tumor is 7 mm.  D.  Radial scar adjacent to prior biopsy site exhibiting atypical ductal hyperplasia, usual ductal hyperplasia and columnar  cell changes without atypia (4.8 mm).  E.  No malignancy or atypia identified at the margins of surgical resection.  F.  Invasive malignancy is 4.9 mm from the closest inked (anterosuperior) margin.  G.  Prior biopsy site changes.  H.  No evidence of Paget's disease of nipple.  I.  Overlying skin is negative for malignancy.     2.  Right breast, mastectomy:  A.  No mammary carcinoma identified.  B.  Radial scar with columnar cell changes and atypical ductal hyperplasia identified in random section of lower outer quadrant (3.6 mm).  C.  No evidence of Paget's disease of nipple.  D.  Overlying skin is negative for malignancy.  E.  Benign intramammary lymph node (1).  F.  Focal fibroadenomatoid changes identified in random section of central breast.     3.  Left axillary sentinel lymph nodes:  A.  Lymph nodes (4), negative for metastatic carcinoma.  B.  Absence of micrometastases is confirmed utilizing immunohistochemical stains for CK AE1/AE3.     AJCC stage: pT1b snpN0    Synoptic Checklist   INVASIVE CARCINOMA OF THE BREAST: Resection   8th Edition - Protocol posted: 6/19/2024INVASIVE CARCINOMA OF THE BREAST: RESECTION - All Specimens  SPECIMEN   Procedure  Total mastectomy   Specimen Laterality  Left   TUMOR   Tumor Site  Upper inner quadrant   Histologic Type  Invasive carcinoma of no special type (ductal)   Histologic Grade (Shara Histologic Score)     Glandular (Acinar) / Tubular Differentiation  Score 2   Nuclear Pleomorphism  Score 2   Mitotic Rate  Score 2   Overall Grade  Grade 2 (scores of 6 or 7)   Tumor Size  Greatest dimension of largest invasive focus (Millimeters): 7 mm   Tumor Focality  Single focus of invasive carcinoma   Ductal Carcinoma In Situ (DCIS)  Present     Negative for extensive intraductal component (EIC)   Nuclear Grade  Grade I (low)   Lymphatic and / or Vascular Invasion  Not identified   Dermal Lymphatic and / or Vascular Invasion  Not identified   Treatment Effect in the Breast   No known presurgical therapy   MARGINS   Margin Status for Invasive Carcinoma  All margins negative for invasive carcinoma   Distance from Invasive Carcinoma to Closest Margin  4.9 mm   Closest Margin(s) to Invasive Carcinoma  Anterosuperior   Margin Status for DCIS  All margins negative for DCIS   Distance from DCIS to Closest Margin  Greater than: 5 mm mm   Closest Margin(s) to DCIS  Anterosuperior   REGIONAL LYMPH NODES   Regional Lymph Node Status  All regional lymph nodes negative for tumor   Total Number of Lymph Nodes Examined (sentinel and non-sentinel)  4   Number of [place] Nodes Examined  4   pTNM CLASSIFICATION (AJCC 8th Edition)   Reporting of pT, pN, and (when applicable) pM categories is based on information available to the pathologist at the time the report is issued. As per the AJCC (Chapter 1, 8th Ed.) it is the managing physician's responsibility to establish the final pathologic stage based upon all pertinent information, including but potentially not limited to this pathology report.   pT Category  pT1b   pN Category  pN0   N Suffix  (sn)   SPECIAL STUDIES        Estrogen Receptor (ER) Status  Positive (greater than 10% of cells demonstrate nuclear positivity)   Percentage of Cells with Nuclear Positivity  90 %        Progesterone Receptor (PgR) Status  Positive   Percentage of Cells with Nuclear Positivity  8 %        HER2 (by immunohistochemistry)  Negative (Score 1+)        Ki-67 Percentage of Positive Nuclei  11 %        - Adjuvant Tamoxifen 20 mg po daily, 12/5/24- 2/7/25  -1/23/25-Uterus, complete, with bilateral fallopian tubes and bilateral ovaries:  - No cervical dysplasia identified.  - Disordered inactive to weakly proliferative endometrium, negative for atypia.  - Left and right fallopian tubes, no significant histopathologic abnormalities.  - Cystic follicles, focal surface papillary proliferation without atypia and a small ovarian Walthard rest-like nest, possible  incipient  Benny tumor (0.3 mm), right ovary.  - Hemorrhagic corpus luteum and luteinized cystic follicles, left ovary.  - No histologic evidence of malignancy.    -Adjuvant letrozole 2.5 mg po qd beginning 2/7/25-present    LABS    Lab Results - Last 18 Months   Lab Units 01/28/25  1636 01/17/25  0846 12/27/24  0919 12/05/24  0949 10/29/24  1035 05/24/24  0905 04/22/24  1131 03/18/24  1741   HEMOGLOBIN g/dL 9.9* 11.2* 10.7* 10.7* 11.0* 11.4*   < > 7.2*   HEMATOCRIT % 31.4* 34.6 33.3* 32.9* 33.9* 35.8   < > 24.0*   MCV fL 98.7* 94.8 96.8 99.7* 101.8* 96.8   < > 87.6   WBC 10*3/mm3 12.31* 9.96 8.05 6.97 7.40 6.38   < > 9.82   RDW % 15.2 14.9 14.6 14.9 15.7* 20.8*   < > 22.1*   MPV fL 10.7 10.4 9.7 9.4 9.6 9.8   < > 9.7   PLATELETS 10*3/mm3 108* 136* 142 136* 120* 155   < > 266   IMM GRAN % % 1.1* 0.6* 0.4 0.3 0.4  --   --  0.7*   NEUTROS ABS 10*3/mm3 9.11* 5.57 4.14 3.38 4.43  --   --  6.29   LYMPHS ABS 10*3/mm3 1.69 2.84 2.59 2.33 1.97  --   --  1.88   MONOS ABS 10*3/mm3 1.09* 0.97* 0.72 0.67 0.63  --   --  0.97*   EOS ABS 10*3/mm3 0.23 0.41* 0.43* 0.46* 0.24  --   --  0.49*   BASOS ABS 10*3/mm3 0.06 0.11 0.14 0.11 0.10  --   --  0.12   IMMATURE GRANS (ABS) 10*3/mm3 0.13* 0.06* 0.03 0.02 0.03  --   --  0.07*   NRBC /100 WBC 0.0 0.0 0.0 0.0 0.0  --   --  0.0    < > = values in this interval not displayed.       Lab Results - Last 18 Months   Lab Units 01/28/25  1636 01/17/25  0846 01/03/25  1028 12/27/24  0919 12/05/24  0949 10/29/24  1035   GLUCOSE mg/dL 126* 93 182* 117* 110* 148*   SODIUM mmol/L 134* 138 133* 132* 137 136   POTASSIUM mmol/L 4.9 2.8* 3.6 2.7* 2.5* 3.1*   CO2 mmol/L 21.0* 31.0* 27.0 29.0 31.0* 29.0   CHLORIDE mmol/L 104 93* 97* 93* 98 98   ANION GAP mmol/L 9.0 14.0 9.0 10.0 8.0 9.0   CREATININE mg/dL 0.85 1.00 0.83 0.87 0.83 0.73   BUN mg/dL 10 16 11 10 6 10   BUN / CREAT RATIO  11.8 16.0 13.3 11.5 7.2 13.7   CALCIUM mg/dL 8.2* 9.4 8.7 8.5* 8.4* 8.7   ALK PHOS U/L 83 112 116 112 131* 125*   TOTAL PROTEIN  g/dL 6.3 7.8 7.3 7.1 7.0 7.3   ALT (SGPT) U/L 15 13 14 15 17 17   AST (SGOT) U/L 27 33* 38* 34* 40* 40*   BILIRUBIN mg/dL 2.2* 2.4* 2.7* 2.2* 2.3* 2.8*   ALBUMIN g/dL 2.9* 3.4* 3.2* 3.2* 2.9* 3.1*   GLOBULIN gm/dL 3.4 4.4 4.1 3.9 4.1 4.2       Lab Results - Last 18 Months   Lab Units 01/17/25  0846 12/27/24  0919   CEA ng/mL 4.78 5.61       Lab Results - Last 18 Months   Lab Units 12/05/24  0949 03/18/24  1741 03/16/24  1429   IRON mcg/dL 68  --  11*   TIBC mcg/dL 274*  --  244*   IRON SATURATION (TSAT) % 25  --  5*   FERRITIN ng/mL 75.30  --  18.30   TSH uIU/mL  --  1.350 1.600   FOLATE ng/mL 9.23  --  9.22         PAST MEDICAL HISTORY:  ALLERGIES:  Allergies   Allergen Reactions    Oxycodone Itching     Pt states any pain medication causes severe itching, nausea    Codeine Rash and GI Intolerance     CURRENT MEDICATIONS:  Outpatient Encounter Medications as of 2/7/2025   Medication Sig Dispense Refill    carvedilol (Coreg) 6.25 MG tablet Take 1 tablet by mouth 2 (Two) Times a Day With Meals. 60 tablet 3    ferrous sulfate 325 (65 FE) MG tablet Take 1 tablet by mouth Daily With Breakfast.      furosemide (LASIX) 40 MG tablet Take 1 tablet by mouth once daily 90 tablet 1    HYDROcodone-acetaminophen (NORCO) 5-325 MG per tablet Take 1 tablet by mouth Every 6 (Six) Hours As Needed (Pain). 15 tablet 0    HYDROcodone-acetaminophen (Norco) 5-325 MG per tablet Take 1 tablet by mouth Every 6 (Six) Hours As Needed for Moderate Pain. 12 tablet 0    ibuprofen (Motrin IB) 200 MG tablet Take 3 tablets by mouth Every 8 (Eight) Hours. Take every 8 hours for three days then take as needed.      Multivitamin tablet tablet Take 1 tablet by mouth Daily.      potassium chloride (KLOR-CON M20) 20 MEQ CR tablet Take 1 tablet by mouth 3 (Three) Times a Day. 9 tablet 0    sulfamethoxazole-trimethoprim (Bactrim DS) 800-160 MG per tablet Take 1 tablet by mouth 2 (Two) Times a Day. 19 tablet 0    tamoxifen (NOLVADEX) 20 MG chemo tablet Take  1 tablet by mouth Daily. 30 tablet 2    thiamine (VITAMIN B1) 100 MG tablet Take 1 tablet by mouth Daily. (Patient taking differently: Take 2.5 tablets by mouth Daily.) 30 tablet 0    vitamin B-12 (cyanocobalamin) 100 MCG tablet Take 5 tablets by mouth Daily.      [] ciprofloxacin (CIPRO) 500 MG tablet Take 1 tablet by mouth 2 (Two) Times a Day for 7 days. 14 tablet 0    [] metroNIDAZOLE (FLAGYL) 500 MG tablet Take 1 tablet by mouth 2 (Two) Times a Day for 7 days. 14 tablet 0     Facility-Administered Encounter Medications as of 2025   Medication Dose Route Frequency Provider Last Rate Last Admin    lidocaine 1% - EPINEPHrine 1:[id number] (XYLOCAINE W/EPI) 1 %-1:834978 injection 10 mL  10 mL Injection Once Amina Camarillo MD         ADULT ILLNESSES:  Patient Active Problem List   Diagnosis Code    Tobacco use Z72.0    Anxiety F41.9    Essential hypertension I10    Hypomagnesemia E83.42    Alcoholism F10.20    Macrocytosis D75.89    Anemia - iron deficiency and chronic diseased D64.9    Alcoholic cirrhosis of liver with ascites K70.31    Colon cancer screening Z12.11    Secondary esophageal varices without bleeding I85.10    Invasive ductal carcinoma of breast, female, left C50.912    Malignant neoplasm of upper-inner quadrant of left female breast C50.212    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    Breast cancer C50.919    Malignant neoplasm of upper-inner quadrant of left female breast, unspecified estrogen receptor status C50.212    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 - The examination was otherwise normal on direct and retroflexion views. - No specimens collected    D & C HYSTEROSCOPY N/A 11/05/2024    Procedure: DILATATION AND CURETTAGE HYSTEROSCOPY;  Surgeon: Glo Benavides MD;  Location: [facility] OR;  Service: Obstetrics/Gynecology;  Laterality: N/A;    ENDOSCOPY N/A 01/31/2024    no evidence of any fresh or old blood or clots,normal stomach    ENDOSCOPY N/A 08/09/2024    -Grade I esophageal varices. - Portal hypertensive gastropathy. - Normal examined duodenum. - No specimens collected    MASTECTOMY W/ SENTINEL NODE BIOPSY Bilateral 11/05/2024    Procedure: BILATERAL SKIN SPARING MASTECTOMY WITH LEFT AXILLARY MAGTRACE GUIDED SENTINEL LYMPH NODE BIOPSY (Dr. Prieto to nahid);  Surgeon: Fide Verduzco MD;  Location: [facility] OR;  Service: General;  Laterality: Bilateral;    MOUTH SURGERY      TOTAL LAPAROSCOPIC HYSTERECTOMY SALPINGO OOPHORECTOMY Bilateral 1/23/2025    Procedure: TOTAL LAPAROSCOPIC HYSTERECTOMY BILATERAL SALPINGOOPHORECTOMY WITH DAVINCI ROBOT (removal of uterus, cervix, both fallopian tubes and ovaries using the davinci robot);  Surgeon: Glo Benavides MD;  Location: [facility] OR;  Service: Robotics - DaVinci;  Laterality: Bilateral;    UPPER GASTROINTESTINAL ENDOSCOPY  3/24    WISDOM TOOTH EXTRACTION  1995     HEALTH MAINTENANCE ITEMS:  Health Maintenance Due   Topic Date Due    ANNUAL PHYSICAL  Never done    Hepatitis B (2 of 3 - Hep B Twinrix 3-dose series) 03/29/2023    COVID-19 Vaccine (1 - 2024-25 season) Never done       <no information>  Last Completed Colonoscopy            COLORECTAL CANCER SCREENING (COLONOSCOPY - Every 3 Years) Next due on 8/9/2027 08/09/2024  Colonoscopy    08/09/2024  Surgical Procedure: COLONOSCOPY    03/16/2024  Fecal Occult Blood component of POC Occult Blood Stool    01/31/2024  Fecal Occult Blood component of POCT Occult Blood, Stool                  Immunization  "History   Administered Date(s) Administered    Hep A / Hep B 03/01/2023    Pneumococcal Conjugate 20-Valent (PCV20) 03/01/2023    Tdap 01/17/2024     Last Completed Mammogram            Discontinued - MAMMOGRAM  Discontinued        Frequency changed to Never automatically (Topic No Longer Applies)    09/11/2024  Mammo Screening Digital Tomosynthesis Bilateral With CAD                      FAMILY HISTORY:  Family History   Problem Relation Age of Onset    Diabetes Mother     Heart disease Father     Diabetes Father     Stroke Father     Melanoma Father 40 - 49    Esophageal cancer Father 60 - 69    Skin cancer Maternal Aunt     Colon cancer Paternal Uncle     Pancreatic cancer Paternal Uncle     Cervical cancer Maternal Grandmother 40 - 49    Skin cancer Paternal Grandmother     Cancer Paternal Grandfather 50 - 59        Mesothelioma    Breast cancer Half-Sister 45    Colon polyps Neg Hx     Uterine cancer Neg Hx      SOCIAL HISTORY:  Social History     Socioeconomic History    Marital status:    Tobacco Use    Smoking status: Some Days     Current packs/day: 0.50     Average packs/day: 0.5 packs/day for 10.0 years (5.0 ttl pk-yrs)     Types: Cigarettes    Smokeless tobacco: Never   Vaping Use    Vaping status: Never Used   Substance and Sexual Activity    Alcohol use: Not Currently     Alcohol/week: 10.0 standard drinks of alcohol     Types: 10 Standard drinks or equivalent per week    Drug use: Never    Sexual activity: Yes     Partners: Male     Birth control/protection: None, Coitus interruptus       REVIEW OF SYSTEMS:  Review of Systems   Constitutional:  Positive for fatigue.        Manages her ADLs, chores, errands, and driving.  Is up and about, \"all the time.\"    Tamoxifen tolerance:  Mild hot flashes. Doing well   HENT:  Positive for postnasal drip.    Eyes: Negative.    Respiratory: Negative.          Current cigarette smoker-age 20 -present:  Less than 1/2 pack/day   Cardiovascular: Negative.  " "  Gastrointestinal: Negative.    Endocrine: Positive for heat intolerance (\"Hot flashes\").        Menarche age 10    Irregular menstrual cycles occurring on and off with stretches of up to 10 months with no bleeding.  Is anticipating hysterectomy and ovariectomy within the next several weeks.    G3,  (miscarriage)   Genitourinary: Negative.    Musculoskeletal:  Positive for arthralgias (Chronic), back pain (Chronic) and myalgias (Nocturnal leg).   Skin: Negative.    Allergic/Immunologic: Negative.    Neurological:  Positive for dizziness (Postural dizziness).   Hematological: Negative.    Psychiatric/Behavioral: Negative.         /70   Pulse 76   Temp 98.3 °F (36.8 °C) (Temporal)   Resp 18   Ht 170.2 cm (67\")   Wt 70.4 kg (155 lb 4.8 oz)   LMP 2024 (Exact Date)   SpO2 100%   BMI 24.32 kg/m²  Body surface area is 1.82 meters squared.  Pain Score    25 0920   PainSc: 0-No pain         Physical Exam  Vitals and nursing note reviewed. Exam conducted with a chaperone present.   Constitutional:       Comments: Pleasant, cooperative, modestly Middle-aged female.  Ambulatory.  ECOG 0.     Has regained has regained 5 pounds   HENT:      Head: Normocephalic and atraumatic.   Eyes:      General: No scleral icterus.     Extraocular Movements: Extraocular movements intact.      Pupils: Pupils are equal, round, and reactive to light.   Cardiovascular:      Rate and Rhythm: Normal rate.   Pulmonary:      Effort: Pulmonary effort is normal.   Chest:   Breasts:     Right: Absent.      Left: Absent.          Comments: Chaperoned exam: Blanca Rogers RN.    Left breast mastectomy incision is well-healed.  Some overlying incisional/scar fullness is noted.  No nodularity    Left breast mastectomy incision and left sentinel lymph node biopsy incision are healed with some overlying incisional/scar fullness noted.  No nodularity.    No overt supraclavicular/axillary adenopathy bilaterally.  Abdominal:      " Palpations: Abdomen is soft.      Tenderness: There is no abdominal tenderness.      Comments: Laparoscopic incisions are noted to be healing well.   Musculoskeletal:         General: Normal range of motion.      Cervical back: Normal range of motion.   Lymphadenopathy:      Cervical: No cervical adenopathy.      Upper Body:      Right upper body: No supraclavicular or axillary adenopathy.      Left upper body: No supraclavicular or axillary adenopathy.   Skin:     General: Skin is warm.   Neurological:      General: No focal deficit present.      Mental Status: She is alert and oriented to person, place, and time.   Psychiatric:         Mood and Affect: Mood normal.         Behavior: Behavior normal.         Thought Content: Thought content normal.         .Assessment:  1.   Invasive mammary (ductal) carcinoma, left breast            Original tumor stage:  IA (pT1b, p(sn)N0, Mx, G2). ER 90%+, KY 8 %+, HER-2 (IHC) 1+-low.negative.            Original tumor burden:     - 9/11/24- Mammogram- LEFT breast 6 mm focal asymmetry with spiculation/distortion. Recommend targeted ultrasound. No suspicious mammographic findings in the RIGHT breast. BIRADS 0  - 9/13/24- US left breast-FINDINGS: Targeted LEFT breast ultrasound 11:00 position, 7 centimeters from the nipple demonstrates a 0.5 x 0.4 x 0.7 cm oval, parallel, hypoechoic mass with spiculated margins and internal vascularity. Posterior shadowing is present. This correlates in size, position and character with the mammographic finding.IMPRESSION:  Suspicious LEFT breast mass. Recommend ultrasound-guided biopsy.  - 9/13/24- US guided breast biopsy- Left breast mass at 11 o'clock position, 7 cm from nipple, core biopsies: A.  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 B.  A prominent associated low-grade ductal carcinoma in situ component is present. C.  Linear length of invasive tumor is 4.6 mm. AJCC stage: pTX pNX. 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  Lifetime risk of 8.7-20% for colorectal cancer and 13-26% for endometrial cancer.  11/5/24- Oncotype DX- RS 12; DRR 3%; ACTB <1%           Tumor status:   - 11/5/24-bilateral skin sparing mastectomies and left sentinel lymph node biopsies: Final diagnoses:  1.  Left breast, mastectomy:  A.  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  B.  Minor associated low-grade ductal carcinoma in situ component.  C.  Invasive tumor is 7 mm.  D.  Radial scar adjacent to prior biopsy site exhibiting atypical ductal hyperplasia, usual ductal hyperplasia and columnar cell changes without atypia (4.8 mm).  E.  No malignancy or atypia identified at the margins of surgical resection.  F.  Invasive malignancy is 4.9 mm from the closest inked (anterosuperior) margin.  G.  Prior biopsy site changes.  H.  No evidence of Paget's disease of nipple.  I.  Overlying skin is negative for malignancy.  2.  Left axillary sentinel lymph nodes:  A.  Lymph nodes (4), negative for metastatic carcinoma.  B.  Absence of micrometastases is confirmed utilizing immunohistochemical stains for CK AE1/AE3.   -AJCC stage: pT1b snpN0    --Adjuvant Tamoxifen beginning 12/5/24-2/7/25  --Adjuvant letrozole 2.5 mg po qd beginning, 2/7/25- present     2.   Narayanan syndrome  - 8/9/24- Colonoscopy- - Diverticulosis in the left colon. - The examination was otherwise normal on direct and retroflexion views. - No specimens collected. Repeat 10 yr  - 10/2/24- Pap smear- Negative IEL/HPV not detected  - 10/15/24-Endometrium, curettage:  A.  Late secretory endometrium.  B.  Fragments of squamous mucosa and endocervical mucosa.  C.  Blood.  D.  No evidence of atypia or malignancy.  -1/23/25-OSEAS/BSO: Uterus, complete, with bilateral fallopian tubes and bilateral ovaries:  - No cervical dysplasia identified.  -  Disordered inactive to weakly proliferative endometrium, negative for atypia.  - Left and right fallopian tubes, no significant histopathologic abnormalities.  - Cystic follicles, focal surface papillary proliferation without atypia and a small ovarian [place] rest-like nest, possible  incipient Benny tumor (0.3 mm), right ovary.  - Hemorrhagic corpus luteum and luteinized cystic follicles, left ovary.  - No histologic evidence of malignancy.    3.   Anemia, macrocytic.  From cirrhosis  --Hgb 9.9; MCV 98.7, 1/28/25 (prior hemant:Hgb 5.2, 3/16/24)  4.   HepC/cirrhosis/esophageal varices  - 8/9/24- EGD-- Grade I esophageal varices. - Portal hypertensive gastropathy. - Normal examined duodenum. - No specimens collected. Rx:  Coreg  5.   Alcoholism in remission  6.   Tobacco smoker- <1/2 ppd since age 20  7.   Surgically postmenopausal  8.   Intermittent thrombocytopenia. Hypersplenism?  -- 108,000, 1/28/25 (prior hemant: 102,000, 2/20/23)  9.   Osteopenia.  Already on Os-Floyd and Prolia        Plan:  1.   Apprised of the available diagnostic information, including pathologic diagnosis on breast biopsy, mastectomies, hysterectomy/BSO (no malignancy -above), DEXA scan and staging CT scans (below).    - Baseline labs: 12/5/24-12/27/24- gluc 117, sodium 132, K+ 2.7, bili 2.2, AST 34 otherwise normal CMP, CEA 5.61, mag 1.5, PO 2.4, Hgb 10.7 otherwise normal CBC, CA27-29 56.5,  59.3 (H)  - 12/13/24- DEXA scan- The osteopenia and increased fracture risk.   - 12/27/24- CT CAP-No acute abnormality of the chest, intra-abdominal or pelvic metastatic disease. Cirrhosis with portal hypertension, interval resolution of ascites. 3.3 cm uncomplicated cyst in the left adnexa probably arising from the left ovary. Small sliding-type hiatal hernia. Partial contraction of the gallbladder, which contains solitary 5 mm gallstone, without convincing evidence of cholecystitis.  - 1/17/25- Labs- K+ 2.8, alb 3.4, AST 33, bili 2.4 (prior:  2.2-3.4), CEA 4.7 (prior: 5.6), CA27-29 46.3 (prior: 56)  - 1/28/25- K+ 4.9, alb 2.9, bili 2.2, ESR 22    2.   The rationale for adjuvant hormonal manipulation with AI's is discussed. The potential risks (to include but not limited to: Hot flashes, asthenia, pain, arthralgia, arthritis, nausea/vomiting, headache, pharyngitis, depression, rash, hypertension, lymphedema, insomnia, edema, weight gain, dyspnea, abdominal pain, constipation, osteoporosis, fractures, cough, bone pain, diarrhea, breast pain, paresthesias, infection, cataracts, myalgias, thromboembolism, angina, endometrial carcinoma, myocardial infarction, stroke, anemia, leukopenia, erythema multiforme, Salguero-Garrett syndrome) are discussed at length. Questions answered. She agrees to a trial of therapy.    3.   Rx:  STOP Tamoxifen                Letrozole 2.5 mg po qd # 30 x 11 RF                Oscal 600/400 po bid                Prolia 60 mg sc q 6 mo                 4.   Review NCCN guidelines version invasive breast cancer-systemic adjuvant treatment: HR positive, HER-2 negative disease in premenopausal patients with pT1-3 and pN0 tumors(ductal, lobular, mixed)-tumor </= 0.5 cm and pN0 -consider adjuvant endocrine therapy (category 2B).  If tumor>0.5cm and pN0-strongly consider 21 gene RT-PCR assay if candidate for chemotherapy (category 1)-if not done: Adjuvant chemotherapy followed by endocrine therapy (category 1) or adjuvant endocrine therapy; recurrence score<15: Adjuvant endocrine therapy/ovarian suppression/ablation; recurrence score 16-25-adjuvant endocrine therapy/ovarian suppression/ablation or adjuvant chemotherapy followed by endocrine therapy; recurrence score >26-adjuvant chemotherapy followed by endocrine therapy.  Surveillance follow-up: H&P 1-4 times per year as clinically appropriate for 5 years then annually.  Genetic screening.  Postsurgical management: Lymphedema management.  Imaging: Mammogram every 12 months.  Routine imaging  of reconstructed breast not indicated.  For patient receiving anthracycline based therapy: Echocardiogram recommendation per NCCN guidelines for survivorship.  Screening for metastasis: In the absence of clinical signs and symptoms suggestive of recurrent disease no indication for laboratory or imaging studies for metastatic screening.        5.  Continue management per primary care and other specialists  6.   Return to office in 6 weeks with preoffice CBC and differential, CMP, CEA and CA 27-29  6.   Importance of Smoking Cessation discussed with patient and informed patient additional information will be on today's [facility]'s Flyer - Plan to Be Tobacco Free handout provided to patient         I spent ~66 minutes caring for Rachana on this date of service. This time includes time spent by me in the following activities: preparing for the visit, reviewing tests, performing a medically appropriate examination and/or evaluation, counseling and educating the patient/family/caregiver, ordering medications, tests, or procedures and documenting information in the medical record.

## 2025-02-02 LAB
BACTERIA SPEC AEROBE CULT: NORMAL
BACTERIA SPEC AEROBE CULT: NORMAL

## 2025-02-05 ENCOUNTER — OFFICE VISIT (OUTPATIENT)
Age: 45
End: 2025-02-05
Payer: COMMERCIAL

## 2025-02-05 VITALS
DIASTOLIC BLOOD PRESSURE: 80 MMHG | HEIGHT: 67 IN | SYSTOLIC BLOOD PRESSURE: 110 MMHG | WEIGHT: 155.3 LBS | BODY MASS INDEX: 24.37 KG/M2

## 2025-02-05 DIAGNOSIS — Z09 FOLLOW-UP EXAMINATION, FOLLOWING OTHER SURGERY: Primary | ICD-10-CM

## 2025-02-05 PROCEDURE — 99024 POSTOP FOLLOW-UP VISIT: CPT | Performed by: OBSTETRICS & GYNECOLOGY

## 2025-02-05 RX ORDER — SULFAMETHOXAZOLE AND TRIMETHOPRIM 800; 160 MG/1; MG/1
1 TABLET ORAL 2 TIMES DAILY
Qty: 19 TABLET | Refills: 0 | Status: SHIPPED | OUTPATIENT
Start: 2025-02-05

## 2025-02-05 NOTE — PROGRESS NOTES
"Chief Complaint  Post-op Follow-up (Pt here for 2 wk postop TLH BSO with sacha 1-. Pt c/o R sided incision pain and redness, denies any other problems today.)    Subjective        Rachana Smart presents to [facility] for post op visit. Pt with visit to the ER after surgery and was treated for colitis.  Feeling better. No further f/c.  Has seen PCP for follow up.  Is sore but reports less pain than with mastectomy.  Normal bowel and bladder function.  Pathology benign.    History of Present Illness    Objective   Vital Signs:  /80   Ht 170.2 cm (67.01\")   Wt 70.4 kg (155 lb 4.8 oz)   BMI 24.32 kg/m²   Estimated body mass index is 24.32 kg/m² as calculated from the following:    Height as of this encounter: 170.2 cm (67.01\").    Weight as of this encounter: 70.4 kg (155 lb 4.8 oz).    BMI is within normal parameters. No other follow-up for BMI required.      Physical Exam   Abdomen: soft/Nt/Nd normal BS, incisions at the umbilicus and right lower quadrant with small amount of exudate but no surrounding cellulitis, LLQ incision well healed  Result Review :                Assessment and Plan   Diagnoses and all orders for this visit:    1. Follow-up examination, following other surgery (Primary)  Post op TLH/BSO, doing well. Does have some exudate from two incisions. Bactrim DS 1 tab po bid for 7 days.  RTC 4 weeks for final post op visit. No intercourse or tub bathes. Increase activities as tolerated.   Other orders  -     sulfamethoxazole-trimethoprim (Bactrim DS) 800-160 MG per tablet; Take 1 tablet by mouth 2 (Two) Times a Day.  Dispense: 19 tablet; Refill: 0             Follow Up   Return in about 4 weeks (around 3/5/2025).  Patient was given instructions and counseling regarding her condition or for health maintenance advice. Please see specific information pulled into the AVS if appropriate.             "

## 2025-02-07 ENCOUNTER — OFFICE VISIT (OUTPATIENT)
Dept: ONCOLOGY | Facility: CLINIC | Age: 45
End: 2025-02-07
Payer: COMMERCIAL

## 2025-02-07 VITALS
TEMPERATURE: 98.3 F | BODY MASS INDEX: 24.37 KG/M2 | RESPIRATION RATE: 18 BRPM | HEART RATE: 76 BPM | HEIGHT: 67 IN | OXYGEN SATURATION: 100 % | WEIGHT: 155.3 LBS | SYSTOLIC BLOOD PRESSURE: 118 MMHG | DIASTOLIC BLOOD PRESSURE: 70 MMHG

## 2025-02-07 DIAGNOSIS — C50.212 MALIGNANT NEOPLASM OF UPPER-INNER QUADRANT OF LEFT FEMALE BREAST, UNSPECIFIED ESTROGEN RECEPTOR STATUS: Primary | ICD-10-CM

## 2025-02-07 RX ORDER — LETROZOLE 2.5 MG/1
2.5 TABLET, FILM COATED ORAL DAILY
Qty: 30 TABLET | Refills: 11 | Status: SHIPPED | OUTPATIENT
Start: 2025-02-07

## 2025-02-24 ENCOUNTER — OFFICE VISIT (OUTPATIENT)
Dept: SURGERY | Facility: CLINIC | Age: 45
End: 2025-02-24
Payer: COMMERCIAL

## 2025-02-24 VITALS
BODY MASS INDEX: 24.33 KG/M2 | SYSTOLIC BLOOD PRESSURE: 124 MMHG | DIASTOLIC BLOOD PRESSURE: 88 MMHG | WEIGHT: 155 LBS | HEIGHT: 67 IN | OXYGEN SATURATION: 95 % | HEART RATE: 77 BPM

## 2025-02-24 DIAGNOSIS — F17.210 NICOTINE DEPENDENCE, CIGARETTES, UNCOMPLICATED: ICD-10-CM

## 2025-02-24 DIAGNOSIS — Z90.13 S/P BILATERAL MASTECTOMY: ICD-10-CM

## 2025-02-24 DIAGNOSIS — Z80.3 FAMILY HISTORY OF BREAST CANCER: ICD-10-CM

## 2025-02-24 DIAGNOSIS — Z85.3 HISTORY OF LEFT BREAST CANCER: Primary | ICD-10-CM

## 2025-02-24 NOTE — PROGRESS NOTES
Office Established Patient Note:     Referring Provider: No ref. provider found    Chief Complaint   Patient presents with    Follow-up       Subjective .     History of present illness:  Rachana Smart is a 45 y.o. female s/p bilateral mastectomies with left axillary sentinel lymph node biopsy on 24 for a pT1b pN0 invasive ductal carcinoma, ER 90%+ AK 8% +, Her2-. She has switched to letrozole since her hysterectomy.   History of Present Illness  She reports no current chest-related concerns. She underwent a hysterectomy, during which one of the incisions became infected, necessitating an emergency room visit.  Her medication was changed from tamoxifen to letrozole following the hysterectomy. However, due to its impact on her liver, she has been taking it every other day.    SOCIAL HISTORY  The patient admits to smoking a little bit here and there.    MEDICATIONS  Current: letrozole  Discontinued: tamoxifen       History  Past Medical History:   Diagnosis Date    Alcoholism in recovery     Allergic     Anemia 220    Anxiety     Asthma     Bacterial infection     lower intestine    Breast cancer     Calculus of gallbladder without cholecystitis without obstruction 2025    Cancer [id number]    Cirrhosis     Endometriosis     Gestational hypertension 2006    Hepatitis C     Hypertension     Leaky heart valve     Narayanan syndrome     Migraine 2020    Multiple gestation 2006    Osteopenia due to cancer therapy 2024    Ovarian cyst     PMS (premenstrual syndrome)     PONV (postoperative nausea and vomiting)     Preeclampsia     Secondary esophageal varices without bleeding 2024    Sinusitis     Varicella 1989   ,   Past Surgical History:   Procedure Laterality Date    BREAST BIOPSY       SECTION      x2    COLONOSCOPY      COLONOSCOPY N/A 2024    Dr. Bernardo-- Diverticulosis in the left colon. - The examination was otherwise normal on direct and  retroflexion views. - No specimens collected    D & C HYSTEROSCOPY N/A 11/05/2024    Procedure: DILATATION AND CURETTAGE HYSTEROSCOPY;  Surgeon: Glo Benavides MD;  Location:  [facility] OR;  Service: Obstetrics/Gynecology;  Laterality: N/A;    ENDOSCOPY N/A 01/31/2024    no evidence of any fresh or old blood or clots,normal stomach    ENDOSCOPY N/A 08/09/2024    -Grade I esophageal varices. - Portal hypertensive gastropathy. - Normal examined duodenum. - No specimens collected    MASTECTOMY W/ SENTINEL NODE BIOPSY Bilateral 11/05/2024    Procedure: BILATERAL SKIN SPARING MASTECTOMY WITH LEFT AXILLARY MAGTRACE GUIDED SENTINEL LYMPH NODE BIOPSY (Dr. Prieto to nahid);  Surgeon: Fide Verduzco MD;  Location:  [facility] OR;  Service: General;  Laterality: Bilateral;    MOUTH SURGERY      TOTAL LAPAROSCOPIC HYSTERECTOMY SALPINGO OOPHORECTOMY Bilateral 1/23/2025    Procedure: TOTAL LAPAROSCOPIC HYSTERECTOMY BILATERAL SALPINGOOPHORECTOMY WITH DAVINCI ROBOT (removal of uterus, cervix, both fallopian tubes and ovaries using the davinci robot);  Surgeon: Glo Benavides MD;  Location:  [facility] OR;  Service: Robotics - DaVinci;  Laterality: Bilateral;    UPPER GASTROINTESTINAL ENDOSCOPY  3/24    WISDOM TOOTH EXTRACTION  1995   ,   Family History   Problem Relation Age of Onset    Diabetes Mother     Heart disease Father     Diabetes Father     Stroke Father     Melanoma Father 40 - 49    Esophageal cancer Father 60 - 69    Skin cancer Maternal Aunt     Colon cancer Paternal Uncle     Pancreatic cancer Paternal Uncle     Cervical cancer Maternal Grandmother 40 - 49    Skin cancer Paternal Grandmother     Cancer Paternal Grandfather 50 - 59        Mesothelioma    Breast cancer Half-Sister 45    Colon polyps Neg Hx     Uterine cancer Neg Hx    ,   Social History     Tobacco Use    Smoking status: Some Days     Current packs/day: 0.50     Average packs/day: 0.5 packs/day for 10.0 years (5.0 ttl pk-yrs)     Types:  Cigarettes    Smokeless tobacco: Never   Vaping Use    Vaping status: Never Used   Substance Use Topics    Alcohol use: Not Currently     Alcohol/week: 10.0 standard drinks of alcohol     Types: 10 Standard drinks or equivalent per week    Drug use: Never   , (Not in a hospital admission)   and Allergies:  Oxycodone and Codeine    Current Outpatient Medications:     Calcium Carbonate-Vitamin D 600-10 MG-MCG per tablet, Take 1 tablet by mouth 2 (Two) Times a Day., Disp: 60 tablet, Rfl: 11    carvedilol (Coreg) 6.25 MG tablet, Take 1 tablet by mouth 2 (Two) Times a Day With Meals., Disp: 60 tablet, Rfl: 3    ferrous sulfate 325 (65 FE) MG tablet, Take 1 tablet by mouth Daily With Breakfast., Disp: , Rfl:     furosemide (LASIX) 40 MG tablet, Take 1 tablet by mouth once daily, Disp: 90 tablet, Rfl: 1    ibuprofen (Motrin IB) 200 MG tablet, Take 3 tablets by mouth Every 8 (Eight) Hours. Take every 8 hours for three days then take as needed., Disp: , Rfl:     letrozole (FEMARA) 2.5 MG tablet, Take 1 tablet by mouth Daily., Disp: 30 tablet, Rfl: 11    Multivitamin tablet tablet, Take 1 tablet by mouth Daily., Disp: , Rfl:     potassium chloride (KLOR-CON M20) 20 MEQ CR tablet, Take 1 tablet by mouth 3 (Three) Times a Day., Disp: 9 tablet, Rfl: 0    sulfamethoxazole-trimethoprim (Bactrim DS) 800-160 MG per tablet, Take 1 tablet by mouth 2 (Two) Times a Day., Disp: 19 tablet, Rfl: 0    tamoxifen (NOLVADEX) 20 MG chemo tablet, Take 1 tablet by mouth Daily., Disp: 30 tablet, Rfl: 2    thiamine (VITAMIN B1) 100 MG tablet, Take 1 tablet by mouth Daily. (Patient taking differently: Take 2.5 tablets by mouth Daily.), Disp: 30 tablet, Rfl: 0    vitamin B-12 (cyanocobalamin) 100 MCG tablet, Take 5 tablets by mouth Daily., Disp: , Rfl:     HYDROcodone-acetaminophen (NORCO) 5-325 MG per tablet, Take 1 tablet by mouth Every 6 (Six) Hours As Needed (Pain)., Disp: 15 tablet, Rfl: 0    HYDROcodone-acetaminophen (Norco) 5-325 MG per  "tablet, Take 1 tablet by mouth Every 6 (Six) Hours As Needed for Moderate Pain., Disp: 12 tablet, Rfl: 0    Current Facility-Administered Medications:     lidocaine 1% - EPINEPHrine 1:[id number] (XYLOCAINE W/EPI) 1 %-1:094886 injection 10 mL, 10 mL, Injection, Once, Amina Camarillo MD    Objective     Vital Signs   /88   Pulse 77   Ht 170.2 cm (67\")   Wt 70.3 kg (155 lb)   LMP 09/29/2024 (Exact Date)   SpO2 95%   BMI 24.28 kg/m²      Physical Exam:  General appearance - alert, well appearing, and in no distress  Mental status - alert, oriented to person, place, and time  Eyes - pupils equal and reactive, extraocular eye movements intact  Neck - supple, no significant adenopathy  Abdomen - soft, nontender, nondistended, no masses or organomegaly  Incisions with scabs but no signs of infection.   Neurological - alert, oriented, normal speech, no focal findings or movement disorder noted  Breast Exam: s/p bilateral mastectomies. Incisions well healed. Patient examined in the supine position with the ipsilateral arm above the head. No palpable masses bilaterally.  No palpable axillary nor supraclavicular adenopathy bilaterally.   Physical Exam    Results Review:    The following data was reviewed by: Fide Verduzco MD on 02/24/2025:    Progress Notes by Matthew Cruz MD (02/07/2025 09:30)   Results       Assessment & Plan       Diagnoses and all orders for this visit:    1. History of left breast cancer (Primary)    2. S/P bilateral mastectomy    3. Family history of breast cancer       Assessment & Plan  1. Personal history of breast cancer.   She is doing well post op. Oncology note reviewed. On anti-hormone therapy. Clinical exam with no evidence of recurrence. Follow up in 3 months. Keep appointment with Dr. Prieto.     Follow-up  The patient will follow up in 3 months.    PROCEDURE  The patient underwent a hysterectomy.     BMI is within normal parameters. No other follow-up for BMI " required.      Fide Verduzco MD  02/24/25  14:30 CST    Patient or patient representative verbalized consent for the use of Ambient Listening during the visit with  Fide Verduzco MD for chart documentation. 2/25/2025  17:24 CST

## 2025-02-24 NOTE — PATIENT INSTRUCTIONS
Smoking Tobacco Information, Adult  Smoking tobacco can be harmful to your health. Tobacco contains a poisonous (toxic), colorless chemical called nicotine. Nicotine is addictive. It changes the brain and can make it hard to stop smoking. Tobacco also has other toxic chemicals that can hurt your body and raise your risk of many cancers.  How can smoking tobacco affect me?  Smoking tobacco puts you at risk for:  Cancer. Smoking is most commonly associated with lung cancer, but can also lead to cancer in other parts of the body.  Chronic obstructive pulmonary disease (COPD). This is a long-term lung condition that makes it hard to breathe. It also gets worse over time.  High blood pressure (hypertension), heart disease, stroke, or heart attack.  Lung infections, such as pneumonia.  Cataracts. This is when the lenses in the eyes become clouded.  Digestive problems. This may include peptic ulcers, heartburn, and gastroesophageal reflux disease (GERD).  Oral health problems, such as gum disease and tooth loss.  Loss of taste and smell.  Smoking can affect your appearance by causing:  Wrinkles.  Yellow or stained teeth, fingers, and fingernails.  Smoking tobacco can also affect your social life, because:  It may be challenging to find places to smoke when away from home. Many workplaces, restaurants, hotels, and public places are tobacco-free.  Smoking is expensive. This is due to the cost of tobacco and the long-term costs of treating health problems from smoking.  Secondhand smoke may affect those around you. Secondhand smoke can cause lung cancer, breathing problems, and heart disease. Children of smokers have a higher risk for:  Sudden infant death syndrome (SIDS).  Ear infections.  Lung infections.  If you currently smoke tobacco, quitting now can help you:  Lead a longer and healthier life.  Look, smell, breathe, and feel better over time.  Save money.  Protect others from the harms of secondhand smoke.  What  actions can I take to prevent health problems?  Quit smoking    Do not start smoking. Quit if you already do.  Make a plan to quit smoking and commit to it. Look for programs to help you and ask your health care provider for recommendations and ideas.  Set a date and write down all the reasons you want to quit.  Let your friends and family know you are quitting so they can help and support you. Consider finding friends who also want to quit. It can be easier to quit with someone else, so that you can support each other.  Talk with your health care provider about using nicotine replacement medicines to help you quit, such as gum, lozenges, patches, sprays, or pills.  Do not replace cigarette smoking with electronic cigarettes, which are commonly called e-cigarettes. The safety of e-cigarettes is not known, and some may contain harmful chemicals.  If you try to quit but return to smoking, stay positive. It is common to slip up when you first quit, so take it one day at a time.  Be prepared for cravings. When you feel the urge to smoke, chew gum or suck on hard candy.    Lifestyle  Stay busy and take care of your body.  Drink enough fluid to keep your urine pale yellow.  Get plenty of exercise and eat a healthy diet. This can help prevent weight gain after quitting.  Monitor your eating habits. Quitting smoking can cause you to have a larger appetite than when you smoke.  Find ways to relax. Go out with friends or family to a movie or a restaurant where people do not smoke.  Ask your health care provider about having regular tests (screenings) to check for cancer. This may include blood tests, imaging tests, and other tests.  Find ways to manage your stress, such as meditation, yoga, or exercise.  Where to find support  To get support to quit smoking, consider:  Asking your health care provider for more information and resources.  Taking classes to learn more about quitting smoking.  Looking for local organizations  that offer resources about quitting smoking.  Joining a support group for people who want to quit smoking in your local community.  Calling the smokefree.gov counselor helpline: 1-800-Quit-Now (1-341.539.8445)  Where to find more information  You may find more information about quitting smoking from:  HelpGuide.org: www.helpguide.org  Smokefree.gov: smokefree.gov  American Lung Association: www.lung.org  Contact a health care provider if you:  Have problems breathing.  Notice that your lips, nose, or fingers turn blue.  Have chest pain.  Are coughing up blood.  Feel faint or you pass out.  Have other health changes that cause you to worry.  Summary  Smoking tobacco can negatively affect your health, the health of those around you, your finances, and your social life.  Do not start smoking. Quit if you already do. If you need help quitting, ask your health care provider.  Think about joining a support group for people who want to quit smoking in your local community. There are many effective programs that will help you to quit this behavior.  This information is not intended to replace advice given to you by your health care provider. Make sure you discuss any questions you have with your health care provider.  Document Revised: 09/11/2020 Document Reviewed: 01/02/2018  Elsevier Patient Education © 2021 [employer]

## 2025-03-05 ENCOUNTER — OFFICE VISIT (OUTPATIENT)
Age: 45
End: 2025-03-05
Payer: COMMERCIAL

## 2025-03-05 VITALS
BODY MASS INDEX: 23.95 KG/M2 | WEIGHT: 152.6 LBS | SYSTOLIC BLOOD PRESSURE: 122 MMHG | DIASTOLIC BLOOD PRESSURE: 88 MMHG | HEIGHT: 67 IN

## 2025-03-05 DIAGNOSIS — Z09 FOLLOW-UP EXAMINATION, FOLLOWING OTHER SURGERY: Primary | ICD-10-CM

## 2025-03-05 PROCEDURE — 99024 POSTOP FOLLOW-UP VISIT: CPT | Performed by: OBSTETRICS & GYNECOLOGY

## 2025-03-05 NOTE — PROGRESS NOTES
"Chief Complaint  Post-op Follow-up (Pt here for 6 wk postop TLH BSO with natalyanafisa 1-. Pt denies any problems today.)    Subjective        Rachana Smart presents to [facility] for post op visit. Doing well. Normal bowel and bladder function.  Denies VB, vaginal discharge, pain.  Is now on letrozole. Having mood swings and hot flashes, but states they are tolerable.   History of Present Illness    Objective   Vital Signs:  /88   Ht 170.2 cm (67.01\")   Wt 69.2 kg (152 lb 9.6 oz)   BMI 23.89 kg/m²   Estimated body mass index is 23.89 kg/m² as calculated from the following:    Height as of this encounter: 170.2 cm (67.01\").    Weight as of this encounter: 69.2 kg (152 lb 9.6 oz).    BMI is within normal parameters. No other follow-up for BMI required.      Physical Exam   Abdomen: soft/NT/Nd normal BS, incisions well healed  Pelvic: normal external genitalia, vaginal cuff well suspended, intact, no granulation tissue or suture.   Result Review :                Assessment and Plan   Diagnoses and all orders for this visit:    1. Follow-up examination, following other surgery (Primary)  Post op TLH/BSO, doing well. Resume all normal activities. RTC yearly           Follow Up   No follow-ups on file.  Patient was given instructions and counseling regarding her condition or for health maintenance advice. Please see specific information pulled into the AVS if appropriate.             "

## 2025-03-18 ENCOUNTER — OFFICE VISIT (OUTPATIENT)
Dept: INTERNAL MEDICINE | Facility: CLINIC | Age: 45
End: 2025-03-18
Payer: COMMERCIAL

## 2025-03-18 VITALS
BODY MASS INDEX: 24.71 KG/M2 | SYSTOLIC BLOOD PRESSURE: 110 MMHG | DIASTOLIC BLOOD PRESSURE: 80 MMHG | OXYGEN SATURATION: 96 % | TEMPERATURE: 98 F | HEIGHT: 67 IN | WEIGHT: 157.4 LBS | HEART RATE: 58 BPM

## 2025-03-18 DIAGNOSIS — G62.0 NEUROPATHY DUE TO CHEMOTHERAPEUTIC DRUG: Primary | ICD-10-CM

## 2025-03-18 DIAGNOSIS — T45.1X5A NEUROPATHY DUE TO CHEMOTHERAPEUTIC DRUG: Primary | ICD-10-CM

## 2025-03-18 DIAGNOSIS — C50.912 INVASIVE DUCTAL CARCINOMA OF BREAST, FEMALE, LEFT: ICD-10-CM

## 2025-03-18 RX ORDER — GABAPENTIN 300 MG/1
300 CAPSULE ORAL 2 TIMES DAILY
Qty: 60 CAPSULE | Refills: 2 | Status: SHIPPED | OUTPATIENT
Start: 2025-03-18

## 2025-03-18 NOTE — PROGRESS NOTES
Chief Complaint   Patient presents with    Pain         History:  Rachana Smart is a 45 y.o. female who presents today for evaluation of the above problems.      HPI  History of Present Illness    Rachana presents today for an acute visit to discuss neuropathic type pain that started shortly after starting a new chemotherapy for breast cancer.    She was initially prescribed tamoxifen, which she found intolerable. Following a hysterectomy, she was transitioned to letrozole. However, she experienced adverse effects including abdominal distension, bloating, muscle aches, and migraines. She also reported joint and muscle aches, reminiscent of neuropathic pain, and described a sensation akin to fractured feet, particularly when ambulating. These symptoms have been present for approximately 2 to 3 weeks and have significantly impacted her daily activities, such as touring her daughter's college. She has been applying Bengay to her feet, which provides temporary relief and allows her to sleep for about 2 hours. However, her sleep is often disrupted due to pain, limiting her to 2 to 3 hours of sleep over a 3-day period. She also reports difficulty with night sweats and hot flashes, which further disrupt her sleep. She describes her pain as a pins-and-needles sensation, predominantly in her legs and feet, accompanied by occasional electrical shocks.  Her symptoms keep her from sleeping at night. She also reports daytime pain, which is exacerbated by walking. She has an upcoming appointment with Dr. Garner on March 28th, and blood work will be done a week prior. She reports not eating and not exercising because it hurts to move. She also reports a gurgly feeling in her stomach and soreness in her leg. She has been taking Benadryl, which aids in relaxation but does not alleviate her pain. She has been unable to tolerate letrozole for the past 3 to 4 days due to its side effects. She has a history of liver issues and  is concerned about potential liver damage from letrozole. She has an upcoming appointment with Dr. Garner on March 28th, during which blood work will be conducted.      ROS:  Review of Systems  See above    Current Outpatient Medications:     Acetaminophen (MIDOL PO), Take  by mouth., Disp: , Rfl:     Calcium Carbonate-Vitamin D 600-10 MG-MCG per tablet, Take 1 tablet by mouth 2 (Two) Times a Day., Disp: 60 tablet, Rfl: 11    carvedilol (Coreg) 6.25 MG tablet, Take 1 tablet by mouth 2 (Two) Times a Day With Meals., Disp: 60 tablet, Rfl: 3    ferrous sulfate 325 (65 FE) MG tablet, Take 1 tablet by mouth Daily With Breakfast., Disp: , Rfl:     furosemide (LASIX) 40 MG tablet, Take 1 tablet by mouth once daily, Disp: 90 tablet, Rfl: 1    letrozole (FEMARA) 2.5 MG tablet, Take 1 tablet by mouth Daily., Disp: 30 tablet, Rfl: 11    Multivitamin tablet tablet, Take 1 tablet by mouth Daily., Disp: , Rfl:     potassium chloride (KLOR-CON M20) 20 MEQ CR tablet, Take 1 tablet by mouth 3 (Three) Times a Day., Disp: 9 tablet, Rfl: 0    thiamine (VITAMIN B1) 100 MG tablet, Take 1 tablet by mouth Daily. (Patient taking differently: Take 2.5 tablets by mouth Daily.), Disp: 30 tablet, Rfl: 0    vitamin B-12 (cyanocobalamin) 100 MCG tablet, Take 5 tablets by mouth Daily., Disp: , Rfl:     gabapentin (NEURONTIN) 300 MG capsule, Take 1 capsule by mouth 2 (Two) Times a Day., Disp: 60 capsule, Rfl: 2    ibuprofen (Motrin IB) 200 MG tablet, Take 3 tablets by mouth Every 8 (Eight) Hours. Take every 8 hours for three days then take as needed. (Patient not taking: Reported on 3/18/2025), Disp: , Rfl:     tamoxifen (NOLVADEX) 20 MG chemo tablet, Take 1 tablet by mouth Daily. (Patient not taking: Reported on 3/18/2025), Disp: 30 tablet, Rfl: 2    Current Facility-Administered Medications:     lidocaine 1% - EPINEPHrine 1:[id number] (XYLOCAINE W/EPI) 1 %-1:396974 injection 10 mL, 10 mL, Injection, Once, Amina Camarillo MD    Lab Results    Component Value Date    GLUCOSE 126 (H) 01/28/2025    BUN 10 01/28/2025    CREATININE 0.85 01/28/2025     (L) 01/28/2025    K 4.9 01/28/2025     01/28/2025    CALCIUM 8.2 (L) 01/28/2025    PROTEINTOT 6.3 01/28/2025    ALBUMIN 2.9 (L) 01/28/2025    ALT 15 01/28/2025    AST 27 01/28/2025    ALKPHOS 83 01/28/2025    BILITOT 2.2 (H) 01/28/2025    GLOB 3.4 01/28/2025    AGRATIO 0.9 01/28/2025    BCR 11.8 01/28/2025    ANIONGAP 9.0 01/28/2025    EGFR 86.2 01/28/2025       WBC   Date Value Ref Range Status   01/28/2025 12.31 (H) 3.40 - 10.80 10*3/mm3 Final     RBC   Date Value Ref Range Status   01/28/2025 3.18 (L) 3.77 - 5.28 10*6/mm3 Final     Hemoglobin   Date Value Ref Range Status   01/28/2025 9.9 (L) 12.0 - 15.9 g/dL Final     Hematocrit   Date Value Ref Range Status   01/28/2025 31.4 (L) 34.0 - 46.6 % Final     MCV   Date Value Ref Range Status   01/28/2025 98.7 (H) 79.0 - 97.0 fL Final     MCH   Date Value Ref Range Status   01/28/2025 31.1 26.6 - 33.0 pg Final     MCHC   Date Value Ref Range Status   01/28/2025 31.5 31.5 - 35.7 g/dL Final     RDW   Date Value Ref Range Status   01/28/2025 15.2 12.3 - 15.4 % Final     RDW-SD   Date Value Ref Range Status   01/28/2025 55.0 (H) 37.0 - 54.0 fl Final     MPV   Date Value Ref Range Status   01/28/2025 10.7 6.0 - 12.0 fL Final     Platelets   Date Value Ref Range Status   01/28/2025 108 (L) 140 - 450 10*3/mm3 Final     Neutrophil %   Date Value Ref Range Status   01/28/2025 73.9 42.7 - 76.0 % Final     Lymphocyte %   Date Value Ref Range Status   01/28/2025 13.7 (L) 19.6 - 45.3 % Final     Monocyte %   Date Value Ref Range Status   01/28/2025 8.9 5.0 - 12.0 % Final     Eosinophil %   Date Value Ref Range Status   01/28/2025 1.9 0.3 - 6.2 % Final     Basophil %   Date Value Ref Range Status   01/28/2025 0.5 0.0 - 1.5 % Final     Immature Grans %   Date Value Ref Range Status   01/28/2025 1.1 (H) 0.0 - 0.5 % Final     Neutrophils, Absolute   Date Value Ref  "Range Status   01/28/2025 9.11 (H) 1.70 - 7.00 10*3/mm3 Final     Lymphocytes, Absolute   Date Value Ref Range Status   01/28/2025 1.69 0.70 - 3.10 10*3/mm3 Final     Monocytes, Absolute   Date Value Ref Range Status   01/28/2025 1.09 (H) 0.10 - 0.90 10*3/mm3 Final     Eosinophils, Absolute   Date Value Ref Range Status   01/28/2025 0.23 0.00 - 0.40 10*3/mm3 Final     Basophils, Absolute   Date Value Ref Range Status   01/28/2025 0.06 0.00 - 0.20 10*3/mm3 Final     Immature Grans, Absolute   Date Value Ref Range Status   01/28/2025 0.13 (H) 0.00 - 0.05 10*3/mm3 Final     nRBC   Date Value Ref Range Status   01/28/2025 0.0 0.0 - 0.2 /100 WBC Final         OBJECTIVE:  Visit Vitals  /80 (BP Location: Right arm, Patient Position: Sitting, Cuff Size: Adult)   Pulse 58   Temp 98 °F (36.7 °C) (Temporal)   Ht 170.2 cm (67.01\")   Wt 71.4 kg (157 lb 6.4 oz)   LMP 09/29/2024 (Exact Date)   SpO2 96%   BMI 24.64 kg/m²      Physical Exam  Constitutional:       General: She is not in acute distress.     Appearance: She is well-developed. She is not diaphoretic.   HENT:      Head: Normocephalic and atraumatic.   Eyes:      Pupils: Pupils are equal, round, and reactive to light.   Neck:      Thyroid: No thyromegaly.      Trachea: Phonation normal.   Cardiovascular:      Rate and Rhythm: Normal rate.   Pulmonary:      Effort: No respiratory distress.   Skin:     Coloration: Skin is not pale.      Findings: No erythema.   Neurological:      Mental Status: She is alert.   Psychiatric:         Behavior: Behavior normal.         Thought Content: Thought content normal.         Judgment: Judgment normal.           Assessment/Plan      Diagnoses and all orders for this visit:    1. 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  Dispense: 60 capsule; Refill: 2    2. Invasive ductal carcinoma of breast, female, left    3. Essential " hypertension      Assessment & Plan  1. Neuropathic pain.  She reports experiencing pins-and-needles pain, particularly in her feet, which started shortly after beginning letrozole. The pain is severe enough to disrupt her sleep and daily activities. Gabapentin 300 mg twice daily has been prescribed to manage the neuropathic pain. She is advised to take the medication at night initially to assess its impact on her sleep. If necessary, she may take an additional dose during the day but should avoid driving or engaging in activities that require alertness until she understands how the medication affects her.    It is suspected that her current therapy for breast cancer has caused her symptoms.  She will be seeing her oncologist on March 28, 2025 for evaluation.      Return for Next scheduled follow up.      ROSALINDA Holloway MD  15:12 CDT  3/18/2025   Electronically signed    Patient or patient representative verbalized consent for the use of Ambient Listening during the visit with  INA Holloway MD for chart documentation. 3/18/2025  16:14 CDT

## 2025-03-21 ENCOUNTER — LAB (OUTPATIENT)
Dept: LAB | Facility: HOSPITAL | Age: 45
End: 2025-03-21
Payer: COMMERCIAL

## 2025-03-21 ENCOUNTER — RESULTS FOLLOW-UP (OUTPATIENT)
Dept: LAB | Facility: HOSPITAL | Age: 45
End: 2025-03-21
Payer: COMMERCIAL

## 2025-03-21 DIAGNOSIS — E87.6 HYPOKALEMIA: ICD-10-CM

## 2025-03-21 DIAGNOSIS — Z17.0 MALIGNANT NEOPLASM OF LEFT BREAST IN FEMALE, ESTROGEN RECEPTOR POSITIVE, UNSPECIFIED SITE OF BREAST: ICD-10-CM

## 2025-03-21 DIAGNOSIS — C50.912 MALIGNANT NEOPLASM OF LEFT BREAST IN FEMALE, ESTROGEN RECEPTOR POSITIVE, UNSPECIFIED SITE OF BREAST: ICD-10-CM

## 2025-03-21 DIAGNOSIS — C50.212 MALIGNANT NEOPLASM OF UPPER-INNER QUADRANT OF LEFT FEMALE BREAST, UNSPECIFIED ESTROGEN RECEPTOR STATUS: ICD-10-CM

## 2025-03-21 LAB
ALBUMIN SERPL-MCNC: 3.8 G/DL (ref 3.5–5.2)
ALBUMIN/GLOB SERPL: 0.8 G/DL
ALP SERPL-CCNC: 65 U/L (ref 39–117)
ALT SERPL W P-5'-P-CCNC: 21 U/L (ref 1–33)
ANION GAP SERPL CALCULATED.3IONS-SCNC: 16 MMOL/L (ref 5–15)
AST SERPL-CCNC: 47 U/L (ref 1–32)
BASOPHILS # BLD AUTO: 0.06 10*3/MM3 (ref 0–0.2)
BASOPHILS NFR BLD AUTO: 0.7 % (ref 0–1.5)
BILIRUB SERPL-MCNC: 1.8 MG/DL (ref 0–1.2)
BUN SERPL-MCNC: 7 MG/DL (ref 6–20)
BUN/CREAT SERPL: 6.9 (ref 7–25)
CALCIUM SPEC-SCNC: 9.4 MG/DL (ref 8.6–10.5)
CEA SERPL-MCNC: 3.95 NG/ML
CHLORIDE SERPL-SCNC: 92 MMOL/L (ref 98–107)
CO2 SERPL-SCNC: 31 MMOL/L (ref 22–29)
CREAT SERPL-MCNC: 1.02 MG/DL (ref 0.57–1)
DEPRECATED RDW RBC AUTO: 48.7 FL (ref 37–54)
EGFRCR SERPLBLD CKD-EPI 2021: 68.9 ML/MIN/1.73
EOSINOPHIL # BLD AUTO: 0.41 10*3/MM3 (ref 0–0.4)
EOSINOPHIL NFR BLD AUTO: 4.6 % (ref 0.3–6.2)
ERYTHROCYTE [DISTWIDTH] IN BLOOD BY AUTOMATED COUNT: 14.4 % (ref 12.3–15.4)
GLOBULIN UR ELPH-MCNC: 4.5 GM/DL
GLUCOSE SERPL-MCNC: 123 MG/DL (ref 65–99)
HCT VFR BLD AUTO: 38 % (ref 34–46.6)
HGB BLD-MCNC: 12.8 G/DL (ref 12–15.9)
IMM GRANULOCYTES # BLD AUTO: 0.02 10*3/MM3 (ref 0–0.05)
IMM GRANULOCYTES NFR BLD AUTO: 0.2 % (ref 0–0.5)
LYMPHOCYTES # BLD AUTO: 2.92 10*3/MM3 (ref 0.7–3.1)
LYMPHOCYTES NFR BLD AUTO: 32.8 % (ref 19.6–45.3)
MCH RBC QN AUTO: 31.1 PG (ref 26.6–33)
MCHC RBC AUTO-ENTMCNC: 33.7 G/DL (ref 31.5–35.7)
MCV RBC AUTO: 92.5 FL (ref 79–97)
MONOCYTES # BLD AUTO: 0.76 10*3/MM3 (ref 0.1–0.9)
MONOCYTES NFR BLD AUTO: 8.5 % (ref 5–12)
NEUTROPHILS NFR BLD AUTO: 4.74 10*3/MM3 (ref 1.7–7)
NEUTROPHILS NFR BLD AUTO: 53.2 % (ref 42.7–76)
NRBC BLD AUTO-RTO: 0 /100 WBC (ref 0–0.2)
PLATELET # BLD AUTO: 154 10*3/MM3 (ref 140–450)
PMV BLD AUTO: 10.2 FL (ref 6–12)
POTASSIUM SERPL-SCNC: 2.7 MMOL/L (ref 3.5–5.2)
PROT SERPL-MCNC: 8.3 G/DL (ref 6–8.5)
RBC # BLD AUTO: 4.11 10*6/MM3 (ref 3.77–5.28)
SODIUM SERPL-SCNC: 139 MMOL/L (ref 136–145)
WBC NRBC COR # BLD AUTO: 8.91 10*3/MM3 (ref 3.4–10.8)

## 2025-03-21 PROCEDURE — 85025 COMPLETE CBC W/AUTO DIFF WBC: CPT

## 2025-03-21 PROCEDURE — 80053 COMPREHEN METABOLIC PANEL: CPT

## 2025-03-21 PROCEDURE — 82378 CARCINOEMBRYONIC ANTIGEN: CPT

## 2025-03-21 PROCEDURE — 36415 COLL VENOUS BLD VENIPUNCTURE: CPT

## 2025-03-21 PROCEDURE — 86300 IMMUNOASSAY TUMOR CA 15-3: CPT

## 2025-03-21 RX ORDER — POTASSIUM CHLORIDE 1500 MG/1
20 TABLET, EXTENDED RELEASE ORAL 3 TIMES DAILY
Qty: 12 TABLET | Refills: 0 | Status: SHIPPED | OUTPATIENT
Start: 2025-03-21 | End: 2025-03-25

## 2025-03-21 NOTE — TELEPHONE ENCOUNTER
----- Message from Matthew Cruz sent at 3/21/2025  9:53 AM CDT -----  Potassium 2.7-K-Dur p.o. 3 times daily x 4 days-repeat potassium on Monday  ----- Message -----  From: Lab, Background User  Sent: 3/21/2025   9:22 AM CDT  To: Matthew Cruz MD

## 2025-03-21 NOTE — TELEPHONE ENCOUNTER
Spoke with patient's  Kurt and will  the potassium for her to take. Scheduled for next Wednesday for repeat.

## 2025-03-22 LAB — CANCER AG27-29 SERPL-ACNC: 39.6 U/ML (ref 0–38.6)

## 2025-03-26 ENCOUNTER — LAB (OUTPATIENT)
Dept: LAB | Facility: HOSPITAL | Age: 45
End: 2025-03-26
Payer: COMMERCIAL

## 2025-03-26 DIAGNOSIS — E87.6 HYPOKALEMIA: ICD-10-CM

## 2025-03-26 LAB — POTASSIUM SERPL-SCNC: 3.7 MMOL/L (ref 3.5–5.2)

## 2025-03-26 PROCEDURE — 84132 ASSAY OF SERUM POTASSIUM: CPT

## 2025-03-26 PROCEDURE — 36415 COLL VENOUS BLD VENIPUNCTURE: CPT

## 2025-03-30 NOTE — PROGRESS NOTES
[facility] ONC [facility] HEMATOLOGY & ONCOLOGY  [address]  [facility] [address] [phone]    Patient Name: Rachana Smart  Encounter Date: 2025  YOB: 1980  Patient Number: [id number]    REASON FOR VISIT: Rachana Smart is a 44 yoF   who returns in follow-up of invasive mammary (ductal) carcinoma, left breast- original tumor stage:  IA (pT1b, p(sn)N0, Mx, G2). ER 90%+, LA 8 %+, HER-2 (IHC) 1+-low.negative.  Low risk Oncotype (RS=12).  She underwent bilateral skin sparing mastectomies and left sentinel lymph node biopsies, 24. She has been on adjuvant Tamoxifen 20 p.o. daily from 2024-25. On 25-underwent OSEAS/BSO:  Uterus, complete, with bilateral fallopian tubes and bilateral ovaries:No histologic evidence of malignancy. On 25 started adjuvant letrozole 2.5 mg po qd (2 months).  Stopped ~ 2 weeks ago due to arthralgias and chest discomfort.  She is accompanied by her spouse, Kurt     I have reviewed the HPI and verified with the patient the accuracy of it. No changes to interval history since the information was documented. Matthew Cruz MD 25      Diagnostic abnormalities:  - History: +Narayanan, endometriosis, alcoholism in recovery, anemia, transfusions, anxiety, asthma, hepC, cirrhosis, esophageal varices wo bleeding, migraines, cigarette smoker (0.5 ppd x 24 yr), recent breast cancer  - 24- Mammogram- LEFT breast 6 mm focal asymmetry with spiculation/distortion. Recommend targeted ultrasound. No suspicious mammographic findings in the RIGHT breast. BIRADS 0  - 24- US left breast-FINDINGS: Targeted LEFT breast ultrasound 11:00 position, 7 centimeters from the nipple demonstrates a 0.5 x 0.4 x 0.7 cm oval, parallel, hypoechoic mass with spiculated margins and internal vascularity. Posterior shadowing is present. This correlates in size, position and character with the mammographic  finding.IMPRESSION: Suspicious LEFT breast mass. Recommend ultrasound-guided biopsy.  - 9/13/24- US guided breast biopsy- Left breast mass at 11 o'clock position, 7 cm from nipple, core biopsies: A.  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 B.  A prominent associated low-grade ductal carcinoma in situ component is present. C.  Linear length of invasive tumor is 4.6 mm. AJCC stage: pTX pNX. 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  Lifetime risk of 8.7-20% for colorectal cancer and 13-26% for endometrial cancer.  - 10/15/24-endometrial biopsy: Nondiagnostic specimen  - 10/29/24-glucose 148, potassium 3.1, albumin 3.1, AST 40, alk phos 125, total bili 2.8, Hgb 11, .8, platelets 120,000 otherwise normal CBC  - 11/5/24- Oncotype DX- RS 12; DRR 3%; ACTB <1%  - 12/5/24-12/27/24- gluc 117, sodium 132, K+ 2.7, bili 2.2, AST 34 otherwise normal CMP, CEA 5.61, mag 1.5, PO 2.4, Hgb 10.7 otherwise normal CBC, CA27-29 56.5,  59.3 (H)  - 12/27/24- CT CAP-No acute abnormality of the chest. No mass. No infiltrate. No lymphadenopathy.No evidence of intra-abdominal or pelvic metastatic disease. Cirrhosis with portal hypertension, interval resolution of ascites. 3.3 cm uncomplicated cyst in the left adnexa probably arising from the left ovary. Small sliding-type hiatal hernia. Partial contraction of the gallbladder, which contains solitary 5 mm gallstone, without convincing evidence of cholecystitis.    Previous interventions:  - 11/5/24-bilateral skin sparing mastectomies and left sentinel lymph node biopsies: Final diagnoses:  1.  Left breast, mastectomy:  A.  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  B.  Minor associated low-grade ductal carcinoma in situ component.  C.  Invasive tumor  is 7 mm.  D.  Radial scar adjacent to prior biopsy site exhibiting atypical ductal hyperplasia, usual ductal hyperplasia and columnar cell changes without atypia (4.8 mm).  E.  No malignancy or atypia identified at the margins of surgical resection.  F.  Invasive malignancy is 4.9 mm from the closest inked (anterosuperior) margin.  G.  Prior biopsy site changes.  H.  No evidence of Paget's disease of nipple.  I.  Overlying skin is negative for malignancy.     2.  Right breast, mastectomy:  A.  No mammary carcinoma identified.  B.  Radial scar with columnar cell changes and atypical ductal hyperplasia identified in random section of lower outer quadrant (3.6 mm).  C.  No evidence of Paget's disease of nipple.  D.  Overlying skin is negative for malignancy.  E.  Benign intramammary lymph node (1).  F.  Focal fibroadenomatoid changes identified in random section of central breast.     3.  Left axillary sentinel lymph nodes:  A.  Lymph nodes (4), negative for metastatic carcinoma.  B.  Absence of micrometastases is confirmed utilizing immunohistochemical stains for CK AE1/AE3.     AJCC stage: pT1b snpN0    Synoptic Checklist   INVASIVE CARCINOMA OF THE BREAST: Resection   8th Edition - Protocol posted: 6/19/2024INVASIVE CARCINOMA OF THE BREAST: RESECTION - All Specimens  SPECIMEN   Procedure  Total mastectomy   Specimen Laterality  Left   TUMOR   Tumor Site  Upper inner quadrant   Histologic Type  Invasive carcinoma of no special type (ductal)   Histologic Grade ([place] Histologic Score)     Glandular (Acinar) / Tubular Differentiation  Score 2   Nuclear Pleomorphism  Score 2   Mitotic Rate  Score 2   Overall Grade  Grade 2 (scores of 6 or 7)   Tumor Size  Greatest dimension of largest invasive focus (Millimeters): 7 mm   Tumor Focality  Single focus of invasive carcinoma   Ductal Carcinoma In Situ (DCIS)  Present     Negative for extensive intraductal component (EIC)   Nuclear Grade  Grade I (low)   Lymphatic and /  or Vascular Invasion  Not identified   Dermal Lymphatic and / or Vascular Invasion  Not identified   Treatment Effect in the Breast  No known presurgical therapy   MARGINS   Margin Status for Invasive Carcinoma  All margins negative for invasive carcinoma   Distance from Invasive Carcinoma to Closest Margin  4.9 mm   Closest Margin(s) to Invasive Carcinoma  Anterosuperior   Margin Status for DCIS  All margins negative for DCIS   Distance from DCIS to Closest Margin  Greater than: 5 mm mm   Closest Margin(s) to DCIS  Anterosuperior   REGIONAL LYMPH NODES   Regional Lymph Node Status  All regional lymph nodes negative for tumor   Total Number of Lymph Nodes Examined (sentinel and non-sentinel)  4   Number of [place] Nodes Examined  4   pTNM CLASSIFICATION (AJCC 8th Edition)   Reporting of pT, pN, and (when applicable) pM categories is based on information available to the pathologist at the time the report is issued. As per the AJCC (Chapter 1, 8th Ed.) it is the managing physician's responsibility to establish the final pathologic stage based upon all pertinent information, including but potentially not limited to this pathology report.   pT Category  pT1b   pN Category  pN0   N Suffix  (sn)   SPECIAL STUDIES        Estrogen Receptor (ER) Status  Positive (greater than 10% of cells demonstrate nuclear positivity)   Percentage of Cells with Nuclear Positivity  90 %        Progesterone Receptor (PgR) Status  Positive   Percentage of Cells with Nuclear Positivity  8 %        HER2 (by immunohistochemistry)  Negative (Score 1+)        Ki-67 Percentage of Positive Nuclei  11 %        - Adjuvant Tamoxifen 20 mg po daily, 12/5/24- 2/7/25  -1/23/25-Uterus, complete, with bilateral fallopian tubes and bilateral ovaries:  - No cervical dysplasia identified.  - Disordered inactive to weakly proliferative endometrium, negative for atypia.  - Left and right fallopian tubes, no significant histopathologic abnormalities.  - Cystic  follicles, focal surface papillary proliferation without atypia and a small ovarian [place] rest-like nest, possible  incipient Benny tumor (0.3 mm), right ovary.  - Hemorrhagic corpus luteum and luteinized cystic follicles, left ovary.  - No histologic evidence of malignancy.    -Adjuvant letrozole 2.5 mg po qd beginning 2/7/25-present    LABS    Lab Results - Last 18 Months   Lab Units 03/21/25  0911 01/28/25  1636 01/17/25  0846 12/27/24  0919 12/05/24  0949 10/29/24  1035   HEMOGLOBIN g/dL 12.8 9.9* 11.2* 10.7* 10.7* 11.0*   HEMATOCRIT % 38.0 31.4* 34.6 33.3* 32.9* 33.9*   MCV fL 92.5 98.7* 94.8 96.8 99.7* 101.8*   WBC 10*3/mm3 8.91 12.31* 9.96 8.05 6.97 7.40   RDW % 14.4 15.2 14.9 14.6 14.9 15.7*   MPV fL 10.2 10.7 10.4 9.7 9.4 9.6   PLATELETS 10*3/mm3 154 108* 136* 142 136* 120*   IMM GRAN % % 0.2 1.1* 0.6* 0.4 0.3 0.4   NEUTROS ABS 10*3/mm3 4.74 9.11* 5.57 4.14 3.38 4.43   LYMPHS ABS 10*3/mm3 2.92 1.69 2.84 2.59 2.33 1.97   MONOS ABS 10*3/mm3 0.76 1.09* 0.97* 0.72 0.67 0.63   EOS ABS 10*3/mm3 0.41* 0.23 0.41* 0.43* 0.46* 0.24   BASOS ABS 10*3/mm3 0.06 0.06 0.11 0.14 0.11 0.10   IMMATURE GRANS (ABS) 10*3/mm3 0.02 0.13* 0.06* 0.03 0.02 0.03   NRBC /100 WBC 0.0 0.0 0.0 0.0 0.0 0.0       Lab Results - Last 18 Months   Lab Units 03/26/25  1032 03/21/25  0911 01/28/25  1636 01/17/25  0846 01/03/25  1028 12/27/24  0919 12/05/24  0949   GLUCOSE mg/dL  --  123* 126* 93 182* 117* 110*   SODIUM mmol/L  --  139 134* 138 133* 132* 137   POTASSIUM mmol/L 3.7 2.7* 4.9 2.8* 3.6 2.7* 2.5*   CO2 mmol/L  --  31.0* 21.0* 31.0* 27.0 29.0 31.0*   CHLORIDE mmol/L  --  92* 104 93* 97* 93* 98   ANION GAP mmol/L  --  16.0* 9.0 14.0 9.0 10.0 8.0   CREATININE mg/dL  --  1.02* 0.85 1.00 0.83 0.87 0.83   BUN mg/dL  --  7 10 16 11 10 6   BUN / CREAT RATIO   --  6.9* 11.8 16.0 13.3 11.5 7.2   CALCIUM mg/dL  --  9.4 8.2* 9.4 8.7 8.5* 8.4*   ALK PHOS U/L  --  65 83 112 116 112 131*   TOTAL PROTEIN g/dL  --  8.3 6.3 7.8 7.3 7.1 7.0   ALT  (SGPT) U/L  --  21 15 13 14 15 17   AST (SGOT) U/L  --  47* 27 33* 38* 34* 40*   BILIRUBIN mg/dL  --  1.8* 2.2* 2.4* 2.7* 2.2* 2.3*   ALBUMIN g/dL  --  3.8 2.9* 3.4* 3.2* 3.2* 2.9*   GLOBULIN gm/dL  --  4.5 3.4 4.4 4.1 3.9 4.1       Lab Results - Last 18 Months   Lab Units 03/21/25  0911 01/17/25  0846 12/27/24  0919   CEA ng/mL 3.95 4.78 5.61       Lab Results - Last 18 Months   Lab Units 12/05/24  0949 03/18/24  1741 03/16/24  1429   IRON mcg/dL 68  --  11*   TIBC mcg/dL 274*  --  244*   IRON SATURATION (TSAT) % 25  --  5*   FERRITIN ng/mL 75.30  --  18.30   TSH uIU/mL  --  1.350 1.600   FOLATE ng/mL 9.23  --  9.22         PAST MEDICAL HISTORY:  ALLERGIES:  Allergies   Allergen Reactions    Oxycodone Itching     Pt states any pain medication causes severe itching, nausea    Codeine Rash and GI Intolerance     CURRENT MEDICATIONS:  Outpatient Encounter Medications as of 4/4/2025   Medication Sig Dispense Refill    Acetaminophen (MIDOL PO) Take  by mouth.      Calcium Carbonate-Vitamin D 600-10 MG-MCG per tablet Take 1 tablet by mouth 2 (Two) Times a Day. 60 tablet 11    carvedilol (Coreg) 6.25 MG tablet Take 1 tablet by mouth 2 (Two) Times a Day With Meals. 60 tablet 3    ferrous sulfate 325 (65 FE) MG tablet Take 1 tablet by mouth Daily With Breakfast.      furosemide (LASIX) 40 MG tablet Take 1 tablet by mouth once daily 90 tablet 1    gabapentin (NEURONTIN) 300 MG capsule Take 1 capsule by mouth 2 (Two) Times a Day. 60 capsule 2    ibuprofen (Motrin IB) 200 MG tablet Take 3 tablets by mouth Every 8 (Eight) Hours. Take every 8 hours for three days then take as needed.      Multivitamin tablet tablet Take 1 tablet by mouth Daily.      tamoxifen (NOLVADEX) 20 MG chemo tablet Take 1 tablet by mouth Daily. 30 tablet 2    thiamine (VITAMIN B1) 100 MG tablet Take 1 tablet by mouth Daily. (Patient taking differently: Take 2.5 tablets by mouth Daily.) 30 tablet 0    vitamin B-12 (cyanocobalamin) 100 MCG tablet Take 5  tablets by mouth Daily.      letrozole (FEMARA) 2.5 MG tablet Take 1 tablet by mouth Daily. (Patient not taking: Reported on 2025) 30 tablet 11     Facility-Administered Encounter Medications as of 2025   Medication Dose Route Frequency Provider Last Rate Last Admin    lidocaine 1% - EPINEPHrine [id number] (XYLOCAINE W/EPI) 1 %-1:[id number] injection 10 mL  10 mL Injection Once Amina Camarillo MD         ADULT ILLNESSES:  Patient Active Problem List   Diagnosis Code    Tobacco use Z72.0    Anxiety F41.9    Essential hypertension I10    Hypomagnesemia E83.42    Alcoholism F10.20    Macrocytosis D75.89    Anemia - iron deficiency and chronic diseased D64.9    Alcoholic cirrhosis of liver with ascites K70.31    Colon cancer screening Z12.11    Secondary esophageal varices without bleeding I85.10    Invasive ductal carcinoma of breast, female, left C50.912    Malignant neoplasm of upper-inner quadrant of left female breast C50.212    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    Breast cancer C50.919    Malignant neoplasm of upper-inner quadrant of left female breast, unspecified estrogen receptor status C50.212    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 Bernardo-- Diverticulosis in the left colon. - The examination was otherwise normal on direct and retroflexion views. - No specimens collected    D & C HYSTEROSCOPY N/A 2024    Procedure: DILATATION AND CURETTAGE HYSTEROSCOPY;  Surgeon: Glo Benavides MD;  Location: [facility] OR;   Service: Obstetrics/Gynecology;  Laterality: N/A;    ENDOSCOPY N/A 01/31/2024    no evidence of any fresh or old blood or clots,normal stomach    ENDOSCOPY N/A 08/09/2024    -Grade I esophageal varices. - Portal hypertensive gastropathy. - Normal examined duodenum. - No specimens collected    MASTECTOMY W/ SENTINEL NODE BIOPSY Bilateral 11/05/2024    Procedure: BILATERAL SKIN SPARING MASTECTOMY WITH LEFT AXILLARY MAGTRACE GUIDED SENTINEL LYMPH NODE BIOPSY (Dr. Prieto to nahid);  Surgeon: Fide Verduzco MD;  Location: [facility] OR;  Service: General;  Laterality: Bilateral;    MOUTH SURGERY      TOTAL LAPAROSCOPIC HYSTERECTOMY SALPINGO OOPHORECTOMY Bilateral 1/23/2025    Procedure: TOTAL LAPAROSCOPIC HYSTERECTOMY BILATERAL SALPINGOOPHORECTOMY WITH DAVINCI ROBOT (removal of uterus, cervix, both fallopian tubes and ovaries using the davinci robot);  Surgeon: Glo Benavides MD;  Location: [facility] OR;  Service: Robotics - DaVinci;  Laterality: Bilateral;    UPPER GASTROINTESTINAL ENDOSCOPY  3/24    WISDOM TOOTH EXTRACTION  1995     HEALTH MAINTENANCE ITEMS:  Health Maintenance Due   Topic Date Due    ANNUAL PHYSICAL  Never done    Hepatitis B (2 of 3 - Hep B Twinrix 3-dose series) 03/29/2023    COVID-19 Vaccine (1 - 2024-25 season) Never done       <no information>  Last Completed Colonoscopy            Upcoming       COLORECTAL CANCER SCREENING (COLONOSCOPY - Every 3 Years) Next due on 8/9/2027 08/09/2024  Surgical Procedure: COLONOSCOPY    08/09/2024  Colonoscopy    03/16/2024  Fecal Occult Blood component of POC Occult Blood Stool    01/31/2024  Fecal Occult Blood component of POCT Occult Blood, Stool                          Immunization History   Administered Date(s) Administered    Hep A / Hep B 03/01/2023    Pneumococcal Conjugate 20-Valent (PCV20) 03/01/2023    Tdap 01/17/2024     Last Completed Mammogram            Completed or No Longer Recommended       MAMMOGRAM  Discontinued         "Frequency changed to Never automatically (Topic No Longer Applies)    09/11/2024  Mammo Screening Digital Tomosynthesis Bilateral With CAD                              FAMILY HISTORY:  Family History   Problem Relation Age of Onset    Diabetes Mother     Heart disease Father     Diabetes Father     Stroke Father     Melanoma Father 40 - 49    Esophageal cancer Father 60 - 69    Skin cancer Maternal Aunt     Colon cancer Paternal Uncle     Pancreatic cancer Paternal Uncle     Cervical cancer Maternal Grandmother 40 - 49    Skin cancer Paternal Grandmother     Cancer Paternal Grandfather 50 - 59        Mesothelioma    Breast cancer Half-Sister 45    Colon polyps Neg Hx     Uterine cancer Neg Hx      SOCIAL HISTORY:  Social History     Socioeconomic History    Marital status:    Tobacco Use    Smoking status: Some Days     Current packs/day: 0.50     Average packs/day: 0.5 packs/day for 10.0 years (5.0 ttl pk-yrs)     Types: Cigarettes    Smokeless tobacco: Never   Vaping Use    Vaping status: Never Used   Substance and Sexual Activity    Alcohol use: Not Currently     Alcohol/week: 10.0 standard drinks of alcohol     Types: 10 Standard drinks or equivalent per week    Drug use: Never    Sexual activity: Yes     Partners: Male     Birth control/protection: None, Coitus interruptus       REVIEW OF SYSTEMS:  Review of Systems   Constitutional:  Positive for fatigue.        Manages her ADLs, chores, errands, and driving.  Is up and about, \"all the time.\"    Letrozole tolerance: Stopped about 2 weeks ago (after about 6 weeks of use) due to arthralgias, vague sense of chest discomfort and \"liver fullness\".   HENT:  Positive for postnasal drip.    Eyes: Negative.    Respiratory: Negative.          Current cigarette smoker-age 20 -present:  Less than 1/2 pack/day   Cardiovascular: Negative.    Gastrointestinal: Negative.    Endocrine: Positive for heat intolerance (\"Hot flashes\").        Menarche age 10    Cessation of " "menstrual bleeding since TLH/BSO, 2025.    G3,  (miscarriage)   Genitourinary: Negative.         Underwent total laparoscopic hysterectomy/bilateral salpingo-oophorectomy, 2025   Musculoskeletal:  Positive for arthralgias (Chronic), back pain (Chronic) and myalgias (Nocturnal leg).   Skin: Negative.    Allergic/Immunologic: Negative.    Neurological:  Positive for dizziness (Postural dizziness).   Hematological: Negative.    Psychiatric/Behavioral: Negative.         /70   Pulse 73   Temp 97.2 °F (36.2 °C) (Temporal)   Resp 18   Ht 170.2 cm (67\")   Wt 73.3 kg (161 lb 11.2 oz)   LMP 2024 (Exact Date)   SpO2 100%   BMI 25.33 kg/m²  Body surface area is 1.85 meters squared.  Pain Score    25 1000   PainSc: 0-No pain         Physical Exam  Vitals and nursing note reviewed. Exam conducted with a chaperone present.   Constitutional:       Comments: Pleasant, cooperative, modestly Middle-aged female.  Ambulatory.  ECOG 0.     Has regained 5 pounds   HENT:      Head: Normocephalic and atraumatic.   Eyes:      General: No scleral icterus.     Extraocular Movements: Extraocular movements intact.      Pupils: Pupils are equal, round, and reactive to light.   Cardiovascular:      Rate and Rhythm: Normal rate.   Pulmonary:      Effort: Pulmonary effort is normal.   Chest:   Breasts:     Right: Absent.      Left: Absent.          Comments: Chaperoned exam: Blanca Rogers RN.    Left breast mastectomy incision is well-healed.  Some overlying incisional/scar fullness is noted.  No nodularity    Left breast mastectomy incision and left sentinel lymph node biopsy incision are healed with some overlying incisional/scar fullness noted.  No nodularity.    No overt supraclavicular/axillary adenopathy bilaterally.  Abdominal:      Palpations: Abdomen is soft.      Tenderness: There is no abdominal tenderness.      Comments: Laparoscopic incisions are noted to be healed..   Musculoskeletal:         " General: Normal range of motion.      Cervical back: Normal range of motion.   Lymphadenopathy:      Cervical: No cervical adenopathy.      Upper Body:      Right upper body: No supraclavicular or axillary adenopathy.      Left upper body: No supraclavicular or axillary adenopathy.   Skin:     General: Skin is warm.   Neurological:      General: No focal deficit present.      Mental Status: She is alert and oriented to person, place, and time.   Psychiatric:         Mood and Affect: Mood normal.         Behavior: Behavior normal.         Thought Content: Thought content normal.         .Assessment:  1.   Invasive mammary (ductal) carcinoma, left breast            Original tumor stage:  IA (pT1b, p(sn)N0, Mx, G2). ER 90%+, NE 8 %+, HER-2 (IHC) 1+-low.negative.            Original tumor burden:     - 9/11/24- Mammogram- LEFT breast 6 mm focal asymmetry with spiculation/distortion. Recommend targeted ultrasound. No suspicious mammographic findings in the RIGHT breast. BIRADS 0  - 9/13/24- US left breast-FINDINGS: Targeted LEFT breast ultrasound 11:00 position, 7 centimeters from the nipple demonstrates a 0.5 x 0.4 x 0.7 cm oval, parallel, hypoechoic mass with spiculated margins and internal vascularity. Posterior shadowing is present. This correlates in size, position and character with the mammographic finding.IMPRESSION:  Suspicious LEFT breast mass. Recommend ultrasound-guided biopsy.  - 9/13/24- US guided breast biopsy- Left breast mass at 11 o'clock position, 7 cm from nipple, core biopsies: A.  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 B.  A prominent associated low-grade ductal carcinoma in situ component is present. C.  Linear length of invasive tumor is 4.6 mm. AJCC stage: pTX pNX. 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  Lifetime risk of  8.7-20% for colorectal cancer and 13-26% for endometrial cancer.  11/5/24- Oncotype DX- RS 12; DRR 3%; ACTB <1%           Tumor status:   - 11/5/24-bilateral skin sparing mastectomies and left sentinel lymph node biopsies: Final diagnoses:  1.  Left breast, mastectomy:  A.  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  B.  Minor associated low-grade ductal carcinoma in situ component.  C.  Invasive tumor is 7 mm.  D.  Radial scar adjacent to prior biopsy site exhibiting atypical ductal hyperplasia, usual ductal hyperplasia and columnar cell changes without atypia (4.8 mm).  E.  No malignancy or atypia identified at the margins of surgical resection.  F.  Invasive malignancy is 4.9 mm from the closest inked (anterosuperior) margin.  G.  Prior biopsy site changes.  H.  No evidence of Paget's disease of nipple.  I.  Overlying skin is negative for malignancy.  2.  Left axillary sentinel lymph nodes:  A.  Lymph nodes (4), negative for metastatic carcinoma.  B.  Absence of micrometastases is confirmed utilizing immunohistochemical stains for CK AE1/AE3.   -AJCC stage: pT1b snpN0    --Adjuvant Tamoxifen beginning 12/5/24-2/7/25  --Adjuvant letrozole 2.5 mg po qd beginning, 2/7/25- present     2.   Narayanan syndrome  - 8/9/24- Colonoscopy- - Diverticulosis in the left colon. - The examination was otherwise normal on direct and retroflexion views. - No specimens collected. Repeat 10 yr  - 10/2/24- Pap smear- Negative IEL/HPV not detected  - 10/15/24-Endometrium, curettage:  A.  Late secretory endometrium.  B.  Fragments of squamous mucosa and endocervical mucosa.  C.  Blood.  D.  No evidence of atypia or malignancy.  -1/23/25-OSEAS/BSO: Uterus, complete, with bilateral fallopian tubes and bilateral ovaries:  - No cervical dysplasia identified.  - Disordered inactive to weakly proliferative endometrium, negative for atypia.  - Left and right fallopian tubes, no significant histopathologic abnormalities.  - Cystic  "follicles, focal surface papillary proliferation without atypia and a small ovarian [place] rest-like nest, possible  incipient Benny tumor (0.3 mm), right ovary.  - Hemorrhagic corpus luteum and luteinized cystic follicles, left ovary.  - No histologic evidence of malignancy.    3.   Anemia, macrocytic.  From cirrhosis  --Hgb 12.8; MCV 92.5, 3/21/2025 (prior hemant:Hgb 5.2, 3/16/24)  4.   HepC/cirrhosis/esophageal varices  - 8/9/24- EGD-- Grade I esophageal varices. - Portal hypertensive gastropathy. - Normal examined duodenum. - No specimens collected. Rx:  Coreg  5.   Alcoholism in remission  6.   Tobacco smoker- <1/2 ppd since age 20  7.   Surgically postmenopausal  8.   Intermittent thrombocytopenia. Hypersplenism?  -- 154,000, 3/21/2025 (prior hemant: 102,000, 2/20/23)  9.   Osteopenia.  Already on Os-Floyd and Prolia        Plan:  1.   Review labs, 3/21/25-glucose 123, K 2.7 (kdur called in-repeat 3.7, 3/26/25) AST 47, bili 1.8 otherwise normal CMP, CEA 3.95 (prior 4.7), CA 27-29 39.6 (prior 46.3), normal CBC.  Femara tolerance.  Stopped after about 6 weeks (about 2 weeks ago) due to arthralgias, vague chest discomfort, and \"liver fullness\".  2.   Previously apprised of the available diagnostic information, including pathologic diagnosis on breast biopsy, mastectomies, hysterectomy/BSO (no malignancy -above), DEXA scan and staging CT scans (below).    - Baseline labs: 12/5/24-12/27/24- gluc 117, sodium 132, K+ 2.7, bili 2.2, AST 34 otherwise normal CMP, CEA 5.61, mag 1.5, PO 2.4, Hgb 10.7 otherwise normal CBC, CA27-29 56.5,  59.3 (H)  - 12/13/24- DEXA scan- The osteopenia and increased fracture risk.   - 12/27/24- CT CAP-No acute abnormality of the chest, intra-abdominal or pelvic metastatic disease. Cirrhosis with portal hypertension, interval resolution of ascites. 3.3 cm uncomplicated cyst in the left adnexa probably arising from the left ovary. Small sliding-type hiatal hernia. Partial contraction of " the gallbladder, which contains solitary 5 mm gallstone, without convincing evidence of cholecystitis.  - 1/17/25- Labs- K+ 2.8, alb 3.4, AST 33, bili 2.4 (prior: 2.2-3.4), CEA 4.7 (prior: 5.6), CA27-29 46.3 (prior: 56)  - 1/28/25- K+ 4.9, alb 2.9, bili 2.2, ESR 22    3.   The rationale for adjuvant hormonal manipulation with AI's is discussed. The potential risks (to include but not limited to: Hot flashes, asthenia, pain, arthralgia, arthritis, nausea/vomiting, headache, pharyngitis, depression, rash, hypertension, lymphedema, insomnia, edema, weight gain, dyspnea, abdominal pain, constipation, osteoporosis, fractures, cough, bone pain, diarrhea, breast pain, paresthesias, infection, cataracts, myalgias, thromboembolism, angina, endometrial carcinoma, myocardial infarction, stroke, anemia, leukopenia, erythema multiforme, Salgueor-Garrett syndrome) are discussed at length. Questions answered. She agrees to a trial of alternative AI (exemestane)    4.   Rx: Stop-letrozole                Exemestane 25 mg p.o. daily dispense 30 x 11 refill                Oscal 600/400 po bid dispense 30 x 11 refills                Prolia 60 mg sc q 6 mo                 5.   Review NCCN guidelines version invasive breast cancer-systemic adjuvant treatment: HR positive, HER-2 negative disease in premenopausal patients with pT1-3 and pN0 tumors(ductal, lobular, mixed)-tumor </= 0.5 cm and pN0 -consider adjuvant endocrine therapy (category 2B).  If tumor>0.5cm and pN0-strongly consider 21 gene RT-PCR assay if candidate for chemotherapy (category 1)-if not done: Adjuvant chemotherapy followed by endocrine therapy (category 1) or adjuvant endocrine therapy; recurrence score<15: Adjuvant endocrine therapy/ovarian suppression/ablation; recurrence score 16-25-adjuvant endocrine therapy/ovarian suppression/ablation or adjuvant chemotherapy followed by endocrine therapy; recurrence score >26-adjuvant chemotherapy followed by endocrine therapy.   Surveillance follow-up: H&P 1-4 times per year as clinically appropriate for 5 years then annually.  Genetic screening.  Postsurgical management: Lymphedema management.  Imaging: Mammogram every 12 months.  Routine imaging of reconstructed breast not indicated.  For patient receiving anthracycline based therapy: Echocardiogram recommendation per NCCN guidelines for survivorship.  Screening for metastasis: In the absence of clinical signs and symptoms suggestive of recurrent disease no indication for laboratory or imaging studies for metastatic screening.        6.  Continue management per primary care and other specialists  7.   Return to office in 6 weeks with preoffice CBC and differential, CMP, CEA and CA 27-29  8.   Importance of Smoking Cessation discussed with patient and informed patient additional information will be on today's [facility]'s Flyer - Plan to Be Tobacco Free handout provided to patient         I spent ~42 minutes caring for Rachana on this date of service. This time includes time spent by me in the following activities: preparing for the visit, reviewing tests, performing a medically appropriate examination and/or evaluation, counseling and educating the patient/family/caregiver, ordering medications, tests, or procedures and documenting information in the medical record.

## 2025-04-04 ENCOUNTER — OFFICE VISIT (OUTPATIENT)
Dept: ONCOLOGY | Facility: CLINIC | Age: 45
End: 2025-04-04
Payer: COMMERCIAL

## 2025-04-04 VITALS
HEIGHT: 67 IN | DIASTOLIC BLOOD PRESSURE: 70 MMHG | RESPIRATION RATE: 18 BRPM | BODY MASS INDEX: 25.38 KG/M2 | TEMPERATURE: 97.2 F | HEART RATE: 73 BPM | OXYGEN SATURATION: 100 % | WEIGHT: 161.7 LBS | SYSTOLIC BLOOD PRESSURE: 116 MMHG

## 2025-04-04 DIAGNOSIS — C50.912 INVASIVE DUCTAL CARCINOMA OF BREAST, FEMALE, LEFT: Primary | ICD-10-CM

## 2025-04-04 RX ORDER — EXEMESTANE 25 MG/1
25 TABLET ORAL DAILY
Qty: 30 TABLET | Refills: 11 | Status: SHIPPED | OUTPATIENT
Start: 2025-04-04

## 2025-04-17 ENCOUNTER — TELEPHONE (OUTPATIENT)
Dept: INTERNAL MEDICINE | Facility: CLINIC | Age: 45
End: 2025-04-17
Payer: COMMERCIAL

## 2025-04-17 NOTE — TELEPHONE ENCOUNTER
Caller: Kurt Smart    Relationship: Emergency Contact    Best call back number: [phone]     What form or medical record are you requesting: HANDICAP PLACARD    Who is requesting this form or medical record from you: PERSONAL     How would you like to receive the form or medical records (pick-up, mail, fax): PICK-UP, PLEASE CALL WHEN READY FOR PICK-UP     Timeframe paperwork needed: WOULD LIKE TO PICK-UP AROUND 2 PM ON 04.18.25

## 2025-04-17 NOTE — TELEPHONE ENCOUNTER
Handicap placard application here in the office and they could come and fill this out. Should they  application form License [place] and then bring to office?

## 2025-04-18 NOTE — TELEPHONE ENCOUNTER
Yes, that is fine.  However, I will be seeing patients in the clinic and they may have to wait until there is a free time.

## 2025-04-25 ENCOUNTER — OFFICE VISIT (OUTPATIENT)
Dept: INTERNAL MEDICINE | Facility: CLINIC | Age: 45
End: 2025-04-25
Payer: COMMERCIAL

## 2025-04-25 ENCOUNTER — LAB (OUTPATIENT)
Dept: LAB | Facility: HOSPITAL | Age: 45
End: 2025-04-25
Payer: COMMERCIAL

## 2025-04-25 ENCOUNTER — TRANSCRIBE ORDERS (OUTPATIENT)
Dept: ADMINISTRATIVE | Facility: HOSPITAL | Age: 45
End: 2025-04-25
Payer: COMMERCIAL

## 2025-04-25 VITALS
BODY MASS INDEX: 24.33 KG/M2 | HEIGHT: 67 IN | WEIGHT: 155 LBS | OXYGEN SATURATION: 97 % | DIASTOLIC BLOOD PRESSURE: 80 MMHG | HEART RATE: 84 BPM | SYSTOLIC BLOOD PRESSURE: 112 MMHG

## 2025-04-25 DIAGNOSIS — K74.60 CIRRHOSIS OF LIVER WITH ASCITES, UNSPECIFIED HEPATIC CIRRHOSIS TYPE: ICD-10-CM

## 2025-04-25 DIAGNOSIS — K70.31 ALCOHOLIC CIRRHOSIS OF LIVER WITH ASCITES: Primary | ICD-10-CM

## 2025-04-25 DIAGNOSIS — R30.0 DYSURIA: ICD-10-CM

## 2025-04-25 DIAGNOSIS — C50.912 INVASIVE DUCTAL CARCINOMA OF BREAST, FEMALE, LEFT: ICD-10-CM

## 2025-04-25 DIAGNOSIS — M25.562 CHRONIC PAIN OF LEFT KNEE: ICD-10-CM

## 2025-04-25 DIAGNOSIS — M71.22 SYNOVIAL CYST OF LEFT POPLITEAL SPACE: ICD-10-CM

## 2025-04-25 DIAGNOSIS — Z00.00 ANNUAL PHYSICAL EXAM: Primary | ICD-10-CM

## 2025-04-25 DIAGNOSIS — Z15.09 LYNCH SYNDROME: ICD-10-CM

## 2025-04-25 DIAGNOSIS — G89.29 CHRONIC PAIN OF LEFT KNEE: ICD-10-CM

## 2025-04-25 DIAGNOSIS — R18.8 CIRRHOSIS OF LIVER WITH ASCITES, UNSPECIFIED HEPATIC CIRRHOSIS TYPE: ICD-10-CM

## 2025-04-25 LAB
BACTERIA UR QL AUTO: ABNORMAL /HPF
BILIRUB UR QL STRIP: NEGATIVE
CLARITY UR: CLEAR
COLOR UR: YELLOW
GLUCOSE UR STRIP-MCNC: ABNORMAL MG/DL
HGB UR QL STRIP.AUTO: ABNORMAL
HYALINE CASTS UR QL AUTO: ABNORMAL /LPF
KETONES UR QL STRIP: NEGATIVE
LEUKOCYTE ESTERASE UR QL STRIP.AUTO: ABNORMAL
NITRITE UR QL STRIP: NEGATIVE
PH UR STRIP.AUTO: 6.5 [PH] (ref 5–8)
PROT UR QL STRIP: NEGATIVE
RBC # UR STRIP: ABNORMAL /HPF
REF LAB TEST METHOD: ABNORMAL
SP GR UR STRIP: 1.03 (ref 1–1.03)
SQUAMOUS #/AREA URNS HPF: ABNORMAL /HPF
UROBILINOGEN UR QL STRIP: ABNORMAL
WBC # UR STRIP: ABNORMAL /HPF

## 2025-04-25 PROCEDURE — 87086 URINE CULTURE/COLONY COUNT: CPT

## 2025-04-25 PROCEDURE — 81001 URINALYSIS AUTO W/SCOPE: CPT

## 2025-04-25 RX ORDER — POTASSIUM CHLORIDE 1500 MG/1
TABLET, EXTENDED RELEASE ORAL
COMMUNITY
Start: 2025-03-22

## 2025-04-25 RX ORDER — SPIRONOLACTONE 25 MG/1
1 TABLET ORAL DAILY
COMMUNITY
Start: 2025-04-15

## 2025-04-25 NOTE — PROGRESS NOTES
Chief Complaint   Patient presents with    Annual Exam       History:  Rachana Smart is a 45 y.o. female who presents today for evaluation of the above problems.      HPI  History of Present Illness  The patient is a 45-year-old female who presents for an annual physical exam.    She reports a sensation of an impending bladder infection and experiences frequent urination, which she attributes to high water intake. Bowel and bladder functions are otherwise normal.    Knee pain has been a persistent issue for several years, described as severe upon palpation, making ambulation and rising difficult. Previous diagnostic imaging, including ultrasound and x-ray, was conducted prior to her cancer diagnosis in 09/2024. She has not engaged in physical therapy but attempts home stretching exercises, noting a popping sensation in her knee during these exercises. She expresses interest in obtaining an MRI and receiving cortisone injections.    Diet is maintained as healthy, but regular exercise is not performed due to knee and foot pain. She consumes 1 to 2 cigarettes daily and is gradually reducing her intake, declining the desire to quit at this time. Alcohol and drug use are denied.     The last dental examination was 7 years ago, during which a tooth fracture occurred. The last ophthalmological examination was 13 years ago, and she is now ready to get her eyes checked.     A history of heart palpitations while on tamoxifen and letrozole is noted, which she likened to a heart attack. These medications also caused liver swelling and pain, leading to their discontinuation. Symptoms improved after stopping these medications. She underwent a full hysterectomy and is currently on exemestane without any side effects.    Gabapentin is currently prescribed and found beneficial, though it impairs her ability to drive. A refill of this medication is requested, due to spilling her medication at bedside.  She reports that her dog  tried to eat 2-4 of the pills which she can no longer consume.  She is concerned that she will run out of the medication early.    PAST SURGICAL HISTORY:  - Full hysterectomy  - Mastectomy    SOCIAL HISTORY  She reports no alcohol use. She admits to smoking 1 or 2 cigarettes a day. She reports no drug use.        Social History     Socioeconomic History    Marital status:    Tobacco Use    Smoking status: Some Days     Current packs/day: 0.50     Average packs/day: 0.5 packs/day for 10.0 years (5.0 ttl pk-yrs)     Types: Cigarettes    Smokeless tobacco: Never   Vaping Use    Vaping status: Never Used   Substance and Sexual Activity    Alcohol use: Not Currently     Alcohol/week: 10.0 standard drinks of alcohol     Types: 10 Standard drinks or equivalent per week    Drug use: Never    Sexual activity: Yes     Partners: Male     Birth control/protection: None, Coitus interruptus       PHQ-9 Depression Screening  Little interest or pleasure in doing things? Not at all   Feeling down, depressed, or hopeless? Several days   PHQ-2 Total Score 1   Trouble falling or staying asleep, or sleeping too much?     Feeling tired or having little energy?     Poor appetite or overeating?     Feeling bad about yourself - or that you are a failure or have let yourself or your family down?     Trouble concentrating on things, such as reading the newspaper or watching television?     Moving or speaking so slowly that other people could have noticed? Or the opposite - being so fidgety or restless that you have been moving around a lot more than usual?       Thoughts that you would be better off dead, or of hurting yourself in some way?     PHQ-9 Total Score     If you checked off any problems, how difficult have these problems made it for you to do your work, take care of things at home, or get along with other people? Not difficult at all       PHQ-9 Total Score:        ROS:  Review of Systems   Respiratory:  Negative for chest  tightness and shortness of breath.    Cardiovascular:  Negative for chest pain and palpitations.   Gastrointestinal:  Negative for abdominal pain, constipation, diarrhea, nausea and vomiting.   Musculoskeletal:  Positive for arthralgias.         Current Outpatient Medications:     Acetaminophen (MIDOL PO), Take  by mouth., Disp: , Rfl:     Calcium Carbonate-Vitamin D 600-10 MG-MCG per tablet, Take 1 tablet by mouth 2 (Two) Times a Day., Disp: 60 tablet, Rfl: 11    carvedilol (Coreg) 6.25 MG tablet, Take 1 tablet by mouth 2 (Two) Times a Day With Meals., Disp: 60 tablet, Rfl: 3    exemestane (AROMASIN) 25 MG tablet, Take 1 tablet by mouth Daily., Disp: 30 tablet, Rfl: 11    ferrous sulfate 325 (65 FE) MG tablet, Take 1 tablet by mouth Daily With Breakfast., Disp: , Rfl:     furosemide (LASIX) 40 MG tablet, Take 1 tablet by mouth once daily, Disp: 90 tablet, Rfl: 1    gabapentin (NEURONTIN) 300 MG capsule, Take 1 capsule by mouth 2 (Two) Times a Day., Disp: 60 capsule, Rfl: 2    ibuprofen (Motrin IB) 200 MG tablet, Take 3 tablets by mouth Every 8 (Eight) Hours. Take every 8 hours for three days then take as needed., Disp: , Rfl:     Multivitamin tablet tablet, Take 1 tablet by mouth Daily., Disp: , Rfl:     potassium chloride (KLOR-CON M20) 20 MEQ CR tablet, TAKE 1 TABLET BY MOUTH THREE TIMES DAILY FOR 4 DAYS, Disp: , Rfl:     spironolactone (ALDACTONE) 25 MG tablet, Take 1 tablet by mouth Daily., Disp: , Rfl:     thiamine (VITAMIN B1) 100 MG tablet, Take 1 tablet by mouth Daily. (Patient taking differently: Take 2.5 tablets by mouth Daily.), Disp: 30 tablet, Rfl: 0    vitamin B-12 (cyanocobalamin) 100 MCG tablet, Take 5 tablets by mouth Daily., Disp: , Rfl:     Current Facility-Administered Medications:     lidocaine 1% - EPINEPHrine 1:642014 (XYLOCAINE W/EPI) 1 %-1:236892 injection 10 mL, 10 mL, Injection, Once, Amina Camarillo MD    Lab Results   Component Value Date    GLUCOSE 123 (H) 03/21/2025    BUN 7  03/21/2025    CREATININE 1.02 (H) 03/21/2025     03/21/2025    K 3.7 03/26/2025    CL 92 (L) 03/21/2025    CALCIUM 9.4 03/21/2025    PROTEINTOT 8.3 03/21/2025    ALBUMIN 3.8 03/21/2025    ALT 21 03/21/2025    AST 47 (H) 03/21/2025    ALKPHOS 65 03/21/2025    BILITOT 1.8 (H) 03/21/2025    GLOB 4.5 03/21/2025    AGRATIO 0.8 03/21/2025    BCR 6.9 (L) 03/21/2025    ANIONGAP 16.0 (H) 03/21/2025    EGFR 68.9 03/21/2025       WBC   Date Value Ref Range Status   03/21/2025 8.91 3.40 - 10.80 10*3/mm3 Final     RBC   Date Value Ref Range Status   03/21/2025 4.11 3.77 - 5.28 10*6/mm3 Final     Hemoglobin   Date Value Ref Range Status   03/21/2025 12.8 12.0 - 15.9 g/dL Final     Hematocrit   Date Value Ref Range Status   03/21/2025 38.0 34.0 - 46.6 % Final     MCV   Date Value Ref Range Status   03/21/2025 92.5 79.0 - 97.0 fL Final     MCH   Date Value Ref Range Status   03/21/2025 31.1 26.6 - 33.0 pg Final     MCHC   Date Value Ref Range Status   03/21/2025 33.7 31.5 - 35.7 g/dL Final     RDW   Date Value Ref Range Status   03/21/2025 14.4 12.3 - 15.4 % Final     RDW-SD   Date Value Ref Range Status   03/21/2025 48.7 37.0 - 54.0 fl Final     MPV   Date Value Ref Range Status   03/21/2025 10.2 6.0 - 12.0 fL Final     Platelets   Date Value Ref Range Status   03/21/2025 154 140 - 450 10*3/mm3 Final     Neutrophil %   Date Value Ref Range Status   03/21/2025 53.2 42.7 - 76.0 % Final     Lymphocyte %   Date Value Ref Range Status   03/21/2025 32.8 19.6 - 45.3 % Final     Monocyte %   Date Value Ref Range Status   03/21/2025 8.5 5.0 - 12.0 % Final     Eosinophil %   Date Value Ref Range Status   03/21/2025 4.6 0.3 - 6.2 % Final     Basophil %   Date Value Ref Range Status   03/21/2025 0.7 0.0 - 1.5 % Final     Immature Grans %   Date Value Ref Range Status   03/21/2025 0.2 0.0 - 0.5 % Final     Neutrophils, Absolute   Date Value Ref Range Status   03/21/2025 4.74 1.70 - 7.00 10*3/mm3 Final     Lymphocytes, Absolute   Date  "Value Ref Range Status   03/21/2025 2.92 0.70 - 3.10 10*3/mm3 Final     Monocytes, Absolute   Date Value Ref Range Status   03/21/2025 0.76 0.10 - 0.90 10*3/mm3 Final     Eosinophils, Absolute   Date Value Ref Range Status   03/21/2025 0.41 (H) 0.00 - 0.40 10*3/mm3 Final     Basophils, Absolute   Date Value Ref Range Status   03/21/2025 0.06 0.00 - 0.20 10*3/mm3 Final     Immature Grans, Absolute   Date Value Ref Range Status   03/21/2025 0.02 0.00 - 0.05 10*3/mm3 Final     nRBC   Date Value Ref Range Status   03/21/2025 0.0 0.0 - 0.2 /100 WBC Final     OBJECTIVE:  Visit Vitals  /80 (BP Location: Right arm, Patient Position: Sitting, Cuff Size: Adult)   Pulse 84   Ht 170.2 cm (67\")   Wt 70.3 kg (155 lb)   LMP 09/29/2024 (Exact Date)   SpO2 97%   BMI 24.28 kg/m²      Physical Exam  Constitutional:       Appearance: Normal appearance.   HENT:      Head: Normocephalic.      Right Ear: Tympanic membrane normal.      Left Ear: Tympanic membrane normal.      Mouth/Throat:      Mouth: Mucous membranes are moist.   Cardiovascular:      Rate and Rhythm: Normal rate and regular rhythm.      Heart sounds: Normal heart sounds.   Pulmonary:      Effort: Pulmonary effort is normal.      Breath sounds: Normal breath sounds.   Abdominal:      General: Bowel sounds are normal.      Palpations: Abdomen is soft.   Musculoskeletal:      Left knee: Bony tenderness present. Tenderness present.        Legs:    Skin:     General: Skin is warm and dry.   Neurological:      Mental Status: She is alert and oriented to person, place, and time.   Psychiatric:         Mood and Affect: Mood normal.         Behavior: Behavior normal.         Thought Content: Thought content normal.         Judgment: Judgment normal.       Physical Exam  Respiratory: Clear to auscultation, no wheezing, rales or rhonchi  Cardiovascular: Regular rate and rhythm, no murmurs, rubs, or gallops    Results  Imaging   - Ultrasound of the knee: Showed a cyst on the " back of the knee   - X-ray of the knee: Showed a cyst on the back of the knee    Assessment/Plan    Diagnoses and all orders for this visit:    1. Annual physical exam (Primary)  -     Lipid Panel; Future  -     Hemoglobin A1c; Future    2. Narayanan syndrome    3. Invasive ductal carcinoma of breast, female, left    4. Cirrhosis of liver with ascites, unspecified hepatic cirrhosis type    5. Dysuria  -     Urinalysis With Culture If Indicated -; Future    6. Chronic pain of left knee  -     MRI Knee Left Without Contrast; Future    7. Synovial cyst of left popliteal space  -     MRI Knee Left Without Contrast; Future      Assessment & Plan  1. Suspected urinary tract infection.  - A urinalysis will be conducted today to confirm the presence of an infection. If an infection is detected, appropriate treatment will be initiated.    2. Knee pain.  - The patient has a cyst on the back of her knee, causing significant pain and difficulty in walking.  - An MRI of the knee will be ordered to further investigate the cause of the pain. If the MRI is not approved by insurance, a referral to orthopedics will be considered for potential joint injections.    3. Health maintenance.  - Blood pressure and heart rate are within normal limits.  - Advised to maintain an active lifestyle, including short walks, and encouraged to continue efforts to quit smoking.  - A comprehensive lab workup, including cholesterol and diabetes screening, will be ordered. Advised to schedule an eye and dental examinations.    4. Medication management.  - The patient is requesting a refill of gabapentin, which was last dispensed on 03/18/2025.  Additional refill is not needed as it has been more than 30 days since her last refill, refills are available at her pharmacy.  Recommend contacting pharmacy for refill.    Hypertension screen negative per JNC 8 guidelines less than 140/90, blood pressure today is 112/80.    Depression screen negative, PHQ 2 score of  1.    Follow-up  The patient will follow up in 6 months.  Counseling/Anticipatory Guidance Discussed: nutrition, physical activity, healthy weight, injury prevention, misuse of tobacco, alcohol and drugs, dental health, mental health, immunizations, and screenings    BMI is within normal parameters. No other follow-up for BMI required.      Return in about 6 months (around 10/25/2025).    Patient was given instructions and counseling regarding his/her condition or for health maintenance advice. Please see specific information pulled into the AVS if appropriate.     PRIYANK Turner   08:27 CDT  4/25/2025  Electronically signed       Patient or patient representative verbalized consent for the use of Ambient Listening during the visit with  PRIYANK Turner for chart documentation. 4/25/2025  09:47 CDT

## 2025-04-26 LAB — BACTERIA SPEC AEROBE CULT: NO GROWTH

## 2025-04-29 ENCOUNTER — TELEPHONE (OUTPATIENT)
Dept: ONCOLOGY | Facility: CLINIC | Age: 45
End: 2025-04-29
Payer: COMMERCIAL

## 2025-04-29 NOTE — TELEPHONE ENCOUNTER
Caller: Rachana Smart    Relationship to patient: Self    Best call back number: [phone]    Chief complaint: SCHEDULING     Type of visit: LAB    Requested date: SAME DAY REQUESTING TO MOVE UP AS EARLY AS POSSIBLE      If rescheduling, when is the original appointment: 5-2

## 2025-05-01 NOTE — PROGRESS NOTES
[facility] HEMATOLOGY & ONCOLOGY  [address]  [place] [address] [phone]    Patient Name: Rachana Smart  Encounter Date: 2025  YOB: 1980  Patient Number: [id number]    REASON FOR VISIT: Rachana Smart is a 45 yoF   who returns in follow-up of invasive mammary (ductal) carcinoma, left breast- original tumor stage:  IA (pT1b, p(sn)N0, Mx, G2). ER 90%+, MS 8 %+, HER-2 (IHC) 1+-low.negative.  Low risk Oncotype (RS=12).  She underwent bilateral skin sparing mastectomies and left sentinel lymph node biopsies, 24. She has been on adjuvant Tamoxifen 20 p.o. daily from 2024-25. On 25-underwent OSEAS/BSO:  Uterus, complete, with bilateral fallopian tubes and bilateral ovaries:No histologic evidence of malignancy. On 25 started adjuvant letrozole 2.5 mg po qd (2 months).  Stopped ~ 6 weeks ago due to arthralgias and chest discomfort.  At her most recent visit, 25 was started on alternative exemestane. She is here for tolerance assessment.  She is accompanied by her spouse, Kurt       I have reviewed the HPI and verified with the patient the accuracy of it. No changes to interval history since the information was documented. Matthew Cruz MD 25      Diagnostic abnormalities:  - History: +Narayanan, endometriosis, alcoholism in recovery, anemia, transfusions, anxiety, asthma, hepC, cirrhosis, esophageal varices wo bleeding, migraines, cigarette smoker (0.5 ppd x 24 yr), recent breast cancer  - 24- Mammogram- LEFT breast 6 mm focal asymmetry with spiculation/distortion. Recommend targeted ultrasound. No suspicious mammographic findings in the RIGHT breast. BIRADS 0  - 24- US left breast-FINDINGS: Targeted LEFT breast ultrasound 11:00 position, 7 centimeters from the nipple demonstrates a 0.5 x 0.4 x 0.7 cm oval, parallel, hypoechoic mass with spiculated margins and internal  vascularity. Posterior shadowing is present. This correlates in size, position and character with the mammographic finding.IMPRESSION: Suspicious LEFT breast mass. Recommend ultrasound-guided biopsy.  - 9/13/24- US guided breast biopsy- Left breast mass at 11 o'clock position, 7 cm from nipple, core biopsies: A.  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 B.  A prominent associated low-grade ductal carcinoma in situ component is present. C.  Linear length of invasive tumor is 4.6 mm. AJCC stage: pTX pNX. 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  Lifetime risk of 8.7-20% for colorectal cancer and 13-26% for endometrial cancer.  - 10/15/24-endometrial biopsy: Nondiagnostic specimen  - 10/29/24-glucose 148, potassium 3.1, albumin 3.1, AST 40, alk phos 125, total bili 2.8, Hgb 11, .8, platelets 120,000 otherwise normal CBC  - 11/5/24- Oncotype DX- RS 12; DRR 3%; ACTB <1%  - 12/5/24-12/27/24- gluc 117, sodium 132, K+ 2.7, bili 2.2, AST 34 otherwise normal CMP, CEA 5.61, mag 1.5, PO 2.4, Hgb 10.7 otherwise normal CBC, CA27-29 56.5,  59.3 (H)  - 12/27/24- CT CAP-No acute abnormality of the chest. No mass. No infiltrate. No lymphadenopathy.No evidence of intra-abdominal or pelvic metastatic disease. Cirrhosis with portal hypertension, interval resolution of ascites. 3.3 cm uncomplicated cyst in the left adnexa probably arising from the left ovary. Small sliding-type hiatal hernia. Partial contraction of the gallbladder, which contains solitary 5 mm gallstone, without convincing evidence of cholecystitis.    Previous interventions:  - 11/5/24-bilateral skin sparing mastectomies and left sentinel lymph node biopsies: Final diagnoses:  1.  Left breast, mastectomy:  A.  Invasive carcinoma of no  special type (ductal), grade 2.  B.  Minor associated low-grade ductal carcinoma in situ component.  C.  Invasive tumor is 7 mm.  D.  Radial scar adjacent to prior biopsy site exhibiting atypical ductal hyperplasia, usual ductal hyperplasia and columnar cell changes without atypia (4.8 mm).  E.  No malignancy or atypia identified at the margins of surgical resection.  F.  Invasive malignancy is 4.9 mm from the closest inked (anterosuperior) margin.  G.  Prior biopsy site changes.  H.  No evidence of Paget's disease of nipple.  I.  Overlying skin is negative for malignancy.     2.  Right breast, mastectomy:  A.  No mammary carcinoma identified.  B.  Radial scar with columnar cell changes and atypical ductal hyperplasia identified in random section of lower outer quadrant (3.6 mm).  C.  No evidence of Paget's disease of nipple.  D.  Overlying skin is negative for malignancy.  E.  Benign intramammary lymph node (1).  F.  Focal fibroadenomatoid changes identified in random section of central breast.     3.  Left axillary sentinel lymph nodes:  A.  Lymph nodes (4), negative for metastatic carcinoma.  B.  Absence of micrometastases is confirmed utilizing immunohistochemical stains for CK AE1/AE3.     AJCC stage: pT1b snpN0    Synoptic Checklist   INVASIVE CARCINOMA OF THE BREAST: Resection   8th Edition - Protocol posted: 6/19/2024INVASIVE CARCINOMA OF THE BREAST: RESECTION - All Specimens  SPECIMEN   Procedure  Total mastectomy   Specimen Laterality  Left   TUMOR   Tumor Site  Upper inner quadrant   Histologic Type  Invasive carcinoma of no special type (ductal)   Histologic Grade ([place] Histologic Score)     Glandular (Acinar) / Tubular Differentiation  Score 2   Nuclear Pleomorphism  Score 2   Mitotic Rate  Score 2   Overall Grade  Grade 2 (scores of 6 or 7)   Tumor Size  Greatest dimension of largest invasive focus (Millimeters): 7 mm   Tumor Focality  Single focus of invasive carcinoma   Ductal Carcinoma In Situ  (DCIS)  Present     Negative for extensive intraductal component (EIC)   Nuclear Grade  Grade I (low)   Lymphatic and / or Vascular Invasion  Not identified   Dermal Lymphatic and / or Vascular Invasion  Not identified   Treatment Effect in the Breast  No known presurgical therapy   MARGINS   Margin Status for Invasive Carcinoma  All margins negative for invasive carcinoma   Distance from Invasive Carcinoma to Closest Margin  4.9 mm   Closest Margin(s) to Invasive Carcinoma  Anterosuperior   Margin Status for DCIS  All margins negative for DCIS   Distance from DCIS to Closest Margin  Greater than: 5 mm mm   Closest Margin(s) to DCIS  Anterosuperior   REGIONAL LYMPH NODES   Regional Lymph Node Status  All regional lymph nodes negative for tumor   Total Number of Lymph Nodes Examined (sentinel and non-sentinel)  4   Number of [place] Nodes Examined  4   pTNM CLASSIFICATION (AJCC 8th Edition)   Reporting of pT, pN, and (when applicable) pM categories is based on information available to the pathologist at the time the report is issued. As per the AJCC (Chapter 1, 8th Ed.) it is the managing physician's responsibility to establish the final pathologic stage based upon all pertinent information, including but potentially not limited to this pathology report.   pT Category  pT1b   pN Category  pN0   N Suffix  (sn)   SPECIAL STUDIES        Estrogen Receptor (ER) Status  Positive (greater than 10% of cells demonstrate nuclear positivity)   Percentage of Cells with Nuclear Positivity  90 %        Progesterone Receptor (PgR) Status  Positive   Percentage of Cells with Nuclear Positivity  8 %        HER2 (by immunohistochemistry)  Negative (Score 1+)        Ki-67 Percentage of Positive Nuclei  11 %        - Adjuvant Tamoxifen 20 mg po daily, 12/5/24- 2/7/25  -1/23/25-Uterus, complete, with bilateral fallopian tubes and bilateral ovaries:  - No cervical dysplasia identified.  - Disordered inactive to weakly proliferative  endometrium, negative for atypia.  - Left and right fallopian tubes, no significant histopathologic abnormalities.  - Cystic follicles, focal surface papillary proliferation without atypia and a small ovarian [place] rest-like nest, possible  incipient Benny tumor (0.3 mm), right ovary.  - Hemorrhagic corpus luteum and luteinized cystic follicles, left ovary.  - No histologic evidence of malignancy.    -Adjuvant letrozole 2.5 mg po qd beginning 2/7/25-4/4/25. Stopped due to intolerance  -Adjuvant exemestane. 25 mg po qd beginning 4/4/25-5/4/25    LABS    Lab Results - Last 18 Months   Lab Units 05/02/25  0713 03/21/25  0911 01/28/25  1636 01/17/25  0846 12/27/24  0919 12/05/24  0949   HEMOGLOBIN g/dL 13.0 12.8 9.9* 11.2* 10.7* 10.7*   HEMATOCRIT % 37.8 38.0 31.4* 34.6 33.3* 32.9*   MCV fL 88.3 92.5 98.7* 94.8 96.8 99.7*   WBC 10*3/mm3 6.36 8.91 12.31* 9.96 8.05 6.97   RDW % 13.9 14.4 15.2 14.9 14.6 14.9   MPV fL 10.5 10.2 10.7 10.4 9.7 9.4   PLATELETS 10*3/mm3 132* 154 108* 136* 142 136*   IMM GRAN % % 0.3 0.2 1.1* 0.6* 0.4 0.3   NEUTROS ABS 10*3/mm3 4.04 4.74 9.11* 5.57 4.14 3.38   LYMPHS ABS 10*3/mm3 1.61 2.92 1.69 2.84 2.59 2.33   MONOS ABS 10*3/mm3 0.40 0.76 1.09* 0.97* 0.72 0.67   EOS ABS 10*3/mm3 0.21 0.41* 0.23 0.41* 0.43* 0.46*   BASOS ABS 10*3/mm3 0.08 0.06 0.06 0.11 0.14 0.11   IMMATURE GRANS (ABS) 10*3/mm3 0.02 0.02 0.13* 0.06* 0.03 0.02   NRBC /100 WBC 0.0 0.0 0.0 0.0 0.0 0.0       Lab Results - Last 18 Months   Lab Units 05/05/25  1007 05/02/25  1347 05/02/25  0917 05/02/25  0713 03/26/25  1032 03/21/25  0911 01/28/25  1636 01/17/25  0846   GLUCOSE mg/dL 223* 449*  --  599*  --  123* 126* 93   GLUCOSE, ARTERIAL mg/dL  --   --  707*  --   --   --   --   --    SODIUM mmol/L 137 132*  --  126*  --  139 134* 138   SODIUM, VENOUS mmol/L  --   --  128*  --   --   --   --   --    POTASSIUM mmol/L 4.0 3.1*  --  2.9* 3.7 2.7* 4.9 2.8*   POTASSIUM, VENOUS mmol/L  --   --  3.0*  --   --   --   --   --    CO2  mmol/L 23.2 26.0  --  31.0*  --  31.0* 21.0* 31.0*   CHLORIDE mmol/L 99 93*  --  79*  --  92* 104 93*   ANION GAP mmol/L 14.8 13.0  --  16.0*  --  16.0* 9.0 14.0   CREATININE mg/dL 1.07* 0.81  --  1.03*  --  1.02* 0.85 1.00   BUN mg/dL 5* 9  --  10  --  7 10 16   BUN / CREAT RATIO  4.7* 11.1  --  9.7  --  6.9* 11.8 16.0   CALCIUM mg/dL 9.3 8.4*  --  9.5  --  9.4 8.2* 9.4   ALK PHOS U/L 81 81  --  87  --  65 83 112   TOTAL PROTEIN g/dL 7.8 6.5  --  7.8  --  8.3 6.3 7.8   ALT (SGPT) U/L 20 13  --  16  --  21 15 13   AST (SGOT) U/L 46* 28  --  29  --  47* 27 33*   BILIRUBIN mg/dL 2.7* 1.8*  --  2.9*  --  1.8* 2.2* 2.4*   ALBUMIN g/dL 3.7 3.1*  --  3.8  --  3.8 2.9* 3.4*   GLOBULIN gm/dL 4.1 3.4  --  4.0  --  4.5 3.4 4.4       Lab Results - Last 18 Months   Lab Units 05/02/25  0713 03/21/25  0911 01/17/25  0846 12/27/24  0919   CEA ng/mL 7.12 3.95 4.78 5.61       Lab Results - Last 18 Months   Lab Units 12/05/24  0949 03/18/24  1741 03/16/24  1429   IRON mcg/dL 68  --  11*   TIBC mcg/dL 274*  --  244*   IRON SATURATION (TSAT) % 25  --  5*   FERRITIN ng/mL 75.30  --  18.30   TSH uIU/mL  --  1.350 1.600   FOLATE ng/mL 9.23  --  9.22         PAST MEDICAL HISTORY:  ALLERGIES:  Allergies   Allergen Reactions    Oxycodone Itching     Pt states any pain medication causes severe itching, nausea    Codeine Rash and GI Intolerance     CURRENT MEDICATIONS:  Outpatient Encounter Medications as of 5/9/2025   Medication Sig Dispense Refill    Acetaminophen (MIDOL PO) Take  by mouth.      Blood Glucose Monitoring Suppl (Blood Glucose Monitor System) w/Device kit Use 1 each 4 (Four) Times a Day. 1 each 0    Calcium Carbonate-Vitamin D 600-10 MG-MCG per tablet Take 1 tablet by mouth 2 (Two) Times a Day. 60 tablet 11    carvedilol (Coreg) 6.25 MG tablet Take 1 tablet by mouth 2 (Two) Times a Day With Meals. 60 tablet 3    cefdinir (OMNICEF) 300 MG capsule Take 1 capsule by mouth 2 (Two) Times a Day. 14 capsule 0    ferrous sulfate 325  (65 FE) MG tablet Take 1 tablet by mouth Daily With Breakfast.      furosemide (LASIX) 40 MG tablet Take 1 tablet by mouth once daily 90 tablet 1    Glucagon 1 MG/0.2ML solution auto-injector Inject 1 mg under the skin into the appropriate area as directed Daily As Needed (glucose <60). 0.2 mL 1    glucose blood test strip 1 each by Other route 4 (Four) Times a Day. Use as instructed 200 each 5    ibuprofen (Motrin IB) 200 MG tablet Take 3 tablets by mouth Every 8 (Eight) Hours. Take every 8 hours for three days then take as needed.      insulin glargine (Lantus) 100 UNIT/ML injection Inject 10 Units under the skin into the appropriate area as directed Daily. 3 mL 5    Lancets 33G misc Use 1 each 4 (Four) Times a Day. 200 each 5    Multivitamin tablet tablet Take 1 tablet by mouth Daily.      potassium chloride (KLOR-CON M20) 20 MEQ CR tablet TAKE 1 TABLET BY MOUTH THREE TIMES DAILY FOR 4 DAYS      spironolactone (ALDACTONE) 25 MG tablet Take 1 tablet by mouth Daily.      thiamine (VITAMIN B1) 100 MG tablet Take 1 tablet by mouth Daily. (Patient taking differently: Take 2.5 tablets by mouth Daily.) 30 tablet 0    vitamin B-12 (cyanocobalamin) 100 MCG tablet Take 5 tablets by mouth Daily.      exemestane (AROMASIN) 25 MG tablet Take 1 tablet by mouth Daily. (Patient not taking: Reported on 5/9/2025) 30 tablet 11    gabapentin (NEURONTIN) 300 MG capsule Take 1 capsule by mouth 2 (Two) Times a Day. (Patient not taking: Reported on 5/9/2025) 60 capsule 2     Facility-Administered Encounter Medications as of 5/9/2025   Medication Dose Route Frequency Provider Last Rate Last Admin    lidocaine 1% - EPINEPHrine 1:901729 (XYLOCAINE W/EPI) 1 %-1:599971 injection 10 mL  10 mL Injection Once Amina Camarillo MD         ADULT ILLNESSES:  Patient Active Problem List   Diagnosis Code    Tobacco use Z72.0    Anxiety F41.9    Essential hypertension I10    Hypomagnesemia E83.42    Alcoholism F10.20    Macrocytosis D75.89    Anemia  - iron deficiency and chronic diseased D64.9    Alcoholic cirrhosis of liver with ascites K70.31    Colon cancer screening Z12.11    Secondary esophageal varices without bleeding I85.10    Invasive ductal carcinoma of breast, female, left C50.912    Malignant neoplasm of upper-inner quadrant of left female breast C50.212    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    Breast cancer C50.919    Malignant neoplasm of upper-inner quadrant of left female breast, unspecified estrogen receptor status C50.212    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 Bernardo-- Diverticulosis in the left colon. - The examination was otherwise normal on direct and retroflexion views. - No specimens collected    D & C HYSTEROSCOPY N/A 2024    Procedure: DILATATION AND CURETTAGE HYSTEROSCOPY;  Surgeon: Glo Benavides MD;  Location: [facility] OR;  Service: Obstetrics/Gynecology;  Laterality: N/A;    ENDOSCOPY N/A 2024    no evidence of any fresh or old blood or clots,normal stomach    ENDOSCOPY N/A 2024    -Grade I esophageal varices. - Portal hypertensive gastropathy. - Normal examined duodenum. - No specimens collected    MASTECTOMY W/ SENTINEL NODE BIOPSY Bilateral 2024    Procedure: BILATERAL SKIN SPARING MASTECTOMY WITH LEFT AXILLARY MAGTRACE GUIDED SENTINEL LYMPH NODE BIOPSY (Dr. Prieto to nahid);  Surgeon: Fide Verduzco MD;  Location: [facility] OR;  Service: General;  Laterality: Bilateral;    MOUTH SURGERY      TOTAL  LAPAROSCOPIC HYSTERECTOMY SALPINGO OOPHORECTOMY Bilateral 1/23/2025    Procedure: TOTAL LAPAROSCOPIC HYSTERECTOMY BILATERAL SALPINGOOPHORECTOMY WITH DAVINCI ROBOT (removal of uterus, cervix, both fallopian tubes and ovaries using the davinci robot);  Surgeon: Glo Benavides MD;  Location: [facility];  Service: Robotics - DaVinci;  Laterality: Bilateral;    UPPER GASTROINTESTINAL ENDOSCOPY  3/24    WISDOM TOOTH EXTRACTION  1995     HEALTH MAINTENANCE ITEMS:  Health Maintenance Due   Topic Date Due    DIABETIC FOOT EXAM  Never done    DIABETIC EYE EXAM  Never done    URINE MICROALBUMIN-CREATININE RATIO (uACR)  Never done         <no information>  Last Completed Colonoscopy            Upcoming       COLORECTAL CANCER SCREENING (COLONOSCOPY - Every 3 Years) Next due on 8/9/2027 08/09/2024  Surgical Procedure: COLONOSCOPY    08/09/2024  Colonoscopy    03/16/2024  Fecal Occult Blood component of POC Occult Blood Stool    01/31/2024  Fecal Occult Blood component of POCT Occult Blood, Stool                          Immunization History   Administered Date(s) Administered    Hep A / Hep B 03/01/2023    Pneumococcal Conjugate 20-Valent (PCV20) 03/01/2023    Tdap 01/17/2024     Last Completed Mammogram            Completed or No Longer Recommended       MAMMOGRAM  Discontinued        Frequency changed to Never automatically (Topic No Longer Applies)    09/11/2024  Mammo Screening Digital Tomosynthesis Bilateral With CAD                              FAMILY HISTORY:  Family History   Problem Relation Age of Onset    Diabetes Mother     Heart disease Father     Diabetes Father     Stroke Father     Melanoma Father 40 - 49    Esophageal cancer Father 60 - 69    Skin cancer Maternal Aunt     Colon cancer Paternal Uncle     Pancreatic cancer Paternal Uncle     Cervical cancer Maternal Grandmother 40 - 49    Skin cancer Paternal Grandmother     Cancer Paternal Grandfather 50 - 59        Mesothelioma    Breast cancer  "Half-Sister 45    Colon polyps Neg Hx     Uterine cancer Neg Hx      SOCIAL HISTORY:  Social History     Socioeconomic History    Marital status:    Tobacco Use    Smoking status: Some Days     Current packs/day: 0.50     Average packs/day: 0.5 packs/day for 10.0 years (5.0 ttl pk-yrs)     Types: Cigarettes    Smokeless tobacco: Never   Vaping Use    Vaping status: Never Used   Substance and Sexual Activity    Alcohol use: Not Currently     Alcohol/week: 10.0 standard drinks of alcohol     Types: 10 Standard drinks or equivalent per week    Drug use: Never    Sexual activity: Yes     Partners: Male     Birth control/protection: None, Coitus interruptus       REVIEW OF SYSTEMS:  Review of Systems   Constitutional:  Positive for fatigue.        Manages her ADLs, chores, errands, and driving.  Is up and about, \"all the time.\"  Says a cyst in the back of her left knee slows her down.  Ortho is seeing    letrozole tolerance: since stopping letrozole about 6 weeks ago (after about 6 weeks of use), says arthralgias, vague sense of chest discomfort and \"liver fullness\" have resolved.    Exemestane tolerance:    HENT:  Positive for postnasal drip.    Eyes: Negative.    Respiratory: Negative.          Current cigarette smoker-age 20 -present:  Less than 1/2 pack/day   Cardiovascular: Negative.    Gastrointestinal: Negative.    Endocrine: Positive for heat intolerance (\"Hot flashes\").        Menarche age 10    Cessation of menstrual bleeding since TLH/BSO, 2025.    G3,  (miscarriage)   Genitourinary: Negative.         Underwent total laparoscopic hysterectomy/bilateral salpingo-oophorectomy, 2025   Musculoskeletal:  Positive for arthralgias (Chronic- worse on exemestane (hips, back, knees).  Better off the drug, since 25), back pain (Chronic) and myalgias (Nocturnal leg).   Skin: Negative.    Allergic/Immunologic: Negative.    Neurological:  Positive for dizziness (Postural dizziness).   Hematological: " "Negative.    Psychiatric/Behavioral: Negative.       161 lb    /70   Pulse 69   Temp 96.7 °F (35.9 °C) (Temporal)   Resp 18   Ht 170.2 cm (67\")   Wt 68.1 kg (150 lb 3.2 oz)   LMP 09/29/2024 (Exact Date)   SpO2 96%   BMI 23.52 kg/m²  Body surface area is 1.79 meters squared.  Pain Score    05/09/25 1027   PainSc: 0-No pain           Physical Exam  Vitals and nursing note reviewed. Exam conducted with a chaperone present.   Constitutional:       Comments: Pleasant, cooperative, modestly Middle-aged female.  Ambulatory.  ECOG 0.     Has regained 5 pounds   HENT:      Head: Normocephalic and atraumatic.   Eyes:      General: No scleral icterus.     Extraocular Movements: Extraocular movements intact.      Pupils: Pupils are equal, round, and reactive to light.   Cardiovascular:      Rate and Rhythm: Normal rate.   Pulmonary:      Effort: Pulmonary effort is normal.   Chest:   Breasts:     Right: Absent.      Left: Absent.          Comments: Chaperoned exam: Danita De MA    Left breast mastectomy incision is well-healed.  Some overlying incisional/scar fullness is noted.  No overt nodularity    Left breast mastectomy incision and left sentinel lymph node biopsy incision are healed with some overlying incisional/scar fullness noted.  No overt nodularity.    No overt supraclavicular/axillary adenopathy bilaterally.  Abdominal:      Palpations: Abdomen is soft.      Tenderness: There is no abdominal tenderness.      Comments: Laparoscopic incisions are noted to be healed..   Musculoskeletal:         General: Normal range of motion.      Cervical back: Normal range of motion.   Lymphadenopathy:      Cervical: No cervical adenopathy.      Upper Body:      Right upper body: No supraclavicular or axillary adenopathy.      Left upper body: No supraclavicular or axillary adenopathy.   Skin:     General: Skin is warm.   Neurological:      General: No focal deficit present.      Mental Status: She is alert and " oriented to person, place, and time.   Psychiatric:         Mood and Affect: Mood normal.         Behavior: Behavior normal.         Thought Content: Thought content normal.         .Assessment:  1.   Invasive mammary (ductal) carcinoma, left breast            Original tumor stage:  IA (pT1b, p(sn)N0, Mx, G2). ER 90%+, TX 8 %+, HER-2 (IHC) 1+-low.negative.            Original tumor burden:     - 9/11/24- Mammogram- LEFT breast 6 mm focal asymmetry with spiculation/distortion. Recommend targeted ultrasound. No suspicious mammographic findings in the RIGHT breast. BIRADS 0  - 9/13/24- US left breast-FINDINGS: Targeted LEFT breast ultrasound 11:00 position, 7 centimeters from the nipple demonstrates a 0.5 x 0.4 x 0.7 cm oval, parallel, hypoechoic mass with spiculated margins and internal vascularity. Posterior shadowing is present. This correlates in size, position and character with the mammographic finding.IMPRESSION:  Suspicious LEFT breast mass. Recommend ultrasound-guided biopsy.  - 9/13/24- US guided breast biopsy- Left breast mass at 11 o'clock position, 7 cm from nipple, core biopsies: A.  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 B.  A prominent associated low-grade ductal carcinoma in situ component is present. C.  Linear length of invasive tumor is 4.6 mm. AJCC stage: pTX pNX. 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  Lifetime risk of 8.7-20% for colorectal cancer and 13-26% for endometrial cancer.  11/5/24- Oncotype DX- RS 12; DRR 3%; ACTB <1%           Tumor status:   - 11/5/24-bilateral skin sparing mastectomies and left sentinel lymph node biopsies: Final diagnoses:  1.  Left breast, mastectomy:  A.  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  B.  Minor associated low-grade ductal carcinoma in situ component.  C.  Invasive  tumor is 7 mm.  D.  Radial scar adjacent to prior biopsy site exhibiting atypical ductal hyperplasia, usual ductal hyperplasia and columnar cell changes without atypia (4.8 mm).  E.  No malignancy or atypia identified at the margins of surgical resection.  F.  Invasive malignancy is 4.9 mm from the closest inked (anterosuperior) margin.  G.  Prior biopsy site changes.  H.  No evidence of Paget's disease of nipple.  I.  Overlying skin is negative for malignancy.  2.  Left axillary sentinel lymph nodes:  A.  Lymph nodes (4), negative for metastatic carcinoma.  B.  Absence of micrometastases is confirmed utilizing immunohistochemical stains for CK AE1/AE3.   -AJCC stage: pT1b snpN0    --Adjuvant Tamoxifen beginning 12/5/24-2/7/25  --Adjuvant letrozole 2.5 mg po qd beginning, 2/7/25- 4/4/25-intolerant  --Adjuvant exemestane 25 mg po qd, 4/4/25-stopped 5/2/2025-hyperglycemia?    2.   Abnormal tumor markers.  Need follow-up:  -5/2/2025--CEA 7.12 (prior peak: 5.61), CA 27-29 50.3 (prior peak: 59.3)     3.   Narayanan syndrome  - 8/9/24- Colonoscopy- - Diverticulosis in the left colon. - The examination was otherwise normal on direct and retroflexion views. - No specimens collected. Repeat 10 yr  - 10/2/24- Pap smear- Negative IEL/HPV not detected  - 10/15/24-Endometrium, curettage:  A.  Late secretory endometrium.  B.  Fragments of squamous mucosa and endocervical mucosa.  C.  Blood.  D.  No evidence of atypia or malignancy.  -1/23/25-OSEAS/BSO: Uterus, complete, with bilateral fallopian tubes and bilateral ovaries:  - No cervical dysplasia identified.  - Disordered inactive to weakly proliferative endometrium, negative for atypia.  - Left and right fallopian tubes, no significant histopathologic abnormalities.  - Cystic follicles, focal surface papillary proliferation without atypia and a small ovarian Walthard rest-like nest, possible  incipient Benny tumor (0.3 mm), right ovary.  - Hemorrhagic corpus luteum and luteinized  cystic follicles, left ovary.  - No histologic evidence of malignancy.    4.   Anemia, macrocytic.  From cirrhosis  --Hgb 13; MCV 88.3, 5/2/2025 (prior hemant:Hgb 5.2, 3/16/24)  5.   HepC/cirrhosis/esophageal varices  - 8/9/24- EGD-- Grade I esophageal varices. - Portal hypertensive gastropathy. - Normal examined duodenum. - No specimens collected. Rx:  Coreg  6.   Alcoholism in remission  7.   Tobacco smoker- <1/2 ppd since age 20  8.   Surgically postmenopausal  9.   Intermittent thrombocytopenia. Hypersplenism?  -- 154,000, 3/21/2025 (prior hemant: 102,000, 2/20/23)  10.   Osteopenia.  Already on Os-Floyd and Prolia  - 12/13/24- DEXA scan- Osteopenia and increased fracture risk  11.  UTI  -4/25/25- UA.  Abx called in by PRIYANK Valle  12.  Hyperglycemia/new onset DM. Now on insulin.  Exemestane associated?        Plan:  1.   Review labs, 3/21/25-5/2/25-5/5/25 glucose 223 (peak:599), AST 46 (peak: 47), bili 2.7 (peak: 2.9) otherwise normal CMP, CEA 7.12 (prior peak: 5.61), CA 27-29 50.3 (prior peak: 59.3), normal CBC.      2.   Exemestane tolerance.  Hot flashes and arthralgias.  In the interval was also seen in the ED for blood glucose> 500 on 5/2/2025.  She stopped the drug on 5/4/2025.  States her blood sugars have dropped into the 200s and her arthralgias have disappeared.  Clarified with pharmacy (discussed with Juaquin Torres, Pharm.D who noted:*case report* in which on two separate occurrences of the same patient taking exemestane, she experienced significant hyperglycemia and worsening diabetes control overall. In this report, the authors question whether the steroidal/androgenic-like structure of exemestane may have been the reason.     3.   The rationale for adjuvant hormonal manipulation with AI's is again discussed/reviewed. The potential risks (to include but not limited to: Hot flashes, asthenia, pain, arthralgia, arthritis, nausea/vomiting, headache, pharyngitis, depression, rash, hypertension,  lymphedema, insomnia, edema, weight gain, dyspnea, abdominal pain, constipation, osteoporosis, fractures, cough, bone pain, diarrhea, breast pain, paresthesias, infection, cataracts, myalgias, thromboembolism, angina, endometrial carcinoma, myocardial infarction, stroke, anemia, leukopenia, erythema multiforme, Salguero-Garrett syndrome) are discussed at length. Questions answered. She agrees to a trial with anastrozole (which has a very different molecular structure than exemestane)    4.   Rx:  Anastrozole 1 mg p.o. daily dispense 30 x 11 refill                Oscal 600/400 po bid dispense 30 x 11 refills                Prolia 60 mg sc q 6 mo                 5.   Review NCCN guidelines version invasive breast cancer-systemic adjuvant treatment: HR positive, HER-2 negative disease in premenopausal patients with pT1-3 and pN0 tumors(ductal, lobular, mixed)-tumor </= 0.5 cm and pN0 -consider adjuvant endocrine therapy (category 2B).  If tumor>0.5cm and pN0-strongly consider 21 gene RT-PCR assay if candidate for chemotherapy (category 1)-if not done: Adjuvant chemotherapy followed by endocrine therapy (category 1) or adjuvant endocrine therapy; recurrence score<15: Adjuvant endocrine therapy/ovarian suppression/ablation; recurrence score 16-25-adjuvant endocrine therapy/ovarian suppression/ablation or adjuvant chemotherapy followed by endocrine therapy; recurrence score >26-adjuvant chemotherapy followed by endocrine therapy.  Surveillance follow-up: H&P 1-4 times per year as clinically appropriate for 5 years then annually.  Genetic screening.  Postsurgical management: Lymphedema management.  Imaging: Mammogram every 12 months.  Routine imaging of reconstructed breast not indicated.  For patient receiving anthracycline based therapy: Echocardiogram recommendation per NCCN guidelines for survivorship.  Screening for metastasis: In the absence of clinical signs and symptoms suggestive of recurrent disease no indication  for laboratory or imaging studies for metastatic screening.        6.  Continue management per primary care and other specialists  7.   Return to office in 6 weeks with preoffice CBC and differential, CMP, CEA and CA 27-29  8.   Importance of Smoking Cessation discussed with patient and informed patient additional information will be on today's [facility]'s Flyer - Plan to Be Tobacco Free handout provided to patient         I spent ~71 minutes caring for Rachana on this date of service. This time includes time spent by me in the following activities: preparing for the visit, reviewing tests, performing a medically appropriate examination and/or evaluation, counseling and educating the patient/family/caregiver, ordering medications, tests, or procedures and documenting information in the medical record.

## 2025-05-02 ENCOUNTER — LAB (OUTPATIENT)
Dept: LAB | Facility: HOSPITAL | Age: 45
End: 2025-05-02
Payer: COMMERCIAL

## 2025-05-02 ENCOUNTER — HOSPITAL ENCOUNTER (EMERGENCY)
Facility: HOSPITAL | Age: 45
Discharge: HOME | End: 2025-05-02
Attending: EMERGENCY MEDICINE
Payer: COMMERCIAL

## 2025-05-02 ENCOUNTER — TELEPHONE (OUTPATIENT)
Dept: ONCOLOGY | Facility: CLINIC | Age: 45
End: 2025-05-02
Payer: COMMERCIAL

## 2025-05-02 VITALS
HEART RATE: 65 BPM | WEIGHT: 155 LBS | DIASTOLIC BLOOD PRESSURE: 71 MMHG | SYSTOLIC BLOOD PRESSURE: 117 MMHG | RESPIRATION RATE: 15 BRPM | OXYGEN SATURATION: 98 % | BODY MASS INDEX: 24.33 KG/M2 | TEMPERATURE: 98.3 F | HEIGHT: 67 IN

## 2025-05-02 DIAGNOSIS — K76.9 CHRONIC LIVER DISEASE: ICD-10-CM

## 2025-05-02 DIAGNOSIS — Z00.00 ANNUAL PHYSICAL EXAM: ICD-10-CM

## 2025-05-02 DIAGNOSIS — K70.31 ALCOHOLIC CIRRHOSIS OF LIVER WITH ASCITES: ICD-10-CM

## 2025-05-02 DIAGNOSIS — R73.9 HYPERGLYCEMIA: Primary | ICD-10-CM

## 2025-05-02 DIAGNOSIS — E86.0 DEHYDRATION: ICD-10-CM

## 2025-05-02 DIAGNOSIS — E87.6 HYPOKALEMIA: ICD-10-CM

## 2025-05-02 DIAGNOSIS — C50.912 INVASIVE DUCTAL CARCINOMA OF BREAST, FEMALE, LEFT: ICD-10-CM

## 2025-05-02 LAB
A-A DO2: ABNORMAL
ALBUMIN SERPL-MCNC: 3.1 G/DL (ref 3.5–5.2)
ALBUMIN SERPL-MCNC: 3.8 G/DL (ref 3.5–5.2)
ALBUMIN/GLOB SERPL: 0.9 G/DL
ALBUMIN/GLOB SERPL: 1 G/DL
ALP SERPL-CCNC: 81 U/L (ref 39–117)
ALP SERPL-CCNC: 87 U/L (ref 39–117)
ALPHA-FETOPROTEIN: 5.08 NG/ML (ref 0–8.3)
ALT SERPL W P-5'-P-CCNC: 13 U/L (ref 1–33)
ALT SERPL W P-5'-P-CCNC: 16 U/L (ref 1–33)
ANION GAP SERPL CALCULATED.3IONS-SCNC: 13 MMOL/L (ref 5–15)
ANION GAP SERPL CALCULATED.3IONS-SCNC: 16 MMOL/L (ref 5–15)
AST SERPL-CCNC: 28 U/L (ref 1–32)
AST SERPL-CCNC: 29 U/L (ref 1–32)
ATMOSPHERIC PRESS: 751 MMHG
BACTERIA UR QL AUTO: NORMAL /HPF
BASE EXCESS BLDV CALC-SCNC: 10.7 MMOL/L (ref 0–2)
BASOPHILS # BLD AUTO: 0.08 10*3/MM3 (ref 0–0.2)
BASOPHILS NFR BLD AUTO: 1.3 % (ref 0–1.5)
BDY SITE: ABNORMAL
BILIRUB CONJ SERPL-MCNC: 1.3 MG/DL (ref 0–0.3)
BILIRUB SERPL-MCNC: 1.8 MG/DL (ref 0–1.2)
BILIRUB SERPL-MCNC: 2.9 MG/DL (ref 0–1.2)
BILIRUB UR QL STRIP: NEGATIVE
BODY TEMPERATURE: 37
BUN SERPL-MCNC: 10 MG/DL (ref 6–20)
BUN SERPL-MCNC: 9 MG/DL (ref 6–20)
BUN/CREAT SERPL: 11.1 (ref 7–25)
BUN/CREAT SERPL: 9.7 (ref 7–25)
CA-I BLD-MCNC: ABNORMAL MG/DL
CALCIUM SPEC-SCNC: 8.4 MG/DL (ref 8.6–10.5)
CALCIUM SPEC-SCNC: 9.5 MG/DL (ref 8.6–10.5)
CEA SERPL-MCNC: 7.12 NG/ML
CHLORIDE SERPL-SCNC: 79 MMOL/L (ref 98–107)
CHLORIDE SERPL-SCNC: 93 MMOL/L (ref 98–107)
CHOLEST SERPL-MCNC: 158 MG/DL (ref 0–200)
CLARITY UR: CLEAR
CO2 SERPL-SCNC: 26 MMOL/L (ref 22–29)
CO2 SERPL-SCNC: 31 MMOL/L (ref 22–29)
COHGB MFR BLD: 4.9 % (ref 0–5)
COLOR UR: YELLOW
CPAP: ABNORMAL
CREAT SERPL-MCNC: 0.81 MG/DL (ref 0.57–1)
CREAT SERPL-MCNC: 1.03 MG/DL (ref 0.57–1)
D-LACTATE SERPL-SCNC: 2.1 MMOL/L (ref 0.5–2)
D-LACTATE SERPL-SCNC: 4.9 MMOL/L (ref 0.5–2)
DEPRECATED RDW RBC AUTO: 44.4 FL (ref 37–54)
EGFRCR SERPLBLD CKD-EPI 2021: 68.1 ML/MIN/1.73
EGFRCR SERPLBLD CKD-EPI 2021: 90.8 ML/MIN/1.73
EOSINOPHIL # BLD AUTO: 0.21 10*3/MM3 (ref 0–0.4)
EOSINOPHIL NFR BLD AUTO: 3.3 % (ref 0.3–6.2)
EPAP: ABNORMAL
ERYTHROCYTE [DISTWIDTH] IN BLOOD BY AUTOMATED COUNT: 13.9 % (ref 12.3–15.4)
GAS FLOW AIRWAY: ABNORMAL L/MIN
GLOBULIN UR ELPH-MCNC: 3.4 GM/DL
GLOBULIN UR ELPH-MCNC: 4 GM/DL
GLUCOSE BLDA-MCNC: 707 MG/DL (ref 65–99)
GLUCOSE BLDC GLUCOMTR-MCNC: 593 MG/DL (ref 70–130)
GLUCOSE SERPL-MCNC: 449 MG/DL (ref 65–99)
GLUCOSE SERPL-MCNC: 599 MG/DL (ref 65–99)
GLUCOSE UR STRIP-MCNC: ABNORMAL MG/DL
HBA1C MFR BLD: 12.1 % (ref 4.8–5.6)
HBV SURFACE AG SERPL QL IA: NORMAL
HCO3 BLDV-SCNC: 35 MMOL/L (ref 22–28)
HCT VFR BLD AUTO: 37.8 % (ref 34–46.6)
HCT VFR BLD CALC: 39.2 % (ref 38–51)
HDLC SERPL-MCNC: 31 MG/DL (ref 40–60)
HGB BLD-MCNC: 13 G/DL (ref 12–15.9)
HGB BLDA-MCNC: 12.8 G/DL (ref 12–16)
HGB UR QL STRIP.AUTO: ABNORMAL
HOLD SPECIMEN: NORMAL
HOLD SPECIMEN: NORMAL
HYALINE CASTS UR QL AUTO: NORMAL /LPF
IMM GRANULOCYTES # BLD AUTO: 0.02 10*3/MM3 (ref 0–0.05)
IMM GRANULOCYTES NFR BLD AUTO: 0.3 % (ref 0–0.5)
INHALED O2 CONCENTRATION: ABNORMAL %
INR PPP: 1.4 (ref 0.91–1.09)
IPAP: ABNORMAL
KETONES UR QL STRIP: ABNORMAL
LACTATE BLDA-SCNC: 4.5 MMOL/L (ref 0.5–2)
LDLC SERPL CALC-MCNC: 96 MG/DL (ref 0–100)
LDLC/HDLC SERPL: 2.94 {RATIO}
LEUKOCYTE ESTERASE UR QL STRIP.AUTO: NEGATIVE
LYMPHOCYTES # BLD AUTO: 1.61 10*3/MM3 (ref 0.7–3.1)
LYMPHOCYTES NFR BLD AUTO: 25.3 % (ref 19.6–45.3)
Lab: ABNORMAL
Lab: ABNORMAL
MAGNESIUM SERPL-MCNC: 1.9 MG/DL (ref 1.6–2.6)
MCH RBC QN AUTO: 30.4 PG (ref 26.6–33)
MCHC RBC AUTO-ENTMCNC: 34.4 G/DL (ref 31.5–35.7)
MCV RBC AUTO: 88.3 FL (ref 79–97)
METHGB BLD QL: 0.2 % (ref 0–3)
MODALITY: ABNORMAL
MONOCYTES # BLD AUTO: 0.4 10*3/MM3 (ref 0.1–0.9)
MONOCYTES NFR BLD AUTO: 6.3 % (ref 5–12)
NEUTROPHILS NFR BLD AUTO: 4.04 10*3/MM3 (ref 1.7–7)
NEUTROPHILS NFR BLD AUTO: 63.5 % (ref 42.7–76)
NITRIC OXIDE: ABNORMAL
NITRITE UR QL STRIP: NEGATIVE
NOTE: ABNORMAL
NOTIFIED BY: ABNORMAL
NOTIFIED WHO: ABNORMAL
NRBC BLD AUTO-RTO: 0 /100 WBC (ref 0–0.2)
OXYHGB MFR BLDV: 80.1 % (ref 60–85)
PAW @ PEAK INSP FLOW SETTING VENT: ABNORMAL CM[H2O]
PCO2 BLDV: 44 MM HG (ref 41–51)
PEEP RESPIRATORY: ABNORMAL CM[H2O]
PH BLDV: 7.51 PH UNITS (ref 7.32–7.42)
PH UR STRIP.AUTO: 7.5 [PH] (ref 5–8)
PLATELET # BLD AUTO: 132 10*3/MM3 (ref 140–450)
PMV BLD AUTO: 10.5 FL (ref 6–12)
PO2 BLDV: 46.2 MM HG (ref 27–53)
POTASSIUM BLDV-SCNC: 3 MMOL/L (ref 3.5–5.2)
POTASSIUM SERPL-SCNC: 2.9 MMOL/L (ref 3.5–5.2)
POTASSIUM SERPL-SCNC: 3.1 MMOL/L (ref 3.5–5.2)
PROCALCITONIN SERPL-MCNC: 0.17 NG/ML (ref 0–0.25)
PROT SERPL-MCNC: 6.5 G/DL (ref 6–8.5)
PROT SERPL-MCNC: 7.8 G/DL (ref 6–8.5)
PROT UR QL STRIP: NEGATIVE
PROTHROMBIN TIME: 17.9 SECONDS (ref 11.8–14.8)
PSV: ABNORMAL
PULSE OX: ABNORMAL
RBC # BLD AUTO: 4.28 10*6/MM3 (ref 3.77–5.28)
RBC # UR STRIP: NORMAL /HPF
REF LAB TEST METHOD: NORMAL
SAO2 % BLDCOV: 84.4 % (ref 45–75)
SET MECH RESP RATE: ABNORMAL
SODIUM BLDV-SCNC: 128 MMOL/L (ref 136–145)
SODIUM SERPL-SCNC: 126 MMOL/L (ref 136–145)
SODIUM SERPL-SCNC: 132 MMOL/L (ref 136–145)
SP GR UR STRIP: >1.03 (ref 1–1.03)
SQUAMOUS #/AREA URNS HPF: NORMAL /HPF
TOTAL RATE: ABNORMAL
TRIGL SERPL-MCNC: 179 MG/DL (ref 0–150)
UROBILINOGEN UR QL STRIP: ABNORMAL
VENTILATOR MODE: ABNORMAL
VLDLC SERPL-MCNC: 31 MG/DL (ref 5–40)
VOLUME % O2: ABNORMAL
VT ON VENT VENT: ABNORMAL ML
WBC # UR STRIP: NORMAL /HPF
WBC NRBC COR # BLD AUTO: 6.36 10*3/MM3 (ref 3.4–10.8)
WHOLE BLOOD HOLD COAG: NORMAL
WHOLE BLOOD HOLD SPECIMEN: NORMAL

## 2025-05-02 PROCEDURE — 81001 URINALYSIS AUTO W/SCOPE: CPT | Performed by: EMERGENCY MEDICINE

## 2025-05-02 PROCEDURE — 84145 PROCALCITONIN (PCT): CPT | Performed by: EMERGENCY MEDICINE

## 2025-05-02 PROCEDURE — 86706 HEP B SURFACE ANTIBODY: CPT

## 2025-05-02 PROCEDURE — 85025 COMPLETE CBC W/AUTO DIFF WBC: CPT

## 2025-05-02 PROCEDURE — 82378 CARCINOEMBRYONIC ANTIGEN: CPT

## 2025-05-02 PROCEDURE — 36415 COLL VENOUS BLD VENIPUNCTURE: CPT

## 2025-05-02 PROCEDURE — 25010000002 POTASSIUM CHLORIDE 10 MEQ/100ML SOLUTION: Performed by: EMERGENCY MEDICINE

## 2025-05-02 PROCEDURE — 63710000001 INSULIN GLARGINE PER 5 UNITS: Performed by: EMERGENCY MEDICINE

## 2025-05-02 PROCEDURE — 82105 ALPHA-FETOPROTEIN SERUM: CPT

## 2025-05-02 PROCEDURE — 86708 HEPATITIS A ANTIBODY: CPT

## 2025-05-02 PROCEDURE — 96365 THER/PROPH/DIAG IV INF INIT: CPT

## 2025-05-02 PROCEDURE — 86704 HEP B CORE ANTIBODY TOTAL: CPT

## 2025-05-02 PROCEDURE — 83036 HEMOGLOBIN GLYCOSYLATED A1C: CPT

## 2025-05-02 PROCEDURE — 86705 HEP B CORE ANTIBODY IGM: CPT

## 2025-05-02 PROCEDURE — 86300 IMMUNOASSAY TUMOR CA 15-3: CPT

## 2025-05-02 PROCEDURE — 83605 ASSAY OF LACTIC ACID: CPT

## 2025-05-02 PROCEDURE — 82330 ASSAY OF CALCIUM: CPT

## 2025-05-02 PROCEDURE — 96366 THER/PROPH/DIAG IV INF ADDON: CPT

## 2025-05-02 PROCEDURE — 80053 COMPREHEN METABOLIC PANEL: CPT

## 2025-05-02 PROCEDURE — 84132 ASSAY OF SERUM POTASSIUM: CPT

## 2025-05-02 PROCEDURE — 83735 ASSAY OF MAGNESIUM: CPT | Performed by: EMERGENCY MEDICINE

## 2025-05-02 PROCEDURE — 82948 REAGENT STRIP/BLOOD GLUCOSE: CPT

## 2025-05-02 PROCEDURE — 82805 BLOOD GASES W/O2 SATURATION: CPT

## 2025-05-02 PROCEDURE — 82248 BILIRUBIN DIRECT: CPT

## 2025-05-02 PROCEDURE — 80053 COMPREHEN METABOLIC PANEL: CPT | Performed by: EMERGENCY MEDICINE

## 2025-05-02 PROCEDURE — 87340 HEPATITIS B SURFACE AG IA: CPT

## 2025-05-02 PROCEDURE — P9612 CATHETERIZE FOR URINE SPEC: HCPCS

## 2025-05-02 PROCEDURE — 86709 HEPATITIS A IGM ANTIBODY: CPT

## 2025-05-02 PROCEDURE — 80061 LIPID PANEL: CPT

## 2025-05-02 PROCEDURE — 82947 ASSAY GLUCOSE BLOOD QUANT: CPT

## 2025-05-02 PROCEDURE — 84295 ASSAY OF SERUM SODIUM: CPT

## 2025-05-02 PROCEDURE — 85018 HEMOGLOBIN: CPT

## 2025-05-02 PROCEDURE — 85610 PROTHROMBIN TIME: CPT

## 2025-05-02 PROCEDURE — 83605 ASSAY OF LACTIC ACID: CPT | Performed by: EMERGENCY MEDICINE

## 2025-05-02 PROCEDURE — 25810000003 SODIUM CHLORIDE 0.9 % SOLUTION: Performed by: EMERGENCY MEDICINE

## 2025-05-02 PROCEDURE — 99283 EMERGENCY DEPT VISIT LOW MDM: CPT | Performed by: EMERGENCY MEDICINE

## 2025-05-02 RX ORDER — SODIUM CHLORIDE 0.9 % (FLUSH) 0.9 %
10 SYRINGE (ML) INJECTION AS NEEDED
Status: DISCONTINUED | OUTPATIENT
Start: 2025-05-02 | End: 2025-05-02 | Stop reason: HOSPADM

## 2025-05-02 RX ORDER — INSULIN GLARGINE 100 [IU]/ML
10 INJECTION, SOLUTION SUBCUTANEOUS DAILY
Qty: 0.5 ML | Refills: 0 | Status: SHIPPED | OUTPATIENT
Start: 2025-05-02 | End: 2025-05-05 | Stop reason: SDUPTHER

## 2025-05-02 RX ORDER — POTASSIUM CHLORIDE 29.8 MG/ML
20 INJECTION INTRAVENOUS ONCE
Status: DISCONTINUED | OUTPATIENT
Start: 2025-05-02 | End: 2025-05-02 | Stop reason: SDUPTHER

## 2025-05-02 RX ORDER — POTASSIUM CHLORIDE 1500 MG/1
40 TABLET, EXTENDED RELEASE ORAL ONCE
Status: COMPLETED | OUTPATIENT
Start: 2025-05-02 | End: 2025-05-02

## 2025-05-02 RX ORDER — POTASSIUM CHLORIDE 1500 MG/1
20 TABLET, EXTENDED RELEASE ORAL 3 TIMES DAILY
Qty: 15 TABLET | Refills: 0 | Status: SHIPPED | OUTPATIENT
Start: 2025-05-02 | End: 2025-05-02

## 2025-05-02 RX ORDER — POTASSIUM CHLORIDE 7.45 MG/ML
10 INJECTION INTRAVENOUS
Status: COMPLETED | OUTPATIENT
Start: 2025-05-02 | End: 2025-05-02

## 2025-05-02 RX ORDER — GABAPENTIN 300 MG/1
300 CAPSULE ORAL ONCE
Status: COMPLETED | OUTPATIENT
Start: 2025-05-02 | End: 2025-05-02

## 2025-05-02 RX ORDER — POTASSIUM CHLORIDE 1500 MG/1
20 TABLET, EXTENDED RELEASE ORAL 3 TIMES DAILY
Qty: 9 TABLET | Refills: 0 | Status: SHIPPED | OUTPATIENT
Start: 2025-05-02 | End: 2025-05-05

## 2025-05-02 RX ADMIN — GABAPENTIN 300 MG: 300 CAPSULE ORAL at 11:50

## 2025-05-02 RX ADMIN — POTASSIUM CHLORIDE 10 MEQ: 7.46 INJECTION, SOLUTION INTRAVENOUS at 10:30

## 2025-05-02 RX ADMIN — INSULIN GLARGINE 10 UNITS: 100 INJECTION, SOLUTION SUBCUTANEOUS at 15:38

## 2025-05-02 RX ADMIN — SODIUM CHLORIDE 1109 ML: 9 INJECTION, SOLUTION INTRAVENOUS at 09:46

## 2025-05-02 RX ADMIN — SODIUM CHLORIDE 1000 ML: 9 INJECTION, SOLUTION INTRAVENOUS at 08:55

## 2025-05-02 RX ADMIN — POTASSIUM CHLORIDE 40 MEQ: 1500 TABLET, EXTENDED RELEASE ORAL at 15:39

## 2025-05-02 RX ADMIN — POTASSIUM CHLORIDE 10 MEQ: 7.46 INJECTION, SOLUTION INTRAVENOUS at 08:57

## 2025-05-02 NOTE — ED PROVIDER NOTES
Subjective   History of Present Illness  This patient is a 45-year-old who came to the ED after blood work was drawn at the hematology oncology office which revealed hyperglycemia patient does not have any history of diabetes.  She does have a history of breast cancer and cirrhosis of the liver she has stopped drinking.  Is not on a transplant list currently getting chemotherapy by oral medications s/p mastectomy and does not any chest pain or shortness of breath.  Denies any nausea or vomiting.  There is no ascites.    Hyperglycemia  Blood sugar level PTA:  Elevated blood sugar  Severity:  Moderate  Onset quality:  Gradual  Timing:  Constant  Chronicity:  New  Diabetes status:  Non-diabetic  Context: not change in medication, not insulin pump use and not noncompliance    Relieved by:  Nothing  Ineffective treatments:  None tried  Associated symptoms: malaise and nausea    Associated symptoms: no abdominal pain, no altered mental status, no chest pain, no confusion, no dehydration, no diaphoresis, no fatigue, no fever, no increased appetite, no shortness of breath, no syncope, no vomiting and no weakness    Risk factors: family hx of diabetes    Risk factors: no hx of DKA and no pancreatic disease        Review of Systems   Constitutional: Negative.  Negative for diaphoresis, fatigue and fever.   HENT: Negative.     Eyes: Negative.    Respiratory: Negative.  Negative for shortness of breath.    Cardiovascular: Negative.  Negative for chest pain and syncope.   Gastrointestinal:  Positive for nausea. Negative for abdominal pain and vomiting.   Endocrine: Negative.    Genitourinary: Negative.    Skin: Negative.    Neurological: Negative.  Negative for weakness.   Hematological: Negative.    Psychiatric/Behavioral:  Negative for confusion.    All other systems reviewed and are negative.      Past Medical History:   Diagnosis Date    Alcoholism in recovery     Allergic     Anemia [id number]    Anxiety     Asthma      Bacterial infection     lower intestine    Breast cancer     Calculus of gallbladder without cholecystitis without obstruction 2025    Cancer 920    Cirrhosis     Endometriosis     Gestational hypertension     Hepatitis C     Hypertension     Leaky heart valve     Narayanan syndrome     Migraine 2020    Multiple gestation 2006    Osteopenia due to cancer therapy 2024    Ovarian cyst     PMS (premenstrual syndrome) 1991    PONV (postoperative nausea and vomiting) 1994    Preeclampsia     Secondary esophageal varices without bleeding 2024    Sinusitis     Varicella 1989       Allergies   Allergen Reactions    Oxycodone Itching     Pt states any pain medication causes severe itching, nausea    Codeine Rash and GI Intolerance       Past Surgical History:   Procedure Laterality Date    BREAST BIOPSY       SECTION      x2    COLONOSCOPY      COLONOSCOPY N/A 2024    Dr. Bernardo-- Diverticulosis in the left colon. - The examination was otherwise normal on direct and retroflexion views. - No specimens collected    D & C HYSTEROSCOPY N/A 2024    Procedure: DILATATION AND CURETTAGE HYSTEROSCOPY;  Surgeon: Glo Benavides MD;  Location: [facility] OR;  Service: Obstetrics/Gynecology;  Laterality: N/A;    ENDOSCOPY N/A 2024    no evidence of any fresh or old blood or clots,normal stomach    ENDOSCOPY N/A 2024    -Grade I esophageal varices. - Portal hypertensive gastropathy. - Normal examined duodenum. - No specimens collected    MASTECTOMY W/ SENTINEL NODE BIOPSY Bilateral 2024    Procedure: BILATERAL SKIN SPARING MASTECTOMY WITH LEFT AXILLARY MAGTRACE GUIDED SENTINEL LYMPH NODE BIOPSY (Dr. Prieto to nahid);  Surgeon: Fide Verduzco MD;  Location: [facility] OR;  Service: General;  Laterality: Bilateral;    MOUTH SURGERY      TOTAL LAPAROSCOPIC HYSTERECTOMY SALPINGO OOPHORECTOMY Bilateral 2025    Procedure: TOTAL LAPAROSCOPIC  HYSTERECTOMY BILATERAL SALPINGOOPHORECTOMY WITH DAVINCI ROBOT (removal of uterus, cervix, both fallopian tubes and ovaries using the davinci robot);  Surgeon: Glo Benavides MD;  Location: [facility] OR;  Service: Robotics - DaVinci;  Laterality: Bilateral;    UPPER GASTROINTESTINAL ENDOSCOPY  3/24    WISDOM TOOTH EXTRACTION  1995       Family History   Problem Relation Age of Onset    Diabetes Mother     Heart disease Father     Diabetes Father     Stroke Father     Melanoma Father 40 - 49    Esophageal cancer Father 60 - 69    Skin cancer Maternal Aunt     Colon cancer Paternal Uncle     Pancreatic cancer Paternal Uncle     Cervical cancer Maternal Grandmother 40 - 49    Skin cancer Paternal Grandmother     Cancer Paternal Grandfather 50 - 59        Mesothelioma    Breast cancer Half-Sister 45    Colon polyps Neg Hx     Uterine cancer Neg Hx        Social History     Socioeconomic History    Marital status:    Tobacco Use    Smoking status: Some Days     Current packs/day: 0.50     Average packs/day: 0.5 packs/day for 10.0 years (5.0 ttl pk-yrs)     Types: Cigarettes    Smokeless tobacco: Never   Vaping Use    Vaping status: Never Used   Substance and Sexual Activity    Alcohol use: Not Currently     Alcohol/week: 10.0 standard drinks of alcohol     Types: 10 Standard drinks or equivalent per week    Drug use: Never    Sexual activity: Yes     Partners: Male     Birth control/protection: None, Coitus interruptus           Objective   Physical Exam  Vitals and nursing note reviewed. Exam conducted with a chaperone present.   Constitutional:       General: She is awake. She is not in acute distress.     Appearance: Normal appearance. She is well-developed. She is not toxic-appearing.   HENT:      Head: Normocephalic and atraumatic.      Nose:      Right Nostril: No epistaxis.      Left Nostril: No epistaxis.   Eyes:      General: Lids are normal. No scleral icterus.     Conjunctiva/sclera: Conjunctivae  normal.      Pupils: Pupils are equal, round, and reactive to light.   Neck:      Vascular: No hepatojugular reflux or JVD.   Cardiovascular:      Rate and Rhythm: Normal rate and regular rhythm.      Chest Wall: PMI is not displaced.      Pulses: Normal pulses. No decreased pulses.      Heart sounds: Normal heart sounds. No murmur heard.  Pulmonary:      Effort: Pulmonary effort is normal. No accessory muscle usage or respiratory distress.      Breath sounds: Normal breath sounds. No stridor. No decreased breath sounds or wheezing.   Abdominal:      General: Bowel sounds are normal. There is no distension or abdominal bruit.      Palpations: Abdomen is soft. There is no shifting dullness, fluid wave, mass or pulsatile mass.      Tenderness: There is no abdominal tenderness. There is no right CVA tenderness, left CVA tenderness, guarding or rebound.      Hernia: No hernia is present.   Musculoskeletal:         General: Normal range of motion.      Cervical back: Normal range of motion and neck supple. No rigidity.      Right lower leg: No edema.      Left lower leg: No edema.   Skin:     General: Skin is warm and dry.      Capillary Refill: Capillary refill takes less than 2 seconds.      Coloration: Skin is not cyanotic, jaundiced, mottled or pale.      Findings: No bruising or erythema.   Neurological:      General: No focal deficit present.      Mental Status: She is alert and oriented to person, place, and time. Mental status is at baseline.      GCS: GCS eye subscore is 4. GCS verbal subscore is 5. GCS motor subscore is 6.      Cranial Nerves: No cranial nerve deficit.      Sensory: Sensation is intact.      Motor: Motor function is intact.      Deep Tendon Reflexes: Reflexes are normal and symmetric.   Psychiatric:         Behavior: Behavior normal. Behavior is cooperative.         Procedures           ED Course  ED Course as of 05/02/25 1517   Fri May 02, 2025   1224 Giving fluids recheck chemistries. [TS]    1318 Patient's blood workup keeps hemolyzing and would not give her any insulin because the potassium was on the lower side when the labs drawn in the morning potassium was replaced IV fluids were given.  Waiting on the repeat CMP and lactic acid [TS]   1511 Had an extensive discussion with this patient and she came into the ED primarily for elevated blood sugar.  Therefore she was given IV fluids and potassium as well as given potassium replacement.  After the IV fluids her blood sugars gone down to 449 and she is not in DKA but her potassium is still 3.1.  The hyponatremia is corrected because of hyperglycemia.  She has got a baseline bilirubin elevation 1.8.  She is not acidotic.  My plan was to admit the patient to the hospital for further evaluation and treatment concern is a giving insulin to drop her potassium further. [TS]   1512 Patient absolutely refuses to be admitted to the hospital and wants to go home.  The possibly increased morbidity and mortality has been explained to the patient at length along with her  but she does not want to stay. [TS]   1512 The patient has requested to leave the ED and does not want to be admitted nor does he want any further work-up at this time. The patient reason(s) for leaving include, but are not limited to, the following: Patient wants to go home because she has to attend the problem for her daughter.. I believe this patient is of sound mind and competent to refuse medical care. The patient is responding and asking questions appropriately. The patient is oriented to person, place and time. The patient is not psychotic, delusional, suicidal, homicidal or hallucinating. The patient demonstrates a normal mental capacity to make decisions regarding their healthcare. The patient is clinically sober and does not appear to be under the influence of any illicit drugs at this time. The patient has been advised of the risks, in layman terms, of leaving  which include,  but are not limited to death, coma, permanent disability, loss of current lifestyle, delay in diagnosis. Alternatives have been offered - the patient remains steadfast in their wish to leave. The patient has been advised that should they change their mind they are welcome to return to this hospital, or any other,  I have provided appropriate prescriptions, referrals, and discharge instructions.. The above discussion was witnessed by another member of staff.  [TS]   1516 Advised the patient that her potassium may drop with the insulin and bringing down the blood sugar which can cause arrhythmias etc. patient understands the possibility of increasing morbidity mortality absolutely refuses in the hospital is going be discharged home with a close follow-up. [TS]      ED Course User Index  [TS] Ray Morrell MD                                                       Medical Decision Making  Elevated blood sugar could be DKA or just elevated blood sugar lab workup was initiated the lab workup done in the morning was reviewed IV fluids given and given to the patient.    Problems Addressed:  Chronic liver disease: complicated acute illness or injury  Dehydration: complicated acute illness or injury  Hyperglycemia: complicated acute illness or injury  Hypokalemia: complicated acute illness or injury    Amount and/or Complexity of Data Reviewed  Labs: ordered.    Risk  Prescription drug management.        Final diagnoses:   Hyperglycemia   Hypokalemia   Chronic liver disease   Dehydration       ED Disposition  ED Disposition       ED Disposition   Discharge    Condition   Stable    Comment   --               INA Holloway MD  [address] [facility] [address]  [address]  [place] [id number] [phone]    In 2 days           Medication List        New Prescriptions      insulin glargine 100 UNIT/ML injection  Commonly known as: Lantus  Inject 10 Units under the skin into the appropriate area as directed Daily for 5 days.             Changed      * potassium chloride 20 MEQ CR tablet  Commonly known as: KLOR-CON M20  What changed: Another medication with the same name was added. Make sure you understand how and when to take each.     * potassium chloride 20 MEQ CR tablet  Commonly known as: KLOR-CON M20  Take 1 tablet by mouth 3 (Three) Times a Day for 3 days.  What changed: You were already taking a medication with the same name, and this prescription was added. Make sure you understand how and when to take each.     thiamine 100 MG tablet  Commonly known as: VITAMIN B1  Take 1 tablet by mouth Daily.  What changed: how much to take           * This list has 2 medication(s) that are the same as other medications prescribed for you. Read the directions carefully, and ask your doctor or other care provider to review them with you.                   Where to Get Your Medications        These medications were sent to [facility] [address] [place] [address] [facility] [phone] [place] [phone] [address] [address]      Phone: [phone]   insulin glargine 100 UNIT/ML injection  potassium chloride 20 MEQ CR tablet            Ray Morrell MD  05/02/25 0902       Ray Morrell MD  05/02/25 [id number]

## 2025-05-02 NOTE — DISCHARGE INSTRUCTIONS
It was very nice to meet you, Rachana . Thank you for allowing us to take care of you today at [facility].     Today you were seen in the emergency department for your symptoms. Please understand that an ER evaluation is just the start of your evaluation. We do the best we can, but we are often unable to fully find what is causing your symptoms from one evaluation.  Because of this, the goal is to determine whether you need to be evaluated in the hospital or if it is safe for you to go home and see other doctors provided such as primary care physicians or specialist on an outpatient basis.      Like we discussed, I strongly urge that you follow up with your primary care doctor. Please call their office to set up an appointment as soon as possible so that you can be re-evaluated for improvement in your symptoms or for any other questions.  I have provided the information needed, including phone number, to call to set up an appointment below in these discharge papers.      Educational material has also been provided in the following pages regarding what we have discussed today.  Please make sure you follow-up with the primary MD for [facility] in the morning on Monday.  Return there for any worsening symptoms I have called in prescription potassium and insulin for you.  Keep a blood sugar diary with you.     Please return to the emergency room within 12-48 hours if you experience symptoms such as the following:   Fever, chills, chest pain or shortness of breath, pain with inspiration/expiration, pain that travels to your arms, neck or back, nausea, vomiting, severe headache, tearing pain in your chest, dizziness, feel as though you are about to pass out, OR if you have any worsening symptoms, or any other concerns.

## 2025-05-02 NOTE — TELEPHONE ENCOUNTER
CRITICAL LAB VALUE:   Received call from [facility] Chemistry lab with CRITICAL LAB VALUE    GLUCOSE: 599    This information was reviewed with Dr Cruz to address.      NOTE:   Per Dr Leblanc instructions, he recommends patient be sent to ER dept for evaluation immediately due to critical lab value  Glucose: 599    NOTE:   Notified patient per Dr Leblanc instructions that she should go immediately to the ER dept for evaluation due to Critical Glucose Reading of: 599  Patient v/u of instructions and will go to ER dept.

## 2025-05-03 LAB
CANCER AG27-29 SERPL-ACNC: 50.3 U/ML (ref 0–38.6)
HAV AB SER QL IA: POSITIVE
HAV IGM SERPL QL IA: NEGATIVE
HBV CORE AB SERPL QL IA: NEGATIVE
HBV CORE IGM SERPL QL IA: NEGATIVE
HBV SURFACE AB SER QL: NON REACTIVE
HBV SURFACE AG SERPL QL IA: NEGATIVE

## 2025-05-05 ENCOUNTER — RESULTS FOLLOW-UP (OUTPATIENT)
Dept: LAB | Facility: HOSPITAL | Age: 45
End: 2025-05-05
Payer: COMMERCIAL

## 2025-05-05 ENCOUNTER — LAB (OUTPATIENT)
Dept: LAB | Facility: HOSPITAL | Age: 45
End: 2025-05-05
Payer: COMMERCIAL

## 2025-05-05 ENCOUNTER — OFFICE VISIT (OUTPATIENT)
Dept: INTERNAL MEDICINE | Facility: CLINIC | Age: 45
End: 2025-05-05
Payer: COMMERCIAL

## 2025-05-05 VITALS
HEART RATE: 78 BPM | OXYGEN SATURATION: 97 % | HEIGHT: 67 IN | BODY MASS INDEX: 24.28 KG/M2 | SYSTOLIC BLOOD PRESSURE: 108 MMHG | DIASTOLIC BLOOD PRESSURE: 78 MMHG

## 2025-05-05 DIAGNOSIS — Z79.4 TYPE 2 DIABETES MELLITUS WITH HYPERGLYCEMIA, WITH LONG-TERM CURRENT USE OF INSULIN: Primary | ICD-10-CM

## 2025-05-05 DIAGNOSIS — E11.65 TYPE 2 DIABETES MELLITUS WITH HYPERGLYCEMIA, WITH LONG-TERM CURRENT USE OF INSULIN: Primary | ICD-10-CM

## 2025-05-05 DIAGNOSIS — M25.562 CHRONIC PAIN OF LEFT KNEE: ICD-10-CM

## 2025-05-05 DIAGNOSIS — E87.6 HYPOKALEMIA: ICD-10-CM

## 2025-05-05 DIAGNOSIS — G89.29 CHRONIC PAIN OF LEFT KNEE: ICD-10-CM

## 2025-05-05 LAB
ALBUMIN SERPL-MCNC: 3.7 G/DL (ref 3.5–5.2)
ALBUMIN/GLOB SERPL: 0.9 G/DL
ALP SERPL-CCNC: 81 U/L (ref 39–117)
ALT SERPL W P-5'-P-CCNC: 20 U/L (ref 1–33)
ANION GAP SERPL CALCULATED.3IONS-SCNC: 14.8 MMOL/L (ref 5–15)
AST SERPL-CCNC: 46 U/L (ref 1–32)
BILIRUB SERPL-MCNC: 2.7 MG/DL (ref 0–1.2)
BUN SERPL-MCNC: 5 MG/DL (ref 6–20)
BUN/CREAT SERPL: 4.7 (ref 7–25)
CALCIUM SPEC-SCNC: 9.3 MG/DL (ref 8.6–10.5)
CHLORIDE SERPL-SCNC: 99 MMOL/L (ref 98–107)
CO2 SERPL-SCNC: 23.2 MMOL/L (ref 22–29)
CREAT SERPL-MCNC: 1.07 MG/DL (ref 0.57–1)
EGFRCR SERPLBLD CKD-EPI 2021: 65 ML/MIN/1.73
GLOBULIN UR ELPH-MCNC: 4.1 GM/DL
GLUCOSE SERPL-MCNC: 223 MG/DL (ref 65–99)
POTASSIUM SERPL-SCNC: 4 MMOL/L (ref 3.5–5.2)
PROT SERPL-MCNC: 7.8 G/DL (ref 6–8.5)
SODIUM SERPL-SCNC: 137 MMOL/L (ref 136–145)

## 2025-05-05 PROCEDURE — 99214 OFFICE O/P EST MOD 30 MIN: CPT

## 2025-05-05 PROCEDURE — 36415 COLL VENOUS BLD VENIPUNCTURE: CPT

## 2025-05-05 PROCEDURE — 80053 COMPREHEN METABOLIC PANEL: CPT

## 2025-05-05 RX ORDER — INSULIN GLARGINE 100 [IU]/ML
10 INJECTION, SOLUTION SUBCUTANEOUS DAILY
Qty: 3 ML | Refills: 5 | Status: SHIPPED | OUTPATIENT
Start: 2025-05-05

## 2025-05-05 RX ORDER — DIPHENHYDRAMINE HYDROCHLORIDE 25 MG/1
1 CAPSULE, LIQUID FILLED ORAL 4 TIMES DAILY
Qty: 1 EACH | Refills: 0 | Status: SHIPPED | OUTPATIENT
Start: 2025-05-05

## 2025-05-05 RX ORDER — LANCETS 33 GAUGE
1 EACH MISCELLANEOUS 4 TIMES DAILY
Qty: 200 EACH | Refills: 5 | Status: SHIPPED | OUTPATIENT
Start: 2025-05-05

## 2025-05-05 NOTE — TELEPHONE ENCOUNTER
Patient did go to  ER dept as instructed last Friday 5/2/25, she was seen by her PCP office today/ld

## 2025-05-05 NOTE — TELEPHONE ENCOUNTER
----- Message from Matthew Cruz sent at 5/2/2025 10:58 AM CDT -----  Send to ER ASAP pls  ----- Message -----  From: Lab, Background User  Sent: 5/2/2025   7:25 AM CDT  To: Matthew Cruz MD

## 2025-05-05 NOTE — PROGRESS NOTES
Chief Complaint   Patient presents with    Hospital Follow Up Visit     ER f/u       History:      Rachana Smart is a 45 y.o. female who presents today for evaluation of the above problems.      HPI  History of Present Illness  The patient is a 45-year-old female who presents for follow-up from the emergency department and discuss labs.    She had labs drawn on Friday that showed a hemoglobin A1c of 12.1 glucose of 599.  Dr. Mary he on evaluated labs initially sent her to the emergency department for further evaluation.  Upon receiving care at the emergency department she was also found to have hypokalemia with a potassium of 2.9.  She was also started on insulin glargine 10 units daily.  She has initiated the insulin regimen which she feels is going well, she also feels that taking exemestane has significantly affected her blood sugar causing her to appear diabetic.  She does not believe that she is truly diabetic.    She reports significant fluctuations in her blood glucose levels, with a decrease of 400 points following the omission of exemestane dose. Her blood glucose level was recorded as over 600 yesterday morning, prompting the administration of insulin injections. Despite this, her blood glucose level remained elevated at 540 before bedtime. Upon waking, her fasting blood glucose level was 220. She has been on a trial medication prescribed by her oncologist for less than a month, which she believes may be contributing to her elevated blood glucose levels. She has a history of gestational diabetes during her pregnancies but has maintained a healthy diet. She expresses a desire to continue her current diabetes medication until her blood glucose levels normalize.     She has been on three different types of cancer medications for the past 5 to 6 months, which have been affecting her liver. However, her liver enzymes have normalized since discontinuing these medications. She was previously on  letrozole but has since stopped taking it. She also reports an inability to engage in physical activity due to knee pain. She has previously experienced hypoglycemia, with a blood glucose level dropping to 50, which was managed by consuming food.    She has started taking potassium supplements as prescribed by her ER doctor two days ago. She was informed that her potassium levels were low due to her use of Lasix.    PAST SURGICAL HISTORY:   - Hysterectomy      ROS:  Review of Systems   Constitutional:  Positive for fatigue.   Neurological:  Positive for weakness.         Current Outpatient Medications:     Acetaminophen (MIDOL PO), Take  by mouth., Disp: , Rfl:     Calcium Carbonate-Vitamin D 600-10 MG-MCG per tablet, Take 1 tablet by mouth 2 (Two) Times a Day., Disp: 60 tablet, Rfl: 11    carvedilol (Coreg) 6.25 MG tablet, Take 1 tablet by mouth 2 (Two) Times a Day With Meals., Disp: 60 tablet, Rfl: 3    ferrous sulfate 325 (65 FE) MG tablet, Take 1 tablet by mouth Daily With Breakfast., Disp: , Rfl:     furosemide (LASIX) 40 MG tablet, Take 1 tablet by mouth once daily, Disp: 90 tablet, Rfl: 1    gabapentin (NEURONTIN) 300 MG capsule, Take 1 capsule by mouth 2 (Two) Times a Day., Disp: 60 capsule, Rfl: 2    ibuprofen (Motrin IB) 200 MG tablet, Take 3 tablets by mouth Every 8 (Eight) Hours. Take every 8 hours for three days then take as needed., Disp: , Rfl:     insulin glargine (Lantus) 100 UNIT/ML injection, Inject 10 Units under the skin into the appropriate area as directed Daily., Disp: 3 mL, Rfl: 5    Multivitamin tablet tablet, Take 1 tablet by mouth Daily., Disp: , Rfl:     potassium chloride (KLOR-CON M20) 20 MEQ CR tablet, Take 1 tablet by mouth 3 (Three) Times a Day for 3 days., Disp: 9 tablet, Rfl: 0    spironolactone (ALDACTONE) 25 MG tablet, Take 1 tablet by mouth Daily., Disp: , Rfl:     thiamine (VITAMIN B1) 100 MG tablet, Take 1 tablet by mouth Daily. (Patient taking differently: Take 2.5 tablets  by mouth Daily.), Disp: 30 tablet, Rfl: 0    vitamin B-12 (cyanocobalamin) 100 MCG tablet, Take 5 tablets by mouth Daily., Disp: , Rfl:     Blood Glucose Monitoring Suppl (Blood Glucose Monitor System) w/Device kit, Use 1 each 4 (Four) Times a Day., Disp: 1 each, Rfl: 0    cefdinir (OMNICEF) 300 MG capsule, Take 1 capsule by mouth 2 (Two) Times a Day., Disp: 14 capsule, Rfl: 0    exemestane (AROMASIN) 25 MG tablet, Take 1 tablet by mouth Daily. (Patient not taking: Reported on 5/5/2025), Disp: 30 tablet, Rfl: 11    Glucagon 1 MG/0.2ML solution auto-injector, Inject 1 mg under the skin into the appropriate area as directed Daily As Needed (glucose <60)., Disp: 0.2 mL, Rfl: 1    glucose blood test strip, 1 each by Other route 4 (Four) Times a Day. Use as instructed, Disp: 200 each, Rfl: 5    Lancets 33G misc, Use 1 each 4 (Four) Times a Day., Disp: 200 each, Rfl: 5    potassium chloride (KLOR-CON M20) 20 MEQ CR tablet, TAKE 1 TABLET BY MOUTH THREE TIMES DAILY FOR 4 DAYS, Disp: , Rfl:     Current Facility-Administered Medications:     lidocaine 1% - EPINEPHrine 1:415603 (XYLOCAINE W/EPI) 1 %-1:334335 injection 10 mL, 10 mL, Injection, Once, Amina Camarillo MD    Lab Results   Component Value Date    GLUCOSE 449 (C) 05/02/2025    BUN 9 05/02/2025    CREATININE 0.81 05/02/2025     (L) 05/02/2025    K 3.1 (L) 05/02/2025    CL 93 (L) 05/02/2025    CALCIUM 8.4 (L) 05/02/2025    PROTEINTOT 6.5 05/02/2025    ALBUMIN 3.1 (L) 05/02/2025    ALT 13 05/02/2025    AST 28 05/02/2025    ALKPHOS 81 05/02/2025    BILITOT 1.8 (H) 05/02/2025    GLOB 3.4 05/02/2025    AGRATIO 0.9 05/02/2025    BCR 11.1 05/02/2025    ANIONGAP 13.0 05/02/2025    EGFR 90.8 05/02/2025       WBC   Date Value Ref Range Status   05/02/2025 6.36 3.40 - 10.80 10*3/mm3 Final     RBC   Date Value Ref Range Status   05/02/2025 4.28 3.77 - 5.28 10*6/mm3 Final     Hemoglobin   Date Value Ref Range Status   05/02/2025 13.0 12.0 - 15.9 g/dL Final     Hematocrit    Date Value Ref Range Status   05/02/2025 37.8 34.0 - 46.6 % Final     MCV   Date Value Ref Range Status   05/02/2025 88.3 79.0 - 97.0 fL Final     MCH   Date Value Ref Range Status   05/02/2025 30.4 26.6 - 33.0 pg Final     MCHC   Date Value Ref Range Status   05/02/2025 34.4 31.5 - 35.7 g/dL Final     RDW   Date Value Ref Range Status   05/02/2025 13.9 12.3 - 15.4 % Final     RDW-SD   Date Value Ref Range Status   05/02/2025 44.4 37.0 - 54.0 fl Final     MPV   Date Value Ref Range Status   05/02/2025 10.5 6.0 - 12.0 fL Final     Platelets   Date Value Ref Range Status   05/02/2025 132 (L) 140 - 450 10*3/mm3 Final     Neutrophil %   Date Value Ref Range Status   05/02/2025 63.5 42.7 - 76.0 % Final     Lymphocyte %   Date Value Ref Range Status   05/02/2025 25.3 19.6 - 45.3 % Final     Monocyte %   Date Value Ref Range Status   05/02/2025 6.3 5.0 - 12.0 % Final     Eosinophil %   Date Value Ref Range Status   05/02/2025 3.3 0.3 - 6.2 % Final     Basophil %   Date Value Ref Range Status   05/02/2025 1.3 0.0 - 1.5 % Final     Immature Grans %   Date Value Ref Range Status   05/02/2025 0.3 0.0 - 0.5 % Final     Neutrophils, Absolute   Date Value Ref Range Status   05/02/2025 4.04 1.70 - 7.00 10*3/mm3 Final     Lymphocytes, Absolute   Date Value Ref Range Status   05/02/2025 1.61 0.70 - 3.10 10*3/mm3 Final     Monocytes, Absolute   Date Value Ref Range Status   05/02/2025 0.40 0.10 - 0.90 10*3/mm3 Final     Eosinophils, Absolute   Date Value Ref Range Status   05/02/2025 0.21 0.00 - 0.40 10*3/mm3 Final     Basophils, Absolute   Date Value Ref Range Status   05/02/2025 0.08 0.00 - 0.20 10*3/mm3 Final     Immature Grans, Absolute   Date Value Ref Range Status   05/02/2025 0.02 0.00 - 0.05 10*3/mm3 Final     nRBC   Date Value Ref Range Status   05/02/2025 0.0 0.0 - 0.2 /100 WBC Final       OBJECTIVE:  Visit Vitals  /78 (BP Location: Right arm, Patient Position: Sitting, Cuff Size: Adult)   Pulse 78   Ht 170.2 cm  "(67\")   LMP 09/29/2024 (Exact Date)   SpO2 97%   BMI 24.28 kg/m²      Physical Exam  Constitutional:       Appearance: Normal appearance.   Cardiovascular:      Rate and Rhythm: Normal rate and regular rhythm.   Pulmonary:      Effort: Pulmonary effort is normal.      Breath sounds: Normal breath sounds.   Skin:     General: Skin is warm and dry.   Neurological:      Mental Status: She is alert.   Psychiatric:         Mood and Affect: Mood normal.         Behavior: Behavior normal.         Thought Content: Thought content normal.         Judgment: Judgment normal.       Physical Exam  Respiratory: Clear to auscultation, no wheezing, rales or rhonchi    Results  Labs   - Blood sugar: Dropped from over 600 to 200 after not taking one pill for a day   - Fasting blood sugar: 220   - A1c: High    Assessment/Plan    Diagnoses and all orders for this visit:    1. Type 2 diabetes mellitus with hyperglycemia, with long-term current use of insulin (Primary)  -     Blood Glucose Monitoring Suppl (Blood Glucose Monitor System) w/Device kit; Use 1 each 4 (Four) Times a Day.  Dispense: 1 each; Refill: 0  -     glucose blood test strip; 1 each by Other route 4 (Four) Times a Day. Use as instructed  Dispense: 200 each; Refill: 5  -     Lancets 33G misc; Use 1 each 4 (Four) Times a Day.  Dispense: 200 each; Refill: 5  -     insulin glargine (Lantus) 100 UNIT/ML injection; Inject 10 Units under the skin into the appropriate area as directed Daily.  Dispense: 3 mL; Refill: 5  -     Glucagon 1 MG/0.2ML solution auto-injector; Inject 1 mg under the skin into the appropriate area as directed Daily As Needed (glucose <60).  Dispense: 0.2 mL; Refill: 1    2. Hypokalemia  -     Comprehensive Metabolic Panel; Future    3. Chronic pain of left knee  -     Ambulatory Referral to Sports Medicine      Assessment & Plan  1. Diabetes mellitus.  - Elevated blood glucose levels could be exacerbated by cancer medications.  - Current regimen includes " 10 units of insulin daily; fasting blood glucose goal is between 70 and 120.  - Prescription for glucometer, test strips, lancets, and glucagon will be provided.  - Advised to maintain a balanced diet, limit carbohydrates and sugars, stay physically active, and monitor blood glucose levels. Insulin dosage adjustments will be made based on blood glucose readings.    2. Knee pain.  - Referral to orthopedics will be made for further evaluation and management.    3. Hypokalemia.  - Potassium levels will be rechecked today to monitor for any changes.    Follow-up  - Follow-up in 2 weeks.      Return in about 2 weeks (around 5/19/2025).      PRIYANK Turner  09:10 CDT  5/5/2025   Electronically signed      Patient or patient representative verbalized consent for the use of Ambient Listening during the visit with  PRIYANK Turner for chart documentation. 5/5/2025  10:04 CDT

## 2025-05-09 ENCOUNTER — OFFICE VISIT (OUTPATIENT)
Dept: ONCOLOGY | Facility: CLINIC | Age: 45
End: 2025-05-09
Payer: COMMERCIAL

## 2025-05-09 VITALS
TEMPERATURE: 96.7 F | HEART RATE: 69 BPM | DIASTOLIC BLOOD PRESSURE: 70 MMHG | OXYGEN SATURATION: 96 % | WEIGHT: 150.2 LBS | SYSTOLIC BLOOD PRESSURE: 118 MMHG | BODY MASS INDEX: 23.57 KG/M2 | RESPIRATION RATE: 18 BRPM | HEIGHT: 67 IN

## 2025-05-09 DIAGNOSIS — Z17.0 MALIGNANT NEOPLASM OF LEFT BREAST IN FEMALE, ESTROGEN RECEPTOR POSITIVE, UNSPECIFIED SITE OF BREAST: Primary | ICD-10-CM

## 2025-05-09 DIAGNOSIS — C50.912 MALIGNANT NEOPLASM OF LEFT BREAST IN FEMALE, ESTROGEN RECEPTOR POSITIVE, UNSPECIFIED SITE OF BREAST: Primary | ICD-10-CM

## 2025-05-09 RX ORDER — ANASTROZOLE 1 MG/1
1 TABLET ORAL DAILY
Qty: 30 TABLET | Refills: 11 | Status: SHIPPED | OUTPATIENT
Start: 2025-05-09

## 2025-05-10 ENCOUNTER — HOSPITAL ENCOUNTER (EMERGENCY)
Facility: HOSPITAL | Age: 45
Discharge: HOME | End: 2025-05-10
Attending: FAMILY MEDICINE
Payer: COMMERCIAL

## 2025-05-10 VITALS
OXYGEN SATURATION: 93 % | BODY MASS INDEX: 24.42 KG/M2 | HEIGHT: 67 IN | WEIGHT: 155.6 LBS | TEMPERATURE: 98.3 F | RESPIRATION RATE: 14 BRPM | DIASTOLIC BLOOD PRESSURE: 69 MMHG | HEART RATE: 90 BPM | SYSTOLIC BLOOD PRESSURE: 97 MMHG

## 2025-05-10 DIAGNOSIS — E87.6 HYPOKALEMIA: ICD-10-CM

## 2025-05-10 DIAGNOSIS — E11.65 HYPERGLYCEMIA DUE TO DIABETES MELLITUS: Primary | ICD-10-CM

## 2025-05-10 LAB
A-A DO2: ABNORMAL
ACETONE BLD QL: NEGATIVE
ALBUMIN SERPL-MCNC: 3.6 G/DL (ref 3.5–5.2)
ALBUMIN/GLOB SERPL: 0.9 G/DL
ALP SERPL-CCNC: 90 U/L (ref 39–117)
ALT SERPL W P-5'-P-CCNC: 21 U/L (ref 1–33)
AMPHET+METHAMPHET UR QL: NEGATIVE
AMPHETAMINES UR QL: NEGATIVE
ANION GAP SERPL CALCULATED.3IONS-SCNC: 12 MMOL/L (ref 5–15)
ANION GAP SERPL CALCULATED.3IONS-SCNC: 16 MMOL/L (ref 5–15)
APTT PPP: 31.1 SECONDS (ref 24.5–36)
AST SERPL-CCNC: 36 U/L (ref 1–32)
ATMOSPHERIC PRESS: 757 MMHG
BARBITURATES UR QL SCN: NEGATIVE
BASE EXCESS BLDV CALC-SCNC: 9.3 MMOL/L (ref 0–2)
BASOPHILS # BLD AUTO: 0.09 10*3/MM3 (ref 0–0.2)
BASOPHILS NFR BLD AUTO: 1.8 % (ref 0–1.5)
BDY SITE: ABNORMAL
BENZODIAZ UR QL SCN: NEGATIVE
BILIRUB SERPL-MCNC: 2.1 MG/DL (ref 0–1.2)
BILIRUB UR QL STRIP: NEGATIVE
BODY TEMPERATURE: 37
BUN SERPL-MCNC: 6 MG/DL (ref 6–20)
BUN SERPL-MCNC: 7 MG/DL (ref 6–20)
BUN/CREAT SERPL: 5.7 (ref 7–25)
BUN/CREAT SERPL: 6.8 (ref 7–25)
BUPRENORPHINE SERPL-MCNC: NEGATIVE NG/ML
CA-I BLD-MCNC: ABNORMAL MG/DL
CALCIUM SPEC-SCNC: 8.3 MG/DL (ref 8.6–10.5)
CALCIUM SPEC-SCNC: 9.6 MG/DL (ref 8.6–10.5)
CANNABINOIDS SERPL QL: POSITIVE
CHLORIDE SERPL-SCNC: 87 MMOL/L (ref 98–107)
CHLORIDE SERPL-SCNC: 97 MMOL/L (ref 98–107)
CK SERPL-CCNC: 80 U/L (ref 20–180)
CLARITY UR: CLEAR
CO2 SERPL-SCNC: 26 MMOL/L (ref 22–29)
CO2 SERPL-SCNC: 28 MMOL/L (ref 22–29)
COCAINE UR QL: NEGATIVE
COHGB MFR BLD: 4.8 % (ref 0–5)
COLOR UR: YELLOW
CPAP: ABNORMAL
CREAT SERPL-MCNC: 0.88 MG/DL (ref 0.57–1)
CREAT SERPL-MCNC: 1.23 MG/DL (ref 0.57–1)
D-LACTATE SERPL-SCNC: 1.6 MMOL/L (ref 0.5–2)
D-LACTATE SERPL-SCNC: 2.8 MMOL/L (ref 0.5–2)
D-LACTATE SERPL-SCNC: 2.9 MMOL/L (ref 0.5–2)
DEPRECATED RDW RBC AUTO: 47.9 FL (ref 37–54)
EGFRCR SERPLBLD CKD-EPI 2021: 55 ML/MIN/1.73
EGFRCR SERPLBLD CKD-EPI 2021: 82.2 ML/MIN/1.73
EOSINOPHIL # BLD AUTO: 0.23 10*3/MM3 (ref 0–0.4)
EOSINOPHIL NFR BLD AUTO: 4.7 % (ref 0.3–6.2)
EPAP: ABNORMAL
ERYTHROCYTE [DISTWIDTH] IN BLOOD BY AUTOMATED COUNT: 14.7 % (ref 12.3–15.4)
FENTANYL UR-MCNC: NEGATIVE NG/ML
GAS FLOW AIRWAY: ABNORMAL L/MIN
GLOBULIN UR ELPH-MCNC: 4.1 GM/DL
GLUCOSE BLDA-MCNC: 506 MG/DL (ref 65–99)
GLUCOSE BLDC GLUCOMTR-MCNC: 204 MG/DL (ref 70–130)
GLUCOSE BLDC GLUCOMTR-MCNC: 228 MG/DL (ref 70–130)
GLUCOSE BLDC GLUCOMTR-MCNC: 251 MG/DL (ref 70–130)
GLUCOSE BLDC GLUCOMTR-MCNC: 352 MG/DL (ref 70–130)
GLUCOSE BLDC GLUCOMTR-MCNC: 486 MG/DL (ref 70–130)
GLUCOSE SERPL-MCNC: 238 MG/DL (ref 65–99)
GLUCOSE SERPL-MCNC: 506 MG/DL (ref 65–99)
GLUCOSE UR STRIP-MCNC: ABNORMAL MG/DL
HCO3 BLDV-SCNC: 33.7 MMOL/L (ref 22–28)
HCT VFR BLD AUTO: 41.4 % (ref 34–46.6)
HCT VFR BLD CALC: 42.5 % (ref 38–51)
HGB BLD-MCNC: 13.8 G/DL (ref 12–15.9)
HGB BLDA-MCNC: 13.9 G/DL (ref 12–16)
HGB UR QL STRIP.AUTO: NEGATIVE
IMM GRANULOCYTES # BLD AUTO: 0.01 10*3/MM3 (ref 0–0.05)
IMM GRANULOCYTES NFR BLD AUTO: 0.2 % (ref 0–0.5)
INHALED O2 CONCENTRATION: ABNORMAL %
INR PPP: 1.36 (ref 0.91–1.09)
IPAP: ABNORMAL
KETONES UR QL STRIP: NEGATIVE
LACTATE BLDA-SCNC: 2.5 MMOL/L (ref 0.5–2)
LEUKOCYTE ESTERASE UR QL STRIP.AUTO: NEGATIVE
LIPASE SERPL-CCNC: 48 U/L (ref 13–60)
LYMPHOCYTES # BLD AUTO: 2.09 10*3/MM3 (ref 0.7–3.1)
LYMPHOCYTES NFR BLD AUTO: 42.6 % (ref 19.6–45.3)
Lab: ABNORMAL
Lab: ABNORMAL
MAGNESIUM SERPL-MCNC: 1.9 MG/DL (ref 1.6–2.6)
MCH RBC QN AUTO: 30.1 PG (ref 26.6–33)
MCHC RBC AUTO-ENTMCNC: 33.3 G/DL (ref 31.5–35.7)
MCV RBC AUTO: 90.4 FL (ref 79–97)
METHADONE UR QL SCN: NEGATIVE
METHGB BLD QL: 0.1 % (ref 0–3)
MODALITY: ABNORMAL
MONOCYTES # BLD AUTO: 0.47 10*3/MM3 (ref 0.1–0.9)
MONOCYTES NFR BLD AUTO: 9.6 % (ref 5–12)
NEUTROPHILS NFR BLD AUTO: 2.02 10*3/MM3 (ref 1.7–7)
NEUTROPHILS NFR BLD AUTO: 41.1 % (ref 42.7–76)
NITRIC OXIDE: ABNORMAL
NITRITE UR QL STRIP: NEGATIVE
NOTE: ABNORMAL
NOTIFIED BY: ABNORMAL
NOTIFIED WHO: ABNORMAL
OPIATES UR QL: NEGATIVE
OXYCODONE UR QL SCN: NEGATIVE
OXYHGB MFR BLDV: 85.6 % (ref 60–85)
PAW @ PEAK INSP FLOW SETTING VENT: ABNORMAL CM[H2O]
PCO2 BLDV: 43.8 MM HG (ref 41–51)
PCP UR QL SCN: NEGATIVE
PEEP RESPIRATORY: ABNORMAL CM[H2O]
PH BLDV: 7.5 PH UNITS (ref 7.32–7.42)
PH UR STRIP.AUTO: 7 [PH] (ref 5–8)
PLATELET # BLD AUTO: 128 10*3/MM3 (ref 140–450)
PMV BLD AUTO: 10.3 FL (ref 6–12)
PO2 BLDV: 55.1 MM HG (ref 27–53)
POTASSIUM BLDV-SCNC: 3.2 MMOL/L (ref 3.5–5.2)
POTASSIUM SERPL-SCNC: 3.2 MMOL/L (ref 3.5–5.2)
POTASSIUM SERPL-SCNC: 3.2 MMOL/L (ref 3.5–5.2)
PROT SERPL-MCNC: 7.7 G/DL (ref 6–8.5)
PROT UR QL STRIP: NEGATIVE
PROTHROMBIN TIME: 17.5 SECONDS (ref 11.8–14.8)
PSV: ABNORMAL
PULSE OX: ABNORMAL
RBC # BLD AUTO: 4.58 10*6/MM3 (ref 3.77–5.28)
SAO2 % BLDCOV: 90 % (ref 45–75)
SET MECH RESP RATE: ABNORMAL
SODIUM BLDV-SCNC: 133 MMOL/L (ref 136–145)
SODIUM SERPL-SCNC: 131 MMOL/L (ref 136–145)
SODIUM SERPL-SCNC: 135 MMOL/L (ref 136–145)
SP GR UR STRIP: 1.03 (ref 1–1.03)
TOTAL RATE: ABNORMAL
TRICYCLICS UR QL SCN: NEGATIVE
UROBILINOGEN UR QL STRIP: ABNORMAL
VENTILATOR MODE: ABNORMAL
VOLUME % O2: ABNORMAL
VT ON VENT VENT: ABNORMAL ML
WBC NRBC COR # BLD AUTO: 4.91 10*3/MM3 (ref 3.4–10.8)

## 2025-05-10 PROCEDURE — 84132 ASSAY OF SERUM POTASSIUM: CPT

## 2025-05-10 PROCEDURE — 80053 COMPREHEN METABOLIC PANEL: CPT | Performed by: FAMILY MEDICINE

## 2025-05-10 PROCEDURE — 96360 HYDRATION IV INFUSION INIT: CPT

## 2025-05-10 PROCEDURE — 83690 ASSAY OF LIPASE: CPT | Performed by: FAMILY MEDICINE

## 2025-05-10 PROCEDURE — 85610 PROTHROMBIN TIME: CPT | Performed by: FAMILY MEDICINE

## 2025-05-10 PROCEDURE — 83605 ASSAY OF LACTIC ACID: CPT | Performed by: FAMILY MEDICINE

## 2025-05-10 PROCEDURE — 85018 HEMOGLOBIN: CPT

## 2025-05-10 PROCEDURE — 82009 KETONE BODYS QUAL: CPT | Performed by: FAMILY MEDICINE

## 2025-05-10 PROCEDURE — 82805 BLOOD GASES W/O2 SATURATION: CPT

## 2025-05-10 PROCEDURE — 85025 COMPLETE CBC W/AUTO DIFF WBC: CPT | Performed by: FAMILY MEDICINE

## 2025-05-10 PROCEDURE — 80307 DRUG TEST PRSMV CHEM ANLYZR: CPT | Performed by: FAMILY MEDICINE

## 2025-05-10 PROCEDURE — 63710000001 INSULIN REGULAR HUMAN PER 5 UNITS: Performed by: FAMILY MEDICINE

## 2025-05-10 PROCEDURE — 81003 URINALYSIS AUTO W/O SCOPE: CPT | Performed by: FAMILY MEDICINE

## 2025-05-10 PROCEDURE — 84295 ASSAY OF SERUM SODIUM: CPT

## 2025-05-10 PROCEDURE — 82947 ASSAY GLUCOSE BLOOD QUANT: CPT

## 2025-05-10 PROCEDURE — 83605 ASSAY OF LACTIC ACID: CPT

## 2025-05-10 PROCEDURE — 82550 ASSAY OF CK (CPK): CPT | Performed by: FAMILY MEDICINE

## 2025-05-10 PROCEDURE — 82948 REAGENT STRIP/BLOOD GLUCOSE: CPT

## 2025-05-10 PROCEDURE — 82330 ASSAY OF CALCIUM: CPT

## 2025-05-10 PROCEDURE — 83735 ASSAY OF MAGNESIUM: CPT | Performed by: FAMILY MEDICINE

## 2025-05-10 PROCEDURE — 99283 EMERGENCY DEPT VISIT LOW MDM: CPT | Performed by: FAMILY MEDICINE

## 2025-05-10 PROCEDURE — 85730 THROMBOPLASTIN TIME PARTIAL: CPT | Performed by: FAMILY MEDICINE

## 2025-05-10 PROCEDURE — 25810000003 SODIUM CHLORIDE 0.9 % SOLUTION: Performed by: FAMILY MEDICINE

## 2025-05-10 RX ORDER — POTASSIUM CHLORIDE 1500 MG/1
40 TABLET, EXTENDED RELEASE ORAL DAILY
Qty: 10 TABLET | Refills: 0 | Status: SHIPPED | OUTPATIENT
Start: 2025-05-10 | End: 2025-05-15

## 2025-05-10 RX ADMIN — HUMAN INSULIN 14 UNITS: 100 INJECTION, SOLUTION SUBCUTANEOUS at 09:50

## 2025-05-10 RX ADMIN — SODIUM CHLORIDE 1000 ML: 9 INJECTION, SOLUTION INTRAVENOUS at 13:32

## 2025-05-10 RX ADMIN — SODIUM CHLORIDE 1956 ML: 9 INJECTION, SOLUTION INTRAVENOUS at 09:44

## 2025-05-10 NOTE — ED NOTES
Pt easily aroused for glucose check.  Pt states unable to urinate at this time.  IV fluids continue to infuse

## 2025-05-10 NOTE — ED PROVIDER NOTES
HPI:     Patient is a 45-year-old white female with known history of diabetes does take insulin but has not taken it today.  Presents with the blood sugar that read high this morning.  EMS noted it at 440 and upon arrival here in the emergency room he was 486 by fingerstick.  Patient denies any alcohol intake no chest pain shortness of breath.  Allergies to codeine.  Patient denies any other trauma.  Has not been eating appropriately.  Overall pain 5 out of 10.    REVIEW OF SYSTEMS    CONSTITUTIONAL:  No complaints of fever, chills,or weakness  EYES:  No complaints of discharge   ENT: No complaints of sore throat or ear pain  CARDIOVASCULAR:  No complaints of chest pain, palpitations, or swelling  RESPIRATORY:  No complaints of cough or shortness of breath  GI: Positive for nausea no vomiting or diarrhea.  MUSCULOSKELETAL:  No complaints of back pain  SKIN:  No complaints of rash  NEUROLOGIC:  No complaints of headache, focal weakness, or sensory changes  ENDOCRINE: positive for and polydipsia and polyuria LYMPHATIC:  No complaints of swollen glands  GENITOURINARY:  Positive urinary frequency and urgency and dysuria  PAST MEDICAL HISTORY  Past Medical History:   Diagnosis Date    Alcoholism in recovery     Allergic     Anemia [id number]    Anxiety     Asthma     Bacterial infection     lower intestine    Breast cancer 2024    Calculus of gallbladder without cholecystitis without obstruction 01/31/2025    Cancer [id number]    Cirrhosis 2023    Endometriosis 1993    Gestational hypertension 2006    Hepatitis C 2022    Hypertension     Leaky heart valve     Narayanan syndrome     Migraine 2020    Multiple gestation 2006    Osteopenia due to cancer therapy 12/16/2024    Ovarian cyst 1995    PMS (premenstrual syndrome) 1991    PONV (postoperative nausea and vomiting) 1994    Preeclampsia 2006    Secondary esophageal varices without bleeding 08/09/2024    Sinusitis     Varicella 1989       FAMILY HISTORY  Family History   Problem  Relation Age of Onset    Diabetes Mother     Heart disease Father     Diabetes Father     Stroke Father     Melanoma Father 40 - 49    Esophageal cancer Father 60 - 69    Skin cancer Maternal Aunt     Colon cancer Paternal Uncle     Pancreatic cancer Paternal Uncle     Cervical cancer Maternal Grandmother 40 - 49    Skin cancer Paternal Grandmother     Cancer Paternal Grandfather 50 - 59        Mesothelioma    Breast cancer Half-Sister 45    Colon polyps Neg Hx     Uterine cancer Neg Hx        SOCIAL HISTORY  Social History     Socioeconomic History    Marital status:    Tobacco Use    Smoking status: Some Days     Current packs/day: 0.50     Average packs/day: 0.5 packs/day for 10.0 years (5.0 ttl pk-yrs)     Types: Cigarettes    Smokeless tobacco: Never   Vaping Use    Vaping status: Never Used   Substance and Sexual Activity    Alcohol use: Not Currently     Alcohol/week: 10.0 standard drinks of alcohol     Types: 10 Standard drinks or equivalent per week    Drug use: Never    Sexual activity: Yes     Partners: Male     Birth control/protection: None, Coitus interruptus       IMMUNIZATION HISTORY  Deferred to primary care physician.    SURGICAL HISTORY  Past Surgical History:   Procedure Laterality Date    BREAST BIOPSY       SECTION      x2    COLONOSCOPY      COLONOSCOPY N/A 2024    Dr. Bernardo-- Diverticulosis in the left colon. - The examination was otherwise normal on direct and retroflexion views. - No specimens collected    D & C HYSTEROSCOPY N/A 2024    Procedure: DILATATION AND CURETTAGE HYSTEROSCOPY;  Surgeon: Glo Benavides MD;  Location: [facility];  Service: Obstetrics/Gynecology;  Laterality: N/A;    ENDOSCOPY N/A 2024    no evidence of any fresh or old blood or clots,normal stomach    ENDOSCOPY N/A 2024    -Grade I esophageal varices. - Portal hypertensive gastropathy. - Normal examined duodenum. - No specimens collected    MASTECTOMY W/ SENTINEL  NODE BIOPSY Bilateral 11/05/2024    Procedure: BILATERAL SKIN SPARING MASTECTOMY WITH LEFT AXILLARY MAGTRACE GUIDED SENTINEL LYMPH NODE BIOPSY (Dr. Prieto to nahid);  Surgeon: Fide Verduzco MD;  Location:  [facility];  Service: General;  Laterality: Bilateral;    MOUTH SURGERY      TOTAL LAPAROSCOPIC HYSTERECTOMY SALPINGO OOPHORECTOMY Bilateral 1/23/2025    Procedure: TOTAL LAPAROSCOPIC HYSTERECTOMY BILATERAL SALPINGOOPHORECTOMY WITH DAVINCI ROBOT (removal of uterus, cervix, both fallopian tubes and ovaries using the davinci robot);  Surgeon: Glo Benavides MD;  Location:  [facility];  Service: Robotics - DaVinci;  Laterality: Bilateral;    UPPER GASTROINTESTINAL ENDOSCOPY  3/24    WISDOM TOOTH EXTRACTION  1995       CURRENT MEDICATIONS    Current Facility-Administered Medications:     lidocaine 1% - EPINEPHrine [id number] (XYLOCAINE W/EPI) 1 %-1:[id number] injection 10 mL, 10 mL, Injection, Once, Amina Camarillo MD    Current Outpatient Medications:     Acetaminophen (MIDOL PO), Take  by mouth., Disp: , Rfl:     anastrozole (ARIMIDEX) 1 MG tablet, Take 1 tablet by mouth Daily., Disp: 30 tablet, Rfl: 11    Blood Glucose Monitoring Suppl (Blood Glucose Monitor System) w/Device kit, Use 1 each 4 (Four) Times a Day., Disp: 1 each, Rfl: 0    Calcium Carbonate-Vitamin D 600-10 MG-MCG per tablet, Take 1 tablet by mouth 2 (Two) Times a Day., Disp: 60 tablet, Rfl: 11    carvedilol (Coreg) 6.25 MG tablet, Take 1 tablet by mouth 2 (Two) Times a Day With Meals., Disp: 60 tablet, Rfl: 3    cefdinir (OMNICEF) 300 MG capsule, Take 1 capsule by mouth 2 (Two) Times a Day., Disp: 14 capsule, Rfl: 0    ferrous sulfate 325 (65 FE) MG tablet, Take 1 tablet by mouth Daily With Breakfast., Disp: , Rfl:     furosemide (LASIX) 40 MG tablet, Take 1 tablet by mouth once daily, Disp: 90 tablet, Rfl: 1    gabapentin (NEURONTIN) 300 MG capsule, Take 1 capsule by mouth 2 (Two) Times a Day. (Patient not taking: Reported on 5/9/2025), Disp:  "60 capsule, Rfl: 2    Glucagon 1 MG/0.2ML solution auto-injector, Inject 1 mg under the skin into the appropriate area as directed Daily As Needed (glucose <60)., Disp: 0.2 mL, Rfl: 1    glucose blood test strip, 1 each by Other route 4 (Four) Times a Day. Use as instructed, Disp: 200 each, Rfl: 5    ibuprofen (Motrin IB) 200 MG tablet, Take 3 tablets by mouth Every 8 (Eight) Hours. Take every 8 hours for three days then take as needed., Disp: , Rfl:     insulin glargine (Lantus) 100 UNIT/ML injection, Inject 10 Units under the skin into the appropriate area as directed Daily., Disp: 3 mL, Rfl: 5    Lancets 33G misc, Use 1 each 4 (Four) Times a Day., Disp: 200 each, Rfl: 5    Multivitamin tablet tablet, Take 1 tablet by mouth Daily., Disp: , Rfl:     potassium chloride (KLOR-CON M20) 20 MEQ CR tablet, TAKE 1 TABLET BY MOUTH THREE TIMES DAILY FOR 4 DAYS, Disp: , Rfl:     potassium chloride (KLOR-CON M20) 20 MEQ CR tablet, Take 2 tablets by mouth Daily for 5 days., Disp: 10 tablet, Rfl: 0    spironolactone (ALDACTONE) 25 MG tablet, Take 1 tablet by mouth Daily., Disp: , Rfl:     thiamine (VITAMIN B1) 100 MG tablet, Take 1 tablet by mouth Daily. (Patient taking differently: Take 2.5 tablets by mouth Daily.), Disp: 30 tablet, Rfl: 0    vitamin B-12 (cyanocobalamin) 100 MCG tablet, Take 5 tablets by mouth Daily., Disp: , Rfl:     ALLERGIES  Allergies   Allergen Reactions    Oxycodone Itching     Pt states any pain medication causes severe itching, nausea    Codeine Rash and GI Intolerance       ABDOMINAL EXAM    VITAL SIGNS:   BP 97/69   Pulse 90   Temp 98.3 °F (36.8 °C) (Oral)   Resp 14   Ht 170.2 cm (67\")   Wt 70.6 kg (155 lb 9.6 oz)   LMP 09/29/2024 (Exact Date)   SpO2 93%   BMI 24.37 kg/m²     Constitutional: Patient is alert and in no distress.  Patient with generalized body  discomfort.    ENT: There is a normal pharynx with no acute erythema or exudate and oral mucosa is moist.  Nose is clear with no " drainage.  Tympanic membranes intact and non-erythemic    Cardiovascular: S1-S2 regular rate and rhythm. No murmurs, rubs or gallops.  Pulses are equal bilaterally and there is no pitting edema.    Respiratory: Patient is clear to auscultation bilaterally with no wheezing or rhonchi.  Chest wall is nontender.  There are no external lesions on the chest.  There is no crepitance.    Gastrointestinal: Bowel sounds normal in all 4 quadrants there is no rebound or guarding.  There is mild epigastric tenderness to palpation.  No abdominal distention or fluid wave.    Genitourinary: Mild suprapubic tenderness palpation but no costovertebral angle tenderness to percussion.    Integument: No acute lesions noted.  Color appears to be normal.    Chris Coma Scale: Total score 15    Neurological: Patient is alert and oriented x4 and no acute findings noted.  Speech is fluent and cognition is normal.  No evidence of acute CVA.  Cranial nerves II through XII intact.  Patient with normal motor function as well as reflexes and sensation    Psychiatric: Normal affect and mood.            RADIOLOGY/PROCEDURES        No orders to display          FUTURE APPOINTMENTS     Future Appointments   Date Time Provider Department Center   5/16/2025 10:00 AM [facility] MRI 2 (3T)  [facility] MR BI PAD   5/20/2025 10:15 AM INA Holloway MD [facility] [place] [place] PC [facility] PAD   5/20/2025 11:00 AM Dc Castrejon MD [facility] [place] [place] ORSP [facility]AD PAD   6/2/2025  9:00 AM Fide Verduzco MD [facility] [place] [place] GSUR PAD PAD   6/20/2025  9:05 AM  [facility] CANCER CTR LAB  [facility] CCLAB PAD   6/27/2025  9:45 AM Matthew Cruz MD [facility] [place] [place] ONC PAD PAD   6/27/2025 10:00 AM TX ROOM  [facility] OP INFU ONC  [facility] OIONC PAD   11/7/2025  8:15 AM INA Holloway MD [facility] [place] [place] PC PAD PAD   3/6/2026  1:00 PM Vandana Sherwood APRN [facility] [place] [place] OBG PAD PAD      COURSE & MEDICAL DECISION MAKING       Patient's partial differential diagnosis can include:    gastritis, gastroenteritis, colitis, enteritis, cholecystitis, pancreatitis, GERD,   Acute appendicitis,partial bowel obstruction, bowel obstruction,volvulus, intussusception, constipation, irritable bowel, diverticulitis, diverticulosis, AAA, peptic ulcer disease, perforated esophagus,  mesenteric adenitis, mesenteric ischemia, Crohn's disease, ulcerative colitis, perforated peptic ulcer,celiac disease, esophageal spasms, gastroparesis, and others      CBC, CMP, lipase, warauds, VBG, PT, PTT, and CPK will be ordered.    Patient will be given 16 units of regular insulin for blood sugar.  Patient also will be given a bolus of 2 L normal saline    Patient's drug screen positive for THC.  Glucose is greater than 1000 and a urine.  Patient also noted to have a blood sugar glucose after insulin of 352.  Venous pH was 7.495 which is elevated.  Her bicarb was 33.7.  Potassium 3.2.  Still awaiting a repeat BMP and a complete fluid bolus.  If patient still has a low anion gap or normalized anion gap in the absence of acetone in her bloodstream which she is negative for patient can be discharged home to treat her hyperglycemia at home.    3 L of fluid patient lactate is normal patient blood sugar is 204 and patient is ready to go home.      Patient is comfortable and is hungry and is ready to go home    Patient's level of risk: Moderate       CRITICAL CARE    CRITICAL CARE: No    CRITICAL CARE TIME: None      Recent Results (from the past 24 hours)   POC Glucose Once    Collection Time: 05/10/25  9:32 AM    Specimen: Blood   Result Value Ref Range    Glucose 486 (C) 70 - 130 mg/dL   Acetone    Collection Time: 05/10/25  9:43 AM    Specimen: Arm, Right; Blood   Result Value Ref Range    Acetone Negative Negative   Comprehensive Metabolic Panel    Collection Time: 05/10/25  9:43 AM    Specimen: Arm, Right; Blood   Result Value Ref Range    Glucose 506 (C) 65 - 99 mg/dL    BUN 7 6 - 20 mg/dL    Creatinine 1.23 (H) 0.57 - 1.00 mg/dL    Sodium 131 (L) 136 - 145 mmol/L    Potassium 3.2 (L) 3.5 - 5.2 mmol/L     Chloride 87 (L) 98 - 107 mmol/L    CO2 28.0 22.0 - 29.0 mmol/L    Calcium 9.6 8.6 - 10.5 mg/dL    Total Protein 7.7 6.0 - 8.5 g/dL    Albumin 3.6 3.5 - 5.2 g/dL    ALT (SGPT) 21 1 - 33 U/L    AST (SGOT) 36 (H) 1 - 32 U/L    Alkaline Phosphatase 90 39 - 117 U/L    Total Bilirubin 2.1 (H) 0.0 - 1.2 mg/dL    Globulin 4.1 gm/dL    A/G Ratio 0.9 g/dL    BUN/Creatinine Ratio 5.7 (L) 7.0 - 25.0    Anion Gap 16.0 (H) 5.0 - 15.0 mmol/L    eGFR 55.0 (L) >60.0 mL/min/1.73   Protime-INR    Collection Time: 05/10/25  9:43 AM    Specimen: Arm, Right; Blood   Result Value Ref Range    Protime 17.5 (H) 11.8 - 14.8 Seconds    INR 1.36 (H) 0.91 - 1.09   aPTT    Collection Time: 05/10/25  9:43 AM    Specimen: Arm, Right; Blood   Result Value Ref Range    PTT 31.1 24.5 - 36.0 seconds   Lipase    Collection Time: 05/10/25  9:43 AM    Specimen: Arm, Right; Blood   Result Value Ref Range    Lipase 48 13 - 60 U/L   Lactic Acid, Plasma    Collection Time: 05/10/25  9:43 AM    Specimen: Arm, Right; Blood   Result Value Ref Range    Lactate 2.8 (C) 0.5 - 2.0 mmol/L   CK    Collection Time: 05/10/25  9:43 AM    Specimen: Arm, Right; Blood   Result Value Ref Range    Creatine Kinase 80 20 - 180 U/L   Magnesium    Collection Time: 05/10/25  9:43 AM    Specimen: Arm, Right; Blood   Result Value Ref Range    Magnesium 1.9 1.6 - 2.6 mg/dL   CBC Auto Differential    Collection Time: 05/10/25  9:43 AM    Specimen: Arm, Right; Blood   Result Value Ref Range    WBC 4.91 3.40 - 10.80 10*3/mm3    RBC 4.58 3.77 - 5.28 10*6/mm3    Hemoglobin 13.8 12.0 - 15.9 g/dL    Hematocrit 41.4 34.0 - 46.6 %    MCV 90.4 79.0 - 97.0 fL    MCH 30.1 26.6 - 33.0 pg    MCHC 33.3 31.5 - 35.7 g/dL    RDW 14.7 12.3 - 15.4 %    RDW-SD 47.9 37.0 - 54.0 fl    MPV 10.3 6.0 - 12.0 fL    Platelets 128 (L) 140 - 450 10*3/mm3    Neutrophil % 41.1 (L) 42.7 - 76.0 %    Lymphocyte % 42.6 19.6 - 45.3 %    Monocyte % 9.6 5.0 - 12.0 %    Eosinophil % 4.7 0.3 - 6.2 %    Basophil % 1.8 (H)  0.0 - 1.5 %    Immature Grans % 0.2 0.0 - 0.5 %    Neutrophils, Absolute 2.02 1.70 - 7.00 10*3/mm3    Lymphocytes, Absolute 2.09 0.70 - 3.10 10*3/mm3    Monocytes, Absolute 0.47 0.10 - 0.90 10*3/mm3    Eosinophils, Absolute 0.23 0.00 - 0.40 10*3/mm3    Basophils, Absolute 0.09 0.00 - 0.20 10*3/mm3    Immature Grans, Absolute 0.01 0.00 - 0.05 10*3/mm3   Venous Blood Gas with Coox and Lactate    Collection Time: 05/10/25  9:55 AM    Specimen: Venous Blood   Result Value Ref Range    pH, Venous 7.495 (H) 7.320 - 7.420 pH Units    pCO2, Venous 43.8 41.0 - 51.0 mm Hg    pO2, Venous 55.1 (H) 27.0 - 53.0 mm Hg    HCO3, Venous 33.7 (H) 22.0 - 28.0 mmol/L    Base Excess, Venous 9.3 (H) 0.0 - 2.0 mmol/L    Hemoglobin, Blood Gas 13.9 12 - 16 g/dL    Hematocrit, Blood Gas 42.5 38.0 - 51.0 %    Oxyhemoglobin Venous 85.6 (H) 60.0 - 85.0 %    Methemoglobin Venous 0.1 0.0 - 3.0 %    Carboxyhemoglobin Venous 4.8 0.0 - 5.0 %    O2 Saturation, Venous 90.0 (H) 45.0 - 75.0 %    Ionized Calcium      Sodium, Venous 133 (L) 136 - 145 mmol/L    Potassium, Venous 3.2 (L) 3.5 - 5.2 mmol/L    Glucose, Arterial 506 (C) 65 - 99 mg/dL    A-a DO2      Volume % O2      Temperature 37.0     Barometric Pressure for Blood Gas 757 mmHg    Site Nurse/ Draw     Modality Room Air     FIO2      Flow Rate      Nitric Oxide      Ventilator Mode NA     Set Tidal Volume      Set Mech Resp Rate      Rate      PEEP      PSV      PIP      IPAP      EPAP      CPAP      Pulse Ox      Notified Who DR LUCIA     Notified By [id number]     Notified Time 05/10/2025 09:56     Collected by [id number]     Note      Lactate, whole blood 2.5 (H) 0.5 - 2 mmol/L   POC Glucose Once    Collection Time: 05/10/25 10:29 AM    Specimen: Blood   Result Value Ref Range    Glucose 352 (H) 70 - 130 mg/dL   Urinalysis With Culture If Indicated - Urine, Catheter    Collection Time: 05/10/25 10:45 AM    Specimen: Urine, Catheter   Result Value Ref Range    Color, UA Yellow Yellow, Straw     Appearance, UA Clear Clear    pH, UA 7.0 5.0 - 8.0    Specific Gravity, UA 1.028 1.005 - 1.030    Glucose, UA >=1000 mg/dL (3+) (A) Negative    Ketones, UA Negative Negative    Bilirubin, UA Negative Negative    Blood, UA Negative Negative    Protein, UA Negative Negative    Leuk Esterase, UA Negative Negative    Nitrite, UA Negative Negative    Urobilinogen, UA 1.0 E.U./dL 0.2 - 1.0 E.U./dL   Urine Drug Screen - Urine, Clean Catch    Collection Time: 05/10/25 10:45 AM    Specimen: Urine, Clean Catch   Result Value Ref Range    THC, Screen, Urine Positive (A) Negative    Phencyclidine (PCP), Urine Negative Negative    Cocaine Screen, Urine Negative Negative    Methamphetamine, Ur Negative Negative    Opiate Screen Negative Negative    Amphetamine Screen, Urine Negative Negative    Benzodiazepine Screen, Urine Negative Negative    Tricyclic Antidepressants Screen Negative Negative    Methadone Screen, Urine Negative Negative    Barbiturates Screen, Urine Negative Negative    Oxycodone Screen, Urine Negative Negative    Buprenorphine, Screen, Urine Negative Negative   Fentanyl, Urine - Urine, Clean Catch    Collection Time: 05/10/25 10:45 AM    Specimen: Urine, Clean Catch   Result Value Ref Range    Fentanyl, Urine Negative Negative   POC Glucose Once    Collection Time: 05/10/25 11:34 AM    Specimen: Blood   Result Value Ref Range    Glucose 251 (H) 70 - 130 mg/dL   POC Glucose Once    Collection Time: 05/10/25 12:41 PM    Specimen: Blood   Result Value Ref Range    Glucose 228 (H) 70 - 130 mg/dL   STAT Lactic Acid, Reflex    Collection Time: 05/10/25 12:42 PM    Specimen: Blood   Result Value Ref Range    Lactate 2.9 (C) 0.5 - 2.0 mmol/L   Basic Metabolic Panel    Collection Time: 05/10/25  1:29 PM    Specimen: Blood   Result Value Ref Range    Glucose 238 (H) 65 - 99 mg/dL    BUN 6 6 - 20 mg/dL    Creatinine 0.88 0.57 - 1.00 mg/dL    Sodium 135 (L) 136 - 145 mmol/L    Potassium 3.2 (L) 3.5 - 5.2 mmol/L    Chloride  97 (L) 98 - 107 mmol/L    CO2 26.0 22.0 - 29.0 mmol/L    Calcium 8.3 (L) 8.6 - 10.5 mg/dL    BUN/Creatinine Ratio 6.8 (L) 7.0 - 25.0    Anion Gap 12.0 5.0 - 15.0 mmol/L    eGFR 82.2 >60.0 mL/min/1.73   STAT Lactic Acid, Reflex    Collection Time: 05/10/25  2:39 PM    Specimen: Blood   Result Value Ref Range    Lactate 1.6 0.5 - 2.0 mmol/L   POC Glucose Once    Collection Time: 05/10/25  2:41 PM    Specimen: Blood   Result Value Ref Range    Glucose 204 (H) 70 - 130 mg/dL          Old charts were reviewed per [facility] EMR.  Pertinent details are summarized above.  All laboratory, radiologic, and EKG studies that were performed in the Emergency Department were a necessary part of the evaluation needed to exclude unstable or  emergent medical conditions.     Patient was hemodynamically and neurologically stable in the ED.   Pertinent studies were reviewed as above.     The patient received:  Medications   insulin regular (humuLIN R,novoLIN R) injection 14 Units (14 Units Intravenous Given 5/10/25 0950)   sodium chloride 0.9 % bolus 1,956 mL (0 mL Intravenous Stopped 5/10/25 1206)   sodium chloride 0.9 % bolus 1,000 mL (0 mL Intravenous Stopped 5/10/25 1442)       ED Course as of 05/11/25 0640   Sat May 10, 2025   1535 Anion Gap: 12.0 [TB]      ED Course User Index  [TB] Uvaldo Cortes Jr., MD       Diagnoses that have been ruled out:   None   Diagnoses that are still under consideration:   None   Final diagnoses:   Hyperglycemia due to diabetes mellitus   Hypokalemia        FINAL IMPRESSION   Diagnosis Plan   1. Hyperglycemia due to diabetes mellitus        2. Hypokalemia              Uvaldo Cortes Jr, MD        ED Disposition       ED Disposition   Discharge    Condition   Stable    Comment   Pt and  verbalized d/c instructions                 Dragon disclaimer:  Part of this note may be an electronic transcription/translation of spoken language to printed text using the Dragon Dictation System.     I have  reviewed the patient’s prescription history via a prescription monitoring program.  This information is consistent with my knowledge of the patient’s controlled substance use history.        Uvaldo Cortes Jr., MD  05/10/25 1527       Uvaldo Cortes Jr., MD  05/11/25 0658

## 2025-05-16 ENCOUNTER — HOSPITAL ENCOUNTER (OUTPATIENT)
Dept: MRI IMAGING | Facility: HOSPITAL | Age: 45
Discharge: HOME | End: 2025-05-16
Payer: COMMERCIAL

## 2025-05-16 DIAGNOSIS — M25.562 CHRONIC PAIN OF LEFT KNEE: ICD-10-CM

## 2025-05-16 DIAGNOSIS — G89.29 CHRONIC PAIN OF LEFT KNEE: ICD-10-CM

## 2025-05-16 DIAGNOSIS — M71.22 SYNOVIAL CYST OF LEFT POPLITEAL SPACE: ICD-10-CM

## 2025-05-16 PROCEDURE — 73721 MRI JNT OF LWR EXTRE W/O DYE: CPT

## 2025-05-20 ENCOUNTER — OFFICE VISIT (OUTPATIENT)
Age: 45
End: 2025-05-20
Payer: COMMERCIAL

## 2025-05-20 VITALS — HEIGHT: 67 IN | WEIGHT: 155 LBS | BODY MASS INDEX: 24.33 KG/M2

## 2025-05-20 DIAGNOSIS — S86.112A GASTROCNEMIUS TEAR, LEFT, INITIAL ENCOUNTER: Primary | ICD-10-CM

## 2025-05-20 DIAGNOSIS — M94.262 CHONDROMALACIA OF LEFT KNEE: ICD-10-CM

## 2025-05-20 NOTE — PROGRESS NOTES
[facility]  Dc Castrejon MD, PhD  Vinicius Castrejon PA-C    CHIEF COMPLAINT  Initial Evaluation of the Left Knee (Patient presents today for left knee pain. X-rays performed at [facility] on 09/10/2024. MRI performed at [facility] on 05/16/2025. Patient states knee pain started about 4 years. Patient complains of intermittent pain in the back of knee. No surgery. No physical therapy.)       HISTORY OF PRESENT ILLNESS    History of Present Illness  The patient is a 45-year-old female, new patient, who presents for evaluation of knee pain.    She reports that her knee pain began approximately 4 years ago following a fall down two steps. Initially, she managed the pain with stretching exercises. However, her sister noticed an abnormal gait during a visit to [place]. The pain has since escalated to the point where it interferes with her daily activities, including hiking and walking around her house. She describes the pain as radiating from the back of her knee into her calf. She reports no episodes of knee locking but mentions a sensation of catching in her knee. She also reports a cracking sound in her knee upon bending, a symptom present since she was 7 years old. An MRI was conducted, revealing a tear. She has been nearly bedridden for 4 months due to recovery from surgeries. She has been managing the pain with Bengay and over-the-counter medications such as Tylenol and ibuprofen as needed. She has attempted home-based physical therapy, including stretching exercises.    She has a history of tendinitis from playing volleyball. She has a genetic disorder that predisposes her to breast cancer. She has had a mastectomy and hysterectomy.       HISTORY    Current Outpatient Medications   Medication Instructions    anastrozole (ARIMIDEX) 1 mg, Oral, Daily    Blood Glucose Monitoring Suppl (Blood Glucose Monitor System) w/Device kit 1 each, Not Applicable, 4 Times Daily    Calcium  Carbonate-Vitamin D 600-10 MG-MCG per tablet 1 tablet, Oral, 2 Times Daily    carvedilol (COREG) 6.25 mg, Oral, 2 Times Daily With Meals    ferrous sulfate 325 mg, Daily With Breakfast    furosemide (LASIX) 40 mg, Oral, Daily    gabapentin (NEURONTIN) 300 mg, Oral, 2 Times Daily    Glucagon 1 mg, Subcutaneous, Daily PRN    glucose blood test strip 1 each, Other, 4 Times Daily, Use as instructed    ibuprofen (MOTRIN IB) 600 mg, Oral, Every 8 Hours, Take every 8 hours for three days then take as needed.    insulin glargine (LANTUS) 15 Units, Subcutaneous, Daily    Insulin Lispro (1 Unit Dial) (HUMALOG KWIKPEN) 10 Units, Subcutaneous, 3 Times Daily Before Meals    Lancets 33G misc 1 each, Not Applicable, 4 Times Daily    metFORMIN ER (GLUCOPHAGE-XR) 500 mg, Oral, Daily With Breakfast    Multivitamin tablet tablet 1 tablet, Daily    POTASSIUM BICARBONATE PO Take  by mouth.    potassium chloride (KLOR-CON M20) 20 MEQ CR tablet TAKE 1 TABLET BY MOUTH THREE TIMES DAILY FOR 4 DAYS    spironolactone (ALDACTONE) 25 MG tablet 1 tablet, Daily    vitamin B-12 (CYANOCOBALAMIN) 500 mcg, Daily         reports that she has been smoking cigarettes. She has a 5 pack-year smoking history. She has never used smokeless tobacco. She reports that she does not currently use alcohol after a past usage of about 10.0 standard drinks of alcohol per week. She reports that she does not use drugs.    Past Medical History:   Diagnosis Date    Alcoholism in recovery     Allergic     Anemia [id number]    Anxiety     Asthma     Bacterial infection     lower intestine    Breast cancer 2024    Calculus of gallbladder without cholecystitis without obstruction 01/31/2025    Cancer 185092    Cirrhosis 2023    Endometriosis 1993    Gestational hypertension 2006    Hepatitis C 2022    Hypertension     Leaky heart valve     Narayanan syndrome     Migraine 2020    Multiple gestation 2006    Osteopenia due to cancer therapy 12/16/2024    Ovarian cyst 1995    PMS  (premenstrual syndrome)     PONV (postoperative nausea and vomiting) 1994    Preeclampsia 2006    Secondary esophageal varices without bleeding 2024    Sinusitis     Varicella         Past Surgical History:   Procedure Laterality Date    BREAST BIOPSY       SECTION      x2    COLONOSCOPY      COLONOSCOPY N/A 2024    Dr. Bernardo-- Diverticulosis in the left colon. - The examination was otherwise normal on direct and retroflexion views. - No specimens collected    D & C HYSTEROSCOPY N/A 2024    Procedure: DILATATION AND CURETTAGE HYSTEROSCOPY;  Surgeon: Glo Benavides MD;  Location: [facility] OR;  Service: Obstetrics/Gynecology;  Laterality: N/A;    ENDOSCOPY N/A 2024    no evidence of any fresh or old blood or clots,normal stomach    ENDOSCOPY N/A 2024    -Grade I esophageal varices. - Portal hypertensive gastropathy. - Normal examined duodenum. - No specimens collected    MASTECTOMY W/ SENTINEL NODE BIOPSY Bilateral 2024    Procedure: BILATERAL SKIN SPARING MASTECTOMY WITH LEFT AXILLARY MAGTRACE GUIDED SENTINEL LYMPH NODE BIOPSY (Dr. Prieto to nahid);  Surgeon: Fide Verduzco MD;  Location: [facility] OR;  Service: General;  Laterality: Bilateral;    MOUTH SURGERY      TOTAL LAPAROSCOPIC HYSTERECTOMY SALPINGO OOPHORECTOMY Bilateral 2025    Procedure: TOTAL LAPAROSCOPIC HYSTERECTOMY BILATERAL SALPINGOOPHORECTOMY WITH DAVINCI ROBOT (removal of uterus, cervix, both fallopian tubes and ovaries using the davinci robot);  Surgeon: Glo Benavides MD;  Location: [facility] OR;  Service: Robotics - DaVinci;  Laterality: Bilateral;    UPPER GASTROINTESTINAL ENDOSCOPY  3/24    WISDOM TOOTH EXTRACTION          PHYSICAL EXAM  Constitutional: The patient is in no apparent distress and generally well-appearing. The patient hears me clearly and answers questions appropriately.   Musculoskeletal:  Knee Left:  Non-antalgic gait   No effusion   No scars, no  overlying skin abnormalities.   No redness, warmth, or signs of infection.   TENDERNESS: popliteal fossa, proximal gastroc, medial joint line  Nontender lateral joint line   Non-tender to palpation of patella, patellar tendon, tibial tubercle, pes anserine, fibular head, popliteal fossa, gastrocnemius, distal hamstring tendons.   Sensation grossly intact about the knee and lower extremity without allodynia.   + crepitus with extension/flexion.   Active ROM extension/flexion: 0-135.  No pain on terminal extension or flexion.   Normal patella position.   + Arpita   Negative Lachman, anterior, and posterior drawer.  No laxity or pain with varus and valgus stress.   Strength 5/5 w/ resisted knee extension and flexion.   Lower leg compartments soft, non-erythematous, with no signs of DVT or compartment syndrome.        IMAGING    MRI Knee Left Without Contrast  Result Date: 5/16/2025  Narrative: EXAMINATION: MRI KNEE LEFT  WO CONTRAST- 5/16/2025 12:36 PM  HISTORY: left knee pain; M25.562-Pain in left knee; G89.29-Other chronic pain; M71.22-Synovial cyst of popliteal space (Baker), left knee.  REPORT: Multiplanar multisequence MR imaging of the left knee was performed without contrast.  COMPARISON: Left knee x-rays 9/10/2024.  Axial images demonstrate normal osseous alignment. Increased signal and mild thinning of the articular cartilage of the lateral facet of the patella is suggestive of low-grade chondromalacia. There is no full-thickness chondrosis. Minimal joint fluid is present. The extensor mechanism is intact. There appears to be partial mucinous degeneration of the ACL, no ACL tear is identified. The PCL is intact. There is mild narrowing of the medial compartment, with patchy partial-thickness chondrosis, no tear of the medial meniscus is identified. The lateral compartment is preserved, with normal cartilage thickness. The lateral meniscus appears within normal limits. No Torres's cyst is identified, there is  mild thickening and increased signal involving a short segment of the tendon for the medial head of the gastrocnemius suggestive of tendinosis or previous incomplete tear. Marrow signal is homogeneous and normal.      Impression: 1. No acute abnormality, findings suggestive of low-grade patellar chondromalacia, with no fissure or full-thickness chondrosis. There is also patchy partial-thickness chondrosis involving the medial compartment. 2. Normal appearance of the medial and lateral menisci, PCL. Partial mucinous degeneration of the distal ACL is suspected. 3. Mild short segment probable tendinosis vs. a chronic partial thickness tear involving the tendon for the medial head of the gastrocnemius, no Bakers cyst is identified.  This report was signed and finalized on 5/16/2025 12:49 PM by Dr. Miles Castaneda MD.              ASSESSMENT & PLAN  Diagnoses and all orders for this visit:    1. Gastrocnemius tear, left, initial encounter (Primary)  -     Ambulatory Referral to Physical Therapy for Evaluation & Treatment    2. Chondromalacia of left knee         Assessment & Plan  1. Knee pain:  The MRI results indicate low-grade patellar chondromalacia, partial mucoid degeneration of the distal ACL, and mild short-segment probable tendinosis versus a chronic partial thickness tear of the gastroc. Pain seems to be primarily related to the calf sprain.     Physical therapy is recommended to rehabilitate the muscle and alleviate pain. A referral will be provided. Inform the therapists of goals, such as hiking and swimming, to tailor the therapy accordingly.    Follow-up  Follow up in 6 weeks.    PT referral  Follow up: 6 weeks         Patient or patient representative verbalized consent for the use of Ambient Listening during the visit with  Dc Castrejon MD for chart documentation. 5/26/2025  11:27 CDT      This document has been signed by Dc Castrejon MD on May 26, 2025 09:02 CDT

## 2025-05-22 ENCOUNTER — OFFICE VISIT (OUTPATIENT)
Dept: INTERNAL MEDICINE | Facility: CLINIC | Age: 45
End: 2025-05-22
Payer: COMMERCIAL

## 2025-05-22 VITALS
TEMPERATURE: 97.6 F | OXYGEN SATURATION: 95 % | BODY MASS INDEX: 22.82 KG/M2 | DIASTOLIC BLOOD PRESSURE: 70 MMHG | WEIGHT: 145.4 LBS | HEART RATE: 90 BPM | HEIGHT: 67 IN | SYSTOLIC BLOOD PRESSURE: 102 MMHG

## 2025-05-22 DIAGNOSIS — K70.31 ALCOHOLIC CIRRHOSIS OF LIVER WITH ASCITES: ICD-10-CM

## 2025-05-22 DIAGNOSIS — C50.912 INVASIVE DUCTAL CARCINOMA OF BREAST, FEMALE, LEFT: ICD-10-CM

## 2025-05-22 DIAGNOSIS — E11.65 TYPE 2 DIABETES MELLITUS WITH HYPERGLYCEMIA, WITH LONG-TERM CURRENT USE OF INSULIN: Primary | ICD-10-CM

## 2025-05-22 DIAGNOSIS — Z79.4 TYPE 2 DIABETES MELLITUS WITH HYPERGLYCEMIA, WITH LONG-TERM CURRENT USE OF INSULIN: Primary | ICD-10-CM

## 2025-05-22 PROCEDURE — 99214 OFFICE O/P EST MOD 30 MIN: CPT | Performed by: INTERNAL MEDICINE

## 2025-05-22 RX ORDER — METFORMIN HYDROCHLORIDE 500 MG/1
500 TABLET, EXTENDED RELEASE ORAL
Qty: 30 TABLET | Refills: 2 | Status: SHIPPED | OUTPATIENT
Start: 2025-05-22

## 2025-05-22 RX ORDER — MULTIVITAMIN
1 TABLET ORAL DAILY
Qty: 30 TABLET | Status: CANCELLED | OUTPATIENT
Start: 2025-05-22

## 2025-05-22 RX ORDER — INSULIN LISPRO 100 [IU]/ML
10 INJECTION, SOLUTION INTRAVENOUS; SUBCUTANEOUS
Qty: 9 ML | Refills: 2 | Status: SHIPPED | OUTPATIENT
Start: 2025-05-22

## 2025-05-22 RX ORDER — INSULIN GLARGINE 100 [IU]/ML
15 INJECTION, SOLUTION SUBCUTANEOUS DAILY
Qty: 3 ML | Refills: 5 | Status: SHIPPED | OUTPATIENT
Start: 2025-05-22

## 2025-05-22 NOTE — PROGRESS NOTES
Chief Complaint   Patient presents with    Follow-up    Diabetes         History:  Rachana Smart is a 45 y.o. female who presents today for evaluation of the above problems.      HPI  History of Present Illness    She presents today for 2-week follow-up on diabetes.  She was recently diagnosed with type 2 diabetes with an A1c of 12.1% on May 2, 2025.  She is currently on Lantus 10 units daily.  At the last visit on May 5, 2025 she was given prescription for glucometer, test trips, lancets and glucagon.    She reports experiencing significantly elevated blood glucose levels, with a recent reading reaching 598. Despite making dietary changes, such as reducing carbohydrate intake and eliminating sugar, her blood glucose levels remain high.     She mentions that her current medication regimen for breast cancer, which includes a fourth unspecified medication, seems to be contributing to her hyperglycemia. She discontinued the use of anastrozole due to adverse effects on her heart and liver.      Her fasting blood glucose levels have been recorded at 300, 400, and 500.    She typically consumes two meals per day and has made significant dietary changes, including the elimination of sugar and the inclusion of chicken, vegetables, turkey, cheese, and sugar-free products. She occasionally consumes diet tea and soda.     She has previously taken metformin during her pregnancies. She has previously consulted with a nutritionist for gestational diabetes management during her pregnancies. She recalls an incident during her pregnancy when her blood glucose level dropped to 50, causing her to feel unwell.    ROS:  Review of Systems  See above    Current Outpatient Medications:     anastrozole (ARIMIDEX) 1 MG tablet, Take 1 tablet by mouth Daily., Disp: 30 tablet, Rfl: 11    Blood Glucose Monitoring Suppl (Blood Glucose Monitor System) w/Device kit, Use 1 each 4 (Four) Times a Day., Disp: 1 each, Rfl: 0    Calcium  Carbonate-Vitamin D 600-10 MG-MCG per tablet, Take 1 tablet by mouth 2 (Two) Times a Day., Disp: 60 tablet, Rfl: 11    carvedilol (Coreg) 6.25 MG tablet, Take 1 tablet by mouth 2 (Two) Times a Day With Meals., Disp: 60 tablet, Rfl: 3    ferrous sulfate 325 (65 FE) MG tablet, Take 1 tablet by mouth Daily With Breakfast., Disp: , Rfl:     furosemide (LASIX) 40 MG tablet, Take 1 tablet by mouth once daily, Disp: 90 tablet, Rfl: 1    gabapentin (NEURONTIN) 300 MG capsule, Take 1 capsule by mouth 2 (Two) Times a Day., Disp: 60 capsule, Rfl: 2    Glucagon 1 MG/0.2ML solution auto-injector, Inject 1 mg under the skin into the appropriate area as directed Daily As Needed (glucose <60)., Disp: 0.2 mL, Rfl: 1    glucose blood test strip, 1 each by Other route 4 (Four) Times a Day. Use as instructed, Disp: 200 each, Rfl: 5    ibuprofen (Motrin IB) 200 MG tablet, Take 3 tablets by mouth Every 8 (Eight) Hours. Take every 8 hours for three days then take as needed., Disp: , Rfl:     insulin glargine (Lantus) 100 UNIT/ML injection, Inject 15 Units under the skin into the appropriate area as directed Daily., Disp: 3 mL, Rfl: 5    Lancets 33G misc, Use 1 each 4 (Four) Times a Day., Disp: 200 each, Rfl: 5    Multivitamin tablet tablet, Take 1 tablet by mouth Daily., Disp: , Rfl:     POTASSIUM BICARBONATE PO, Take  by mouth., Disp: , Rfl:     spironolactone (ALDACTONE) 25 MG tablet, Take 1 tablet by mouth Daily., Disp: , Rfl:     vitamin B-12 (cyanocobalamin) 100 MCG tablet, Take 5 tablets by mouth Daily., Disp: , Rfl:     Insulin Lispro, 1 Unit Dial, (HumaLOG KwikPen) 100 UNIT/ML solution pen-injector, Inject 10 Units under the skin into the appropriate area as directed 3 (Three) Times a Day Before Meals., Disp: 9 mL, Rfl: 2    metFORMIN ER (GLUCOPHAGE-XR) 500 MG 24 hr tablet, Take 1 tablet by mouth Daily With Breakfast., Disp: 30 tablet, Rfl: 2    potassium chloride (KLOR-CON M20) 20 MEQ CR tablet, TAKE 1 TABLET BY MOUTH THREE TIMES  DAILY FOR 4 DAYS, Disp: , Rfl:     Current Facility-Administered Medications:     lidocaine 1% - EPINEPHrine 1:380912 (XYLOCAINE W/EPI) 1 %-1:329113 injection 10 mL, 10 mL, Injection, Once, Amina Camarillo MD    Lab Results   Component Value Date    GLUCOSE 238 (H) 05/10/2025    BUN 6 05/10/2025    CREATININE 0.88 05/10/2025     (L) 05/10/2025    K 3.2 (L) 05/10/2025    CL 97 (L) 05/10/2025    CALCIUM 8.3 (L) 05/10/2025    PROTEINTOT 7.7 05/10/2025    ALBUMIN 3.6 05/10/2025    ALT 21 05/10/2025    AST 36 (H) 05/10/2025    ALKPHOS 90 05/10/2025    BILITOT 2.1 (H) 05/10/2025    GLOB 4.1 05/10/2025    AGRATIO 0.9 05/10/2025    BCR 6.8 (L) 05/10/2025    ANIONGAP 12.0 05/10/2025    EGFR 82.2 05/10/2025       WBC   Date Value Ref Range Status   05/10/2025 4.91 3.40 - 10.80 10*3/mm3 Final     RBC   Date Value Ref Range Status   05/10/2025 4.58 3.77 - 5.28 10*6/mm3 Final     Hemoglobin   Date Value Ref Range Status   05/10/2025 13.8 12.0 - 15.9 g/dL Final     Hematocrit   Date Value Ref Range Status   05/10/2025 41.4 34.0 - 46.6 % Final     MCV   Date Value Ref Range Status   05/10/2025 90.4 79.0 - 97.0 fL Final     MCH   Date Value Ref Range Status   05/10/2025 30.1 26.6 - 33.0 pg Final     MCHC   Date Value Ref Range Status   05/10/2025 33.3 31.5 - 35.7 g/dL Final     RDW   Date Value Ref Range Status   05/10/2025 14.7 12.3 - 15.4 % Final     RDW-SD   Date Value Ref Range Status   05/10/2025 47.9 37.0 - 54.0 fl Final     MPV   Date Value Ref Range Status   05/10/2025 10.3 6.0 - 12.0 fL Final     Platelets   Date Value Ref Range Status   05/10/2025 128 (L) 140 - 450 10*3/mm3 Final     Neutrophil %   Date Value Ref Range Status   05/10/2025 41.1 (L) 42.7 - 76.0 % Final     Lymphocyte %   Date Value Ref Range Status   05/10/2025 42.6 19.6 - 45.3 % Final     Monocyte %   Date Value Ref Range Status   05/10/2025 9.6 5.0 - 12.0 % Final     Eosinophil %   Date Value Ref Range Status   05/10/2025 4.7 0.3 - 6.2 % Final  "    Basophil %   Date Value Ref Range Status   05/10/2025 1.8 (H) 0.0 - 1.5 % Final     Immature Grans %   Date Value Ref Range Status   05/10/2025 0.2 0.0 - 0.5 % Final     Neutrophils, Absolute   Date Value Ref Range Status   05/10/2025 2.02 1.70 - 7.00 10*3/mm3 Final     Lymphocytes, Absolute   Date Value Ref Range Status   05/10/2025 2.09 0.70 - 3.10 10*3/mm3 Final     Monocytes, Absolute   Date Value Ref Range Status   05/10/2025 0.47 0.10 - 0.90 10*3/mm3 Final     Eosinophils, Absolute   Date Value Ref Range Status   05/10/2025 0.23 0.00 - 0.40 10*3/mm3 Final     Basophils, Absolute   Date Value Ref Range Status   05/10/2025 0.09 0.00 - 0.20 10*3/mm3 Final     Immature Grans, Absolute   Date Value Ref Range Status   05/10/2025 0.01 0.00 - 0.05 10*3/mm3 Final     nRBC   Date Value Ref Range Status   05/02/2025 0.0 0.0 - 0.2 /100 WBC Final         OBJECTIVE:  Visit Vitals  /70 (BP Location: Right arm, Patient Position: Sitting, Cuff Size: Adult)   Pulse 90   Temp 97.6 °F (36.4 °C) (Temporal)   Ht 170.2 cm (67\")   Wt 66 kg (145 lb 6.4 oz)   LMP 09/29/2024 (Exact Date)   SpO2 95%   BMI 22.77 kg/m²      Physical Exam  Constitutional:       General: She is not in acute distress.     Appearance: She is well-developed. She is not diaphoretic.   HENT:      Head: Normocephalic and atraumatic.   Eyes:      Pupils: Pupils are equal, round, and reactive to light.   Neck:      Thyroid: No thyromegaly.      Trachea: Phonation normal.   Cardiovascular:      Rate and Rhythm: Normal rate and regular rhythm.   Pulmonary:      Effort: No respiratory distress.   Skin:     Coloration: Skin is not pale.      Findings: No erythema.   Neurological:      Mental Status: She is alert.   Psychiatric:         Behavior: Behavior normal.         Thought Content: Thought content normal.         Judgment: Judgment normal.           Assessment/Plan      Diagnoses and all orders for this visit:    1. Type 2 diabetes mellitus with " hyperglycemia, with long-term current use of insulin (Primary)  -     Ambulatory Referral to Nutrition Services  -     Insulin Lispro, 1 Unit Dial, (HumaLOG KwikPen) 100 UNIT/ML solution pen-injector; Inject 10 Units under the skin into the appropriate area as directed 3 (Three) Times a Day Before Meals.  Dispense: 9 mL; Refill: 2  -     metFORMIN ER (GLUCOPHAGE-XR) 500 MG 24 hr tablet; Take 1 tablet by mouth Daily With Breakfast.  Dispense: 30 tablet; Refill: 2  -     insulin glargine (Lantus) 100 UNIT/ML injection; Inject 15 Units under the skin into the appropriate area as directed Daily.  Dispense: 3 mL; Refill: 5    2. Alcoholic cirrhosis of liver with ascites    3. Invasive ductal carcinoma of breast, female, left      Assessment & Plan  1. Diabetes mellitus.  - Elevated blood glucose levels are likely due to insufficient insulin therapy in addition to her treatment for breast cancer.  -Treatment is complicated due to her comorbidities with breast cancer and cirrhosis.  - She reports eating snacks at 2 AM because she feels poorly, but has not been checking her blood sugar at that time.  Advised to monitor blood glucose levels at 2:00 AM prior to consuming any snacks.  - Consumption of diet tea and soda may be contributing to hyperglycemia; counseled against snacking and encouraged to maintain a regular eating schedule.  - Consultation with a nutritionist recommended to assist in managing diabetes.  - Dosage of Lantus increased to 15 units daily; started on mealtime insulin in the form of Humalog 10 units to be administered 30 minutes prior to meals, frequency ranging from2-3 times daily depending on eating habits.  - Start metformin 500 mg once daily, extended release version.  - Instructed to report any instances of blood glucose level falling below 100.    Follow-up  - Follow up in 2 to 4 weeks.      Return in about 2 weeks (around 6/5/2025) for Recheck BS.      INA. Everton Holloway MD  10:06 CDT  5/22/2025    Electronically signed    Patient or patient representative verbalized consent for the use of Ambient Listening during the visit with  INA Holloway MD for chart documentation. 5/22/2025  10:44 CDT

## 2025-05-23 ENCOUNTER — PATIENT ROUNDING (BHMG ONLY) (OUTPATIENT)
Age: 45
End: 2025-05-23
Payer: COMMERCIAL

## 2025-05-23 NOTE — PROGRESS NOTES
May 23, 2025    Hello, may I speak with Rachana Smart?    My name is AMINA      I am  with [facility]  [address] [place] [id number] [phone].    Before we get started may I verify your date of birth? 1980    I am calling to officially welcome you to our practice and ask about your recent visit. Is this a good time to talk? yes    Tell me about your visit with us. What things went well?  EVERYTHING WAS GREAT, THE STAFF WAS VERY NICE.       We're always looking for ways to make our patients' experiences even better. Do you have recommendations on ways we may improve?  no    Overall were you satisfied with your first visit to our practice? yes       I appreciate you taking the time to speak with me today. Is there anything else I can do for you? no      Thank you, and have a great day.

## 2025-06-02 ENCOUNTER — OFFICE VISIT (OUTPATIENT)
Dept: SURGERY | Facility: CLINIC | Age: 45
End: 2025-06-02
Payer: COMMERCIAL

## 2025-06-02 VITALS
SYSTOLIC BLOOD PRESSURE: 120 MMHG | WEIGHT: 145 LBS | DIASTOLIC BLOOD PRESSURE: 84 MMHG | HEIGHT: 67 IN | BODY MASS INDEX: 22.76 KG/M2

## 2025-06-02 DIAGNOSIS — G62.0 NEUROPATHY DUE TO CHEMOTHERAPEUTIC DRUG: ICD-10-CM

## 2025-06-02 DIAGNOSIS — Z85.3 HISTORY OF LEFT BREAST CANCER: Primary | ICD-10-CM

## 2025-06-02 DIAGNOSIS — Z80.3 FAMILY HISTORY OF BREAST CANCER: ICD-10-CM

## 2025-06-02 DIAGNOSIS — Z90.13 S/P BILATERAL MASTECTOMY: ICD-10-CM

## 2025-06-02 DIAGNOSIS — F17.210 NICOTINE DEPENDENCE, CIGARETTES, UNCOMPLICATED: ICD-10-CM

## 2025-06-02 DIAGNOSIS — T45.1X5A NEUROPATHY DUE TO CHEMOTHERAPEUTIC DRUG: ICD-10-CM

## 2025-06-02 RX ORDER — GABAPENTIN 300 MG/1
300 CAPSULE ORAL 2 TIMES DAILY
Qty: 60 CAPSULE | Refills: 5 | Status: SHIPPED | OUTPATIENT
Start: 2025-06-02

## 2025-06-02 RX ORDER — CARVEDILOL 6.25 MG/1
6.25 TABLET ORAL 2 TIMES DAILY WITH MEALS
Qty: 60 TABLET | Refills: 3 | OUTPATIENT
Start: 2025-06-02

## 2025-06-02 NOTE — PROGRESS NOTES
Office Established Patient Note:     Referring Provider: No ref. provider found    Chief Complaint   Patient presents with    Breast Cancer       Subjective .     History of present illness:    History of Present Illness  She is s/p bilateral mastectomies with L SLN Bx on 24 for a [id number] IDC, ER 90%+ SC 8%+ Her2-. She is overall doing well.     She reports a general feeling of malaise, which she attributes to her diabetes. She has a history of recurrent staph infections, typically manifesting as a single lesion under her left axilla. Additionally, she mentions a recent staph infection on her face. She is not experiencing any new masses or skin changes.    She expresses concern about the potential impact of her cancer medications on her heart, liver, and pancreas, as she has been struggling to manage her blood glucose levels.  She notes that her blood sugar levels have been unusually high, with readings around 590.    PAST SURGICAL HISTORY:  Hysterectomy       History  Past Medical History:   Diagnosis Date    Alcoholism in recovery     Allergic     Anemia 220    Anxiety     Asthma     Bacterial infection     lower intestine    Breast cancer     Calculus of gallbladder without cholecystitis without obstruction 2025    Cancer [id number]    Cirrhosis     Diabetes     Endometriosis 1993    Gestational hypertension 2006    Hepatitis C     Hypertension     Leaky heart valve     Narayanan syndrome     Migraine     Multiple gestation     Osteopenia due to cancer therapy 2024    Ovarian cyst     PMS (premenstrual syndrome)     PONV (postoperative nausea and vomiting)     Preeclampsia     Secondary esophageal varices without bleeding 2024    Sinusitis     Varicella 1989   ,   Past Surgical History:   Procedure Laterality Date    BREAST BIOPSY       SECTION      x2    COLONOSCOPY      COLONOSCOPY N/A 2024    Dr. Bernardo-- Diverticulosis in the left colon. -  The examination was otherwise normal on direct and retroflexion views. - No specimens collected    D & C HYSTEROSCOPY N/A 11/05/2024    Procedure: DILATATION AND CURETTAGE HYSTEROSCOPY;  Surgeon: Glo Benavides MD;  Location: [facility];  Service: Obstetrics/Gynecology;  Laterality: N/A;    ENDOSCOPY N/A 01/31/2024    no evidence of any fresh or old blood or clots,normal stomach    ENDOSCOPY N/A 08/09/2024    -Grade I esophageal varices. - Portal hypertensive gastropathy. - Normal examined duodenum. - No specimens collected    MASTECTOMY W/ SENTINEL NODE BIOPSY Bilateral 11/05/2024    Procedure: BILATERAL SKIN SPARING MASTECTOMY WITH LEFT AXILLARY MAGTRACE GUIDED SENTINEL LYMPH NODE BIOPSY (Dr. Prieto to nahid);  Surgeon: Fide Verduzco MD;  Location: [facility];  Service: General;  Laterality: Bilateral;    MOUTH SURGERY      TOTAL LAPAROSCOPIC HYSTERECTOMY SALPINGO OOPHORECTOMY Bilateral 1/23/2025    Procedure: TOTAL LAPAROSCOPIC HYSTERECTOMY BILATERAL SALPINGOOPHORECTOMY WITH DAVINCI ROBOT (removal of uterus, cervix, both fallopian tubes and ovaries using the davinci robot);  Surgeon: Glo Benavides MD;  Location: [facility] OR;  Service: Robotics - DaVinci;  Laterality: Bilateral;    UPPER GASTROINTESTINAL ENDOSCOPY  3/24    WISDOM TOOTH EXTRACTION  1995   ,   Family History   Problem Relation Age of Onset    Diabetes Mother     Heart disease Father     Diabetes Father     Stroke Father     Melanoma Father 40 - 49    Esophageal cancer Father 60 - 69    Skin cancer Maternal Aunt     Colon cancer Paternal Uncle     Pancreatic cancer Paternal Uncle     Cervical cancer Maternal Grandmother 40 - 49    Skin cancer Paternal Grandmother     Cancer Paternal Grandfather 50 - 59        Mesothelioma    Breast cancer Half-Sister 45    Colon polyps Neg Hx     Uterine cancer Neg Hx    ,   Social History     Tobacco Use    Smoking status: Some Days     Current packs/day: 0.50     Average packs/day: 0.5  packs/day for 10.0 years (5.0 ttl pk-yrs)     Types: Cigarettes    Smokeless tobacco: Never   Vaping Use    Vaping status: Never Used   Substance Use Topics    Alcohol use: Not Currently     Alcohol/week: 10.0 standard drinks of alcohol     Types: 10 Standard drinks or equivalent per week    Drug use: Never   , (Not in a hospital admission)   and Allergies:  Oxycodone and Codeine    Current Outpatient Medications:     Blood Glucose Monitoring Suppl (Blood Glucose Monitor System) w/Device kit, Use 1 each 4 (Four) Times a Day., Disp: 1 each, Rfl: 0    Calcium Carbonate-Vitamin D 600-10 MG-MCG per tablet, Take 1 tablet by mouth 2 (Two) Times a Day., Disp: 60 tablet, Rfl: 11    carvedilol (Coreg) 6.25 MG tablet, Take 1 tablet by mouth 2 (Two) Times a Day With Meals., Disp: 60 tablet, Rfl: 3    ferrous sulfate 325 (65 FE) MG tablet, Take 1 tablet by mouth Daily With Breakfast., Disp: , Rfl:     furosemide (LASIX) 40 MG tablet, Take 1 tablet by mouth once daily (Patient taking differently: Take 1 tablet by mouth As Needed.), Disp: 90 tablet, Rfl: 1    gabapentin (NEURONTIN) 300 MG capsule, Take 1 capsule by mouth 2 (Two) Times a Day., Disp: 60 capsule, Rfl: 2    Glucagon 1 MG/0.2ML solution auto-injector, Inject 1 mg under the skin into the appropriate area as directed Daily As Needed (glucose <60)., Disp: 0.2 mL, Rfl: 1    glucose blood test strip, 1 each by Other route 4 (Four) Times a Day. Use as instructed, Disp: 200 each, Rfl: 5    insulin glargine (Lantus) 100 UNIT/ML injection, Inject 15 Units under the skin into the appropriate area as directed Daily., Disp: 3 mL, Rfl: 5    Lancets 33G misc, Use 1 each 4 (Four) Times a Day., Disp: 200 each, Rfl: 5    metFORMIN ER (GLUCOPHAGE-XR) 500 MG 24 hr tablet, Take 1 tablet by mouth Daily With Breakfast., Disp: 30 tablet, Rfl: 2    POTASSIUM BICARBONATE PO, Take  by mouth., Disp: , Rfl:     potassium chloride (KLOR-CON M20) 20 MEQ CR tablet, TAKE 1 TABLET BY MOUTH THREE  "TIMES DAILY FOR 4 DAYS, Disp: , Rfl:     spironolactone (ALDACTONE) 25 MG tablet, Take 1 tablet by mouth Daily., Disp: , Rfl:     vitamin B-12 (cyanocobalamin) 100 MCG tablet, Take 5 tablets by mouth Daily., Disp: , Rfl:     ibuprofen (Motrin IB) 200 MG tablet, Take 3 tablets by mouth Every 8 (Eight) Hours. Take every 8 hours for three days then take as needed. (Patient not taking: Reported on 6/2/2025), Disp: , Rfl:     Multivitamin tablet tablet, Take 1 tablet by mouth Daily. (Patient not taking: Reported on 6/2/2025), Disp: , Rfl:     Current Facility-Administered Medications:     lidocaine 1% - EPINEPHrine 1:722525 (XYLOCAINE W/EPI) 1 %-1:805203 injection 10 mL, 10 mL, Injection, Once, Amina Camarillo MD    Objective     Vital Signs   /84   Ht 170.2 cm (67\")   Wt 65.8 kg (145 lb)   LMP 09/29/2024 (Exact Date)   BMI 22.71 kg/m²      Physical Exam:  General appearance - alert, well appearing, and in no distress  Mental status - alert, oriented to person, place, and time  Eyes - pupils equal and reactive, extraocular eye movements intact  Neurological - alert, oriented, normal speech, no focal findings or movement disorder noted  Breast Exam: s/p bilateral mastectomies. Incisions well healed. No palpable masses. No palpable axillary nor supraclavicular adenopathy bilaterally. She has an inflamed hair follicle under the left arm.   Physical Exam       Results Review:    The following data was reviewed by: Fide Verduzco MD on 06/02/2025:    Progress Notes by Matthew Cruz MD (04/04/2025 10:15)   Results         Assessment & Plan       Diagnoses and all orders for this visit:    1. History of left breast cancer (Primary)    2. S/P bilateral mastectomy    3. Family history of breast cancer    4. Nicotine dependence, cigarettes, uncomplicated       Assessment & Plan    She is 6 months s/p bilateral mastectomies with left SLNbx for [id number] IDC ER + TX weakly + Her2-. She is doing well. Clinical " exam benign.     Follow-up  Follow up in 6 months.       BMI is within normal parameters. No other follow-up for BMI required.      Fide Verduzco MD  06/02/25  09:04 CDT    Patient or patient representative verbalized consent for the use of Ambient Listening during the visit with  Fide Verduzco MD for chart documentation. 6/4/2025  06:41 CDT

## 2025-06-02 NOTE — TELEPHONE ENCOUNTER
Rx Refill Note  Requested Prescriptions     Pending Prescriptions Disp Refills    carvedilol (Coreg) 6.25 MG tablet 60 tablet 3     Sig: Take 1 tablet by mouth 2 (Two) Times a Day With Meals.    gabapentin (NEURONTIN) 300 MG capsule 60 capsule 2     Sig: Take 1 capsule by mouth 2 (Two) Times a Day.      Last office visit with prescribing clinician: 5/22/2025   Last telemedicine visit with prescribing clinician: Visit date not found   Next office visit with prescribing clinician: 6/12/2025                         Would you like a call back once the refill request has been completed: [] Yes [] No    If the office needs to give you a call back, can they leave a voicemail: [] Yes [] No    Dimas Bower MA  06/02/25, 09:47 CDT

## 2025-06-02 NOTE — PATIENT INSTRUCTIONS
Smoking Tobacco Information, Adult  Smoking tobacco can be harmful to your health. Tobacco contains a poisonous (toxic), colorless chemical called nicotine. Nicotine is addictive. It changes the brain and can make it hard to stop smoking. Tobacco also has other toxic chemicals that can hurt your body and raise your risk of many cancers.  How can smoking tobacco affect me?  Smoking tobacco puts you at risk for:  Cancer. Smoking is most commonly associated with lung cancer, but can also lead to cancer in other parts of the body.  Chronic obstructive pulmonary disease (COPD). This is a long-term lung condition that makes it hard to breathe. It also gets worse over time.  High blood pressure (hypertension), heart disease, stroke, or heart attack.  Lung infections, such as pneumonia.  Cataracts. This is when the lenses in the eyes become clouded.  Digestive problems. This may include peptic ulcers, heartburn, and gastroesophageal reflux disease (GERD).  Oral health problems, such as gum disease and tooth loss.  Loss of taste and smell.  Smoking can affect your appearance by causing:  Wrinkles.  Yellow or stained teeth, fingers, and fingernails.  Smoking tobacco can also affect your social life, because:  It may be challenging to find places to smoke when away from home. Many workplaces, restaurants, hotels, and public places are tobacco-free.  Smoking is expensive. This is due to the cost of tobacco and the long-term costs of treating health problems from smoking.  Secondhand smoke may affect those around you. Secondhand smoke can cause lung cancer, breathing problems, and heart disease. Children of smokers have a higher risk for:  Sudden infant death syndrome (SIDS).  Ear infections.  Lung infections.  If you currently smoke tobacco, quitting now can help you:  Lead a longer and healthier life.  Look, smell, breathe, and feel better over time.  Save money.  Protect others from the harms of secondhand smoke.  What  actions can I take to prevent health problems?  Quit smoking    Do not start smoking. Quit if you already do.  Make a plan to quit smoking and commit to it. Look for programs to help you and ask your health care provider for recommendations and ideas.  Set a date and write down all the reasons you want to quit.  Let your friends and family know you are quitting so they can help and support you. Consider finding friends who also want to quit. It can be easier to quit with someone else, so that you can support each other.  Talk with your health care provider about using nicotine replacement medicines to help you quit, such as gum, lozenges, patches, sprays, or pills.  Do not replace cigarette smoking with electronic cigarettes, which are commonly called e-cigarettes. The safety of e-cigarettes is not known, and some may contain harmful chemicals.  If you try to quit but return to smoking, stay positive. It is common to slip up when you first quit, so take it one day at a time.  Be prepared for cravings. When you feel the urge to smoke, chew gum or suck on hard candy.    Lifestyle  Stay busy and take care of your body.  Drink enough fluid to keep your urine pale yellow.  Get plenty of exercise and eat a healthy diet. This can help prevent weight gain after quitting.  Monitor your eating habits. Quitting smoking can cause you to have a larger appetite than when you smoke.  Find ways to relax. Go out with friends or family to a movie or a restaurant where people do not smoke.  Ask your health care provider about having regular tests (screenings) to check for cancer. This may include blood tests, imaging tests, and other tests.  Find ways to manage your stress, such as meditation, yoga, or exercise.  Where to find support  To get support to quit smoking, consider:  Asking your health care provider for more information and resources.  Taking classes to learn more about quitting smoking.  Looking for local organizations  that offer resources about quitting smoking.  Joining a support group for people who want to quit smoking in your local community.  Calling the smokefree.gov counselor helpline: 1-800-Quit-Now (1-564.191.2845)  Where to find more information  You may find more information about quitting smoking from:  HelpGuide.org: www.helpguide.org  Smokefree.gov: smokefree.gov  American Lung Association: www.lung.org  Contact a health care provider if you:  Have problems breathing.  Notice that your lips, nose, or fingers turn blue.  Have chest pain.  Are coughing up blood.  Feel faint or you pass out.  Have other health changes that cause you to worry.  Summary  Smoking tobacco can negatively affect your health, the health of those around you, your finances, and your social life.  Do not start smoking. Quit if you already do. If you need help quitting, ask your health care provider.  Think about joining a support group for people who want to quit smoking in your local community. There are many effective programs that will help you to quit this behavior.  This information is not intended to replace advice given to you by your health care provider. Make sure you discuss any questions you have with your health care provider.  Document Revised: 09/11/2020 Document Reviewed: 01/02/2018  Elsevier Patient Education © 2021 [employer] Inc.

## 2025-06-03 ENCOUNTER — TRANSCRIBE ORDERS (OUTPATIENT)
Dept: ADMINISTRATIVE | Facility: HOSPITAL | Age: 45
End: 2025-06-03
Payer: COMMERCIAL

## 2025-06-03 DIAGNOSIS — K70.31 ALCOHOLIC CIRRHOSIS OF LIVER WITH ASCITES: Primary | ICD-10-CM

## 2025-06-04 DIAGNOSIS — K70.31 ALCOHOLIC CIRRHOSIS OF LIVER WITH ASCITES: Primary | ICD-10-CM

## 2025-06-11 ENCOUNTER — TELEPHONE (OUTPATIENT)
Dept: ONCOLOGY | Facility: CLINIC | Age: 45
End: 2025-06-11

## 2025-06-11 NOTE — TELEPHONE ENCOUNTER
Caller: Rachana Smart    Relationship: Self    Best call back number: [phone]     What is the best time to reach you: ANYTIME    Who are you requesting to speak with (clinical staff, provider,  specific staff member): CLINICAL        What was the call regarding: PT WANTED TO LET DR KEITH KNOW SHE HAS TAKEN HERSELF OFF THE ANASTROZOLE, NEEDING TO GET HER DIABETES UNDER CONTROL, GOING TO BE SEEING DR PATTON TOMORROW.  PLEASE CALL IF MORE INFO IS NEEDED.

## 2025-06-12 ENCOUNTER — OFFICE VISIT (OUTPATIENT)
Dept: INTERNAL MEDICINE | Facility: CLINIC | Age: 45
End: 2025-06-12
Payer: COMMERCIAL

## 2025-06-12 VITALS
TEMPERATURE: 97.7 F | WEIGHT: 147 LBS | OXYGEN SATURATION: 97 % | HEIGHT: 67 IN | SYSTOLIC BLOOD PRESSURE: 110 MMHG | BODY MASS INDEX: 23.07 KG/M2 | DIASTOLIC BLOOD PRESSURE: 80 MMHG | HEART RATE: 101 BPM

## 2025-06-12 DIAGNOSIS — K70.31 ALCOHOLIC CIRRHOSIS OF LIVER WITH ASCITES: Primary | ICD-10-CM

## 2025-06-12 DIAGNOSIS — E11.65 TYPE 2 DIABETES MELLITUS WITH HYPERGLYCEMIA, WITH LONG-TERM CURRENT USE OF INSULIN: Primary | ICD-10-CM

## 2025-06-12 DIAGNOSIS — Z79.4 TYPE 2 DIABETES MELLITUS WITH HYPERGLYCEMIA, WITH LONG-TERM CURRENT USE OF INSULIN: Primary | ICD-10-CM

## 2025-06-12 PROCEDURE — 99214 OFFICE O/P EST MOD 30 MIN: CPT | Performed by: INTERNAL MEDICINE

## 2025-06-12 RX ORDER — INSULIN GLARGINE 100 [IU]/ML
25 INJECTION, SOLUTION SUBCUTANEOUS DAILY
Qty: 3 ML | Refills: 5 | Status: SHIPPED | OUTPATIENT
Start: 2025-06-12

## 2025-06-12 RX ORDER — INSULIN LISPRO 100 [IU]/ML
15 INJECTION, SOLUTION INTRAVENOUS; SUBCUTANEOUS
Qty: 9 ML | Refills: 2 | Status: SHIPPED | OUTPATIENT
Start: 2025-06-12

## 2025-06-12 RX ORDER — CARVEDILOL 6.25 MG/1
6.25 TABLET ORAL 2 TIMES DAILY WITH MEALS
Qty: 60 TABLET | Refills: 3 | Status: SHIPPED | OUTPATIENT
Start: 2025-06-12

## 2025-06-12 NOTE — PATIENT INSTRUCTIONS
-Increase your Lantus to 25 units daily and the Humalog to 15 units 3 times a day before meals.   -Bring your blood sugar log with you to the next visit in 2 weeks.

## 2025-06-12 NOTE — PROGRESS NOTES
Chief Complaint   Patient presents with    Follow-up     Diabetes: patient reports FBS this morning was 440           History:  Rachana Smart is a 45 y.o. female who presents today for evaluation of the above problems.      HPI  History of Present Illness  The patient presents for a follow-up on her diabetes.    She is currently on a regimen of insulin, Lantus administered at 15 units in the morning, and Humalog 10 units before dinner, and an additional 10 units at night. She also takes metformin orally in the morning. Her dietary habits include a late breakfast or lunch, with a preference for coffee over food in the morning.     Her blood glucose level was recorded at 440 this morning, prior to any food intake, although she did consume cottage cheese at 2:00 AM. She has had two instances where her blood glucose levels exceeded 600. A recent reading was 320, taken before a shower, after consuming green tea without sugar and water. However, an hour later, post-insulin administration by her daughter, her blood glucose level spiked to over 600.     She typically consumes two meals per day and has never been a regular breakfast eater since elementary school. She has been monitoring her blood glucose levels with a meter and has found that a diet of low-carb, low-sugar pasta has been effective in managing her condition.      ROS:  Review of Systems  See above    Current Outpatient Medications:     Blood Glucose Monitoring Suppl (Blood Glucose Monitor System) w/Device kit, Use 1 each 4 (Four) Times a Day., Disp: 1 each, Rfl: 0    Calcium Carbonate-Vitamin D 600-10 MG-MCG per tablet, Take 1 tablet by mouth 2 (Two) Times a Day., Disp: 60 tablet, Rfl: 11    carvedilol (Coreg) 6.25 MG tablet, Take 1 tablet by mouth 2 (Two) Times a Day With Meals., Disp: 60 tablet, Rfl: 3    ferrous sulfate 325 (65 FE) MG tablet, Take 1 tablet by mouth Daily With Breakfast., Disp: , Rfl:     furosemide (LASIX) 40 MG tablet, Take 1 tablet  by mouth once daily (Patient taking differently: Take 1 tablet by mouth As Needed.), Disp: 90 tablet, Rfl: 1    gabapentin (NEURONTIN) 300 MG capsule, Take 1 capsule by mouth 2 (Two) Times a Day., Disp: 60 capsule, Rfl: 5    Glucagon 1 MG/0.2ML solution auto-injector, Inject 1 mg under the skin into the appropriate area as directed Daily As Needed (glucose <60)., Disp: 0.2 mL, Rfl: 1    glucose blood test strip, 1 each by Other route 4 (Four) Times a Day. Use as instructed, Disp: 200 each, Rfl: 5    insulin glargine (Lantus) 100 UNIT/ML injection, Inject 25 Units under the skin into the appropriate area as directed Daily., Disp: 3 mL, Rfl: 5    Insulin Lispro, 1 Unit Dial, (HumaLOG KwikPen) 100 UNIT/ML solution pen-injector, Inject 15 Units under the skin into the appropriate area as directed 3 (Three) Times a Day Before Meals., Disp: 9 mL, Rfl: 2    Lancets 33G misc, Use 1 each 4 (Four) Times a Day., Disp: 200 each, Rfl: 5    metFORMIN ER (GLUCOPHAGE-XR) 500 MG 24 hr tablet, Take 1 tablet by mouth Daily With Breakfast., Disp: 30 tablet, Rfl: 2    Multivitamin tablet tablet, Take 1 tablet by mouth Daily., Disp: , Rfl:     potassium chloride (KLOR-CON M20) 20 MEQ CR tablet, TAKE 1 TABLET BY MOUTH THREE TIMES DAILY FOR 4 DAYS, Disp: , Rfl:     spironolactone (ALDACTONE) 25 MG tablet, Take 1 tablet by mouth Daily., Disp: , Rfl:     vitamin B-12 (cyanocobalamin) 100 MCG tablet, Take 5 tablets by mouth Daily., Disp: , Rfl:     ibuprofen (Motrin IB) 200 MG tablet, Take 3 tablets by mouth Every 8 (Eight) Hours. Take every 8 hours for three days then take as needed. (Patient not taking: Reported on 6/12/2025), Disp: , Rfl:     POTASSIUM BICARBONATE PO, Take  by mouth., Disp: , Rfl:     Current Facility-Administered Medications:     lidocaine 1% - EPINEPHrine 1:292265 (XYLOCAINE W/EPI) 1 %-1:238870 injection 10 mL, 10 mL, Injection, Once, Amina Camarillo MD    Lab Results   Component Value Date    GLUCOSE 238 (H)  05/10/2025    BUN 6 05/10/2025    CREATININE 0.88 05/10/2025     (L) 05/10/2025    K 3.2 (L) 05/10/2025    CL 97 (L) 05/10/2025    CALCIUM 8.3 (L) 05/10/2025    PROTEINTOT 7.7 05/10/2025    ALBUMIN 3.6 05/10/2025    ALT 21 05/10/2025    AST 36 (H) 05/10/2025    ALKPHOS 90 05/10/2025    BILITOT 2.1 (H) 05/10/2025    GLOB 4.1 05/10/2025    AGRATIO 0.9 05/10/2025    BCR 6.8 (L) 05/10/2025    ANIONGAP 12.0 05/10/2025    EGFR 82.2 05/10/2025       WBC   Date Value Ref Range Status   05/10/2025 4.91 3.40 - 10.80 10*3/mm3 Final     RBC   Date Value Ref Range Status   05/10/2025 4.58 3.77 - 5.28 10*6/mm3 Final     Hemoglobin   Date Value Ref Range Status   05/10/2025 13.8 12.0 - 15.9 g/dL Final     Hematocrit   Date Value Ref Range Status   05/10/2025 41.4 34.0 - 46.6 % Final     MCV   Date Value Ref Range Status   05/10/2025 90.4 79.0 - 97.0 fL Final     MCH   Date Value Ref Range Status   05/10/2025 30.1 26.6 - 33.0 pg Final     MCHC   Date Value Ref Range Status   05/10/2025 33.3 31.5 - 35.7 g/dL Final     RDW   Date Value Ref Range Status   05/10/2025 14.7 12.3 - 15.4 % Final     RDW-SD   Date Value Ref Range Status   05/10/2025 47.9 37.0 - 54.0 fl Final     MPV   Date Value Ref Range Status   05/10/2025 10.3 6.0 - 12.0 fL Final     Platelets   Date Value Ref Range Status   05/10/2025 128 (L) 140 - 450 10*3/mm3 Final     Neutrophil %   Date Value Ref Range Status   05/10/2025 41.1 (L) 42.7 - 76.0 % Final     Lymphocyte %   Date Value Ref Range Status   05/10/2025 42.6 19.6 - 45.3 % Final     Monocyte %   Date Value Ref Range Status   05/10/2025 9.6 5.0 - 12.0 % Final     Eosinophil %   Date Value Ref Range Status   05/10/2025 4.7 0.3 - 6.2 % Final     Basophil %   Date Value Ref Range Status   05/10/2025 1.8 (H) 0.0 - 1.5 % Final     Immature Grans %   Date Value Ref Range Status   05/10/2025 0.2 0.0 - 0.5 % Final     Neutrophils, Absolute   Date Value Ref Range Status   05/10/2025 2.02 1.70 - 7.00 10*3/mm3 Final  "    Lymphocytes, Absolute   Date Value Ref Range Status   05/10/2025 2.09 0.70 - 3.10 10*3/mm3 Final     Monocytes, Absolute   Date Value Ref Range Status   05/10/2025 0.47 0.10 - 0.90 10*3/mm3 Final     Eosinophils, Absolute   Date Value Ref Range Status   05/10/2025 0.23 0.00 - 0.40 10*3/mm3 Final     Basophils, Absolute   Date Value Ref Range Status   05/10/2025 0.09 0.00 - 0.20 10*3/mm3 Final     Immature Grans, Absolute   Date Value Ref Range Status   05/10/2025 0.01 0.00 - 0.05 10*3/mm3 Final     nRBC   Date Value Ref Range Status   05/02/2025 0.0 0.0 - 0.2 /100 WBC Final         OBJECTIVE:  Visit Vitals  /80 (BP Location: Right arm, Patient Position: Sitting, Cuff Size: Adult)   Pulse 101   Temp 97.7 °F (36.5 °C) (Temporal)   Ht 170.2 cm (67\")   Wt 66.7 kg (147 lb)   LMP 09/29/2024 (Exact Date)   SpO2 97%   BMI 23.02 kg/m²      Physical Exam  Constitutional:       General: She is not in acute distress.     Appearance: She is well-developed. She is not diaphoretic.   HENT:      Head: Normocephalic and atraumatic.   Eyes:      Pupils: Pupils are equal, round, and reactive to light.   Neck:      Thyroid: No thyromegaly.      Trachea: Phonation normal.   Cardiovascular:      Rate and Rhythm: Normal rate.   Pulmonary:      Effort: No respiratory distress.   Skin:     Coloration: Skin is not pale.      Findings: No erythema.   Neurological:      Mental Status: She is alert.   Psychiatric:         Behavior: Behavior normal.         Thought Content: Thought content normal.         Judgment: Judgment normal.         Results  Labs   - Blood sugar: 440 mg/dL   - A1c: Over 12%    Assessment/Plan      Diagnoses and all orders for this visit:    1. Type 2 diabetes mellitus with hyperglycemia, with long-term current use of insulin (Primary)  -     insulin glargine (Lantus) 100 UNIT/ML injection; Inject 25 Units under the skin into the appropriate area as directed Daily.  Dispense: 3 mL; Refill: 5  -     Insulin " Lispro, 1 Unit Dial, (HumaLOG KwikPen) 100 UNIT/ML solution pen-injector; Inject 15 Units under the skin into the appropriate area as directed 3 (Three) Times a Day Before Meals.  Dispense: 9 mL; Refill: 2      Assessment & Plan  1. Diabetes mellitus.  - Blood glucose levels are significantly elevated and are worsening, with a recent reading of 440 mg/dL and previous readings over 600 mg/dL.  - Last A1c was over 12% 1 month ago.  - Insulin regimen is insufficient; Lantus dosage increased from 15 units to 25 units daily, and mealtime insulin increased from 10 units to 15 units, to be administered three times daily before meals. Insulin should be taken 30 minutes prior to eating.  - Advised to maintain a log of blood glucose levels for monitoring purposes.    Follow-up in 2 weeks.      Return in about 2 weeks (around 6/26/2025) for Recheck DAVINA Holloway MD  10:13 CDT  6/12/2025   Electronically signed    Patient or patient representative verbalized consent for the use of Ambient Listening during the visit with  INA Holloway MD for chart documentation. 6/12/2025  11:10 CDT

## 2025-06-13 ENCOUNTER — TELEPHONE (OUTPATIENT)
Dept: GASTROENTEROLOGY | Facility: CLINIC | Age: 45
End: 2025-06-13
Payer: COMMERCIAL

## 2025-06-13 NOTE — TELEPHONE ENCOUNTER
Pt is getting her ultrasound done in [place] with the hepatologist getting her labs done Friday local.

## 2025-06-16 DIAGNOSIS — M85.80 OSTEOPENIA DUE TO CANCER THERAPY: Primary | ICD-10-CM

## 2025-06-16 DIAGNOSIS — Z79.811 LONG TERM CURRENT USE OF AROMATASE INHIBITOR: ICD-10-CM

## 2025-06-16 DIAGNOSIS — C50.212 MALIGNANT NEOPLASM OF UPPER-INNER QUADRANT OF LEFT FEMALE BREAST, UNSPECIFIED ESTROGEN RECEPTOR STATUS: ICD-10-CM

## 2025-06-20 ENCOUNTER — LAB (OUTPATIENT)
Dept: LAB | Facility: HOSPITAL | Age: 45
End: 2025-06-20
Payer: COMMERCIAL

## 2025-06-20 DIAGNOSIS — C50.212 MALIGNANT NEOPLASM OF UPPER-INNER QUADRANT OF LEFT FEMALE BREAST, UNSPECIFIED ESTROGEN RECEPTOR STATUS: ICD-10-CM

## 2025-06-20 DIAGNOSIS — C50.912 MALIGNANT NEOPLASM OF LEFT BREAST IN FEMALE, ESTROGEN RECEPTOR POSITIVE, UNSPECIFIED SITE OF BREAST: ICD-10-CM

## 2025-06-20 DIAGNOSIS — Z79.811 LONG TERM CURRENT USE OF AROMATASE INHIBITOR: ICD-10-CM

## 2025-06-20 DIAGNOSIS — M85.80 OSTEOPENIA DUE TO CANCER THERAPY: ICD-10-CM

## 2025-06-20 DIAGNOSIS — Z17.0 MALIGNANT NEOPLASM OF LEFT BREAST IN FEMALE, ESTROGEN RECEPTOR POSITIVE, UNSPECIFIED SITE OF BREAST: ICD-10-CM

## 2025-06-20 LAB
25(OH)D3 SERPL-MCNC: 70 NG/ML (ref 30–100)
ALBUMIN SERPL-MCNC: 3.9 G/DL (ref 3.5–5.2)
ALBUMIN/GLOB SERPL: 1 G/DL
ALP SERPL-CCNC: 99 U/L (ref 39–117)
ALPHA-FETOPROTEIN: 4.69 NG/ML (ref 0–8.3)
ALT SERPL W P-5'-P-CCNC: 31 U/L (ref 1–33)
ANION GAP SERPL CALCULATED.3IONS-SCNC: 13 MMOL/L (ref 5–15)
AST SERPL-CCNC: 49 U/L (ref 1–32)
BASOPHILS # BLD AUTO: 0.09 10*3/MM3 (ref 0–0.2)
BASOPHILS NFR BLD AUTO: 1.6 % (ref 0–1.5)
BILIRUB SERPL-MCNC: 1.5 MG/DL (ref 0–1.2)
BUN SERPL-MCNC: 18.1 MG/DL (ref 6–20)
BUN/CREAT SERPL: 21.5 (ref 7–25)
CALCIUM SPEC-SCNC: 9.8 MG/DL (ref 8.6–10.5)
CEA SERPL-MCNC: 6.06 NG/ML
CHLORIDE SERPL-SCNC: 96 MMOL/L (ref 98–107)
CO2 SERPL-SCNC: 24 MMOL/L (ref 22–29)
CREAT SERPL-MCNC: 0.84 MG/DL (ref 0.57–1)
DEPRECATED RDW RBC AUTO: 51.1 FL (ref 37–54)
EGFRCR SERPLBLD CKD-EPI 2021: 86.9 ML/MIN/1.73
EOSINOPHIL # BLD AUTO: 0.22 10*3/MM3 (ref 0–0.4)
EOSINOPHIL NFR BLD AUTO: 3.9 % (ref 0.3–6.2)
ERYTHROCYTE [DISTWIDTH] IN BLOOD BY AUTOMATED COUNT: 15.3 % (ref 12.3–15.4)
GLOBULIN UR ELPH-MCNC: 3.8 GM/DL
GLUCOSE SERPL-MCNC: 345 MG/DL (ref 65–99)
HCT VFR BLD AUTO: 41.1 % (ref 34–46.6)
HGB BLD-MCNC: 14.1 G/DL (ref 12–15.9)
IMM GRANULOCYTES # BLD AUTO: 0.01 10*3/MM3 (ref 0–0.05)
IMM GRANULOCYTES NFR BLD AUTO: 0.2 % (ref 0–0.5)
INR PPP: 1.17 (ref 0.91–1.09)
LYMPHOCYTES # BLD AUTO: 2.02 10*3/MM3 (ref 0.7–3.1)
LYMPHOCYTES NFR BLD AUTO: 35.7 % (ref 19.6–45.3)
MAGNESIUM SERPL-MCNC: 2.2 MG/DL (ref 1.6–2.6)
MCH RBC QN AUTO: 31 PG (ref 26.6–33)
MCHC RBC AUTO-ENTMCNC: 34.3 G/DL (ref 31.5–35.7)
MCV RBC AUTO: 90.3 FL (ref 79–97)
MONOCYTES # BLD AUTO: 0.67 10*3/MM3 (ref 0.1–0.9)
MONOCYTES NFR BLD AUTO: 11.8 % (ref 5–12)
NEUTROPHILS NFR BLD AUTO: 2.65 10*3/MM3 (ref 1.7–7)
NEUTROPHILS NFR BLD AUTO: 46.8 % (ref 42.7–76)
NRBC BLD AUTO-RTO: 0 /100 WBC (ref 0–0.2)
PHOSPHATE SERPL-MCNC: 3.6 MG/DL (ref 2.5–4.5)
PLATELET # BLD AUTO: 132 10*3/MM3 (ref 140–450)
PMV BLD AUTO: 10.2 FL (ref 6–12)
POTASSIUM SERPL-SCNC: 3.9 MMOL/L (ref 3.5–5.2)
PROT SERPL-MCNC: 7.7 G/DL (ref 6–8.5)
PROTHROMBIN TIME: 15.6 SECONDS (ref 11.8–14.8)
RBC # BLD AUTO: 4.55 10*6/MM3 (ref 3.77–5.28)
SODIUM SERPL-SCNC: 133 MMOL/L (ref 136–145)
WBC NRBC COR # BLD AUTO: 5.66 10*3/MM3 (ref 3.4–10.8)

## 2025-06-20 PROCEDURE — 84100 ASSAY OF PHOSPHORUS: CPT

## 2025-06-20 PROCEDURE — 82378 CARCINOEMBRYONIC ANTIGEN: CPT

## 2025-06-20 PROCEDURE — 85610 PROTHROMBIN TIME: CPT | Performed by: CLINICAL NURSE SPECIALIST

## 2025-06-20 PROCEDURE — 82105 ALPHA-FETOPROTEIN SERUM: CPT | Performed by: CLINICAL NURSE SPECIALIST

## 2025-06-20 PROCEDURE — 82306 VITAMIN D 25 HYDROXY: CPT | Performed by: CLINICAL NURSE SPECIALIST

## 2025-06-20 PROCEDURE — 83735 ASSAY OF MAGNESIUM: CPT

## 2025-06-20 PROCEDURE — 85025 COMPLETE CBC W/AUTO DIFF WBC: CPT

## 2025-06-20 PROCEDURE — 86300 IMMUNOASSAY TUMOR CA 15-3: CPT

## 2025-06-20 PROCEDURE — 80053 COMPREHEN METABOLIC PANEL: CPT

## 2025-06-21 LAB — CANCER AG27-29 SERPL-ACNC: 59.7 U/ML (ref 0–38.6)

## 2025-06-22 NOTE — PROGRESS NOTES
[facility] GROUP HEMATOLOGY & ONCOLOGY  [address]  [place] [address] [phone]    Patient Name: Rachana Smart  Encounter Date: 2025  YOB: 1980  Patient Number: [id number]    [phone]    REASON FOR VISIT: Rachana Smart is a 45 yoF   who returns in follow-up of invasive mammary (ductal) carcinoma, left breast- original tumor stage:  IA (pT1b, p(sn)N0, Mx, G2). ER 90%+, PA 8 %+, HER-2 (IHC) 1+-low.negative.  Low risk Oncotype (RS=12).  She underwent bilateral skin sparing mastectomies and left sentinel lymph node biopsies, 24. She was on adjuvant Tamoxifen 20 p.o. daily from 2024-25. On 25-underwent OSEAS/BSO:  Uterus, complete, with bilateral fallopian tubes and bilateral ovaries:No histologic evidence of malignancy. On 25 started adjuvant letrozole 2.5 mg po qd but stopped ~ 3/28/25 due to arthralgias and chest discomfort.  At her visit on 25 was started on alternative exemestane stopped on 24 due to hot flashes and arthralgias.  Was also seen in the ED for blood glucose> 500 on 2025. On 25 was started on anastrozole 1 mg po qd that she now tells me she stopped within 2 weeks due to intolerance (see below).  She is accompanied by her spouse, Kurt       I have reviewed the HPI and verified with the patient the accuracy of it. No changes to interval history since the information was documented. Matthew Cruz MD 25      Diagnostic abnormalities:  - History: +Narayanan, endometriosis, alcoholism in recovery, anemia, transfusions, anxiety, asthma, hepC, cirrhosis, esophageal varices wo bleeding, migraines, cigarette smoker (0.5 ppd x 24 yr), recent breast cancer  - 24- Mammogram- LEFT breast 6 mm focal asymmetry with spiculation/distortion. Recommend targeted ultrasound. No suspicious mammographic findings in the RIGHT breast. BIRADS 0  - 24- US left  breast-FINDINGS: Targeted LEFT breast ultrasound 11:00 position, 7 centimeters from the nipple demonstrates a 0.5 x 0.4 x 0.7 cm oval, parallel, hypoechoic mass with spiculated margins and internal vascularity. Posterior shadowing is present. This correlates in size, position and character with the mammographic finding.IMPRESSION: Suspicious LEFT breast mass. Recommend ultrasound-guided biopsy.  - 9/13/24- US guided breast biopsy- Left breast mass at 11 o'clock position, 7 cm from nipple, core biopsies: A.  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 B.  A prominent associated low-grade ductal carcinoma in situ component is present. C.  Linear length of invasive tumor is 4.6 mm. AJCC stage: pTX pNX. 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  Lifetime risk of 8.7-20% for colorectal cancer and 13-26% for endometrial cancer.  - 10/15/24-endometrial biopsy: Nondiagnostic specimen  - 10/29/24-glucose 148, potassium 3.1, albumin 3.1, AST 40, alk phos 125, total bili 2.8, Hgb 11, .8, platelets 120,000 otherwise normal CBC  - 11/5/24- Oncotype DX- RS 12; DRR 3%; ACTB <1%  - 12/5/24-12/27/24- gluc 117, sodium 132, K+ 2.7, bili 2.2, AST 34 otherwise normal CMP, CEA 5.61, mag 1.5, PO 2.4, Hgb 10.7 otherwise normal CBC, CA27-29 56.5,  59.3 (H)  - 12/27/24- CT CAP-No acute abnormality of the chest. No mass. No infiltrate. No lymphadenopathy.No evidence of intra-abdominal or pelvic metastatic disease. Cirrhosis with portal hypertension, interval resolution of ascites. 3.3 cm uncomplicated cyst in the left adnexa probably arising from the left ovary. Small sliding-type hiatal hernia. Partial contraction of the gallbladder, which contains solitary 5 mm gallstone, without convincing evidence of  cholecystitis.    Previous interventions:  - 11/5/24-bilateral skin sparing mastectomies and left sentinel lymph node biopsies: Final diagnoses:  1.  Left breast, mastectomy:  A.  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  B.  Minor associated low-grade ductal carcinoma in situ component.  C.  Invasive tumor is 7 mm.  D.  Radial scar adjacent to prior biopsy site exhibiting atypical ductal hyperplasia, usual ductal hyperplasia and columnar cell changes without atypia (4.8 mm).  E.  No malignancy or atypia identified at the margins of surgical resection.  F.  Invasive malignancy is 4.9 mm from the closest inked (anterosuperior) margin.  G.  Prior biopsy site changes.  H.  No evidence of Paget's disease of nipple.  I.  Overlying skin is negative for malignancy.     2.  Right breast, mastectomy:  A.  No mammary carcinoma identified.  B.  Radial scar with columnar cell changes and atypical ductal hyperplasia identified in random section of lower outer quadrant (3.6 mm).  C.  No evidence of Paget's disease of nipple.  D.  Overlying skin is negative for malignancy.  E.  Benign intramammary lymph node (1).  F.  Focal fibroadenomatoid changes identified in random section of central breast.     3.  Left axillary sentinel lymph nodes:  A.  Lymph nodes (4), negative for metastatic carcinoma.  B.  Absence of micrometastases is confirmed utilizing immunohistochemical stains for CK AE1/AE3.     AJCC stage: pT1b snpN0    Synoptic Checklist   INVASIVE CARCINOMA OF THE BREAST: Resection   8th Edition - Protocol posted: 6/19/2024INVASIVE CARCINOMA OF THE BREAST: RESECTION - All Specimens  SPECIMEN   Procedure  Total mastectomy   Specimen Laterality  Left   TUMOR   Tumor Site  Upper inner quadrant   Histologic Type  Invasive carcinoma of no special type (ductal)   Histologic Grade (Shara Histologic Score)     Glandular (Acinar) / Tubular Differentiation  Score 2   Nuclear Pleomorphism  Score 2   Mitotic Rate  Score  2   Overall Grade  Grade 2 (scores of 6 or 7)   Tumor Size  Greatest dimension of largest invasive focus (Millimeters): 7 mm   Tumor Focality  Single focus of invasive carcinoma   Ductal Carcinoma In Situ (DCIS)  Present     Negative for extensive intraductal component (EIC)   Nuclear Grade  Grade I (low)   Lymphatic and / or Vascular Invasion  Not identified   Dermal Lymphatic and / or Vascular Invasion  Not identified   Treatment Effect in the Breast  No known presurgical therapy   MARGINS   Margin Status for Invasive Carcinoma  All margins negative for invasive carcinoma   Distance from Invasive Carcinoma to Closest Margin  4.9 mm   Closest Margin(s) to Invasive Carcinoma  Anterosuperior   Margin Status for DCIS  All margins negative for DCIS   Distance from DCIS to Closest Margin  Greater than: 5 mm mm   Closest Margin(s) to DCIS  Anterosuperior   REGIONAL LYMPH NODES   Regional Lymph Node Status  All regional lymph nodes negative for tumor   Total Number of Lymph Nodes Examined (sentinel and non-sentinel)  4   Number of [place] Nodes Examined  4   pTNM CLASSIFICATION (AJCC 8th Edition)   Reporting of pT, pN, and (when applicable) pM categories is based on information available to the pathologist at the time the report is issued. As per the AJCC (Chapter 1, 8th Ed.) it is the managing physician's responsibility to establish the final pathologic stage based upon all pertinent information, including but potentially not limited to this pathology report.   pT Category  pT1b   pN Category  pN0   N Suffix  (sn)   SPECIAL STUDIES        Estrogen Receptor (ER) Status  Positive (greater than 10% of cells demonstrate nuclear positivity)   Percentage of Cells with Nuclear Positivity  90 %        Progesterone Receptor (PgR) Status  Positive   Percentage of Cells with Nuclear Positivity  8 %        HER2 (by immunohistochemistry)  Negative (Score 1+)        Ki-67 Percentage of Positive Nuclei  11 %        - Adjuvant  Tamoxifen 20 mg po daily, 12/5/24- 2/7/25  -1/23/25-Uterus, complete, with bilateral fallopian tubes and bilateral ovaries:  - No cervical dysplasia identified.  - Disordered inactive to weakly proliferative endometrium, negative for atypia.  - Left and right fallopian tubes, no significant histopathologic abnormalities.  - Cystic follicles, focal surface papillary proliferation without atypia and a small ovarian [place] rest-like nest, possible  incipient Benny tumor (0.3 mm), right ovary.  - Hemorrhagic corpus luteum and luteinized cystic follicles, left ovary.  - No histologic evidence of malignancy.    -Adjuvant letrozole 2.5 mg po qd beginning 2/7/25-4/4/25. Stopped due to intolerance  -Adjuvant exemestane. 25 mg po qd beginning 4/4/25-5/4/25 due to hot flashes and arthralgias.  Was also seen in the ED for blood glucose> 500 on 5/2/2025.   - 5/9/25 was started on anastrozole 1 mg po qd.   LABS    Lab Results - Last 18 Months   Lab Units 06/20/25  0851 05/10/25  0943 05/02/25  0713 03/21/25  0911 01/28/25  1636 01/17/25  0846 12/27/24  0919   HEMOGLOBIN g/dL 14.1 13.8 13.0 12.8 9.9* 11.2* 10.7*   HEMATOCRIT % 41.1 41.4 37.8 38.0 31.4* 34.6 33.3*   MCV fL 90.3 90.4 88.3 92.5 98.7* 94.8 96.8   WBC 10*3/mm3 5.66 4.91 6.36 8.91 12.31* 9.96 8.05   RDW % 15.3 14.7 13.9 14.4 15.2 14.9 14.6   MPV fL 10.2 10.3 10.5 10.2 10.7 10.4 9.7   PLATELETS 10*3/mm3 132* 128* 132* 154 108* 136* 142   IMM GRAN % % 0.2 0.2 0.3 0.2 1.1* 0.6* 0.4   NEUTROS ABS 10*3/mm3 2.65 2.02 4.04 4.74 9.11* 5.57 4.14   LYMPHS ABS 10*3/mm3 2.02 2.09 1.61 2.92 1.69 2.84 2.59   MONOS ABS 10*3/mm3 0.67 0.47 0.40 0.76 1.09* 0.97* 0.72   EOS ABS 10*3/mm3 0.22 0.23 0.21 0.41* 0.23 0.41* 0.43*   BASOS ABS 10*3/mm3 0.09 0.09 0.08 0.06 0.06 0.11 0.14   IMMATURE GRANS (ABS) 10*3/mm3 0.01 0.01 0.02 0.02 0.13* 0.06* 0.03   NRBC /100 WBC 0.0  --  0.0 0.0 0.0 0.0 0.0       Lab Results - Last 18 Months   Lab Units 06/20/25  0851 05/10/25  1329 05/10/25  0955  05/10/25  0943 05/05/25  1007 05/02/25  1347 05/02/25  0917 05/02/25  0713 03/26/25  1032 03/21/25  0911   GLUCOSE mg/dL 345* 238*  --  506* 223* 449*  --  599*  --  123*   GLUCOSE, ARTERIAL mg/dL  --   --  506*  --   --   --    < >  --   --   --    SODIUM mmol/L 133* 135*  --  131* 137 132*  --  126*  --  139   SODIUM, VENOUS mmol/L  --   --  133*  --   --   --    < >  --   --   --    POTASSIUM mmol/L 3.9 3.2*  --  3.2* 4.0 3.1*  --  2.9*   < > 2.7*   POTASSIUM, VENOUS mmol/L  --   --  3.2*  --   --   --    < >  --   --   --    CO2 mmol/L 24.0 26.0  --  28.0 23.2 26.0  --  31.0*  --  31.0*   CHLORIDE mmol/L 96* 97*  --  87* 99 93*  --  79*  --  92*   ANION GAP mmol/L 13.0 12.0  --  16.0* 14.8 13.0  --  16.0*  --  16.0*   CREATININE mg/dL 0.84 0.88  --  1.23* 1.07* 0.81  --  1.03*  --  1.02*   BUN mg/dL 18.1 6  --  7 5* 9  --  10  --  7   BUN / CREAT RATIO  21.5 6.8*  --  5.7* 4.7* 11.1  --  9.7  --  6.9*   CALCIUM mg/dL 9.8 8.3*  --  9.6 9.3 8.4*  --  9.5  --  9.4   ALK PHOS U/L 99  --   --  90 81 81  --  87  --  65   TOTAL PROTEIN g/dL 7.7  --   --  7.7 7.8 6.5  --  7.8  --  8.3   ALT (SGPT) U/L 31  --   --  21 20 13  --  16  --  21   AST (SGOT) U/L 49*  --   --  36* 46* 28  --  29  --  47*   BILIRUBIN mg/dL 1.5*  --   --  2.1* 2.7* 1.8*  --  2.9*  --  1.8*   ALBUMIN g/dL 3.9  --   --  3.6 3.7 3.1*  --  3.8  --  3.8   GLOBULIN gm/dL 3.8  --   --  4.1 4.1 3.4  --  4.0  --  4.5    < > = values in this interval not displayed.       Lab Results - Last 18 Months   Lab Units 06/20/25  0851 05/02/25  0713 03/21/25  0911 01/17/25  0846 12/27/24  0919   CEA ng/mL 6.06 7.12 3.95 4.78 5.61       Lab Results - Last 18 Months   Lab Units 12/05/24  0949 03/18/24  1741 03/16/24  1429   IRON mcg/dL 68  --  11*   TIBC mcg/dL 274*  --  244*   IRON SATURATION (TSAT) % 25  --  5*   FERRITIN ng/mL 75.30  --  18.30   TSH uIU/mL  --  1.350 1.600   FOLATE ng/mL 9.23  --  9.22         PAST MEDICAL HISTORY:  ALLERGIES:  Allergies    Allergen Reactions    Oxycodone Itching     Pt states any pain medication causes severe itching, nausea    Codeine Rash and GI Intolerance     CURRENT MEDICATIONS:  Outpatient Encounter Medications as of 6/27/2025   Medication Sig Dispense Refill    Blood Glucose Monitoring Suppl (Blood Glucose Monitor System) w/Device kit Use 1 each 4 (Four) Times a Day. 1 each 0    Calcium Carbonate-Vitamin D 600-10 MG-MCG per tablet Take 1 tablet by mouth 2 (Two) Times a Day. 60 tablet 11    carvedilol (Coreg) 6.25 MG tablet Take 1 tablet by mouth 2 (Two) Times a Day With Meals. 60 tablet 3    ferrous sulfate 325 (65 FE) MG tablet Take 1 tablet by mouth Daily With Breakfast.      furosemide (LASIX) 40 MG tablet Take 1 tablet by mouth once daily (Patient taking differently: Take 1 tablet by mouth As Needed.) 90 tablet 1    gabapentin (NEURONTIN) 300 MG capsule Take 1 capsule by mouth 2 (Two) Times a Day. 60 capsule 5    Glucagon 1 MG/0.2ML solution auto-injector Inject 1 mg under the skin into the appropriate area as directed Daily As Needed (glucose <60). 0.2 mL 1    glucose blood test strip 1 each by Other route 4 (Four) Times a Day. Use as instructed 200 each 5    ibuprofen (Motrin IB) 200 MG tablet Take 3 tablets by mouth Every 8 (Eight) Hours. Take every 8 hours for three days then take as needed.      insulin glargine (Lantus) 100 UNIT/ML injection Inject 25 Units under the skin into the appropriate area as directed Daily. 3 mL 5    Insulin Lispro, 1 Unit Dial, (HumaLOG KwikPen) 100 UNIT/ML solution pen-injector Inject 15 Units under the skin into the appropriate area as directed 3 (Three) Times a Day Before Meals. 9 mL 2    Lancets 33G misc Use 1 each 4 (Four) Times a Day. 200 each 5    metFORMIN ER (GLUCOPHAGE-XR) 500 MG 24 hr tablet Take 1 tablet by mouth Daily With Breakfast. 30 tablet 2    Multivitamin tablet tablet Take 1 tablet by mouth Daily.      POTASSIUM BICARBONATE PO Take  by mouth.      potassium chloride  (KLOR-CON M20) 20 MEQ CR tablet TAKE 1 TABLET BY MOUTH THREE TIMES DAILY FOR 4 DAYS      spironolactone (ALDACTONE) 25 MG tablet Take 1 tablet by mouth Daily.      vitamin B-12 (cyanocobalamin) 100 MCG tablet Take 5 tablets by mouth Daily.       Facility-Administered Encounter Medications as of 2025   Medication Dose Route Frequency Provider Last Rate Last Admin    lidocaine 1% - EPINEPHrine 1:[id number] (XYLOCAINE W/EPI) 1 %-1:978912 injection 10 mL  10 mL Injection Once Amina Camarillo MD         ADULT ILLNESSES:  Patient Active Problem List   Diagnosis Code    Tobacco use Z72.0    Anxiety F41.9    Essential hypertension I10    Hypomagnesemia E83.42    Alcoholism F10.20    Macrocytosis D75.89    Anemia - iron deficiency and chronic diseased D64.9    Alcoholic cirrhosis of liver with ascites K70.31    Colon cancer screening Z12.11    Secondary esophageal varices without bleeding I85.10    Invasive ductal carcinoma of breast, female, left C50.912    Malignant neoplasm of upper-inner quadrant of left female breast C50.212    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    Breast cancer C50.919    Malignant neoplasm of upper-inner quadrant of left female breast, unspecified estrogen receptor status C50.212    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 Bernardo-- Diverticulosis in the left colon. - The examination was otherwise normal on direct and retroflexion views. - No specimens collected    D & C HYSTEROSCOPY N/A 2024    Procedure:  DILATATION AND CURETTAGE HYSTEROSCOPY;  Surgeon: Glo Benavides MD;  Location:  [facility] OR;  Service: Obstetrics/Gynecology;  Laterality: N/A;    ENDOSCOPY N/A 01/31/2024    no evidence of any fresh or old blood or clots,normal stomach    ENDOSCOPY N/A 08/09/2024    -Grade I esophageal varices. - Portal hypertensive gastropathy. - Normal examined duodenum. - No specimens collected    MASTECTOMY W/ SENTINEL NODE BIOPSY Bilateral 11/05/2024    Procedure: BILATERAL SKIN SPARING MASTECTOMY WITH LEFT AXILLARY MAGTRACE GUIDED SENTINEL LYMPH NODE BIOPSY (Dr. Prieto to nahid);  Surgeon: Fide Verduzco MD;  Location:  [facility] OR;  Service: General;  Laterality: Bilateral;    MOUTH SURGERY      TOTAL LAPAROSCOPIC HYSTERECTOMY SALPINGO OOPHORECTOMY Bilateral 1/23/2025    Procedure: TOTAL LAPAROSCOPIC HYSTERECTOMY BILATERAL SALPINGOOPHORECTOMY WITH DAVINCI ROBOT (removal of uterus, cervix, both fallopian tubes and ovaries using the davinci robot);  Surgeon: Glo Benavides MD;  Location:  [facility] OR;  Service: Robotics - DaVinci;  Laterality: Bilateral;    UPPER GASTROINTESTINAL ENDOSCOPY  3/24    WISDOM TOOTH EXTRACTION  1995     HEALTH MAINTENANCE ITEMS:  Health Maintenance Due   Topic Date Due    DIABETIC FOOT EXAM  Never done    DIABETIC EYE EXAM  Never done    URINE MICROALBUMIN-CREATININE RATIO (uACR)  Never done    COVID-19 Vaccine (1 - 2024-25 season) Never done         <no information>  Last Completed Colonoscopy            Upcoming       COLORECTAL CANCER SCREENING (COLONOSCOPY - Every 3 Years) Next due on 8/9/2027 08/09/2024  Surgical Procedure: COLONOSCOPY    08/09/2024  Colonoscopy    03/16/2024  Fecal Occult Blood component of POC Occult Blood Stool    01/31/2024  Fecal Occult Blood component of POCT Occult Blood, Stool                          Immunization History   Administered Date(s) Administered    Hep A / Hep B 03/01/2023    Pneumococcal Conjugate 20-Valent (PCV20) 03/01/2023    Tdap  "01/17/2024     Last Completed Mammogram            Completed or No Longer Recommended       MAMMOGRAM  Discontinued        Frequency changed to Never automatically (Topic No Longer Applies)    09/11/2024  Mammo Screening Digital Tomosynthesis Bilateral With CAD                              FAMILY HISTORY:  Family History   Problem Relation Age of Onset    Diabetes Mother     Heart disease Father     Diabetes Father     Stroke Father     Melanoma Father 40 - 49    Esophageal cancer Father 60 - 69    Skin cancer Maternal Aunt     Colon cancer Paternal Uncle     Pancreatic cancer Paternal Uncle     Cervical cancer Maternal Grandmother 40 - 49    Skin cancer Paternal Grandmother     Cancer Paternal Grandfather 50 - 59        Mesothelioma    Breast cancer Half-Sister 45    Colon polyps Neg Hx     Uterine cancer Neg Hx      SOCIAL HISTORY:  Social History     Socioeconomic History    Marital status:    Tobacco Use    Smoking status: Some Days     Current packs/day: 0.50     Average packs/day: 0.5 packs/day for 10.0 years (5.0 ttl pk-yrs)     Types: Cigarettes    Smokeless tobacco: Never   Vaping Use    Vaping status: Never Used   Substance and Sexual Activity    Alcohol use: Not Currently     Alcohol/week: 10.0 standard drinks of alcohol     Types: 10 Standard drinks or equivalent per week    Drug use: Never    Sexual activity: Yes     Partners: Male     Birth control/protection: None, Coitus interruptus       REVIEW OF SYSTEMS:  Review of Systems   Constitutional:  Positive for fatigue.        Manages her ADLs, chores, errands, and driving.  Is up and about, \"all the time.\"      letrozole tolerance: since stopping letrozole (after about 6 weeks of use), says arthralgias, vague sense of chest discomfort and \"liver fullness\" have resolved.    Exemestane tolerance: On 4/4/25 was started on exemestane stopped on 5/4/24 due to hot flashes and arthralgias.  Was also seen in the ED for blood glucose> 500 on 5/2/2025. On " "25 was started on anastrozole 1 mg po qd.     Anastrozole tolerance:  Stopped after 2 weeks due to \"yellow urine/stools\", palpitations, bloating, mild hot flashes and arthralgias.       HENT:  Positive for postnasal drip.    Eyes: Negative.    Respiratory: Negative.          Current cigarette smoker-age 20 -present:  Less than 1/2 pack/day   Cardiovascular: Negative.    Gastrointestinal: Negative.    Endocrine: Positive for heat intolerance (\"Hot flashes\").        Menarche age 10    Cessation of menstrual bleeding since TLH/BSO, 2025.    G3,  (miscarriage)    - Checks her blood sugars at least 4 times a day.  States she has been running in the low 100s range on current dose of insulin.     Genitourinary: Negative.         Underwent total laparoscopic hysterectomy/bilateral salpingo-oophorectomy, 2025   Musculoskeletal:  Positive for arthralgias (Chronic-worse on anastrozole.), back pain (Chronic--unchanged) and myalgias (Nocturnal leg).   Skin: Negative.    Allergic/Immunologic: Negative.    Neurological:  Negative for dizziness (Postural dizziness).   Hematological: Negative.    Psychiatric/Behavioral: Negative.         /62   Pulse 91   Temp 96.6 °F (35.9 °C) (Temporal)   Resp 18   Ht 170.2 cm (67\")   Wt 67.5 kg (148 lb 12.8 oz)   LMP 2024 (Exact Date)   SpO2 97%   BMI 23.31 kg/m²  Body surface area is 1.78 meters squared.  Pain Score    25 0941   PainSc: 0-No pain       Physical Exam  Vitals and nursing note reviewed. Exam conducted with a chaperone present.   Constitutional:       Comments: Pleasant, cooperative, modestly Middle-aged female.  Ambulatory.  ECOG 0.     Has lost 2 pounds (previously regained 5 pounds at his prior visit) since her last visit patient   HENT:      Head: Normocephalic and atraumatic.   Eyes:      General: No scleral icterus.     Extraocular Movements: Extraocular movements intact.      Pupils: Pupils are equal, round, and reactive to light. " "  Cardiovascular:      Rate and Rhythm: Normal rate.   Pulmonary:      Effort: Pulmonary effort is normal.   Chest:   Breasts:     Right: Absent.      Left: Absent.          Comments: States she was last seen by Dr. Verduzco about 2 weeks ago.  \"She was happy with it.\"    Left breast mastectomy incision is well-healed.  Some overlying incisional/scar fullness is noted.  No overt nodularity    Left breast mastectomy incision and left sentinel lymph node biopsy incision are healed with some overlying incisional/scar fullness noted.  No overt nodularity.    No overt supraclavicular/axillary adenopathy bilaterally.  Abdominal:      Palpations: Abdomen is soft.      Tenderness: There is no abdominal tenderness.      Comments: Laparoscopic incisions are noted to be healed..   Musculoskeletal:         General: Normal range of motion.      Cervical back: Normal range of motion.   Lymphadenopathy:      Cervical: No cervical adenopathy.      Upper Body:      Right upper body: No supraclavicular or axillary adenopathy.      Left upper body: No supraclavicular or axillary adenopathy.   Skin:     General: Skin is warm.   Neurological:      General: No focal deficit present.      Mental Status: She is alert and oriented to person, place, and time.   Psychiatric:         Mood and Affect: Mood normal.         Behavior: Behavior normal.         Thought Content: Thought content normal.         .Assessment:  1.   Invasive mammary (ductal) carcinoma, left breast            Original tumor stage:  IA (pT1b, p(sn)N0, Mx, G2). ER 90%+, FL 8 %+, HER-2 (IHC) 1+-low.negative.            Original tumor burden:     - 9/11/24- Mammogram- LEFT breast 6 mm focal asymmetry with spiculation/distortion. Recommend targeted ultrasound. No suspicious mammographic findings in the RIGHT breast. BIRADS 0  - 9/13/24- US left breast-FINDINGS: Targeted LEFT breast ultrasound 11:00 position, 7 centimeters from the nipple demonstrates a 0.5 x 0.4 x 0.7 cm oval, " parallel, hypoechoic mass with spiculated margins and internal vascularity. Posterior shadowing is present. This correlates in size, position and character with the mammographic finding.IMPRESSION:  Suspicious LEFT breast mass. Recommend ultrasound-guided biopsy.  - 9/13/24- US guided breast biopsy- Left breast mass at 11 o'clock position, 7 cm from nipple, core biopsies: A.  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 B.  A prominent associated low-grade ductal carcinoma in situ component is present. C.  Linear length of invasive tumor is 4.6 mm. AJCC stage: pTX pNX. 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  Lifetime risk of 8.7-20% for colorectal cancer and 13-26% for endometrial cancer.  11/5/24- Oncotype DX- RS 12; DRR 3%; ACTB <1%           Tumor status:   - 11/5/24-bilateral skin sparing mastectomies and left sentinel lymph node biopsies: Final diagnoses:  1.  Left breast, mastectomy:  A.  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  B.  Minor associated low-grade ductal carcinoma in situ component.  C.  Invasive tumor is 7 mm.  D.  Radial scar adjacent to prior biopsy site exhibiting atypical ductal hyperplasia, usual ductal hyperplasia and columnar cell changes without atypia (4.8 mm).  E.  No malignancy or atypia identified at the margins of surgical resection.  F.  Invasive malignancy is 4.9 mm from the closest inked (anterosuperior) margin.  G.  Prior biopsy site changes.  H.  No evidence of Paget's disease of nipple.  I.  Overlying skin is negative for malignancy.  2.  Left axillary sentinel lymph nodes:  A.  Lymph nodes (4), negative for metastatic carcinoma.  B.  Absence of micrometastases is confirmed utilizing immunohistochemical stains for CK AE1/AE3.   -AJCC stage: pT1b snpN0    --Adjuvant Tamoxifen beginning  "12/5/24-2/7/25  --Adjuvant letrozole 2.5 mg po qd beginning, 2/7/25- 4/4/25-intolerant  --Adjuvant exemestane 25 mg po qd, 4/4/25-stopped on 5/4/24 due to hot flashes and arthralgias.  Was also seen in the ED for blood glucose> 500 on 5/2/2025.   --5/9/25 was started on anastrozole 1 mg po qd. stopped after 2 weeks due to intolerance (arthralgias, palpitations, abdominal bloating and, \"yellow stools\".    2.   Abnormal tumor markers.  Need follow-up:  -6/20/2025--CEA 6.06 (prior peak: 7.12), CA 27-29 59.7 (prior peak: 59.3),     3.   Narayanan syndrome  - 8/9/24- Colonoscopy- - Diverticulosis in the left colon. - The examination was otherwise normal on direct and retroflexion views. - No specimens collected. Repeat 10 yr  - 10/2/24- Pap smear- Negative IEL/HPV not detected  - 10/15/24-Endometrium, curettage:  A.  Late secretory endometrium.  B.  Fragments of squamous mucosa and endocervical mucosa.  C.  Blood.  D.  No evidence of atypia or malignancy.  -1/23/25-OSEAS/BSO: Uterus, complete, with bilateral fallopian tubes and bilateral ovaries:  - No cervical dysplasia identified.  - Disordered inactive to weakly proliferative endometrium, negative for atypia.  - Left and right fallopian tubes, no significant histopathologic abnormalities.  - Cystic follicles, focal surface papillary proliferation without atypia and a small ovarian Walthard rest-like nest, possible  incipient Benny tumor (0.3 mm), right ovary.  - Hemorrhagic corpus luteum and luteinized cystic follicles, left ovary.  - No histologic evidence of malignancy.    4.   Anemia, macrocytic.  From cirrhosis  --Hgb 13; MCV 88.3, 5/2/2025 (prior ehmant:Hgb 5.2, 3/16/24)  5.   HepC/cirrhosis/esophageal varices.  Followed by hepatology in [place]  - 8/9/24- EGD-- Grade I esophageal varices. - Portal hypertensive gastropathy. - Normal examined duodenum. - No specimens collected. Rx:  Coreg  6.   Alcoholism in remission  7.   Tobacco smoker- <1/2 ppd since age 20  8.  " " Surgically postmenopausal  9.   Intermittent thrombocytopenia. Hypersplenism?  -- 154,000, 3/21/2025 (prior hemant: 102,000, 2/20/23)  10.   Osteopenia.  Already on Os-Floyd and Prolia  - 12/13/24- DEXA scan- Osteopenia and increased fracture risk  11.  T2DM. Now on insulin.  Dr. Holloway        Plan:  1.   Review labs, 6/20/25 glucose 345 (peak:599), sodium 133, AST 49 (peak: 47), bili 1.5 (peak: 2.9) otherwise normal CMP, CEA 6.06 (prior peak: 7.12), CA 27-29 59.7 (prior peak: 59.3), normal CBC.      2.   Anastrozole tolerance.  Stopped after 2 weeks due to \"yellow urine/stools\", palpitations, bloating, mild hot flashes and arthralgias.    3.   The rationale for adjuvant hormonal manipulation with AI's is again discussed/reviewed. The potential risks (to include but not limited to: Hot flashes, asthenia, pain, arthralgia, arthritis, nausea/vomiting, headache, pharyngitis, depression, rash, hypertension, lymphedema, insomnia, edema, weight gain, dyspnea, abdominal pain, constipation, osteoporosis, fractures, cough, bone pain, diarrhea, breast pain, paresthesias, infection, cataracts, myalgias, thromboembolism, angina, endometrial carcinoma, myocardial infarction, stroke, anemia, leukopenia, erythema multiforme, Salguero-Garrett syndrome) are discussed at length. Questions answered. She agrees to a trial with anastrozole (which has a very different molecular structure than exemestane)    4.   Rx:  STOP (intolerant)-Anastrozole 1 mg p.o. daily dispense 30 x 11 refill                Exemestane 25 mg po qod # 30                Oscal 600/400 po bid dispense 30 x 11 refills                Prolia 60 mg sc q 6 mo                 5.   Review NCCN guidelines version invasive breast cancer-systemic adjuvant treatment: HR positive, HER-2 negative disease in premenopausal patients with pT1-3 and pN0 tumors(ductal, lobular, mixed)-tumor </= 0.5 cm and pN0 -consider adjuvant endocrine therapy (category 2B).  If tumor>0.5cm and " pN0-strongly consider 21 gene RT-PCR assay if candidate for chemotherapy (category 1)-if not done: Adjuvant chemotherapy followed by endocrine therapy (category 1) or adjuvant endocrine therapy; recurrence score<15: Adjuvant endocrine therapy/ovarian suppression/ablation; recurrence score 16-25-adjuvant endocrine therapy/ovarian suppression/ablation or adjuvant chemotherapy followed by endocrine therapy; recurrence score >26-adjuvant chemotherapy followed by endocrine therapy.  Surveillance follow-up: H&P 1-4 times per year as clinically appropriate for 5 years then annually.  Genetic screening.  Postsurgical management: Lymphedema management.  Imaging: Mammogram every 12 months.  Routine imaging of reconstructed breast not indicated.  For patient receiving anthracycline based therapy: Echocardiogram recommendation per NCCN guidelines for survivorship.  Screening for metastasis: In the absence of clinical signs and symptoms suggestive of recurrent disease no indication for laboratory or imaging studies for metastatic screening.        6.  Continue management per primary care and other specialists  7.   Return to office in 6 weeks with preoffice CBC and differential, CMP, CEA and CA 27-29  8.   Importance of Smoking Cessation discussed with patient and informed patient additional information will be on today's [facility]'s Flyer - Plan to Be Tobacco Free handout provided to patient         I spent ~49 minutes caring for Rachana on this date of service. This time includes time spent by me in the following activities: preparing for the visit, reviewing tests, performing a medically appropriate examination and/or evaluation, counseling and educating the patient/family/caregiver, ordering medications, tests, or procedures and documenting information in the medical record.

## 2025-06-25 RX ORDER — ANASTROZOLE 1 MG/1
1 TABLET ORAL DAILY
Qty: 30 TABLET | Refills: 11 | Status: CANCELLED | OUTPATIENT
Start: 2025-06-25

## 2025-06-27 ENCOUNTER — APPOINTMENT (OUTPATIENT)
Dept: LAB | Facility: HOSPITAL | Age: 45
End: 2025-06-27
Payer: COMMERCIAL

## 2025-06-27 ENCOUNTER — INFUSION (OUTPATIENT)
Dept: ONCOLOGY | Facility: HOSPITAL | Age: 45
End: 2025-06-27
Payer: COMMERCIAL

## 2025-06-27 ENCOUNTER — OFFICE VISIT (OUTPATIENT)
Dept: ONCOLOGY | Facility: CLINIC | Age: 45
End: 2025-06-27
Payer: COMMERCIAL

## 2025-06-27 VITALS
SYSTOLIC BLOOD PRESSURE: 110 MMHG | OXYGEN SATURATION: 95 % | HEIGHT: 67 IN | TEMPERATURE: 97.5 F | RESPIRATION RATE: 16 BRPM | HEART RATE: 76 BPM | DIASTOLIC BLOOD PRESSURE: 79 MMHG | BODY MASS INDEX: 22.44 KG/M2 | WEIGHT: 143 LBS

## 2025-06-27 VITALS
SYSTOLIC BLOOD PRESSURE: 100 MMHG | DIASTOLIC BLOOD PRESSURE: 62 MMHG | TEMPERATURE: 96.6 F | HEIGHT: 67 IN | WEIGHT: 148.8 LBS | BODY MASS INDEX: 23.35 KG/M2 | RESPIRATION RATE: 18 BRPM | OXYGEN SATURATION: 97 % | HEART RATE: 91 BPM

## 2025-06-27 DIAGNOSIS — C50.212 MALIGNANT NEOPLASM OF UPPER-INNER QUADRANT OF LEFT FEMALE BREAST, UNSPECIFIED ESTROGEN RECEPTOR STATUS: ICD-10-CM

## 2025-06-27 DIAGNOSIS — Z79.811 LONG TERM CURRENT USE OF AROMATASE INHIBITOR: ICD-10-CM

## 2025-06-27 DIAGNOSIS — M85.80 OSTEOPENIA DUE TO CANCER THERAPY: Primary | ICD-10-CM

## 2025-06-27 DIAGNOSIS — C50.912 INVASIVE DUCTAL CARCINOMA OF BREAST, FEMALE, LEFT: Primary | ICD-10-CM

## 2025-06-27 PROCEDURE — 25010000002 DENOSUMAB 60 MG/ML SOLUTION PREFILLED SYRINGE: Performed by: INTERNAL MEDICINE

## 2025-06-27 PROCEDURE — 96372 THER/PROPH/DIAG INJ SC/IM: CPT

## 2025-06-27 RX ORDER — EXEMESTANE 25 MG/1
25 TABLET ORAL DAILY
Qty: 30 TABLET | Refills: 11 | Status: SHIPPED | OUTPATIENT
Start: 2025-06-27

## 2025-06-27 RX ADMIN — DENOSUMAB 60 MG: 60 INJECTION SUBCUTANEOUS at 10:41

## 2025-07-01 ENCOUNTER — OFFICE VISIT (OUTPATIENT)
Dept: INTERNAL MEDICINE | Facility: CLINIC | Age: 45
End: 2025-07-01
Payer: COMMERCIAL

## 2025-07-01 VITALS
OXYGEN SATURATION: 99 % | HEART RATE: 63 BPM | TEMPERATURE: 97.8 F | DIASTOLIC BLOOD PRESSURE: 72 MMHG | SYSTOLIC BLOOD PRESSURE: 107 MMHG | WEIGHT: 149.2 LBS | HEIGHT: 67 IN | BODY MASS INDEX: 23.42 KG/M2

## 2025-07-01 DIAGNOSIS — C50.912 INVASIVE DUCTAL CARCINOMA OF BREAST, FEMALE, LEFT: ICD-10-CM

## 2025-07-01 DIAGNOSIS — Z79.4 TYPE 2 DIABETES MELLITUS WITH HYPERGLYCEMIA, WITH LONG-TERM CURRENT USE OF INSULIN: Primary | ICD-10-CM

## 2025-07-01 DIAGNOSIS — E11.65 TYPE 2 DIABETES MELLITUS WITH HYPERGLYCEMIA, WITH LONG-TERM CURRENT USE OF INSULIN: Primary | ICD-10-CM

## 2025-07-01 PROCEDURE — 99214 OFFICE O/P EST MOD 30 MIN: CPT | Performed by: INTERNAL MEDICINE

## 2025-07-01 RX ORDER — METFORMIN HYDROCHLORIDE 500 MG/1
1000 TABLET, EXTENDED RELEASE ORAL
Qty: 60 TABLET | Refills: 2 | Status: SHIPPED | OUTPATIENT
Start: 2025-07-01

## 2025-07-01 NOTE — PROGRESS NOTES
Chief Complaint   Patient presents with    Follow-up    Diabetes     FBS this morning was 136         History:  Rachana Smart is a 45 y.o. female who presents today for evaluation of the above problems.      HPI  History of Present Illness  The patient presents for a follow-up on her blood sugar and diabetes.    She reports that her blood sugar levels have been well-controlled. She has been administering Lantus 25 units in the morning, and Humalog 15 units twice a day before meals.  She only eats 2 meals per day. Her regimen includes taking metformin 500 mg in the morning    She has noticed an improvement in her blood sugar levels since increasing her insulin dose from 10 to 15 units. Her blood sugar levels have decreased from the 600s to the 100s with exercise. The lowest recorded blood sugar level was 80, which she attributes to fatigue and skipping meal. She is currently on metformin 500 mg once daily in the morning and reports no side effects. She does not have a glucagon pen although this was previously prescribed.    She has been undergoing physical therapy, which has reduced her pain and increased her activity level. She experienced pain at 2:00 AM today. She believes that exemestane may be affecting her blood sugar levels, as it was detected during a visit to the cancer center, which led to her being sent to the ER. She is currently on half a dose of exemestane. She has two scans scheduled for Monday morning and needs to manage her blood sugar levels for upcoming surgery.      ROS:  Review of Systems  See above    Current Outpatient Medications:     Blood Glucose Monitoring Suppl (Blood Glucose Monitor System) w/Device kit, Use 1 each 4 (Four) Times a Day., Disp: 1 each, Rfl: 0    Calcium Carbonate-Vitamin D 600-10 MG-MCG per tablet, Take 1 tablet by mouth 2 (Two) Times a Day., Disp: 60 tablet, Rfl: 11    carvedilol (Coreg) 6.25 MG tablet, Take 1 tablet by mouth 2 (Two) Times a Day With Meals., Disp:  60 tablet, Rfl: 3    exemestane (AROMASIN) 25 MG tablet, Take 1 tablet by mouth Daily., Disp: 30 tablet, Rfl: 11    ferrous sulfate 325 (65 FE) MG tablet, Take 1 tablet by mouth Daily With Breakfast., Disp: , Rfl:     furosemide (LASIX) 40 MG tablet, Take 1 tablet by mouth once daily (Patient taking differently: Take 1 tablet by mouth As Needed.), Disp: 90 tablet, Rfl: 1    gabapentin (NEURONTIN) 300 MG capsule, Take 1 capsule by mouth 2 (Two) Times a Day., Disp: 60 capsule, Rfl: 5    Glucagon 1 MG/0.2ML solution auto-injector, Inject 1 mg under the skin into the appropriate area as directed Daily As Needed (glucose <60)., Disp: 0.2 mL, Rfl: 1    glucose blood test strip, 1 each by Other route 4 (Four) Times a Day. Use as instructed, Disp: 200 each, Rfl: 5    ibuprofen (Motrin IB) 200 MG tablet, Take 3 tablets by mouth Every 8 (Eight) Hours. Take every 8 hours for three days then take as needed., Disp: , Rfl:     insulin glargine (Lantus) 100 UNIT/ML injection, Inject 25 Units under the skin into the appropriate area as directed Daily., Disp: 3 mL, Rfl: 5    Insulin Lispro, 1 Unit Dial, (HumaLOG KwikPen) 100 UNIT/ML solution pen-injector, Inject 15 Units under the skin into the appropriate area as directed 3 (Three) Times a Day Before Meals., Disp: 9 mL, Rfl: 2    Lancets 33G misc, Use 1 each 4 (Four) Times a Day., Disp: 200 each, Rfl: 5    metFORMIN ER (GLUCOPHAGE-XR) 500 MG 24 hr tablet, Take 2 tablets by mouth Daily With Breakfast., Disp: 60 tablet, Rfl: 2    Multivitamin tablet tablet, Take 1 tablet by mouth Daily., Disp: , Rfl:     POTASSIUM BICARBONATE PO, Take  by mouth., Disp: , Rfl:     potassium chloride (KLOR-CON M20) 20 MEQ CR tablet, TAKE 1 TABLET BY MOUTH THREE TIMES DAILY FOR 4 DAYS, Disp: , Rfl:     spironolactone (ALDACTONE) 25 MG tablet, Take 1 tablet by mouth Daily., Disp: , Rfl:     vitamin B-12 (cyanocobalamin) 100 MCG tablet, Take 5 tablets by mouth Daily., Disp: , Rfl:     Current  Facility-Administered Medications:     lidocaine 1% - EPINEPHrine 1:[id number] (XYLOCAINE W/EPI) 1 %-1:680791 injection 10 mL, 10 mL, Injection, Once, Amina Camarillo MD    Lab Results   Component Value Date    GLUCOSE 345 (H) 06/20/2025    BUN 18.1 06/20/2025    CREATININE 0.84 06/20/2025     (L) 06/20/2025    K 3.9 06/20/2025    CL 96 (L) 06/20/2025    CALCIUM 9.8 06/20/2025    PROTEINTOT 7.7 06/20/2025    ALBUMIN 3.9 06/20/2025    ALT 31 06/20/2025    AST 49 (H) 06/20/2025    ALKPHOS 99 06/20/2025    BILITOT 1.5 (H) 06/20/2025    GLOB 3.8 06/20/2025    AGRATIO 1.0 06/20/2025    BCR 21.5 06/20/2025    ANIONGAP 13.0 06/20/2025    EGFR 86.9 06/20/2025       WBC   Date Value Ref Range Status   06/20/2025 5.66 3.40 - 10.80 10*3/mm3 Final     RBC   Date Value Ref Range Status   06/20/2025 4.55 3.77 - 5.28 10*6/mm3 Final     Hemoglobin   Date Value Ref Range Status   06/20/2025 14.1 12.0 - 15.9 g/dL Final     Hematocrit   Date Value Ref Range Status   06/20/2025 41.1 34.0 - 46.6 % Final     MCV   Date Value Ref Range Status   06/20/2025 90.3 79.0 - 97.0 fL Final     MCH   Date Value Ref Range Status   06/20/2025 31.0 26.6 - 33.0 pg Final     MCHC   Date Value Ref Range Status   06/20/2025 34.3 31.5 - 35.7 g/dL Final     RDW   Date Value Ref Range Status   06/20/2025 15.3 12.3 - 15.4 % Final     RDW-SD   Date Value Ref Range Status   06/20/2025 51.1 37.0 - 54.0 fl Final     MPV   Date Value Ref Range Status   06/20/2025 10.2 6.0 - 12.0 fL Final     Platelets   Date Value Ref Range Status   06/20/2025 132 (L) 140 - 450 10*3/mm3 Final     Neutrophil %   Date Value Ref Range Status   06/20/2025 46.8 42.7 - 76.0 % Final     Lymphocyte %   Date Value Ref Range Status   06/20/2025 35.7 19.6 - 45.3 % Final     Monocyte %   Date Value Ref Range Status   06/20/2025 11.8 5.0 - 12.0 % Final     Eosinophil %   Date Value Ref Range Status   06/20/2025 3.9 0.3 - 6.2 % Final     Basophil %   Date Value Ref Range Status  "  06/20/2025 1.6 (H) 0.0 - 1.5 % Final     Immature Grans %   Date Value Ref Range Status   06/20/2025 0.2 0.0 - 0.5 % Final     Neutrophils, Absolute   Date Value Ref Range Status   06/20/2025 2.65 1.70 - 7.00 10*3/mm3 Final     Lymphocytes, Absolute   Date Value Ref Range Status   06/20/2025 2.02 0.70 - 3.10 10*3/mm3 Final     Monocytes, Absolute   Date Value Ref Range Status   06/20/2025 0.67 0.10 - 0.90 10*3/mm3 Final     Eosinophils, Absolute   Date Value Ref Range Status   06/20/2025 0.22 0.00 - 0.40 10*3/mm3 Final     Basophils, Absolute   Date Value Ref Range Status   06/20/2025 0.09 0.00 - 0.20 10*3/mm3 Final     Immature Grans, Absolute   Date Value Ref Range Status   06/20/2025 0.01 0.00 - 0.05 10*3/mm3 Final     nRBC   Date Value Ref Range Status   06/20/2025 0.0 0.0 - 0.2 /100 WBC Final         OBJECTIVE:  Visit Vitals  /72 (BP Location: Right arm, Patient Position: Sitting, Cuff Size: Adult)   Pulse 63   Temp 97.8 °F (36.6 °C) (Temporal)   Ht 170.2 cm (67\")   Wt 67.7 kg (149 lb 3.2 oz)   LMP 09/29/2024 (Exact Date)   SpO2 99%   BMI 23.37 kg/m²      Physical Exam  Constitutional:       General: She is not in acute distress.     Appearance: She is well-developed. She is not diaphoretic.   HENT:      Head: Normocephalic and atraumatic.   Eyes:      Pupils: Pupils are equal, round, and reactive to light.   Neck:      Thyroid: No thyromegaly.      Trachea: Phonation normal.   Pulmonary:      Effort: No respiratory distress.   Skin:     Coloration: Skin is not pale.      Findings: No erythema.   Neurological:      Mental Status: She is alert.   Psychiatric:         Behavior: Behavior normal.         Thought Content: Thought content normal.         Judgment: Judgment normal.         Results  Labs   - Fasting blood sugar: 07/01/2025, 136 mg/dL    Assessment/Plan      Diagnoses and all orders for this visit:    1. Type 2 diabetes mellitus with hyperglycemia, with long-term current use of insulin " (Primary)  -     metFORMIN ER (GLUCOPHAGE-XR) 500 MG 24 hr tablet; Take 2 tablets by mouth Daily With Breakfast.  Dispense: 60 tablet; Refill: 2  -     Glucagon 1 MG/0.2ML solution auto-injector; Inject 1 mg under the skin into the appropriate area as directed Daily As Needed (glucose <60).  Dispense: 0.2 mL; Refill: 1    2. Invasive ductal carcinoma of breast, female, left      Assessment & Plan  1. Diabetes Mellitus.  - Fasting blood glucose level today is 136.  - Dosage of metformin will be increased to 1000 mg, to be taken as 2 tablets in the morning.  - A glucagon pen will be provided for emergency use in case of hypoglycemia.  - An A1c test will be conducted after 08/02/2025. If blood sugar levels consistently fall below 100, she should inform the clinic.  Continue current dose of insulin with Lantus 25 units in morning and Humalog 15 units twice a day before meals    2. Cancer.  - Concern about the impact of exemestane on blood sugar levels.  - Currently on a half dose of exemestane to manage cancer while minimizing its effect on blood sugar.  - Two scans scheduled for Monday morning and will meet with oncologist to discuss further management.  - Crucial to keep blood sugar controlled to proceed with any necessary surgery.      Return in about 1 month (around 8/1/2025) for Recheck A1c after 8/2/25.      ROSALINDA Holloway MD  10:50 CDT  7/1/2025   Electronically signed    Patient or patient representative verbalized consent for the use of Ambient Listening during the visit with  INA Holloway MD for chart documentation. 7/1/2025  11:13 CDT

## 2025-07-07 ENCOUNTER — HOSPITAL ENCOUNTER (OUTPATIENT)
Dept: ULTRASOUND IMAGING | Facility: HOSPITAL | Age: 45
Discharge: HOME | End: 2025-07-07
Payer: COMMERCIAL

## 2025-07-07 DIAGNOSIS — G62.0 NEUROPATHY DUE TO CHEMOTHERAPEUTIC DRUG: ICD-10-CM

## 2025-07-07 DIAGNOSIS — E11.65 TYPE 2 DIABETES MELLITUS WITH HYPERGLYCEMIA, WITH LONG-TERM CURRENT USE OF INSULIN: ICD-10-CM

## 2025-07-07 DIAGNOSIS — Z79.4 TYPE 2 DIABETES MELLITUS WITH HYPERGLYCEMIA, WITH LONG-TERM CURRENT USE OF INSULIN: ICD-10-CM

## 2025-07-07 DIAGNOSIS — T45.1X5A NEUROPATHY DUE TO CHEMOTHERAPEUTIC DRUG: ICD-10-CM

## 2025-07-07 DIAGNOSIS — K70.31 ALCOHOLIC CIRRHOSIS OF LIVER WITH ASCITES: ICD-10-CM

## 2025-07-07 PROCEDURE — 76700 US EXAM ABDOM COMPLETE: CPT

## 2025-07-07 PROCEDURE — 93976 VASCULAR STUDY: CPT

## 2025-07-07 RX ORDER — INSULIN LISPRO 100 [IU]/ML
15 INJECTION, SOLUTION INTRAVENOUS; SUBCUTANEOUS
Qty: 9 ML | Refills: 2 | OUTPATIENT
Start: 2025-07-07

## 2025-07-07 RX ORDER — INSULIN GLARGINE 100 [IU]/ML
25 INJECTION, SOLUTION SUBCUTANEOUS DAILY
Qty: 3 ML | Refills: 5 | OUTPATIENT
Start: 2025-07-07

## 2025-07-07 RX ORDER — GABAPENTIN 300 MG/1
300 CAPSULE ORAL 2 TIMES DAILY
Qty: 60 CAPSULE | Refills: 5 | OUTPATIENT
Start: 2025-07-07

## 2025-07-07 RX ORDER — METFORMIN HYDROCHLORIDE 500 MG/1
1000 TABLET, EXTENDED RELEASE ORAL
Qty: 60 TABLET | Refills: 2 | OUTPATIENT
Start: 2025-07-07

## 2025-07-07 NOTE — TELEPHONE ENCOUNTER
Rx Refill Note  Requested Prescriptions     Pending Prescriptions Disp Refills    gabapentin (NEURONTIN) 300 MG capsule 60 capsule 5     Sig: Take 1 capsule by mouth 2 (Two) Times a Day.    insulin glargine (Lantus) 100 UNIT/ML injection 3 mL 5     Sig: Inject 25 Units under the skin into the appropriate area as directed Daily.    Insulin Lispro, 1 Unit Dial, (HumaLOG KwikPen) 100 UNIT/ML solution pen-injector 9 mL 2     Sig: Inject 15 Units under the skin into the appropriate area as directed 3 (Three) Times a Day Before Meals.    metFORMIN ER (GLUCOPHAGE-XR) 500 MG 24 hr tablet 60 tablet 2     Sig: Take 2 tablets by mouth Daily With Breakfast.      Last office visit with prescribing clinician: 7/1/2025   Last telemedicine visit with prescribing clinician: Visit date not found   Next office visit with prescribing clinician: 8/4/2025                         Would you like a call back once the refill request has been completed: [] Yes [] No    If the office needs to give you a call back, can they leave a voicemail: [] Yes [] No    Dimas Bower MA  07/07/25, 09:14 CDT

## 2025-07-08 ENCOUNTER — OFFICE VISIT (OUTPATIENT)
Age: 45
End: 2025-07-08
Payer: COMMERCIAL

## 2025-07-08 VITALS — WEIGHT: 149 LBS | HEIGHT: 67 IN | BODY MASS INDEX: 23.39 KG/M2

## 2025-07-08 DIAGNOSIS — S86.112D GASTROCNEMIUS TEAR, LEFT, SUBSEQUENT ENCOUNTER: Primary | ICD-10-CM

## 2025-07-08 DIAGNOSIS — M94.262 CHONDROMALACIA OF LEFT KNEE: ICD-10-CM

## 2025-07-08 NOTE — PROGRESS NOTES
[facility]  Dc Castrejon MD, PhD  Vinicius Castrejon PA-C    CHIEF COMPLAINT  Follow-up of the Left Knee (Patient presents to the office today for left knee follow up. Physical therapy order given on 05/20/2025. Patient states pain has improved. )       HISTORY OF PRESENT ILLNESS    History of Present Illness  The patient is a 45-year-old female who presents for follow-up on her knee.    She reports that her physical therapy sessions have been beneficial. She experiences no pain in her knee but mentions occasional soreness, which she attributes to forgetting her home stretches during busy periods. She also notes a slight tightness in the back of her knee. Initially, she struggled with tasks such as standing up from a chair without using her hands, but she has noticed an improvement in her strength. Her therapist is assisting her in strengthening the front part of her knee and stretching it out, which has been helpful. She has been able to increase her activity level around the house.     She expresses concern about the potential bone pain caused by her chemotherapy medication, exemestane. She has postponed a few surgeries until her A1c levels improve. She was recently admitted to the emergency room due to high blood sugar levels, which were over 600, but they have since decreased to the 100s within a month. She has been able to resume working out and performing household chores, including using the pool. Recent scans showed no tumors on her pancreas, liver, or kidneys, which were previously a concern due to her elevated blood sugar levels. She anticipates undergoing a few more surgeries in the next 6 months.       HISTORY    Current Outpatient Medications   Medication Instructions    Blood Glucose Monitoring Suppl (Blood Glucose Monitor System) w/Device kit 1 each, Not Applicable, 4 Times Daily    Calcium Carbonate-Vitamin D 600-10 MG-MCG per tablet 1 tablet, Oral, 2 Times  Daily    carvedilol (COREG) 6.25 mg, Oral, 2 Times Daily With Meals    exemestane (AROMASIN) 25 mg, Oral, Daily    ferrous sulfate 325 mg, Daily With Breakfast    furosemide (LASIX) 40 mg, Oral, Daily    gabapentin (NEURONTIN) 300 mg, Oral, 2 Times Daily    Glucagon 1 mg, Subcutaneous, Daily PRN    glucose blood test strip 1 each, Other, 4 Times Daily, Use as instructed    ibuprofen (MOTRIN IB) 600 mg, Oral, Every 8 Hours, Take every 8 hours for three days then take as needed.    insulin glargine (LANTUS) 25 Units, Subcutaneous, Daily    Insulin Lispro (1 Unit Dial) (HUMALOG KWIKPEN) 15 Units, Subcutaneous, 3 Times Daily Before Meals    Lancets 33G misc 1 each, Not Applicable, 4 Times Daily    metFORMIN ER (GLUCOPHAGE-XR) 1,000 mg, Oral, Daily With Breakfast    Multivitamin tablet tablet 1 tablet, Daily    POTASSIUM BICARBONATE PO Take  by mouth.    potassium chloride (KLOR-CON M20) 20 MEQ CR tablet TAKE 1 TABLET BY MOUTH THREE TIMES DAILY FOR 4 DAYS    spironolactone (ALDACTONE) 25 MG tablet 1 tablet, Daily    vitamin B-12 (CYANOCOBALAMIN) 500 mcg, Daily         reports that she has been smoking cigarettes. She has a 5 pack-year smoking history. She has never used smokeless tobacco. She reports that she does not currently use alcohol after a past usage of about 10.0 standard drinks of alcohol per week. She reports that she does not use drugs.    Past Medical History:   Diagnosis Date    Alcoholism in recovery     Allergic     Anemia 301287    Anxiety     Asthma     Bacterial infection     lower intestine    Breast cancer 2024    Calculus of gallbladder without cholecystitis without obstruction 01/31/2025    Cancer 524841    Cirrhosis 2023    Diabetes     Endometriosis 1993    Gestational hypertension 2006    Hepatitis C 2022    Hypertension     Leaky heart valve     Narayanan syndrome     Migraine 2020    Multiple gestation 2006    Osteopenia due to cancer therapy 12/16/2024    Ovarian cyst 1995    PMS (premenstrual  syndrome)     PONV (postoperative nausea and vomiting)     Preeclampsia 2006    Secondary esophageal varices without bleeding 2024    Sinusitis     Varicella         Past Surgical History:   Procedure Laterality Date    BREAST BIOPSY       SECTION      x2    COLONOSCOPY      COLONOSCOPY N/A 2024    Dr. Bernrado-- Diverticulosis in the left colon. - The examination was otherwise normal on direct and retroflexion views. - No specimens collected    D & C HYSTEROSCOPY N/A 2024    Procedure: DILATATION AND CURETTAGE HYSTEROSCOPY;  Surgeon: Glo Benavides MD;  Location: [facility];  Service: Obstetrics/Gynecology;  Laterality: N/A;    ENDOSCOPY N/A 2024    no evidence of any fresh or old blood or clots,normal stomach    ENDOSCOPY N/A 2024    -Grade I esophageal varices. - Portal hypertensive gastropathy. - Normal examined duodenum. - No specimens collected    MASTECTOMY W/ SENTINEL NODE BIOPSY Bilateral 2024    Procedure: BILATERAL SKIN SPARING MASTECTOMY WITH LEFT AXILLARY MAGTRACE GUIDED SENTINEL LYMPH NODE BIOPSY (Dr. Prieto to nahid);  Surgeon: Fide Verduzco MD;  Location: [facility];  Service: General;  Laterality: Bilateral;    MOUTH SURGERY      TOTAL LAPAROSCOPIC HYSTERECTOMY SALPINGO OOPHORECTOMY Bilateral 2025    Procedure: TOTAL LAPAROSCOPIC HYSTERECTOMY BILATERAL SALPINGOOPHORECTOMY WITH DAVINCI ROBOT (removal of uterus, cervix, both fallopian tubes and ovaries using the davinci robot);  Surgeon: Glo Benavides MD;  Location: [facility] OR;  Service: Robotics - DaVinci;  Laterality: Bilateral;    UPPER GASTROINTESTINAL ENDOSCOPY  3/24    WISDOM TOOTH EXTRACTION          PHYSICAL EXAM  Constitutional: The patient is in no apparent distress and generally well-appearing. The patient hears me clearly and answers questions appropriately.   Musculoskeletal:  Physical Exam  Musculoskeletal:  Knee: Full range of motion. Tenderness to  palpation in the posterior aspect of the calf and somewhat in the popliteal fossa. Nontender joint line. Ligamentously stable.       IMAGING    US Abdomen Complete  Result Date: 7/7/2025  Narrative: EXAMINATION: US ABDOMEN COMPLETE- 7/7/2025 1:52 PM  HISTORY: k70.31; K70.31-Alcoholic cirrhosis of liver with ascites.  REPORT: Sonographic images of the abdomen were obtained.  COMPARISON: CT abdomen and pelvis 1/28/2025. Ultrasound of the liver 12/13/2024.  Report and images were stored in PACS per institutional and state regulations.  The proximal aorta measures 2.0 x 1.9 cm, the mid aorta measures 1.9 x 1.9 cm and the distal aorta measures 1.6 x 1.3 cm, normal. The proximal IVC is normal in caliber. The visualized pancreas appears unremarkable. Liver parenchyma has coarse echotexture, with a macronodular surface compatible with the history of cirrhosis. There is reversal of flow within the portal vein compatible with portal hypertension. Biphasic waveforms are present within the hepatic veins. The gallbladder is unremarkable. The common hepatic duct is dilated measuring 7.2 mm. No visible choledocholithiasis. The right kidney measures 9.9 x 4.2 x 4.9 cm and appears unremarkable. Color Doppler images demonstrate vascular flow within the right kidney. The bladder is unremarkable. The left kidney appears normal and measures 9.5 x 5.0 x 5.9 cm. The spleen is enlarged, measuring 15 x 14 x 6 cm. No ascites is identified. Color Doppler images demonstrate patency of the splenic vasculature.      Impression: 1. Findings compatible with cirrhosis and portal hypertension, there is reversal of flow within the portal vein. No liver mass is identified. Unremarkable gallbladder. Mild dilation of the extrahepatic bile ducts. No visible choledocholithiasis by ultrasound. The kidneys appear unremarkable. The spleen is enlarged.    This report was signed and finalized on 7/7/2025 1:56 PM by Dr. Miles Castaneda MD.               ASSESSMENT & PLAN  Diagnoses and all orders for this visit:    1. Gastrocnemius tear, left, initial encounter (Primary)    2. Chondromalacia of left knee         Assessment & Plan  1. Knee pain:  Physical therapy is working well, and there is only soreness and tightness when home stretches are forgotten. The knee is improving in strength and flexibility, and there is no need for additional medications or interventions at this time. Continue with the current physical therapy regimen and home exercises to strengthen the knee and improve flexibility. Contact the office if any issues arise.    Continue PT  Follow up: as needed        Patient or patient representative verbalized consent for the use of Ambient Listening during the visit with  Dc Castrejon MD for chart documentation. 7/9/2025  07:33 CDT      This document has been signed by Dc Castrejon MD on July 8, 2025 12:40 CDT

## 2025-07-19 DIAGNOSIS — K74.60 CIRRHOSIS OF LIVER WITH ASCITES, UNSPECIFIED HEPATIC CIRRHOSIS TYPE: ICD-10-CM

## 2025-07-19 DIAGNOSIS — R18.8 CIRRHOSIS OF LIVER WITH ASCITES, UNSPECIFIED HEPATIC CIRRHOSIS TYPE: ICD-10-CM

## 2025-07-21 ENCOUNTER — TELEPHONE (OUTPATIENT)
Dept: INTERNAL MEDICINE | Facility: CLINIC | Age: 45
End: 2025-07-21
Payer: COMMERCIAL

## 2025-07-21 RX ORDER — FUROSEMIDE 40 MG/1
40 TABLET ORAL AS NEEDED
Qty: 90 TABLET | Refills: 1 | Status: SHIPPED | OUTPATIENT
Start: 2025-07-21

## 2025-07-21 NOTE — TELEPHONE ENCOUNTER
Pharmacy Name:  [facility]    Reference Number (if applicable):     Pharmacy representative name: [phone] (     Pharmacy representative phone number:     What medication are you calling in regards to:     furosemide (LASIX) 40 MG tablet       What question does the pharmacy have: NEEDS CLARIFICATION ON DIRECTIONS FOR SCRIPT.      Who is the provider that prescribed the medication: DR. MCKAY    Additional notes:

## 2025-07-21 NOTE — TELEPHONE ENCOUNTER
Per the pharmacist, in order to bill insurance they will need more specific instructions for the frequency.  She asked if it was once or twice daily as needed.

## 2025-07-21 NOTE — TELEPHONE ENCOUNTER
Rx Refill Note  Requested Prescriptions     Pending Prescriptions Disp Refills    furosemide (LASIX) 40 MG tablet [Pharmacy Med Name: Furosemide 40 MG Oral Tablet] 90 tablet 0     Sig: Take 1 tablet by mouth once daily      Last office visit with prescribing clinician: 7/1/2025   Last telemedicine visit with prescribing clinician: Visit date not found   Next office visit with prescribing clinician: 8/4/2025                         Would you like a call back once the refill request has been completed: [] Yes [] No    If the office needs to give you a call back, can they leave a voicemail: [] Yes [] No    OJVI Edwards  07/21/25, 08:46 CDT    Medication last filled 01/27/25, qty 90, 1 refill

## 2025-07-21 NOTE — TELEPHONE ENCOUNTER
Constance, pharmacist, informed the frequency for the Lasix 40 mg prescription is once daily as needed for edema.

## 2025-07-25 ENCOUNTER — LAB (OUTPATIENT)
Dept: LAB | Facility: HOSPITAL | Age: 45
End: 2025-07-25
Payer: COMMERCIAL

## 2025-07-25 DIAGNOSIS — C50.912 INVASIVE DUCTAL CARCINOMA OF BREAST, FEMALE, LEFT: ICD-10-CM

## 2025-07-25 LAB
ALBUMIN SERPL-MCNC: 4 G/DL (ref 3.5–5.2)
ALBUMIN/GLOB SERPL: 1.2 G/DL
ALP SERPL-CCNC: 89 U/L (ref 39–117)
ALT SERPL W P-5'-P-CCNC: 36 U/L (ref 1–33)
ANION GAP SERPL CALCULATED.3IONS-SCNC: 13 MMOL/L (ref 5–15)
AST SERPL-CCNC: 41 U/L (ref 1–32)
BASOPHILS # BLD AUTO: 0.06 10*3/MM3 (ref 0–0.2)
BASOPHILS NFR BLD AUTO: 0.9 % (ref 0–1.5)
BILIRUB SERPL-MCNC: 1 MG/DL (ref 0–1.2)
BUN SERPL-MCNC: 15.6 MG/DL (ref 6–20)
BUN/CREAT SERPL: 19.5 (ref 7–25)
CALCIUM SPEC-SCNC: 10.3 MG/DL (ref 8.6–10.5)
CEA SERPL-MCNC: 3.14 NG/ML
CHLORIDE SERPL-SCNC: 100 MMOL/L (ref 98–107)
CO2 SERPL-SCNC: 25 MMOL/L (ref 22–29)
CREAT SERPL-MCNC: 0.8 MG/DL (ref 0.57–1)
DEPRECATED RDW RBC AUTO: 48.7 FL (ref 37–54)
EGFRCR SERPLBLD CKD-EPI 2021: 92.2 ML/MIN/1.73
EOSINOPHIL # BLD AUTO: 0.33 10*3/MM3 (ref 0–0.4)
EOSINOPHIL NFR BLD AUTO: 5 % (ref 0.3–6.2)
ERYTHROCYTE [DISTWIDTH] IN BLOOD BY AUTOMATED COUNT: 14.3 % (ref 12.3–15.4)
GLOBULIN UR ELPH-MCNC: 3.4 GM/DL
GLUCOSE SERPL-MCNC: 133 MG/DL (ref 65–99)
HCT VFR BLD AUTO: 40.4 % (ref 34–46.6)
HGB BLD-MCNC: 13.6 G/DL (ref 12–15.9)
IMM GRANULOCYTES # BLD AUTO: 0.02 10*3/MM3 (ref 0–0.05)
IMM GRANULOCYTES NFR BLD AUTO: 0.3 % (ref 0–0.5)
LYMPHOCYTES # BLD AUTO: 2.1 10*3/MM3 (ref 0.7–3.1)
LYMPHOCYTES NFR BLD AUTO: 32.1 % (ref 19.6–45.3)
MCH RBC QN AUTO: 31.6 PG (ref 26.6–33)
MCHC RBC AUTO-ENTMCNC: 33.7 G/DL (ref 31.5–35.7)
MCV RBC AUTO: 94 FL (ref 79–97)
MONOCYTES # BLD AUTO: 0.73 10*3/MM3 (ref 0.1–0.9)
MONOCYTES NFR BLD AUTO: 11.2 % (ref 5–12)
NEUTROPHILS NFR BLD AUTO: 3.3 10*3/MM3 (ref 1.7–7)
NEUTROPHILS NFR BLD AUTO: 50.5 % (ref 42.7–76)
NRBC BLD AUTO-RTO: 0 /100 WBC (ref 0–0.2)
PLATELET # BLD AUTO: 110 10*3/MM3 (ref 140–450)
PMV BLD AUTO: 10.3 FL (ref 6–12)
POTASSIUM SERPL-SCNC: 3.7 MMOL/L (ref 3.5–5.2)
PROT SERPL-MCNC: 7.4 G/DL (ref 6–8.5)
RBC # BLD AUTO: 4.3 10*6/MM3 (ref 3.77–5.28)
SODIUM SERPL-SCNC: 138 MMOL/L (ref 136–145)
WBC NRBC COR # BLD AUTO: 6.54 10*3/MM3 (ref 3.4–10.8)

## 2025-07-25 PROCEDURE — 82378 CARCINOEMBRYONIC ANTIGEN: CPT

## 2025-07-25 PROCEDURE — 85025 COMPLETE CBC W/AUTO DIFF WBC: CPT

## 2025-07-25 PROCEDURE — 36415 COLL VENOUS BLD VENIPUNCTURE: CPT

## 2025-07-25 PROCEDURE — 86300 IMMUNOASSAY TUMOR CA 15-3: CPT

## 2025-07-25 PROCEDURE — 80053 COMPREHEN METABOLIC PANEL: CPT

## 2025-07-25 NOTE — PROGRESS NOTES
[facility] HEMATOLOGY & ONCOLOGY  [address]  [facility] [address] [phone]    Patient Name: Rachana Smart  Encounter Date: 2025  YOB: 1980  Patient Number: [id number]    REASON FOR VISIT: Rachana Smart is a 45 yoF   who returns in follow-up of invasive mammary (ductal) carcinoma, left breast- original tumor stage:  IA (pT1b, p(sn)N0, Mx, G2). ER 90%+, LA 8 %+, HER-2 (IHC) 1+-low.negative.  Low risk Oncotype (RS=12).  She underwent bilateral skin sparing mastectomies and left sentinel lymph node biopsies, 24. She was on adjuvant Tamoxifen 20 p.o. daily from 2024-25. On 25-underwent OSEAS/BSO:  Uterus, complete, with bilateral fallopian tubes and bilateral ovaries:No histologic evidence of malignancy. On 25 started adjuvant letrozole 2.5 mg po qd but stopped ~ 3/28/25 due to arthralgias and chest discomfort.  At her visit on 25 was started on alternative exemestane stopped on 24 due to hot flashes and arthralgias.  Was also seen in the ED for blood glucose> 500 on 2025. On 25 was started on anastrozole 1 mg po qd that she stopped within 2 weeks due to intolerance (see below).  Exemestane 25 mg resumed, 25- is taking qod.  She is accompanied by her spouse, Kurt.    I have reviewed the HPI and verified with the patient the accuracy of it. No changes to interval history since the information was documented. Matthew Cruz MD 25       Diagnostic abnormalities:  - History: +Narayanan, endometriosis, alcoholism in recovery, anemia, transfusions, anxiety, asthma, hepC, cirrhosis, esophageal varices wo bleeding, migraines, cigarette smoker (0.5 ppd x 24 yr), recent breast cancer  - 24- Mammogram- LEFT breast 6 mm focal asymmetry with spiculation/distortion. Recommend targeted ultrasound. No suspicious mammographic findings in the RIGHT breast. BIRADS 0  - 9/13/24-  US left breast-FINDINGS: Targeted LEFT breast ultrasound 11:00 position, 7 centimeters from the nipple demonstrates a 0.5 x 0.4 x 0.7 cm oval, parallel, hypoechoic mass with spiculated margins and internal vascularity. Posterior shadowing is present. This correlates in size, position and character with the mammographic finding.IMPRESSION: Suspicious LEFT breast mass. Recommend ultrasound-guided biopsy.  - 9/13/24- US guided breast biopsy- Left breast mass at 11 o'clock position, 7 cm from nipple, core biopsies: A.  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 B.  A prominent associated low-grade ductal carcinoma in situ component is present. C.  Linear length of invasive tumor is 4.6 mm. AJCC stage: pTX pNX. 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  Lifetime risk of 8.7-20% for colorectal cancer and 13-26% for endometrial cancer.  - 10/15/24-endometrial biopsy: Nondiagnostic specimen  - 10/29/24-glucose 148, potassium 3.1, albumin 3.1, AST 40, alk phos 125, total bili 2.8, Hgb 11, .8, platelets 120,000 otherwise normal CBC  - 11/5/24- Oncotype DX- RS 12; DRR 3%; ACTB <1%  - 12/5/24-12/27/24- gluc 117, sodium 132, K+ 2.7, bili 2.2, AST 34 otherwise normal CMP, CEA 5.61, mag 1.5, PO 2.4, Hgb 10.7 otherwise normal CBC, CA27-29 56.5,  59.3 (H)  - 12/27/24- CT CAP-No acute abnormality of the chest. No mass. No infiltrate. No lymphadenopathy.No evidence of intra-abdominal or pelvic metastatic disease. Cirrhosis with portal hypertension, interval resolution of ascites. 3.3 cm uncomplicated cyst in the left adnexa probably arising from the left ovary. Small sliding-type hiatal hernia. Partial contraction of the gallbladder, which contains solitary 5 mm gallstone, without convincing evidence of  cholecystitis.    Previous interventions:  - 11/5/24-bilateral skin sparing mastectomies and left sentinel lymph node biopsies: Final diagnoses:  1.  Left breast, mastectomy:  A.  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  B.  Minor associated low-grade ductal carcinoma in situ component.  C.  Invasive tumor is 7 mm.  D.  Radial scar adjacent to prior biopsy site exhibiting atypical ductal hyperplasia, usual ductal hyperplasia and columnar cell changes without atypia (4.8 mm).  E.  No malignancy or atypia identified at the margins of surgical resection.  F.  Invasive malignancy is 4.9 mm from the closest inked (anterosuperior) margin.  G.  Prior biopsy site changes.  H.  No evidence of Paget's disease of nipple.  I.  Overlying skin is negative for malignancy.     2.  Right breast, mastectomy:  A.  No mammary carcinoma identified.  B.  Radial scar with columnar cell changes and atypical ductal hyperplasia identified in random section of lower outer quadrant (3.6 mm).  C.  No evidence of Paget's disease of nipple.  D.  Overlying skin is negative for malignancy.  E.  Benign intramammary lymph node (1).  F.  Focal fibroadenomatoid changes identified in random section of central breast.     3.  Left axillary sentinel lymph nodes:  A.  Lymph nodes (4), negative for metastatic carcinoma.  B.  Absence of micrometastases is confirmed utilizing immunohistochemical stains for CK AE1/AE3.     AJCC stage: pT1b snpN0    Synoptic Checklist   INVASIVE CARCINOMA OF THE BREAST: Resection   8th Edition - Protocol posted: 6/19/2024INVASIVE CARCINOMA OF THE BREAST: RESECTION - All Specimens  SPECIMEN   Procedure  Total mastectomy   Specimen Laterality  Left   TUMOR   Tumor Site  Upper inner quadrant   Histologic Type  Invasive carcinoma of no special type (ductal)   Histologic Grade (Shara Histologic Score)     Glandular (Acinar) / Tubular Differentiation  Score 2   Nuclear Pleomorphism  Score 2   Mitotic Rate  Score  2   Overall Grade  Grade 2 (scores of 6 or 7)   Tumor Size  Greatest dimension of largest invasive focus (Millimeters): 7 mm   Tumor Focality  Single focus of invasive carcinoma   Ductal Carcinoma In Situ (DCIS)  Present     Negative for extensive intraductal component (EIC)   Nuclear Grade  Grade I (low)   Lymphatic and / or Vascular Invasion  Not identified   Dermal Lymphatic and / or Vascular Invasion  Not identified   Treatment Effect in the Breast  No known presurgical therapy   MARGINS   Margin Status for Invasive Carcinoma  All margins negative for invasive carcinoma   Distance from Invasive Carcinoma to Closest Margin  4.9 mm   Closest Margin(s) to Invasive Carcinoma  Anterosuperior   Margin Status for DCIS  All margins negative for DCIS   Distance from DCIS to Closest Margin  Greater than: 5 mm mm   Closest Margin(s) to DCIS  Anterosuperior   REGIONAL LYMPH NODES   Regional Lymph Node Status  All regional lymph nodes negative for tumor   Total Number of Lymph Nodes Examined (sentinel and non-sentinel)  4   Number of [place] Nodes Examined  4   pTNM CLASSIFICATION (AJCC 8th Edition)   Reporting of pT, pN, and (when applicable) pM categories is based on information available to the pathologist at the time the report is issued. As per the AJCC (Chapter 1, 8th Ed.) it is the managing physician's responsibility to establish the final pathologic stage based upon all pertinent information, including but potentially not limited to this pathology report.   pT Category  pT1b   pN Category  pN0   N Suffix  (sn)   SPECIAL STUDIES        Estrogen Receptor (ER) Status  Positive (greater than 10% of cells demonstrate nuclear positivity)   Percentage of Cells with Nuclear Positivity  90 %        Progesterone Receptor (PgR) Status  Positive   Percentage of Cells with Nuclear Positivity  8 %        HER2 (by immunohistochemistry)  Negative (Score 1+)        Ki-67 Percentage of Positive Nuclei  11 %        - Adjuvant  "Tamoxifen 20 mg po daily, 12/5/24- 2/7/25  -1/23/25-Uterus, complete, with bilateral fallopian tubes and bilateral ovaries:  - No cervical dysplasia identified.  - Disordered inactive to weakly proliferative endometrium, negative for atypia.  - Left and right fallopian tubes, no significant histopathologic abnormalities.  - Cystic follicles, focal surface papillary proliferation without atypia and a small ovarian [place] rest-like nest, possible  incipient Benny tumor (0.3 mm), right ovary.  - Hemorrhagic corpus luteum and luteinized cystic follicles, left ovary.  - No histologic evidence of malignancy.    -Adjuvant letrozole 2.5 mg po qd beginning 2/7/25-4/4/25. Stopped due to intolerance  -Adjuvant exemestane. 25 mg po qd beginning 4/4/25-5/4/25 due to hot flashes and arthralgias.  Was also seen in the ED for blood glucose> 500 on 5/2/2025.   - 5/9/25 was started on anastrozole 1 mg po qd stopped after 2 weeks due to intolerance (arthralgias, palpitations, abdominal bloating and, \"yellow stools\".  --Adjuvant exemestane 25 mg po qd resumed, 6/27/25  LABS    Lab Results - Last 18 Months   Lab Units 07/25/25  0708 06/20/25  0851 05/10/25  0943 05/02/25  0713 03/21/25  0911 01/28/25  1636 01/17/25  0846   HEMOGLOBIN g/dL 13.6 14.1 13.8 13.0 12.8 9.9* 11.2*   HEMATOCRIT % 40.4 41.1 41.4 37.8 38.0 31.4* 34.6   MCV fL 94.0 90.3 90.4 88.3 92.5 98.7* 94.8   WBC 10*3/mm3 6.54 5.66 4.91 6.36 8.91 12.31* 9.96   RDW % 14.3 15.3 14.7 13.9 14.4 15.2 14.9   MPV fL 10.3 10.2 10.3 10.5 10.2 10.7 10.4   PLATELETS 10*3/mm3 110* 132* 128* 132* 154 108* 136*   IMM GRAN % % 0.3 0.2 0.2 0.3 0.2 1.1* 0.6*   NEUTROS ABS 10*3/mm3 3.30 2.65 2.02 4.04 4.74 9.11* 5.57   LYMPHS ABS 10*3/mm3 2.10 2.02 2.09 1.61 2.92 1.69 2.84   MONOS ABS 10*3/mm3 0.73 0.67 0.47 0.40 0.76 1.09* 0.97*   EOS ABS 10*3/mm3 0.33 0.22 0.23 0.21 0.41* 0.23 0.41*   BASOS ABS 10*3/mm3 0.06 0.09 0.09 0.08 0.06 0.06 0.11   IMMATURE GRANS (ABS) 10*3/mm3 0.02 0.01 0.01 " 0.02 0.02 0.13* 0.06*   NRBC /100 WBC 0.0 0.0  --  0.0 0.0 0.0 0.0       Lab Results - Last 18 Months   Lab Units 07/25/25  0708 06/20/25  0851 05/10/25  1329 05/10/25  0955 05/10/25  0943 05/05/25  1007 05/02/25  1347 05/02/25  0917 05/02/25  0713   GLUCOSE mg/dL 133* 345* 238*  --  506* 223* 449*  --  599*   GLUCOSE, ARTERIAL mg/dL  --   --   --  506*  --   --   --    < >  --    SODIUM mmol/L 138 133* 135*  --  131* 137 132*  --  126*   SODIUM, VENOUS mmol/L  --   --   --  133*  --   --   --    < >  --    POTASSIUM mmol/L 3.7 3.9 3.2*  --  3.2* 4.0 3.1*  --  2.9*   POTASSIUM, VENOUS mmol/L  --   --   --  3.2*  --   --   --    < >  --    CO2 mmol/L 25.0 24.0 26.0  --  28.0 23.2 26.0  --  31.0*   CHLORIDE mmol/L 100 96* 97*  --  87* 99 93*  --  79*   ANION GAP mmol/L 13.0 13.0 12.0  --  16.0* 14.8 13.0  --  16.0*   CREATININE mg/dL 0.80 0.84 0.88  --  1.23* 1.07* 0.81  --  1.03*   BUN mg/dL 15.6 18.1 6  --  7 5* 9  --  10   BUN / CREAT RATIO  19.5 21.5 6.8*  --  5.7* 4.7* 11.1  --  9.7   CALCIUM mg/dL 10.3 9.8 8.3*  --  9.6 9.3 8.4*  --  9.5   ALK PHOS U/L 89 99  --   --  90 81 81  --  87   TOTAL PROTEIN g/dL 7.4 7.7  --   --  7.7 7.8 6.5  --  7.8   ALT (SGPT) U/L 36* 31  --   --  21 20 13  --  16   AST (SGOT) U/L 41* 49*  --   --  36* 46* 28  --  29   BILIRUBIN mg/dL 1.0 1.5*  --   --  2.1* 2.7* 1.8*  --  2.9*   ALBUMIN g/dL 4.0 3.9  --   --  3.6 3.7 3.1*  --  3.8   GLOBULIN gm/dL 3.4 3.8  --   --  4.1 4.1 3.4  --  4.0    < > = values in this interval not displayed.       Lab Results - Last 18 Months   Lab Units 07/25/25  0708 06/20/25  0851 05/02/25  0713 03/21/25  0911 01/17/25  0846 12/27/24  0919   CEA ng/mL 3.14 6.06 7.12 3.95 4.78 5.61       Lab Results - Last 18 Months   Lab Units 12/05/24  0949 03/18/24  1741 03/16/24  1429   IRON mcg/dL 68  --  11*   TIBC mcg/dL 274*  --  244*   IRON SATURATION (TSAT) % 25  --  5*   FERRITIN ng/mL 75.30  --  18.30   TSH uIU/mL  --  1.350 1.600   FOLATE ng/mL 9.23  --  9.22          PAST MEDICAL HISTORY:  ALLERGIES:  Allergies   Allergen Reactions    Oxycodone Itching     Pt states any pain medication causes severe itching, nausea    Codeine Rash and GI Intolerance     CURRENT MEDICATIONS:  Outpatient Encounter Medications as of 8/1/2025   Medication Sig Dispense Refill    Blood Glucose Monitoring Suppl (Blood Glucose Monitor System) w/Device kit Use 1 each 4 (Four) Times a Day. 1 each 0    Calcium Carbonate-Vitamin D 600-10 MG-MCG per tablet Take 1 tablet by mouth 2 (Two) Times a Day. 60 tablet 11    carvedilol (Coreg) 6.25 MG tablet Take 1 tablet by mouth 2 (Two) Times a Day With Meals. 60 tablet 3    exemestane (AROMASIN) 25 MG tablet Take 1 tablet by mouth Daily. 30 tablet 11    ferrous sulfate 325 (65 FE) MG tablet Take 1 tablet by mouth Daily With Breakfast.      furosemide (LASIX) 40 MG tablet Take 1 tablet by mouth As Needed (edema). 90 tablet 1    gabapentin (NEURONTIN) 300 MG capsule Take 1 capsule by mouth 2 (Two) Times a Day. 60 capsule 5    Glucagon 1 MG/0.2ML solution auto-injector Inject 1 mg under the skin into the appropriate area as directed Daily As Needed (glucose <60). 0.2 mL 1    glucose blood test strip 1 each by Other route 4 (Four) Times a Day. Use as instructed 200 each 5    ibuprofen (Motrin IB) 200 MG tablet Take 3 tablets by mouth Every 8 (Eight) Hours. Take every 8 hours for three days then take as needed.      insulin glargine (Lantus) 100 UNIT/ML injection Inject 25 Units under the skin into the appropriate area as directed Daily. 3 mL 5    Insulin Lispro, 1 Unit Dial, (HumaLOG KwikPen) 100 UNIT/ML solution pen-injector Inject 15 Units under the skin into the appropriate area as directed 3 (Three) Times a Day Before Meals. 9 mL 2    Lancets 33G misc Use 1 each 4 (Four) Times a Day. 200 each 5    metFORMIN ER (GLUCOPHAGE-XR) 500 MG 24 hr tablet Take 2 tablets by mouth Daily With Breakfast. 60 tablet 2    Multivitamin tablet tablet Take 1 tablet by mouth  Daily.      POTASSIUM BICARBONATE PO Take  by mouth.      potassium chloride (KLOR-CON M20) 20 MEQ CR tablet TAKE 1 TABLET BY MOUTH THREE TIMES DAILY FOR 4 DAYS      spironolactone (ALDACTONE) 25 MG tablet Take 1 tablet by mouth Daily.      vitamin B-12 (cyanocobalamin) 100 MCG tablet Take 5 tablets by mouth Daily.       Facility-Administered Encounter Medications as of 2025   Medication Dose Route Frequency Provider Last Rate Last Admin    lidocaine 1% - EPINEPHrine 1:[id number] (XYLOCAINE W/EPI) 1 %-1:[id number] injection 10 mL  10 mL Injection Once Amina Camarillo MD         ADULT ILLNESSES:  Patient Active Problem List   Diagnosis Code    Tobacco use Z72.0    Anxiety F41.9    Essential hypertension I10    Hypomagnesemia E83.42    Alcoholism F10.20    Macrocytosis D75.89    Anemia - iron deficiency and chronic diseased D64.9    Alcoholic cirrhosis of liver with ascites K70.31    Colon cancer screening Z12.11    Secondary esophageal varices without bleeding I85.10    Invasive ductal carcinoma of breast, female, left C50.912    Malignant neoplasm of upper-inner quadrant of left female breast C50.212    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    Breast cancer C50.919    Malignant neoplasm of upper-inner quadrant of left female breast, unspecified estrogen receptor status C50.212    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 Bernardo--  Diverticulosis in the left colon. - The examination was otherwise normal on direct and retroflexion views. - No specimens collected    D & C HYSTEROSCOPY N/A 11/05/2024    Procedure: DILATATION AND CURETTAGE HYSTEROSCOPY;  Surgeon: Glo Benavides MD;  Location: [facility] OR;  Service: Obstetrics/Gynecology;  Laterality: N/A;    ENDOSCOPY N/A 01/31/2024    no evidence of any fresh or old blood or clots,normal stomach    ENDOSCOPY N/A 08/09/2024    -Grade I esophageal varices. - Portal hypertensive gastropathy. - Normal examined duodenum. - No specimens collected    MASTECTOMY W/ SENTINEL NODE BIOPSY Bilateral 11/05/2024    Procedure: BILATERAL SKIN SPARING MASTECTOMY WITH LEFT AXILLARY MAGTRACE GUIDED SENTINEL LYMPH NODE BIOPSY (Dr. Prieto to nahid);  Surgeon: Fide Verduzco MD;  Location: [facility] OR;  Service: General;  Laterality: Bilateral;    MOUTH SURGERY      TOTAL LAPAROSCOPIC HYSTERECTOMY SALPINGO OOPHORECTOMY Bilateral 1/23/2025    Procedure: TOTAL LAPAROSCOPIC HYSTERECTOMY BILATERAL SALPINGOOPHORECTOMY WITH DAVINCI ROBOT (removal of uterus, cervix, both fallopian tubes and ovaries using the davinci robot);  Surgeon: Glo Benavides MD;  Location: [facility] OR;  Service: Robotics - DaVinci;  Laterality: Bilateral;    UPPER GASTROINTESTINAL ENDOSCOPY  3/24    WISDOM TOOTH EXTRACTION  1995     HEALTH MAINTENANCE ITEMS:  Health Maintenance Due   Topic Date Due    DIABETIC FOOT EXAM  Never done    DIABETIC EYE EXAM  Never done    URINE MICROALBUMIN-CREATININE RATIO (uACR)  Never done    COVID-19 Vaccine (1 - 2024-25 season) Never done         <no information>  Last Completed Colonoscopy            Upcoming       COLORECTAL CANCER SCREENING (COLONOSCOPY - Every 3 Years) Next due on 8/9/2027 08/09/2024  Surgical Procedure: COLONOSCOPY    08/09/2024  Colonoscopy    03/16/2024  Fecal Occult Blood component of POC Occult Blood Stool    01/31/2024  Fecal Occult Blood component of POCT Occult  "Blood, Stool                          Immunization History   Administered Date(s) Administered    Hep A / Hep B 03/01/2023    Pneumococcal Conjugate 20-Valent (PCV20) 03/01/2023    Tdap 01/17/2024     Last Completed Mammogram            Completed or No Longer Recommended       MAMMOGRAM  Discontinued        Frequency changed to Never automatically (Topic No Longer Applies)    09/11/2024  Mammo Screening Digital Tomosynthesis Bilateral With CAD                              FAMILY HISTORY:  Family History   Problem Relation Age of Onset    Diabetes Mother     Heart disease Father     Diabetes Father     Stroke Father     Melanoma Father 40 - 49    Esophageal cancer Father 60 - 69    Skin cancer Maternal Aunt     Colon cancer Paternal Uncle     Pancreatic cancer Paternal Uncle     Cervical cancer Maternal Grandmother 40 - 49    Skin cancer Paternal Grandmother     Cancer Paternal Grandfather 50 - 59        Mesothelioma    Breast cancer Half-Sister 45    Colon polyps Neg Hx     Uterine cancer Neg Hx      SOCIAL HISTORY:  Social History     Socioeconomic History    Marital status:    Tobacco Use    Smoking status: Some Days     Current packs/day: 0.50     Average packs/day: 0.5 packs/day for 10.0 years (5.0 ttl pk-yrs)     Types: Cigarettes    Smokeless tobacco: Never   Vaping Use    Vaping status: Never Used   Substance and Sexual Activity    Alcohol use: Not Currently     Alcohol/week: 10.0 standard drinks of alcohol     Types: 10 Standard drinks or equivalent per week    Drug use: Never    Sexual activity: Yes     Partners: Male     Birth control/protection: None, Coitus interruptus       REVIEW OF SYSTEMS:  Review of Systems   Constitutional:  Positive for fatigue.        Manages her ADLs, chores, errands, and driving.  Is up and about, \"all the time.\"      letrozole tolerance: since stopping letrozole (after about 6 weeks of use), says arthralgias, vague sense of chest discomfort and \"liver fullness\" have " "resolved.    Exemestane tolerance: On 25 was started on exemestane stopped on 24 due to hot flashes and arthralgias. On 25 was started on anastrozole 1 mg po qd- stopped after 2 weeks. Exemestane 25 mg resumed, 25.  States she is tolerating it when taking qod.  \"I am just fine with it.\"  Mild hot flashes.  No new arthralgias.  No chest discomfort.  No GI complaints.    Anastrozole tolerance:  Stopped after 2 weeks due to \"yellow urine/stools\", palpitations, bloating, mild hot flashes and arthralgias.       HENT:  Positive for postnasal drip.    Eyes: Negative.    Respiratory: Negative.          Current cigarette smoker-age 20 -present:  Less than 1/2 pack/day   Cardiovascular: Negative.    Gastrointestinal: Negative.    Endocrine: Positive for heat intolerance (\"Hot flashes\").        Menarche age 10    Cessation of menstrual bleeding since TLH/BSO, 2025.    G3,  (miscarriage)    - Checks her blood sugars at least 4 times a day.  States she has been running in the low 100s range on current dose of insulin.     Genitourinary: Negative.         Underwent total laparoscopic hysterectomy/bilateral salpingo-oophorectomy, 2025   Musculoskeletal:  Positive for arthralgias (Chronic-worse on anastrozole.), back pain (Chronic--unchanged) and myalgias (Nocturnal leg).   Skin: Negative.    Allergic/Immunologic: Negative.    Neurological:  Negative for dizziness (Postural dizziness).   Hematological: Negative.    Psychiatric/Behavioral: Negative.         /68   Pulse 79   Temp 97.1 °F (36.2 °C) (Temporal)   Resp 18   Ht 170.2 cm (67\")   Wt 73.1 kg (161 lb 3.2 oz)   LMP 2024 (Exact Date)   SpO2 97%   BMI 25.25 kg/m²  Body surface area is 1.84 meters squared.  Pain Score    25 1010   PainSc: 0-No pain         Physical Exam  Vitals and nursing note reviewed. Exam conducted with a chaperone present.   Constitutional:       Comments: Pleasant, cooperative, modestly Middle-aged " female.  Ambulatory.  ECOG 0.     Has regained 13 lb (had lost 2 lb at his prior visit) since her last visit    HENT:      Head: Normocephalic and atraumatic.   Eyes:      General: No scleral icterus.     Extraocular Movements: Extraocular movements intact.      Pupils: Pupils are equal, round, and reactive to light.   Cardiovascular:      Rate and Rhythm: Normal rate.   Pulmonary:      Effort: Pulmonary effort is normal.   Chest:   Breasts:     Right: Absent.      Left: Absent.          Comments:   Left breast mastectomy incision is well-healed.  Some overlying incisional/scar fullness is noted.  No overt nodularity    Left breast mastectomy incision and left sentinel lymph node biopsy incision are healed with some overlying incisional/scar fullness noted.  No overt nodularity.    No overt supraclavicular/axillary adenopathy bilaterally.  Abdominal:      Palpations: Abdomen is soft.      Tenderness: There is no abdominal tenderness.      Comments: Laparoscopic incisions are noted to be healed..   Musculoskeletal:         General: Normal range of motion.      Cervical back: Normal range of motion.   Lymphadenopathy:      Cervical: No cervical adenopathy.      Upper Body:      Right upper body: No supraclavicular or axillary adenopathy.      Left upper body: No supraclavicular or axillary adenopathy.   Skin:     General: Skin is warm.   Neurological:      General: No focal deficit present.      Mental Status: She is alert and oriented to person, place, and time.   Psychiatric:         Mood and Affect: Mood normal.         Behavior: Behavior normal.         Thought Content: Thought content normal.         .Assessment:  1.   Invasive mammary (ductal) carcinoma, left breast            Original tumor stage:  IA (pT1b, p(sn)N0, Mx, G2). ER 90%+, NM 8 %+, HER-2 (IHC) 1+-low.negative.            Original tumor burden:     - 9/11/24- Mammogram- LEFT breast 6 mm focal asymmetry with spiculation/distortion. Recommend targeted  ultrasound. No suspicious mammographic findings in the RIGHT breast. BIRADS 0  - 9/13/24- US left breast-FINDINGS: Targeted LEFT breast ultrasound 11:00 position, 7 centimeters from the nipple demonstrates a 0.5 x 0.4 x 0.7 cm oval, parallel, hypoechoic mass with spiculated margins and internal vascularity. Posterior shadowing is present. This correlates in size, position and character with the mammographic finding.IMPRESSION:  Suspicious LEFT breast mass. Recommend ultrasound-guided biopsy.  - 9/13/24- US guided breast biopsy- Left breast mass at 11 o'clock position, 7 cm from nipple, core biopsies: A.  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 B.  A prominent associated low-grade ductal carcinoma in situ component is present. C.  Linear length of invasive tumor is 4.6 mm. AJCC stage: pTX pNX. 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  Lifetime risk of 8.7-20% for colorectal cancer and 13-26% for endometrial cancer.  11/5/24- Oncotype DX- RS 12; DRR 3%; ACTB <1%           Tumor status:   - 11/5/24-bilateral skin sparing mastectomies and left sentinel lymph node biopsies: Final diagnoses:  1.  Left breast, mastectomy:  A.  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  B.  Minor associated low-grade ductal carcinoma in situ component.  C.  Invasive tumor is 7 mm.  D.  Radial scar adjacent to prior biopsy site exhibiting atypical ductal hyperplasia, usual ductal hyperplasia and columnar cell changes without atypia (4.8 mm).  E.  No malignancy or atypia identified at the margins of surgical resection.  F.  Invasive malignancy is 4.9 mm from the closest inked (anterosuperior) margin.  G.  Prior biopsy site changes.  H.  No evidence of Paget's disease of nipple.  I.  Overlying skin is negative for malignancy.  2.  Left axillary sentinel lymph  "nodes:  A.  Lymph nodes (4), negative for metastatic carcinoma.  B.  Absence of micrometastases is confirmed utilizing immunohistochemical stains for CK AE1/AE3.   -AJCC stage: pT1b snpN0    --Adjuvant Tamoxifen beginning 12/5/24-2/7/25  --Adjuvant letrozole 2.5 mg po qd beginning, 2/7/25- 4/4/25-intolerant  --Adjuvant exemestane 25 mg po qd, 4/4/25-stopped on 5/4/24 due to hot flashes and arthralgias.  Was also seen in the ED for blood glucose> 500 on 5/2/2025.   --5/9/25 was started on anastrozole 1 mg po qd. stopped after 2 weeks due to intolerance (arthralgias, palpitations, abdominal bloating and, \"yellow stools\".  --Adjuvant exemestane 25 mg po qod resumed, 6/27/25    2.   Abnormal tumor markers.  Need follow-up:  -7/25/2025--CEA 3.14 (prior peak: 7.12), CA 27-29 30.7 (prior peak: 59.7),     3.   Narayanan syndrome  - 8/9/24- Colonoscopy- - Diverticulosis in the left colon. - The examination was otherwise normal on direct and retroflexion views. - No specimens collected. Repeat 10 yr  - 10/2/24- Pap smear- Negative IEL/HPV not detected  - 10/15/24-Endometrium, curettage:  A.  Late secretory endometrium.  B.  Fragments of squamous mucosa and endocervical mucosa.  C.  Blood.  D.  No evidence of atypia or malignancy.  -1/23/25-OSEAS/BSO: Uterus, complete, with bilateral fallopian tubes and bilateral ovaries:  - No cervical dysplasia identified.  - Disordered inactive to weakly proliferative endometrium, negative for atypia.  - Left and right fallopian tubes, no significant histopathologic abnormalities.  - Cystic follicles, focal surface papillary proliferation without atypia and a small ovarian Walthard rest-like nest, possible  incipient Benny tumor (0.3 mm), right ovary.  - Hemorrhagic corpus luteum and luteinized cystic follicles, left ovary.  - No histologic evidence of malignancy.    4.   Anemia, macrocytic.  From cirrhosis  --Hgb 13.6; MCV 94, 7/25/2025 (prior hemant:Hgb 5.2, 3/16/24)  5.   " HepC/cirrhosis/esophageal varices.  Followed by hepatology in [place]  - 8/9/24- EGD-- Grade I esophageal varices. - Portal hypertensive gastropathy. - Normal examined duodenum. - No specimens collected. Rx:  Coreg  6.   Alcoholism in remission  7.   Tobacco smoker- <1/2 ppd since age 20  8.   Surgically postmenopausal  9.   Intermittent thrombocytopenia. Hypersplenism?  -- 110,000, 7/25/2025 (prior hemant: 102,000, 2/20/23)  10.   Osteopenia.  Already on Os-Floyd and Prolia  - 12/13/24- DEXA scan- Osteopenia and increased fracture risk  11.  T2DM. Now on insulin.  Dr. Holloway        Plan:  1.   Review labs, 7/25/25 glucose 133 (peak:599), sodium 138, AST 41 (peak: 47), ALT 36, bili 1.0 (peak: 2.9) otherwise normal CMP, CEA 3.14 (prior peak: 7.12), CA 27-29 30.7 (prior peak: 59.7), platelets 110,000 otherwise stable CBC.      2.   Exemestane tolerance.    States she is tolerating well taking q. other day.  Mild hot flashes.  No new arthralgias.  No GI complaints.  States she will press on.  3.   The rationale for adjuvant hormonal manipulation with AI's is again discussed/reviewed. The potential risks (to include but not limited to: Hot flashes, asthenia, pain, arthralgia, arthritis, nausea/vomiting, headache, pharyngitis, depression, rash, hypertension, lymphedema, insomnia, edema, weight gain, dyspnea, abdominal pain, constipation, osteoporosis, fractures, cough, bone pain, diarrhea, breast pain, paresthesias, infection, cataracts, myalgias, thromboembolism, angina, endometrial carcinoma, myocardial infarction, stroke, anemia, leukopenia, erythema multiforme, Salguero-Garrett syndrome) are discussed at length. Questions answered. She agrees to press on with exemestane qod    4.   Rx:  Exemestane 25 mg po qod # 30 x 11 refills                Oscal 600/400 po bid dispense 30 x 11 refills                Prolia 60 mg sc q 6 mo                 5.   Review NCCN guidelines version invasive breast cancer-systemic  adjuvant treatment: HR positive, HER-2 negative disease in premenopausal patients with pT1-3 and pN0 tumors(ductal, lobular, mixed)-tumor </= 0.5 cm and pN0 -consider adjuvant endocrine therapy (category 2B).  If tumor>0.5cm and pN0-strongly consider 21 gene RT-PCR assay if candidate for chemotherapy (category 1)-if not done: Adjuvant chemotherapy followed by endocrine therapy (category 1) or adjuvant endocrine therapy; recurrence score<15: Adjuvant endocrine therapy/ovarian suppression/ablation; recurrence score 16-25-adjuvant endocrine therapy/ovarian suppression/ablation or adjuvant chemotherapy followed by endocrine therapy; recurrence score >26-adjuvant chemotherapy followed by endocrine therapy.  Surveillance follow-up: H&P 1-4 times per year as clinically appropriate for 5 years then annually.  Genetic screening.  Postsurgical management: Lymphedema management.  Imaging: Mammogram every 12 months.  Routine imaging of reconstructed breast not indicated.  For patient receiving anthracycline based therapy: Echocardiogram recommendation per NCCN guidelines for survivorship.  Screening for metastasis: In the absence of clinical signs and symptoms suggestive of recurrent disease no indication for laboratory or imaging studies for metastatic screening.        6.  Continue management per primary care and other specialists  7.   Return to office in 12 weeks with preoffice CBC and differential, CMP, CEA and CA 27-29  8.   Importance of Smoking Cessation discussed with patient and informed patient additional information will be on today's [facility]'s Flyer - Plan to Be Tobacco Free handout provided to patient         I spent ~33 minutes caring for Rachana on this date of service. This time includes time spent by me in the following activities: preparing for the visit, reviewing tests, performing a medically appropriate examination and/or evaluation, counseling and educating the patient/family/caregiver,  ordering medications, tests, or procedures and documenting information in the medical record.

## 2025-07-26 LAB — CANCER AG27-29 SERPL-ACNC: 30.7 U/ML (ref 0–38.6)

## 2025-08-01 ENCOUNTER — OFFICE VISIT (OUTPATIENT)
Dept: ONCOLOGY | Facility: CLINIC | Age: 45
End: 2025-08-01
Payer: COMMERCIAL

## 2025-08-01 VITALS
OXYGEN SATURATION: 97 % | SYSTOLIC BLOOD PRESSURE: 102 MMHG | DIASTOLIC BLOOD PRESSURE: 68 MMHG | HEIGHT: 67 IN | HEART RATE: 79 BPM | WEIGHT: 161.2 LBS | BODY MASS INDEX: 25.3 KG/M2 | RESPIRATION RATE: 18 BRPM | TEMPERATURE: 97.1 F

## 2025-08-01 DIAGNOSIS — C50.912 INVASIVE DUCTAL CARCINOMA OF BREAST, FEMALE, LEFT: Primary | ICD-10-CM

## 2025-08-04 ENCOUNTER — OFFICE VISIT (OUTPATIENT)
Dept: INTERNAL MEDICINE | Facility: CLINIC | Age: 45
End: 2025-08-04
Payer: COMMERCIAL

## 2025-08-04 VITALS
SYSTOLIC BLOOD PRESSURE: 99 MMHG | DIASTOLIC BLOOD PRESSURE: 67 MMHG | WEIGHT: 162 LBS | OXYGEN SATURATION: 98 % | HEART RATE: 76 BPM | TEMPERATURE: 97.8 F | BODY MASS INDEX: 25.43 KG/M2 | HEIGHT: 67 IN

## 2025-08-04 DIAGNOSIS — E11.65 TYPE 2 DIABETES MELLITUS WITH HYPERGLYCEMIA, WITH LONG-TERM CURRENT USE OF INSULIN: Primary | ICD-10-CM

## 2025-08-04 DIAGNOSIS — Z79.4 TYPE 2 DIABETES MELLITUS WITH HYPERGLYCEMIA, WITH LONG-TERM CURRENT USE OF INSULIN: Primary | ICD-10-CM

## 2025-08-04 DIAGNOSIS — C50.912 INVASIVE DUCTAL CARCINOMA OF BREAST, FEMALE, LEFT: ICD-10-CM

## 2025-08-04 LAB
EXPIRATION DATE: ABNORMAL
HBA1C MFR BLD: 8.4 % (ref 4.5–5.7)
Lab: ABNORMAL

## 2025-08-04 PROCEDURE — 83036 HEMOGLOBIN GLYCOSYLATED A1C: CPT | Performed by: INTERNAL MEDICINE

## 2025-08-04 PROCEDURE — 99213 OFFICE O/P EST LOW 20 MIN: CPT | Performed by: INTERNAL MEDICINE

## 2025-08-04 RX ORDER — INSULIN LISPRO 100 [IU]/ML
20 INJECTION, SOLUTION INTRAVENOUS; SUBCUTANEOUS
Qty: 15 ML | Refills: 2 | Status: SHIPPED | OUTPATIENT
Start: 2025-08-04

## 2025-08-04 RX ORDER — INSULIN GLARGINE 100 [IU]/ML
30 INJECTION, SOLUTION SUBCUTANEOUS DAILY
Qty: 9 ML | Refills: 3 | Status: SHIPPED | OUTPATIENT
Start: 2025-08-04

## 2025-08-13 ENCOUNTER — TELEPHONE (OUTPATIENT)
Dept: INTERNAL MEDICINE | Facility: CLINIC | Age: 45
End: 2025-08-13
Payer: COMMERCIAL

## 2025-08-18 ENCOUNTER — TELEPHONE (OUTPATIENT)
Dept: ONCOLOGY | Facility: CLINIC | Age: 45
End: 2025-08-18
Payer: COMMERCIAL

## 2025-08-25 ENCOUNTER — TELEPHONE (OUTPATIENT)
Dept: ONCOLOGY | Facility: CLINIC | Age: 45
End: 2025-08-25
Payer: COMMERCIAL

## (undated) DEVICE — ARM DRAPE

## (undated) DEVICE — PAD,NON-ADHERENT,3X8,STERILE,LF,1/PK: Brand: MEDLINE

## (undated) DEVICE — Device

## (undated) DEVICE — SENSR O2 OXIMAX FNGR A/ 18IN NONSTR

## (undated) DEVICE — CONMED SCOPE SAVER BITE BLOCK, 20X27 MM: Brand: SCOPE SAVER

## (undated) DEVICE — SUT MNCRYL 4/0 PS2 27IN UD MCP426H

## (undated) DEVICE — GLV SURG SENSICARE W/ALOE PF LF 6.5 STRL

## (undated) DEVICE — ADHS LIQ MASTISOL 2/3ML

## (undated) DEVICE — PK POSTN TRENGUARD450 PROC

## (undated) DEVICE — BLADELESS OBTURATOR: Brand: WECK VISTA

## (undated) DEVICE — BNDG ELAS W/CLIP 6IN 10YD LF STRL

## (undated) DEVICE — UTILITY MARKER W/MED LABELS: Brand: MEDLINE

## (undated) DEVICE — ANTIBACTERIAL UNDYED BRAIDED (POLYGLACTIN 910), SYNTHETIC ABSORBABLE SUTURE: Brand: COATED VICRYL

## (undated) DEVICE — THE CHANNEL CLEANING BRUSH IS A NYLON FLEXI BRUSH ATTACHED TO A FLEXIBLE PLASTIC SHEATH DESIGNED TO SAFELY REMOVE DEBRIS FROM FLEXIBLE ENDOSCOPES.

## (undated) DEVICE — NEEDLE,22GX1.5",REG,BEVEL: Brand: MEDLINE

## (undated) DEVICE — Device: Brand: DEFENDO AIR/WATER/SUCTION AND BIOPSY VALVE

## (undated) DEVICE — PAD MAJOR: Brand: MEDLINE INDUSTRIES, INC.

## (undated) DEVICE — NDL HYPO PRECISIONGLIDE/REG 18G 11/2 PNK

## (undated) DEVICE — APPL HEMO ENDO SURGICEL 2IN1 1P/U STRL

## (undated) DEVICE — KT CLN CLEANOR SCPE

## (undated) DEVICE — TRAP FLD MINIVAC MEGADYNE 100ML

## (undated) DEVICE — CLTH CLENS READYCLEANSE PERI CARE PK/5

## (undated) DEVICE — SYR LL TP 10ML STRL

## (undated) DEVICE — ELECTRD BLD EZ CLN MOD XLNG 2.75IN

## (undated) DEVICE — PATIENT RETURN ELECTRODE, SINGLE-USE, CONTACT QUALITY MONITORING, ADULT, WITH 9FT CORD, FOR PATIENTS WEIGING OVER 33LBS. (15KG): Brand: MEGADYNE

## (undated) DEVICE — 4-PORT MANIFOLD: Brand: NEPTUNE 2

## (undated) DEVICE — YANKAUER,BULB TIP WITH VENT: Brand: ARGYLE

## (undated) DEVICE — MANIP UTER ELEVATOR/PRO W/LNG/HNDL CERV/CUP 35MM MD

## (undated) DEVICE — TIP COVER ACCESSORY

## (undated) DEVICE — VESSEL SEALER EXTEND: Brand: ENDOWRIST

## (undated) DEVICE — MASK,OXYGEN,MED CONC,ADLT,7' TUB, UC: Brand: PENDING

## (undated) DEVICE — STRIP CLS WND CURAD MEDI/STRIP HYPOALLERG 0.25X4IN PK/10

## (undated) DEVICE — STRIP,CLOSURE,WOUND,MEDI-STRIP,1/2X4: Brand: MEDLINE

## (undated) DEVICE — PAD D&C: Brand: MEDLINE INDUSTRIES, INC.

## (undated) DEVICE — SHEET,DRAPE,53X77,STERILE: Brand: MEDLINE

## (undated) DEVICE — SUT SILK 2/0 SH CR8 18IN CR8 C012D

## (undated) DEVICE — GLOVE,SURG,SENSICARE,ALOE,LF,PF,6: Brand: MEDLINE

## (undated) DEVICE — PAD,ABDOMINAL,8"X10",ST,LF: Brand: MEDLINE

## (undated) DEVICE — GAUZE,SPONGE,FLUFF,6"X6.75",STRL,10/TRAY: Brand: MEDLINE

## (undated) DEVICE — SYR LUERLOK 30CC

## (undated) DEVICE — TBG INSUFFLATION LUER LOCK: Brand: MEDLINE INDUSTRIES, INC.

## (undated) DEVICE — CYSTO/BLADDER IRRIGATION SET, REGULATING CLAMP

## (undated) DEVICE — TUBING, SUCTION, 1/4" X 12', STRAIGHT: Brand: MEDLINE

## (undated) DEVICE — SEAL

## (undated) DEVICE — SYR PRECISIONGLIDE LL 5CC 20X1 1/2IN

## (undated) DEVICE — TOWEL,OR,DSP,ST,BLUE,STD,4/PK,20PK/CS: Brand: MEDLINE

## (undated) DEVICE — APPL CHLORAPREP HI/LITE 26ML ORNG

## (undated) DEVICE — CLEANER,CAUTERY TIP,2X2",STERILE: Brand: MEDLINE

## (undated) DEVICE — DAVINCI: Brand: MEDLINE INDUSTRIES, INC.

## (undated) DEVICE — CUFF,BP,DISP,1 TUBE,ADULT,HP: Brand: MEDLINE

## (undated) DEVICE — CVR BRD ARM 13X30

## (undated) DEVICE — 3M™ IOBAN™ 2 ANTIMICROBIAL INCISE DRAPE 6650EZ: Brand: IOBAN™ 2

## (undated) DEVICE — STERILE (14X122CM) TELESCOPICALLY-FOLDED COVER: Brand: CIV-CLEAR™ TRANSDUCER COVER